# Patient Record
Sex: FEMALE | Race: ASIAN | NOT HISPANIC OR LATINO | Employment: UNEMPLOYED | URBAN - METROPOLITAN AREA
[De-identification: names, ages, dates, MRNs, and addresses within clinical notes are randomized per-mention and may not be internally consistent; named-entity substitution may affect disease eponyms.]

---

## 2017-02-03 ENCOUNTER — GENERIC CONVERSION - ENCOUNTER (OUTPATIENT)
Dept: OTHER | Facility: OTHER | Age: 57
End: 2017-02-03

## 2017-02-14 ENCOUNTER — ALLSCRIPTS OFFICE VISIT (OUTPATIENT)
Dept: OTHER | Facility: OTHER | Age: 57
End: 2017-02-14

## 2017-02-24 ENCOUNTER — GENERIC CONVERSION - ENCOUNTER (OUTPATIENT)
Dept: OTHER | Facility: OTHER | Age: 57
End: 2017-02-24

## 2017-04-12 ENCOUNTER — ALLSCRIPTS OFFICE VISIT (OUTPATIENT)
Dept: OTHER | Facility: OTHER | Age: 57
End: 2017-04-12

## 2017-04-27 ENCOUNTER — ALLSCRIPTS OFFICE VISIT (OUTPATIENT)
Dept: OTHER | Facility: OTHER | Age: 57
End: 2017-04-27

## 2017-06-16 ENCOUNTER — GENERIC CONVERSION - ENCOUNTER (OUTPATIENT)
Dept: OTHER | Facility: OTHER | Age: 57
End: 2017-06-16

## 2017-08-03 DIAGNOSIS — M17.0 PRIMARY OSTEOARTHRITIS OF BOTH KNEES: ICD-10-CM

## 2017-08-03 DIAGNOSIS — M16.12 PRIMARY OSTEOARTHRITIS OF LEFT HIP: ICD-10-CM

## 2017-08-05 ENCOUNTER — LAB CONVERSION - ENCOUNTER (OUTPATIENT)
Dept: OTHER | Facility: OTHER | Age: 57
End: 2017-08-05

## 2017-08-05 LAB
A/G RATIO (HISTORICAL): 1.8 (CALC) (ref 1–2.5)
ALBUMIN SERPL BCP-MCNC: 4.4 G/DL (ref 3.6–5.1)
ALP SERPL-CCNC: 99 U/L (ref 33–130)
ALT SERPL W P-5'-P-CCNC: 26 U/L (ref 6–29)
AST SERPL W P-5'-P-CCNC: 21 U/L (ref 10–35)
BILIRUB SERPL-MCNC: 0.5 MG/DL (ref 0.2–1.2)
BILIRUB UR QL STRIP: NEGATIVE
BUN SERPL-MCNC: 14 MG/DL (ref 7–25)
BUN/CREA RATIO (HISTORICAL): NORMAL (CALC) (ref 6–22)
CALCIUM SERPL-MCNC: 9.3 MG/DL (ref 8.6–10.4)
CHLORIDE SERPL-SCNC: 107 MMOL/L (ref 98–110)
CHOLEST SERPL-MCNC: 192 MG/DL (ref 125–200)
CHOLEST/HDLC SERPL: 2.9 (CALC)
CO2 SERPL-SCNC: 26 MMOL/L (ref 20–31)
COLOR UR: YELLOW
COMMENT (HISTORICAL): CLEAR
CREAT SERPL-MCNC: 0.72 MG/DL (ref 0.5–1.05)
DEPRECATED RDW RBC AUTO: 12.9 % (ref 11–15)
EGFR AFRICAN AMERICAN (HISTORICAL): 106 ML/MIN/1.73M2
EGFR-AMERICAN CALC (HISTORICAL): 92 ML/MIN/1.73M2
FECAL OCCULT BLOOD DIAGNOSTIC (HISTORICAL): NEGATIVE
GAMMA GLOBULIN (HISTORICAL): 2.4 G/DL (CALC) (ref 1.9–3.7)
GLUCOSE (HISTORICAL): 98 MG/DL (ref 65–99)
GLUCOSE (HISTORICAL): NEGATIVE
HCT VFR BLD AUTO: 40.5 % (ref 35–45)
HDLC SERPL-MCNC: 66 MG/DL
HGB BLD-MCNC: 13.3 G/DL (ref 11.7–15.5)
KETONES UR STRIP-MCNC: NEGATIVE MG/DL
LDL CHOLESTEROL (HISTORICAL): 102 MG/DL (CALC)
LEUKOCYTE ESTERASE UR QL STRIP: NEGATIVE
MCH RBC QN AUTO: 30 PG (ref 27–33)
MCHC RBC AUTO-ENTMCNC: 32.8 G/DL (ref 32–36)
MCV RBC AUTO: 91.4 FL (ref 80–100)
NITRITE UR QL STRIP: NEGATIVE
NON-HDL-CHOL (CHOL-HDL) (HISTORICAL): 126 MG/DL (CALC)
PH UR STRIP.AUTO: 6 [PH] (ref 5–8)
PLATELET # BLD AUTO: 234 THOUSAND/UL (ref 140–400)
PMV BLD AUTO: 9.8 FL (ref 7.5–12.5)
POTASSIUM SERPL-SCNC: 4.1 MMOL/L (ref 3.5–5.3)
PROT UR STRIP-MCNC: NEGATIVE MG/DL
RBC # BLD AUTO: 4.43 MILLION/UL (ref 3.8–5.1)
SODIUM SERPL-SCNC: 142 MMOL/L (ref 135–146)
SP GR UR STRIP.AUTO: 1.01 (ref 1–1.03)
T4 FREE SERPL-MCNC: 1.5 NG/DL (ref 0.8–1.8)
TOTAL PROTEIN (HISTORICAL): 6.8 G/DL (ref 6.1–8.1)
TRIGL SERPL-MCNC: 122 MG/DL
TSH SERPL DL<=0.05 MIU/L-ACNC: 2.2 MIU/L (ref 0.4–4.5)
WBC # BLD AUTO: 8.2 THOUSAND/UL (ref 3.8–10.8)

## 2017-08-07 ENCOUNTER — APPOINTMENT (OUTPATIENT)
Dept: PHYSICAL THERAPY | Facility: REHABILITATION | Age: 57
End: 2017-08-07
Payer: COMMERCIAL

## 2017-08-07 PROCEDURE — 97110 THERAPEUTIC EXERCISES: CPT

## 2017-08-07 PROCEDURE — 97162 PT EVAL MOD COMPLEX 30 MIN: CPT

## 2017-08-14 ENCOUNTER — APPOINTMENT (OUTPATIENT)
Dept: PHYSICAL THERAPY | Facility: REHABILITATION | Age: 57
End: 2017-08-14
Payer: COMMERCIAL

## 2017-08-14 PROCEDURE — 97110 THERAPEUTIC EXERCISES: CPT

## 2017-08-17 ENCOUNTER — APPOINTMENT (OUTPATIENT)
Dept: PHYSICAL THERAPY | Facility: REHABILITATION | Age: 57
End: 2017-08-17
Payer: COMMERCIAL

## 2017-08-17 ENCOUNTER — ALLSCRIPTS OFFICE VISIT (OUTPATIENT)
Dept: OTHER | Facility: OTHER | Age: 57
End: 2017-08-17

## 2017-08-21 ENCOUNTER — APPOINTMENT (OUTPATIENT)
Dept: PHYSICAL THERAPY | Facility: REHABILITATION | Age: 57
End: 2017-08-21
Payer: COMMERCIAL

## 2017-08-24 ENCOUNTER — APPOINTMENT (OUTPATIENT)
Dept: PHYSICAL THERAPY | Facility: REHABILITATION | Age: 57
End: 2017-08-24
Payer: COMMERCIAL

## 2017-08-28 ENCOUNTER — ALLSCRIPTS OFFICE VISIT (OUTPATIENT)
Dept: OTHER | Facility: OTHER | Age: 57
End: 2017-08-28

## 2017-10-20 ENCOUNTER — GENERIC CONVERSION - ENCOUNTER (OUTPATIENT)
Dept: OTHER | Facility: OTHER | Age: 57
End: 2017-10-20

## 2017-10-23 NOTE — PROGRESS NOTES
Assessment  1  Hypertension (401 9) (I10)  2  Hyperlipidemia (272 4) (E78 5)   · [facility], Eduardo Owens treats  3  Paroxysmal supraventricular tachycardia (427 0) (I47 1)   · Status post ablation, Dr Tony Alcantar, heart care group  4  Osteoarthritis of left hip, unspecified osteoarthritis type (715 95) (M16 12)  5  Osteoarthritis of both knees (715 96) (M17 0)   · OAA  6  Postsurgical hypothyroidism (244 0) (E89 0)   · Follows up here, no longer seeing endocrinology  7  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 (625 8) (N90 9)  8  Constipation (564 00) (K59 00)  9  Allergic rhinitis (477 9) (J30 9)  10  Headache (784 0) (R51)  11  Obesity (278 00) (E66 9)  12  Encounter for preventive health examination (V70 0) (Z00 00)  13  Change in bowel movement (787 99) (R19 4)    Plan  Allergic rhinitis, Constipation, Health Maintenance, Headache, Hyperlipidemia,  Hypertension, Obesity, Osteoarthritis of both knees, Osteoarthritis of  left hip, unspecified osteoarthritis type, Paroxysmal supraventricular tachycardia,  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, Postsurgical hypothyroidism    · Continue with our present treatment plan ; Status:Complete;   Done: 20TBN5940  05:18PM   · Drink plenty of fluids ; Status:Complete;   Done: 09GVP6338 05:18PM   · Eat a low fat and low cholesterol diet ; Status:Complete;   Done: 71DRU4560 05:18PM   · Start eating more fiber ; Status:Complete;   Done: 82NVE3465 05:18PM   · Follow-up visit in 6 months Evaluation and Treatment  Follow-up  Status: Hold For -  Scheduling  Requested for: 64RCT4606   · (1) CBC/ PLT (NO DIFF); Status:Active; Requested for:37Kpe8829;    · (1) COMPREHENSIVE METABOLIC PANEL; Status:Active; Requested for:46Hvr7327;    · (1) LIPID PANEL, FASTING; Status:Active; Requested for:82Mtr6480;    · (1) OCCULT BLOOD, FECAL IMMUNOCHEMICAL TEST; Status:Active; Requested  for:75Xgn1178;    · (1) T4, FREE; Status:Active; Requested for:76Oqj4115;    · (1) TSH; Status:Active;  Requested for:44Udb5918;    · (1) 8/4/70 was discussed with the patient  Patient does have some mild constipation she's told to increase her by mouth fluid intake and fiber if ineffective return to office patient gained 2 pounds since last office visit  Patient also has a perineal mass was told to see a surgeon by dermatology we'll refer her to Dr Jeannie Holland for surgical consultation      Review of Systems    Constitutional: as noted in HPI  Eyes: No complaints of eye pain, no red eyes, no eyesight problems, no discharge, no dry eyes, no itching of eyes  ENT: no complaints of earache, no loss of hearing, no nose bleeds, no nasal discharge, no sore throat, no hoarseness  Cardiovascular: No complaints of slow heart rate, no fast heart rate, no chest pain, no palpitations, no leg claudication, no lower extremity edema  Respiratory: No complaints of shortness of breath, no wheezing, no cough, no SOB on exertion, no orthopnea, no PND  Gastrointestinal: Patient usually has daily bowel movements over the past 3-4 months had been every 3-4 days no melena change in stool or hematochezia1 1 , but--cf2as noted in HPI1   Genitourinary: No complaints of dysuria, no incontinence, no pelvic pain, no dysmenorrhea, no vaginal discharge or bleeding  Musculoskeletal: as noted in HPI  Integumentary: as noted in HPI  Neurological: No complaints of headache, no confusion, no convulsions, no numbness, no dizziness or fainting, no tingling, no limb weakness, no difficulty walking  Psychiatric: Not suicidal, no sleep disturbance, no anxiety or depression, no change in personality, no emotional problems  Endocrine: No complaints of proptosis, no hot flashes, no muscle weakness, no deepening of the voice, no feelings of weakness  Hematologic/Lymphatic: No complaints of swollen glands, no swollen glands in the neck, does not bleed easily, does not bruise easily  1 Amended By: Dorothy Elder; Aug 17 2017 5:23 PM EST    Active Problems  1  Allergic rhinitis (477 9) (J30 9)  2  Benign hematuria (599 70) (N02 9)  3  Encounter for annual routine gynecological examination (V72 31) (Z01 419)  4  Encounter for routine gynecological examination with Papanicolaou smear of cervix   (V72 31,V76 2) (Z01 419)  5  GERD (gastroesophageal reflux disease) (530 81) (K21 9)  6  Headache (784 0) (R51)  7  Hyperlipidemia (272 4) (E78 5)  8  Hypertension (401 9) (I10)  9  Menopause (627 2) (Z78 0)  10  Obesity (278 00) (E66 9)  11  Osteoarthritis of both knees (715 96) (M17 0)  12  Osteoarthritis of left hip, unspecified osteoarthritis type (715 95) (M16 12)  13  Paroxysmal supraventricular tachycardia (427 0) (I47 1)  14  Postsurgical hypothyroidism (244 0) (E89 0)  15  History of Thyroid cancer (193) (C73)  16  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)  17  Visit for screening mammogram (V76 12) (Z12 31)    Past Medical History  1  History of pregnancy (V13 29)  2  History of Thyroid cancer (193) (C73)    The active problems and past medical history were reviewed and updated today  Surgical History  1  History of Anorectal Myomectomy  2  History of Catheter Ablation Atrial Tachycardia  3  History of Catheter Ablation Atrioventricular Node  4  History of  Section  5  History of Complete Colonoscopy  6  History of Foot Surgery  7  History of Gynecologic Services Thermal Endometrial Ablation  8  History of Heart Surgery  9  History of Knee Surgery  10  History of Myocardial Myectomy  11  History of Thyroid Surgery Total Thyroidectomy    The surgical history was reviewed and updated today  Family History  Mother   1  Family history of Alzheimer Disease  Family History   2  Family history of cerebrovascular accident (V17 1) (Z82 3)  3  Family history of diabetes mellitus (V18 0) (Z83 3)  4  Family history of hypertension (V17 49) (Z82 49)  5   Family history of myocardial infarction (V17 3) (Z82 49)    The family history was reviewed and updated today  Social History   · Being A Social Drinker   · Former smoker (J56 53) (I11 286)   ·   The social history was reviewed and updated today  Current Meds  1  Atorvastatin Calcium 40 MG Oral Tablet; Take 1 tablet daily; Therapy: 82Udv1881 to (Jennifer Hem)  Requested for: 17DTI1773; Last   Rx:85Awb5514 Ordered  2  Azelastine HCl - 0 1 % Nasal Solution; use 2 sprays each nostril twice a day; Therapy: 74Ozv4466 to (Last Rx:64Oqw4656)  Requested for: 02Kli2899 Ordered  3  Benzonatate 200 MG Oral Capsule; Take one capsule orally 3 times daily as needed for   cough; Therapy: 30Pst7203 to (Last Rx:23Lwy5099)  Requested for: 99Njp8610 Ordered  4  Doxycycline Hyclate 100 MG Oral Tablet; TAKE 1 TABLET Twice daily until gone; Therapy: 18RSE9580 to (Last Whitley Cutter)  Requested for: 27Apr2017 Ordered  5  Excedrin Migraine 250-250-65 MG Oral Tablet; as directed on package Recorded  6  Levothyroxine Sodium 112 MCG Oral Tablet; Take 1 tablet daily; Therapy: 73AZN8790 to (Evaluate:10Yqp8635)  Requested for: 80APG2257; Last   Rx:96Nrd9655 Ordered  7  Medrol 4 MG Oral Tablet Therapy Pack; USE AS DIRECTED ON PACKAGE; Therapy: 17XJT1763 to (Last Whitley Cutter)  Requested for: 27Apr2017 Ordered  8  Promethazine HCl - 6 25 MG/5ML Oral Syrup; 1 teaspoon every 4 hours and 2 teaspoons   before bed as needed for cough; Therapy: 11ZKE1572 to (Last Whitley Cutter)  Requested for: 27Apr2017 Ordered  9  Propranolol HCl ER 60 MG Oral Capsule Extended Release 24 Hour; take 1 capsule   daily; Therapy: 50LUT1518 to (Jennifer Hem)  Requested for: 69KSU0581; Last   Rx:66Gkt7399 Ordered    The medication list was reviewed and updated today  Allergies  1  Codeine Derivatives  2  Morphine Derivatives  3   Tramadol    Vitals  Vital Signs    Recorded: 17Aug2017 04:44PM   Heart Rate 60   Respiration 16   Systolic 782   Diastolic 70   Height 5 ft 3 2 in   Weight 206 lb 4 00 oz   BMI Calculated 36 3   BSA Calculated 1 96     Physical Exam    Constitutional   General appearance: No acute distress, well appearing and well nourished  Eyes   Conjunctiva and lids: No swelling, erythema or discharge  Ears, Nose, Mouth, and Throat Mucous membranes are moist    Pulmonary   Respiratory effort: No increased work of breathing or signs of respiratory distress  Auscultation of lungs: Clear to auscultation  Cardiovascular   Palpation of heart: Normal PMI, no thrills  Auscultation of heart: Normal rate and rhythm, normal S1 and S2, without murmurs  Examination of extremities for edema and/or varicosities: Normal     Carotid pulses: Normal     Abdomen Soft nontender positive bowel sounds  Lymphatic   Palpation of lymph nodes in neck: No lymphadenopathy  Musculoskeletal   Gait and station: Normal     Skin Warm and dry  Neurologic Negative Gross focal motor deficit     Psychiatric   Mood and affect: Normal          Signatures   Electronically signed by : Jj Condon DO; Aug 17 2017  5:20PM EST                       (Author)    Electronically signed by : Jj Condon DO; Aug 17 2017  5:23PM EST                       (Author)

## 2017-10-26 ENCOUNTER — GENERIC CONVERSION - ENCOUNTER (OUTPATIENT)
Dept: OTHER | Facility: OTHER | Age: 57
End: 2017-10-26

## 2017-11-20 ENCOUNTER — ALLSCRIPTS OFFICE VISIT (OUTPATIENT)
Dept: OTHER | Facility: OTHER | Age: 57
End: 2017-11-20

## 2017-11-21 NOTE — CONSULTS
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  Excision in the operating room  Counseling Documentation With Imm: The patient was counseled regarding The perioperative expectations  Goals and Barriers: The patient has the current Goals: Surgery as scheduled  The patent has the current Barriers: None identified  Patient Education: Educational resources provided: Soft tissue packet given  Medication SE Review and Pt Understands Tx: Possible side effects of new medications were reviewed with the patient/guardian today  The treatment plan was reviewed with the patient/guardian  The patient/guardian understands and agrees with the treatment plan   Self Referrals:   Self Referrals: No      Chief Complaint  Chief Complaint Free Text Note Form: H&P perineal mass      History of Present Illness  HPI: 59year old female who comes in for evaluation regarding external labial lesion  colonoscopy in 2010 with no polyps  Anesthesia history - no allergies- morphine makes her have a severe headache  Review of Systems  Complete-Female:  Constitutional: no fever-- and-- no chills  Eyes: no eye pain  ENT: no earache  Cardiovascular: no chest pain-- and-- no palpitations  Respiratory: no cough  Gastrointestinal: no nausea  Genitourinary: no pelvic pain  Musculoskeletal: no joint swelling  Integumentary: no itching  Neurological: no numbness  Psychiatric: no anxiety  Endocrine: no muscle weakness  Hematologic/Lymphatic: no tendency for easy bleeding  Active Problems    1  Allergic rhinitis (477 9) (J30 9)   2  Benign hematuria (599 70) (N02 9)   3  Change in bowel movement (787 99) (R19 4)   4  Constipation (564 00) (K59 00)   5  Encounter for routine gynecological examination with Papanicolaou smear of cervix (V72 31,V76 2) (Z01 419)   6   GERD (gastroesophageal reflux disease) (530 81) (K21 9)   7  Headache (784 0) (R51)   8  Hyperlipidemia (272 4) (E78 5)   9  Hypertension (401 9) (I10)   10  Menopause (627 2) (Z78 0)   11  Obesity (278 00) (E66 9)   12  Osteoarthritis of both knees (715 96) (M17 0)   13  Osteoarthritis of left hip, unspecified osteoarthritis type (715 95) (M16 12)   14  Paroxysmal supraventricular tachycardia (427 0) (I47 1)   15  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 (625 8) (N90 9)   16  Postsurgical hypothyroidism (244 0) (E89 0)   17  History of Thyroid cancer (193) (C73)    Past Medical History  1  History of pregnancy (V13 29)   2  History of Thyroid cancer (193) (C73)   3  History of Varicose veins of leg with edema (454 8) (Q09 033)  Active Problems And Past Medical History Reviewed: The active problems and past medical history were reviewed and updated today  Surgical History  1  History of Anorectal Myomectomy   2  History of Catheter Ablation Atrial Tachycardia   3  History of Catheter Ablation Atrioventricular Node   4  History of  Section   5  History of Complete Colonoscopy   6  History of Foot Surgery   7  History of Gynecologic Services Thermal Endometrial Ablation   8  History of Heart Surgery   9  History of Knee Surgery   10  History of Myocardial Myectomy   11  History of Thyroid Surgery Total Thyroidectomy  Surgical History Reviewed: The surgical history was reviewed and updated today  Family History  Mother    1  Family history of Alzheimer Disease  Family History    2  Family history of cerebrovascular accident (V17 1) (Z82 3)   3  Family history of diabetes mellitus (V18 0) (Z83 3)   4  Family history of hypertension (V17 49) (Z82 49)   5  Family history of myocardial infarction (V17 3) (Z82 49)  Family History Reviewed: The family history was reviewed and updated today  Social History     · Being A Social Drinker   · Former smoker (N60 53) (U22 256)   ·   Social History Reviewed:  The social history was reviewed and updated today  Current Meds   1  Atorvastatin Calcium 40 MG Oral Tablet; Take 1 tablet daily; Therapy: 70Ifg0632 to (22 992480)  Requested for: 91XIJ9041; Last Rx:99Tph3957 Ordered   2  Excedrin Migraine 250-250-65 MG Oral Tablet; as directed on package Recorded   3  Levothyroxine Sodium 112 MCG Oral Tablet; Take 1 tablet daily; Therapy: 05AYS9039 to (Evaluate:67Jkj9156)  Requested for: 75IYU5570; Last Rx:63Lgw6801 Ordered   4  Propranolol HCl ER 60 MG Oral Capsule Extended Release 24 Hour; take 1 capsule daily; Therapy: 52OFE1068 to 22 992480)  Requested for: 03GCM0732; Last Rx:52Lft2045 Ordered  Medication List Reviewed: The medication list was reviewed and updated today  Allergies  1  Codeine Derivatives   2  Morphine Derivatives   3  Tramadol    Vitals  Vital Signs    Recorded: 20Nov2017 03:05PM   Temperature 98 1 F, Tympanic   Heart Rate 68, L Radial   Respiration 16   Systolic 501, LUE, Sitting   Diastolic 70, LUE, Sitting   Height 5 ft 3 in   Weight 203 lb    BMI Calculated 35 96   BSA Calculated 1 95       Physical Exam   Constitutional  General appearance: No acute distress, well appearing and well nourished  -- 59year old female who appears her stated age  No acute distress  Pleasant, chatting, answered questions appropriately  Neck  Supple, symmetric, trachea midline, no masses  Pulmonary  Respiratory effort: No increased work of breathing or signs of respiratory distress  Auscultation of lungs: Clear to auscultation, equal breath sounds bilaterally, no wheezes, no rales, no rhonci  Cardiovascular  Auscultation of heart: Normal rate and rhythm, normal S1 and S2, without murmurs  Abdomen  Abdomen: Non-tender, no masses  Liver and spleen: No hepatomegaly or splenomegaly  Skin  Skin and subcutaneous tissue: Normal without rashes or lesions     Psychiatric  Orientation to person, place, and time: Normal    Mood and affect: Normal  Provider Comments  Provider Comments:   nature of the procedure was explained to the patient at great length, including the risks, benefits, alternatives, and complications  The patient is aware of Dr Aldridge Basket statistics and complication rates, they were discussed  preparation was explained to the patient at length, including a bowel preparation if necessary  Patient was instructed to take their hypertension medications prior to surgery, stop any blood thinners, and modulate their diabetes medications as per their primary care physician's instructions  understand the risks and benefits and agreed to the plan        Future Appointments    Date/Time Provider Specialty Site   12/12/2017 11:15 AM Farn Devine [facility]   02/22/2018 04:30 PM Adenike Ramirez DO [facility]       Signatures   Electronically signed by : Shayna Roberts [facility]; Nov 20 2017  3:22PM EST                       (Author)    Electronically signed by : Stacia Cuevas MD; Nov 20 2017  3:29PM EST                       (Author)

## 2017-11-29 ENCOUNTER — ANESTHESIA EVENT (OUTPATIENT)
Dept: PERIOP | Facility: HOSPITAL | Age: 57
End: 2017-11-29
Payer: COMMERCIAL

## 2017-11-29 RX ORDER — ATORVASTATIN CALCIUM 40 MG/1
40 TABLET, FILM COATED ORAL
COMMUNITY
End: 2018-02-22 | Stop reason: SDUPTHER

## 2017-11-29 RX ORDER — LEVOTHYROXINE SODIUM 112 UG/1
112 TABLET ORAL DAILY
COMMUNITY
End: 2018-02-22 | Stop reason: SDUPTHER

## 2017-11-29 RX ORDER — PROPRANOLOL HYDROCHLORIDE 60 MG/1
60 TABLET ORAL
COMMUNITY
End: 2018-02-19

## 2017-11-29 NOTE — PRE-PROCEDURE INSTRUCTIONS
Pre-Surgery Instructions:   Medication Instructions    atorvastatin (LIPITOR) 40 mg tablet Instructed patient per Anesthesia Guidelines   levothyroxine (SYNTHROID) 112 mcg tablet Instructed patient per Anesthesia Guidelines   propranolol (INDERAL) 60 mg tablet Instructed patient per Anesthesia Guidelines  Pt instructed via phone per Dr Santos Burgos to take* levothyroxine with a sip of water the morning of surgery   Pt via phone given/reviewed [facility] preop instructions and she has hibiclens

## 2017-11-30 ENCOUNTER — GENERIC CONVERSION - ENCOUNTER (OUTPATIENT)
Dept: OTHER | Facility: OTHER | Age: 57
End: 2017-11-30

## 2017-11-30 ENCOUNTER — ANESTHESIA (OUTPATIENT)
Dept: PERIOP | Facility: HOSPITAL | Age: 57
End: 2017-11-30
Payer: COMMERCIAL

## 2017-11-30 ENCOUNTER — HOSPITAL ENCOUNTER (OUTPATIENT)
Facility: HOSPITAL | Age: 57
Setting detail: OUTPATIENT SURGERY
Discharge: HOME | End: 2017-11-30
Attending: SURGERY | Admitting: SURGERY
Payer: COMMERCIAL

## 2017-11-30 VITALS
RESPIRATION RATE: 18 BRPM | TEMPERATURE: 97.3 F | BODY MASS INDEX: 34.83 KG/M2 | SYSTOLIC BLOOD PRESSURE: 118 MMHG | HEIGHT: 64 IN | HEART RATE: 74 BPM | WEIGHT: 204 LBS | OXYGEN SATURATION: 98 % | DIASTOLIC BLOOD PRESSURE: 76 MMHG

## 2017-11-30 DIAGNOSIS — N90.9 NONINFLAMMATORY DISORDER OF VULVA AND PERINEUM: ICD-10-CM

## 2017-11-30 PROCEDURE — 88304 TISSUE EXAM BY PATHOLOGIST: CPT | Performed by: SURGERY

## 2017-11-30 RX ORDER — SCOLOPAMINE TRANSDERMAL SYSTEM 1 MG/1
1 PATCH, EXTENDED RELEASE TRANSDERMAL ONCE AS NEEDED
Status: COMPLETED | OUTPATIENT
Start: 2017-11-30 | End: 2017-11-30

## 2017-11-30 RX ORDER — ONDANSETRON 4 MG/1
4 TABLET, ORALLY DISINTEGRATING ORAL EVERY 4 HOURS PRN
Status: DISCONTINUED | OUTPATIENT
Start: 2017-11-30 | End: 2017-11-30 | Stop reason: HOSPADM

## 2017-11-30 RX ORDER — ONDANSETRON 2 MG/ML
4 INJECTION INTRAMUSCULAR; INTRAVENOUS EVERY 4 HOURS PRN
Status: DISCONTINUED | OUTPATIENT
Start: 2017-11-30 | End: 2017-11-30 | Stop reason: HOSPADM

## 2017-11-30 RX ORDER — ACETAMINOPHEN 325 MG/1
650 TABLET ORAL EVERY 6 HOURS PRN
Status: DISCONTINUED | OUTPATIENT
Start: 2017-11-30 | End: 2017-11-30 | Stop reason: HOSPADM

## 2017-11-30 RX ORDER — FENTANYL CITRATE 50 UG/ML
50 INJECTION, SOLUTION INTRAMUSCULAR; INTRAVENOUS
Status: DISCONTINUED | OUTPATIENT
Start: 2017-11-30 | End: 2017-11-30 | Stop reason: HOSPADM

## 2017-11-30 RX ORDER — SODIUM CHLORIDE, SODIUM LACTATE, POTASSIUM CHLORIDE, CALCIUM CHLORIDE 600; 310; 30; 20 MG/100ML; MG/100ML; MG/100ML; MG/100ML
80 INJECTION, SOLUTION INTRAVENOUS CONTINUOUS
Status: DISCONTINUED | OUTPATIENT
Start: 2017-11-30 | End: 2017-11-30 | Stop reason: HOSPADM

## 2017-11-30 RX ORDER — ONDANSETRON 2 MG/ML
INJECTION INTRAMUSCULAR; INTRAVENOUS AS NEEDED
Status: DISCONTINUED | OUTPATIENT
Start: 2017-11-30 | End: 2017-11-30 | Stop reason: SURG

## 2017-11-30 RX ORDER — HYDROCODONE BITARTRATE AND ACETAMINOPHEN 5; 325 MG/1; MG/1
1-2 TABLET ORAL EVERY 6 HOURS PRN
Qty: 30 TABLET | Refills: 0 | Status: SHIPPED | OUTPATIENT
Start: 2017-11-30 | End: 2018-02-19

## 2017-11-30 RX ORDER — SODIUM CHLORIDE 9 MG/ML
125 INJECTION, SOLUTION INTRAVENOUS CONTINUOUS
Status: DISCONTINUED | OUTPATIENT
Start: 2017-11-30 | End: 2017-11-30 | Stop reason: HOSPADM

## 2017-11-30 RX ORDER — MAGNESIUM HYDROXIDE 1200 MG/15ML
LIQUID ORAL AS NEEDED
Status: DISCONTINUED | OUTPATIENT
Start: 2017-11-30 | End: 2017-11-30 | Stop reason: HOSPADM

## 2017-11-30 RX ORDER — MORPHINE SULFATE 4 MG/ML
2 INJECTION, SOLUTION INTRAMUSCULAR; INTRAVENOUS EVERY 2 HOUR PRN
Status: DISCONTINUED | OUTPATIENT
Start: 2017-11-30 | End: 2017-11-30 | Stop reason: HOSPADM

## 2017-11-30 RX ORDER — FENTANYL CITRATE 50 UG/ML
INJECTION, SOLUTION INTRAMUSCULAR; INTRAVENOUS AS NEEDED
Status: DISCONTINUED | OUTPATIENT
Start: 2017-11-30 | End: 2017-11-30 | Stop reason: SURG

## 2017-11-30 RX ORDER — MIDAZOLAM HYDROCHLORIDE 1 MG/ML
INJECTION INTRAMUSCULAR; INTRAVENOUS AS NEEDED
Status: DISCONTINUED | OUTPATIENT
Start: 2017-11-30 | End: 2017-11-30 | Stop reason: SURG

## 2017-11-30 RX ORDER — DEXTROSE AND SODIUM CHLORIDE 5; .45 G/100ML; G/100ML
80 INJECTION, SOLUTION INTRAVENOUS CONTINUOUS
Status: DISCONTINUED | OUTPATIENT
Start: 2017-11-30 | End: 2017-11-30 | Stop reason: HOSPADM

## 2017-11-30 RX ORDER — PROPOFOL 10 MG/ML
INJECTION, EMULSION INTRAVENOUS AS NEEDED
Status: DISCONTINUED | OUTPATIENT
Start: 2017-11-30 | End: 2017-11-30 | Stop reason: SURG

## 2017-11-30 RX ORDER — HYDROCODONE BITARTRATE AND ACETAMINOPHEN 5; 325 MG/1; MG/1
1 TABLET ORAL EVERY 4 HOURS PRN
Status: DISCONTINUED | OUTPATIENT
Start: 2017-11-30 | End: 2017-11-30 | Stop reason: HOSPADM

## 2017-11-30 RX ORDER — LIDOCAINE HYDROCHLORIDE AND EPINEPHRINE 10; 10 MG/ML; UG/ML
INJECTION, SOLUTION INFILTRATION; PERINEURAL AS NEEDED
Status: DISCONTINUED | OUTPATIENT
Start: 2017-11-30 | End: 2017-11-30 | Stop reason: HOSPADM

## 2017-11-30 RX ORDER — DEXAMETHASONE SODIUM PHOSPHATE 4 MG/ML
INJECTION, SOLUTION INTRA-ARTICULAR; INTRALESIONAL; INTRAMUSCULAR; INTRAVENOUS; SOFT TISSUE AS NEEDED
Status: DISCONTINUED | OUTPATIENT
Start: 2017-11-30 | End: 2017-11-30 | Stop reason: SURG

## 2017-11-30 RX ORDER — ONDANSETRON 2 MG/ML
4 INJECTION INTRAMUSCULAR; INTRAVENOUS EVERY 6 HOURS PRN
Status: DISCONTINUED | OUTPATIENT
Start: 2017-11-30 | End: 2017-11-30 | Stop reason: HOSPADM

## 2017-11-30 RX ADMIN — SODIUM CHLORIDE 125 ML/HR: 0.9 INJECTION, SOLUTION INTRAVENOUS at 06:06

## 2017-11-30 RX ADMIN — CEFAZOLIN SODIUM 2000 MG: 2 SOLUTION INTRAVENOUS at 07:41

## 2017-11-30 RX ADMIN — FENTANYL CITRATE 25 MCG: 50 INJECTION INTRAMUSCULAR; INTRAVENOUS at 07:40

## 2017-11-30 RX ADMIN — ONDANSETRON HYDROCHLORIDE 4 MG: 2 INJECTION, SOLUTION INTRAVENOUS at 07:49

## 2017-11-30 RX ADMIN — DEXAMETHASONE SODIUM PHOSPHATE 4 MG: 4 INJECTION, SOLUTION INTRAMUSCULAR; INTRAVENOUS at 07:49

## 2017-11-30 RX ADMIN — SODIUM CHLORIDE 125 ML/HR: 0.9 INJECTION, SOLUTION INTRAVENOUS at 08:40

## 2017-11-30 RX ADMIN — FENTANYL CITRATE 25 MCG: 50 INJECTION INTRAMUSCULAR; INTRAVENOUS at 07:47

## 2017-11-30 RX ADMIN — MIDAZOLAM HYDROCHLORIDE 1 MG: 1 INJECTION, SOLUTION INTRAMUSCULAR; INTRAVENOUS at 07:33

## 2017-11-30 RX ADMIN — LIDOCAINE HYDROCHLORIDE 50 MG: 20 INJECTION, SOLUTION INTRAVENOUS at 07:35

## 2017-11-30 RX ADMIN — ONDANSETRON 4 MG: 2 INJECTION INTRAMUSCULAR; INTRAVENOUS at 08:42

## 2017-11-30 RX ADMIN — FENTANYL CITRATE 50 MCG: 50 INJECTION, SOLUTION INTRAMUSCULAR; INTRAVENOUS at 08:44

## 2017-11-30 RX ADMIN — SCOPALAMINE 1 PATCH: 1 PATCH, EXTENDED RELEASE TRANSDERMAL at 07:29

## 2017-11-30 RX ADMIN — PROPOFOL 200 MG: 10 INJECTION, EMULSION INTRAVENOUS at 07:35

## 2017-11-30 RX ADMIN — MIDAZOLAM HYDROCHLORIDE 1 MG: 1 INJECTION, SOLUTION INTRAMUSCULAR; INTRAVENOUS at 07:28

## 2017-11-30 NOTE — ANESTHESIA PREPROCEDURE EVALUATION
Review of Systems/Medical History  Patient summary reviewed  Chart reviewed  History of anesthetic complications PONV    Cardiovascular  Hyperlipidemia, Hypertension controlled, Dysrhythmias, history of PSVT,   Comment: S/p cardiac ablation ,  Pulmonary       GI/Hepatic    GERD well controlled,             Endo/Other     GYN       Hematology   Musculoskeletal  Osteoarthritis,        Neurology    Headaches,    Psychology           Physical Exam    Airway    Mallampati score: I  TM Distance: >3 FB  Neck ROM: full     Dental   No notable dental hx     Cardiovascular  Rhythm: regular, Rate: normal, Cardiovascular exam normal    Pulmonary  Pulmonary exam normal Breath sounds clear to auscultation,     Other Findings        Anesthesia Plan  ASA Score- 2       Anesthesia Type- general with ASA Monitors  Additional Monitors:   Airway Plan: LMA  Induction- intravenous  Informed Consent- Anesthetic plan and risks discussed with patient

## 2017-11-30 NOTE — ANESTHESIA POSTPROCEDURE EVALUATION
Post-Op Assessment Note      CV Status:  Stable    Mental Status:  Alert and awake    Hydration Status:  Euvolemic    PONV Controlled:  Controlled    Airway Patency:  Patent    Post Op Vitals Reviewed: Yes          Staff: Anesthesiologist           /81 (11/30/17 0910)    Temp     Pulse 64 (11/30/17 0910)   Resp 18 (11/30/17 0910)    SpO2 97 % (11/30/17 0910)

## 2017-11-30 NOTE — DISCHARGE INSTRUCTIONS
[facility] Instructions  Dr Steve Veronica MD, FACS    1  General: You will feel pulling sensations around the wound or funny aches and pains around the incisions  This is normal  Even minor surgery is a change in your body and this is your bodys way of reaction to it  If you have had abdominal surgery, it may help to support the incision with a small pillow or blanket for comfort when moving or coughing  2  Wound care: Make sure to remove the bandage in about 24 hours, unless instructed otherwise  You usually don't have to redress the wound after 24-48 hours, unless for comfort  Keep the incision clean and dry  Let air get to it  If this Steri-Strips fall off, just keep the wound clean  3  Water: You may shower over the wound, unless there are drain tubes left in place  Do not bathe or use a pool or hot tub until cleared by the physician  You may shower right over the staples or Steri-Strips and packing dry when you are done  4  Activity: You may go up and down stairs, walk as much as you are comfortable, but walk at least 3 times each day  If you have had abdominal surgery, do not lift anything heavier than 15 pounds for at least 2-4 weeks, unless cleared by the doctor  5  Diet: You may resume a regular diet  If you had a same-day surgery or overnight stay surgery, you may wish to eat lightly for a few days: soups, crackers, and sandwiches  You may resume a regular diet when ready  6  Medications: Resume all of your previous medications, unless told otherwise by the doctor  Avoid aspirin or ibuprofen (Advil, Motrin, etc ) products for 2-3 days after the date of surgery  You may, at that time, began to take them again  Tylenol is always fine, unless you are taking any narcotic pain medication containing Tylenol (such as Percocet, Darvocet, Vicodin, or anything containing acetaminophen)  Do not take Tylenol if you're taking these medications   You do not need to take the narcotic pain medications unless you are having significant pain and discomfort  7  Driving: You will need someone to drive you home on the day of surgery  Do not drive or make any important decisions while on narcotic pain medication or 24 hours and after anesthesia or sedation for surgery  Generally, you may drive when your off all narcotic pain medications  8  Upset Stomach: You may take Maalox, Tums, or similar items for an upset stomach  If your narcotic pain medication causes an upset stomach, do not take it on an empty stomach  Try taking it with at least some crackers or toast      9  Constipation: Patients often experienced constipation after surgery  You may take over-the-counter medication for this, such as Metamucil, Senokot, Dulcolax, milk of magnesia, etc  You may take a suppository unless you have had anorectal surgery such as a procedure on your hemorrhoids  If you experience significant nausea or vomiting after abdominal surgery, call the office before trying any of these medications  10  Call the office: If you are experiencing any of the following, fevers above 101 5°, significant nausea or vomiting, if the wound develops drainage and/or is excessive redness around the wound, or if you have significant diarrhea or other worsening symptoms  11  Pain: You may be given a prescription for pain  This will be given to the hospital, the day of surgery  12  Sexual Activity: You may resume sexual activity when you feel ready and comfortable and your incision is sealed and healed without apparent infection risk  13  Urination: If you haven't urinated in 6 hours, go directly to the ER for evaluation for urinary retention  [address], [address]  [facility] [address]   Phone: [phone]      Scopolamine (Absorbed through the skin)   Scopolamine (sbln-AXH-k-isabell)  Treat nausea and vomiting     Brand Name(s): Transderm Scop   There may be other brand names for this medicine  When This Medicine Should Not Be Used: You should not use this medicine if you have had an allergic reaction to scopolamine, or if you have narrow angle glaucoma  How to Use This Medicine:   Patch  · Your doctor will tell you how many patches to use, where to apply them, and how often to apply them  Do not use more patches or apply them more often than your doctor tells you to  · Read and follow the patient instructions that come with this medicine  Talk to your doctor or pharmacist if you have any questions  · To prevent motion sickness, apply the patch at least 4 hours before you need it  · Wash and dry your hands thoroughly before applying the patch  · Leave the patch in its sealed wrapper until you are ready to put it on  Tear the wrapper open carefully  NEVER CUT the wrapper or the patch with scissors  Do not use any patch that has been cut by accident  · Take the liner off the sticky side before applying  · Apply the patch to dry, hairless skin behind the ear  · If the patch is loose or falls off, apply a new patch at a different place behind the ear  · After you take off the patch, wash the place where the patch was and your hands thoroughly  · Only one patch should be used at any time  If a dose is missed:   · If you forget to wear or change a patch, put one on as soon as you can  If it is almost time to put on your next patch, wait until then to apply a new patch and skip the one you missed  Do not apply extra patches to make up for a missed dose  How to Store and Dispose of This Medicine:   · Store the patches at room temperature in a closed container, away from heat, moisture, and direct light  · Fold the used patch in half with the sticky sides together  Throw any used patch away so that children or pets cannot get to it  You will also need to throw away old patches after the expiration date has passed    · Keep all medicine out of the reach of children  Never share your medicine with anyone  Drugs and Foods to Avoid:   Ask your doctor or pharmacist before using any other medicine, including over-the-counter medicines, vitamins, and herbal products  · Tell your doctor if you use anything else that makes you sleepy  Some examples are allergy medicine, narcotic pain medicine, and alcohol  · Do not drink alcohol while you are using this medicine  Warnings While Using This Medicine:   · Make sure your doctor knows if you are pregnant or breastfeeding, or if you have glaucoma, prostate problems, trouble urinating, blocked bowels, liver disease, kidney disease, or a history of seizures or mental illness  · This medicine can cause blurring of vision and other vision problems if it comes in contact with the eyes  This medicine may also cause problems with urination  If any of these reactions occur, remove the patch and call your doctor right away  · This medicine may make you dizzy or drowsy  Avoid driving, using machines, or doing anything else that could be dangerous if you are not alert  If you plan to participate in underwater sports, this medicine may cause disorienting effects  If this is a concern for you, talk with your doctor  · This medicine may make you sweat less and cause your body to get too hot  Be careful in hot weather, when you are exercising, or if using a sauna or whirlpool  · Tell any doctor or dentist who treats you that you are using this medicine  This medicine may affect certain medical test results  · Skin burns have been reported at the patch site in several patients wearing an aluminized transdermal system during a magnetic resonance imaging scan (MRI)  Because Transderm Sc?p® contains aluminum, it is recommended to remove the system before undergoing an MRI    Possible Side Effects While Using This Medicine:   Call your doctor right away if you notice any of these side effects:  · Allergic reaction: Itching or hives, swelling in your face or hands, swelling or tingling in your mouth or throat, chest tightness, trouble breathing  · Blurred vision  · Confusion or memory loss  · Fast, slow, or uneven heartbeat  · Lightheadedness, dizziness, drowsiness, or fainting  · Seeing, hearing, or feeling things that are not there  · Severe eye pain  · Trouble urinating  If you notice these less serious side effects, talk with your doctor:   · Dry mouth  · Dry, itchy, or red eyes  · Restlessness  · Skin rash or redness  If you notice other side effects that you think are caused by this medicine, tell your doctor  Call your doctor for medical advice about side effects  You may report side effects to FDA at 2-534-FDA-5846  © 2017 [address] Information is for End User's use only and may not be sold, redistributed or otherwise used for commercial purposes  The above information is an  only  It is not intended as medical advice for individual conditions or treatments  Talk to your doctor, nurse or pharmacist before following any medical regimen to see if it is safe and effective for you

## 2017-11-30 NOTE — OP NOTE
EXCISION RIGHT PERINEAL LIPOMA/ MASS, REMOVAL OF SKIN TAG  Postoperative Note  PATIENT NAME: Dyan Yeh  : 1960  MRN: [id number]  AL OR ROOM 05    Surgery Date: 2017    Pre operative diagnosis:   Noninflammatory disorder of vulva and perineum [N90 9]    Operative Indications:  Soft tissue mass    Operative Findings:  4 cm angio lipoma  A 2 mm skin tag    Consent:  The risks, benefits, and alternatives to the surgery were discussed with the patient and with the family prior to surgery, personally by Dr Sukumar Ang  If the consent was obtained by the physician assistant or other representative, the consent was reviewed once again personally by the operating physician  Common complications particular for this procedure as well as unusual complications were discussed, including but not limited to:  bleeding, wound infection, prolonged wound healing, open wounds, reoperation and/or recurrence of the lesion  Additional consent was obtained for the small skin tag just lateral to the larger lesion  A  was used if necessary  The patient expressed understanding of the issues discussed and wished and consented to the procedure to proceed  All questions were answered  Dr Sukumar Ang personally discussed the informed consent with this patient  Post operative diagnosis :and findings  Post-Op Diagnosis Codes: Mass of the right perineum, consistent with lipoma  Skin tag      Procedure:   Procedure(s):  EXCISION RIGHT PERINEAL LIPOMA/ MASS, REMOVAL OF SKIN TAG    Surgeon(s) and Role:     * Adam Dolan MD - Primary     * Gabi Lowery DO - Assisting    The Physician Assistant was medically necessary for surgical safety the case including suturing, retraction, and hemostasis  No qualified resident was available  I was present for the entire procedure       Drains:       Specimens:  ID Type Source Tests Collected by Time Destination   1 : Angiolipoma Right Perinium Tissue Soft Tissue, Lipoma TISSUE EXAM Moni Mccarthy MD 2017 [id number]    2 : Right Perinium Tissue Skin, Cyst/Tag/Debridement TISSUE EXAM Moni Mccarthy MD 2017 2708        Estimated Blood Loss:   Minimal    Anesthesia Type:   Choice     Procedure: The patient was seen in the Holding Room  The risks, benefits, complications, treatment options, and expected outcomes were discussed with the patient  The possibilities of reaction to medication, bleeding, infection, the need for additional procedures, failure to diagnose a condition, and creating a complication operation were discussed with the patient  The patient concurred with the proposed plan, giving informed consent  The site of surgery properly noted/marked  The patient was taken to Operating Room, identified as Ashanti Real and staff verified the patient name, , site, and laterality, if applicable  A Time Out was held and the above information confirmed  The patient was placed supine  The perineum was prepped and draped in standard fashion  Local anesthesia was used to anesthetize the skin surrounding a 4 cm lesion  A oblique elliptical incision was made over the lesion  Sharp and blunt dissection were used to mobilize the mass which was in a subcutaneous location  Hemostasis was achieved with cautery  Scissors, knife, and cautery were used in the excision so that 2mm  margins were taken around the lesion  Closure was achieved a with layered closure utilizing a 3-0 Vicryl subcutaneous layer and a  4-0 Monocryl subcuticular stitch  This was then covered with a layer of Histoacryl  A small additional skin tag just lateral to this was removed measuring approximately 2 mm, and this was closed using Histoacryl  At the end of the operation, all sponge, instrument, and needle counts were correct  Skin and soft tissue and fat were removed along with the mass         Some portions of this records may have been generated with voice recognition software  There may be translation, syntax,  or grammatical errors  Occasional wrong word or "sound-a-like" substitutions may have occurred due to the inherent limitations of the voice recognition software  Read the chart carefully and recognize, using context, where substations may have occurred  If you have any questions, please contact the dictating provider for clarification or correction, as needed         Complications: None    Condition: Stable to PACU    SIGNATURE: Yonny Schumacher MD   DATE: November 30, 2017   TIME: 8:35 AM

## 2017-12-01 NOTE — PROGRESS NOTES
Pt had min bleeding noted on marilynn pad that was "oozying" incision site closed w/hystocyl that was intact  Dr Eliezer Madden made aware and came to bedside to assess pt  Pt was concerned  Dr Eliezer Madden spoke to pt and advised skin tags bleed advised to place band aid to site  Also advised pressure to site  Ice/peripad applied over top of marilynn pad  Periwash bottle also given to keep site clean

## 2017-12-05 ENCOUNTER — GENERIC CONVERSION - ENCOUNTER (OUTPATIENT)
Dept: OTHER | Facility: OTHER | Age: 57
End: 2017-12-05

## 2017-12-07 ENCOUNTER — GENERIC CONVERSION - ENCOUNTER (OUTPATIENT)
Dept: OTHER | Facility: OTHER | Age: 57
End: 2017-12-07

## 2017-12-12 ENCOUNTER — ALLSCRIPTS OFFICE VISIT (OUTPATIENT)
Dept: OTHER | Facility: OTHER | Age: 57
End: 2017-12-12

## 2017-12-14 NOTE — PROGRESS NOTES
Assessment    1  History of 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 (625 8) (N90 9)    Discussion/Summary    52-year-old female status post excision of lipoma from light right perineal/labial area  Reviewed with the patient  Benign, as expected  She is given a copy of the pathology report  All questions answered  Postoperative restrictions reviewed  See back on an as-needed basis  Chief Complaint  Post op lipoma excision marilynn area 11/30      Post-Op  HPI: 52-year-old female status post excision of right perineal lipoma and skin tag on November 30, 2017   had some minor hip discomfort, likely secondary to positioning intraoperatively for the 1st day postoperative but this has resolved completely  Review of Systems   Constitutional: no fever-- and-- no chills  Active Problems    1  Allergic rhinitis (477 9) (J30 9)   2  Benign hematuria (599 70) (N02 9)   3  Change in bowel movement (787 99) (R19 4)   4  Constipation (564 00) (K59 00)   5  Encounter for routine gynecological examination with Papanicolaou smear of cervix (V72 31,V76 2) (Z01 419)   6  GERD (gastroesophageal reflux disease) (530 81) (K21 9)   7  Headache (784 0) (R51)   8  Hyperlipidemia (272 4) (E78 5)   9  Hypertension (401 9) (I10)   10  Menopause (627 2) (Z78 0)   11  Obesity (278 00) (E66 9)   12  Osteoarthritis of both knees (715 96) (M17 0)   13  Osteoarthritis of left hip, unspecified osteoarthritis type (715 95) (M16 12)   14  Paroxysmal supraventricular tachycardia (427 0) (I47 1)   15  Postsurgical hypothyroidism (244 0) (E89 0)   16  History of Thyroid cancer (193) (C73)    Social History     · Being A Social Drinker   · Former smoker (O24 91) (I82 067)   · Social smoker never a consistent smoker   ·     Current Meds   1  Atorvastatin Calcium 40 MG Oral Tablet; Take 1 tablet daily; Therapy: 03Vuj7818 to (Simeon Moran)  Requested for: 70ULM1877; Last Rx:[id number] Ordered   2   Excedrin Migraine 250-250-65 MG Oral Tablet; as directed on package Recorded   3  Hydrocodone-Acetaminophen 5-325 MG Oral Tablet; TAKE 1 TABLET EVERY 4 TO 6 HOURS AS NEEDED; Therapy: 61UKO6445 to (Evaluate:02Jsp1471); Last Rx:08Fpn4924 Ordered   4  Levothyroxine Sodium 112 MCG Oral Tablet; Take 1 tablet daily; Therapy: 00CSP9643 to (Evaluate:36Lfn4236)  Requested for: 45XBE4616; Last Rx:27Tke1274 Ordered   5  Propranolol HCl ER 60 MG Oral Capsule Extended Release 24 Hour; take 1 capsule daily; Therapy: 80LTZ3120 to (Ana Rosa Eubanks)  Requested for: 08BWG9701; Last Rx:96Gbv5240 Ordered    Allergies  1  Codeine Derivatives   2  Morphine Derivatives   3  Tramadol    Vitals   Recorded: 12Dec2017 11:22AM   Temperature 98 2 F, Tympanic   Heart Rate 72   Respiration 18   Systolic 691, LUE, Sitting   Diastolic 90, LUE, Sitting   Height 5 ft 3 in   Weight 200 lb    BMI Calculated 35 43   BSA Calculated 1 93       Physical Exam   Constitutional Pleasant well-appearing  No acute distress  Additional Exam:  Right marilynn labial area status post excision  Expected postoperative swelling  Minimal tenderness  Monocryl suture removed from the inferior most aspect of the incision  No redness or drainage  No evidence of infection        Future Appointments    Date/Time Provider Specialty Site   02/22/2018 04:30 PM Eldon Ramirez DO [facility]       Signatures   Electronically signed by : Yasmine Christopher [facility]; Dec 12 2017 12:23PM EST                       (Author)    Electronically signed by : Zena Mattson MD; Dec 13 2017  9:56AM EST

## 2018-01-11 NOTE — RESULT NOTES
Verified Results  * DXA BONE DENSITY SPINE HIP AND PELVIS [id number] 09:34AM Marlen Calender Order Number: [id number]    - Patient Instructions: To schedule this appointment, please contact Central Scheduling at [phone]  Test Name Result Flag Reference   DXA BONE DENSITY SPINE HIP AND PELVIS (Report)     CENTRAL DXA SCAN     CLINICAL HISTORY:  60year old post-menopausal  female risk factors include estrogen deficiency  Personal history thyroid cancer  TECHNIQUE: Bone densitometry was performed using a [employer] W bone densitometer  Regions of interest appear properly placed  There are no obvious fractures or other confounding variables which could limit the study  Her elevated BMI will also    influence the T score result  COMPARISON: Baseline     RESULTS:    LUMBAR SPINE: L1-L4:   BMD 1 218 gm/cm2   T-score above normal, +1 6   Z-score +2 9     LEFT TOTAL HIP:   BMD 1 046 gm/cm2   T-score normal, 0 9   Z-score +1 7     LEFT FEMORAL NECK:   BMD 0 982 gm/cm2   T-score above normal, +1 2   Z-score +2 4             IMPRESSION:   1  Based on the [facility] classification, this study identifies above normal bone density and the patient is considered at low risk for fracture  2  A daily intake of calcium of at least 1200 mg and vitamin D, 800-1000 IU, as well as weight bearing and muscle strengthening exercise, fall prevention and avoidance of tobacco and excessive alcohol intake as basic preventive measures are recommended  3  Repeat DXA scan on the same equipment in 18-24 months as clinically indicated  The 10 year risk of hip fracture is <0 1%, with the 10 year risk of major osteoporotic fracture being 4 8%, as calculated by the [facility] fracture risk assessment tool (FRAX)  The current NOF guidelines recommend treating patients with FRAX 10 year risk    score of >3% for hip fracture and >20% for major osteoporotic fracture        WHO CLASSIFICATION:   Normal (a T-score of -1 0 or higher)   Low bone mineral density (a T-score of less than -1 0 but higher than -2 5)   Osteoporosis (a T-score of -2 5 or less)   Severe osteoporosis (a T-score of -2 5 or less with a fragility fracture)         Thank you for allowing us the opportunity to participate in your patient care  The expanded DEXA report will no longer be arriving in your mail  If you desire to view the full report please contact [address] [place] [address] or access the PACS system         Workstation performed: [id number]     Signed by:   Venus Wade MD   12/22/16

## 2018-01-12 VITALS
DIASTOLIC BLOOD PRESSURE: 68 MMHG | BODY MASS INDEX: 36.21 KG/M2 | SYSTOLIC BLOOD PRESSURE: 110 MMHG | HEIGHT: 63 IN | HEART RATE: 64 BPM | WEIGHT: 204.4 LBS

## 2018-01-12 NOTE — RESULT NOTES
Verified Results  (1) COMPREHENSIVE METABOLIC PANEL [id number] 43:20ZN Livia Ramirez     Test Name Result Flag Reference   GLUCOSE 78 mg/dL  65-99   Fasting reference interval   UREA NITROGEN (BUN) 13 mg/dL  7-25   CREATININE 0 67 mg/dL  0 50-1 05   For patients >52years of age, the reference limit  for Creatinine is approximately 13% higher for people  identified as -American  eGFR NON-AFR  AMERICAN 97 mL/min/1 73m2  > OR = 60   eGFR AFRICAN AMERICAN 112 mL/min/1 73m2  > OR = 60   BUN/CREATININE RATIO   0-40   NOT APPLICABLE (calc)   SODIUM 141 mmol/L  135-146   POTASSIUM 4 0 mmol/L  3 5-5 3   CHLORIDE 105 mmol/L     CARBON DIOXIDE 25 mmol/L  20-31   CALCIUM 8 8 mg/dL  8 6-10 4   PROTEIN, TOTAL 6 4 g/dL  6 1-8 1   ALBUMIN 4 2 g/dL  3 6-5 1   GLOBULIN 2 2 g/dL (calc)  1 9-3 7   ALBUMIN/GLOBULIN RATIO 1 9 (calc)  1 0-2 5   BILIRUBIN, TOTAL 0 6 mg/dL  0 2-1 2   ALKALINE PHOSPHATASE 81 U/L     AST 19 U/L  10-35   ALT 21 U/L  6-29     (1) LIPID PANEL, FASTING [id number] 02:45PM Noel Ramirez     Test Name Result Flag Reference   CHOLESTEROL, TOTAL 195 mg/dL  125-200   HDL CHOLESTEROL 62 mg/dL  > OR = 46   TRIGLICERIDES 702 mg/dL H <150   LDL-CHOLESTEROL 101 mg/dL (calc)  <130   Desirable range <100 mg/dL for patients with CHD or  diabetes and <70 mg/dL for diabetic patients with  known heart disease  CHOL/HDLC RATIO 3 1 (calc)  < OR = 5 0   NON HDL CHOLESTEROL 133 mg/dL (calc)     Target for non-HDL cholesterol is 30 mg/dL higher than   LDL cholesterol target       (1) T4, FREE [id number] 02:45PM Noel Ramirez     Test Name Result Flag Reference   T4, FREE 1 5 ng/dL  0 8-1 8     (Q) CBC (H/H, RBC, INDICES, WBC, PLT) [id number] 02:45PM Noel Ramirez     Test Name Result Flag Reference   WHITE BLOOD CELL COUNT 7 6 Thousand/uL  3 8-10 8   RED BLOOD CELL COUNT 4 40 Million/uL  3 80-5 10   HEMOGLOBIN 13 5 g/dL  11 7-15 5   HEMATOCRIT 40 2 %  35 0-45 0   MCV 91 3 fL  80 0-100 0 MCH 30 7 pg  27 0-33 0   MCHC 33 6 g/dL  32 0-36 0   RDW 13 8 %  11 0-15 0   PLATELET COUNT 373 Thousand/uL  140-400   MPV 8 0 fL  7 5-11 5     (Q) TSH, 3RD GENERATION [id number] 02:45PM Noel Ramirez     Test Name Result Flag Reference   TSH 3 58 mIU/L  0 40-4 50     (Q) URINALYSIS REFLEX [id number] 02:45PM Noel Ramirez   REPORT COMMENT:  FASTING:YES     Test Name Result Flag Reference   COLOR YELLOW  YELLOW   APPEARANCE CLEAR  CLEAR   SPECIFIC GRAVITY 1 025  1 001-1 035   PH 5 5  5 0-8 0   GLUCOSE NEGATIVE  NEGATIVE   BILIRUBIN NEGATIVE  NEGATIVE   KETONES NEGATIVE  NEGATIVE   OCCULT BLOOD 1+ A NEGATIVE   PROTEIN NEGATIVE  NEGATIVE   NITRITE NEGATIVE  NEGATIVE   LEUKOCYTE ESTERASE NEGATIVE  NEGATIVE   WBC 0-5 /HPF  < OR = 5   RBC 0-2 /HPF  < OR = 2   SQUAMOUS EPITHELIAL CELLS NONE SEEN /HPF  < OR = 5   BACTERIA NONE SEEN /HPF  NONE SEEN   HYALINE CAST NONE SEEN /LPF  NONE SEEN

## 2018-01-13 VITALS
BODY MASS INDEX: 36.54 KG/M2 | DIASTOLIC BLOOD PRESSURE: 70 MMHG | SYSTOLIC BLOOD PRESSURE: 118 MMHG | HEIGHT: 63 IN | HEART RATE: 60 BPM | WEIGHT: 206.25 LBS | RESPIRATION RATE: 16 BRPM

## 2018-01-13 VITALS
HEIGHT: 63 IN | BODY MASS INDEX: 36.39 KG/M2 | TEMPERATURE: 98.9 F | DIASTOLIC BLOOD PRESSURE: 80 MMHG | SYSTOLIC BLOOD PRESSURE: 114 MMHG | HEART RATE: 68 BPM | WEIGHT: 205.4 LBS

## 2018-01-14 VITALS
DIASTOLIC BLOOD PRESSURE: 70 MMHG | HEART RATE: 68 BPM | TEMPERATURE: 98.7 F | WEIGHT: 204 LBS | BODY MASS INDEX: 35.91 KG/M2 | SYSTOLIC BLOOD PRESSURE: 110 MMHG

## 2018-01-14 VITALS
RESPIRATION RATE: 16 BRPM | TEMPERATURE: 98 F | HEART RATE: 80 BPM | DIASTOLIC BLOOD PRESSURE: 77 MMHG | WEIGHT: 205 LBS | BODY MASS INDEX: 36.32 KG/M2 | SYSTOLIC BLOOD PRESSURE: 121 MMHG | HEIGHT: 63 IN

## 2018-01-14 VITALS
HEIGHT: 63 IN | TEMPERATURE: 98.1 F | WEIGHT: 203 LBS | DIASTOLIC BLOOD PRESSURE: 70 MMHG | RESPIRATION RATE: 16 BRPM | HEART RATE: 68 BPM | SYSTOLIC BLOOD PRESSURE: 140 MMHG | BODY MASS INDEX: 35.97 KG/M2

## 2018-01-15 NOTE — RESULT NOTES
Verified Results  (1) COMPREHENSIVE METABOLIC PANEL [id number] 02:14TT Devyivernonanai Noel     Test Name Result Flag Reference   GLUCOSE 80 mg/dL  65-99   Fasting reference interval   UREA NITROGEN (BUN) 17 mg/dL  7-25   CREATININE 0 70 mg/dL  0 50-1 05   For patients >52years of age, the reference limit  for Creatinine is approximately 13% higher for people  identified as -American  eGFR NON-AFR  AMERICAN 96 mL/min/1 73m2  > OR = 60   eGFR AFRICAN AMERICAN 111 mL/min/1 73m2  > OR = 60   BUN/CREATININE RATIO   8-73   NOT APPLICABLE (calc)   SODIUM 139 mmol/L  135-146   POTASSIUM 4 1 mmol/L  3 5-5 3   CHLORIDE 103 mmol/L     CARBON DIOXIDE 24 mmol/L  19-30   CALCIUM 9 2 mg/dL  8 6-10 4   PROTEIN, TOTAL 7 2 g/dL  6 1-8 1   ALBUMIN 4 7 g/dL  3 6-5 1   GLOBULIN 2 5 g/dL (calc)  1 9-3 7   ALBUMIN/GLOBULIN RATIO 1 9 (calc)  1 0-2 5   BILIRUBIN, TOTAL 0 6 mg/dL  0 2-1 2   ALKALINE PHOSPHATASE 83 U/L     AST 24 U/L  10-35   ALT 29 U/L  6-29     (1) LIPID PANEL, FASTING [id number] 02:50PM Deyvilise Noel     Test Name Result Flag Reference   CHOLESTEROL, TOTAL 206 mg/dL H 125-200   HDL CHOLESTEROL 63 mg/dL  > OR = 46   TRIGLICERIDES 104 mg/dL  <150   LDL-CHOLESTEROL 113 mg/dL (calc)  <130   Desirable range <100 mg/dL for patients with CHD or  diabetes and <70 mg/dL for diabetic patients with  known heart disease  CHOL/HDLC RATIO 3 3 (calc)  < OR = 5 0   NON HDL CHOLESTEROL 143 mg/dL (calc)     Target for non-HDL cholesterol is 30 mg/dL higher than   LDL cholesterol target       (1) T4, FREE [id number] 02:50PM EliasNoel hernandez     Test Name Result Flag Reference   T4, FREE 1 4 ng/dL  0 8-1 8     (1) URINALYSIS (will reflex a microscopy if leukocytes, occult blood, protein or nitrites are not within normal limits) [id number] 02:50PM Protectus TechnologiesNoel hernandez     Test Name Result Flag Reference   COLOR YELLOW  YELLOW   APPEARANCE CLEAR  CLEAR   SPECIFIC GRAVITY 1 018  1 001-1 035   PH 5 5  5 0-8 0 GLUCOSE NEGATIVE  NEGATIVE   BILIRUBIN NEGATIVE  NEGATIVE   KETONES NEGATIVE  NEGATIVE   OCCULT BLOOD TRACE A NEGATIVE   PROTEIN NEGATIVE  NEGATIVE   NITRITE NEGATIVE  NEGATIVE   LEUKOCYTE ESTERASE NEGATIVE  NEGATIVE   WBC NONE SEEN /HPF  < OR = 5   RBC NONE SEEN /HPF  < OR = 2   SQUAMOUS EPITHELIAL CELLS NONE SEEN /HPF  < OR = 5   BACTERIA NONE SEEN /HPF  NONE SEEN   HYALINE CAST NONE SEEN /LPF  NONE SEEN     (Q) CBC (H/H, RBC, INDICES, WBC, PLT) [id number] 02:50PM Noel Ramirez     Test Name Result Flag Reference   WHITE BLOOD CELL COUNT 8 1 Thousand/uL  3 8-10 8   RED BLOOD CELL COUNT 4 49 Million/uL  3 80-5 10   HEMOGLOBIN 13 5 g/dL  11 7-15 5   HEMATOCRIT 42 1 %  35 0-45 0   MCV 93 8 fL  80 0-100 0   MCH 30 1 pg  27 0-33 0   MCHC 32 1 g/dL  32 0-36 0   RDW 13 4 %  11 0-15 0   PLATELET COUNT 705 Thousand/uL  140-400     (Q) TSH, 3RD GENERATION [id number] 02:50PM Noel Ramirez   REPORT COMMENT:  FASTING:YES     Test Name Result Flag Reference   TSH 4 14 mIU/L  0 40-4 50

## 2018-01-18 NOTE — RESULT NOTES
Verified Results  MAMMO SCREENING BILATERAL W 3D & CAD [id number] 09:10AM Demetrius Fortune Order Number: [id number]    - Patient Instructions: To schedule this appointment, please contact Central Scheduling at [phone]  Do not wear any perfume, powder, lotion or deodorant on breast or underarm area  Please bring your doctors order, referral (if needed) and insurance information with you on the day of the test  Failure to bring this information may result in this test being rescheduled  Arrive 15 minutes prior to your appointment time to register  On the day of your test, please bring any prior mammogram or breast studies with you that were not performed at a [facility]  Failure to bring prior exams may result in your test needing to be rescheduled   Order Number: [id number]    - Patient Instructions: To schedule this appointment, please contact Central Scheduling at [phone]  Do not wear any perfume, powder, lotion or deodorant on breast or underarm area  Please bring your doctors order, referral (if needed) and insurance information with you on the day of the test  Failure to bring this information may result in this test being rescheduled  Arrive 15 minutes prior to your appointment time to register  On the day of your test, please bring any prior mammogram or breast studies with you that were not performed at a [facility]  Failure to bring prior exams may result in your test needing to be rescheduled  Test Name Result Flag Reference   MAMMO SCREENING BILATERAL W 3D & CAD (Report)     Patient History:   Patient is postmenopausal, has history of other cancer at age 48,   and had first child at age 27  No known family history of cancer  Patient is a former smoker  Patient's BMI is 35 1  Reason for exam: screening (asymptomatic)       Mammo Screening Bilateral W DBT and CAD: December 21, 2016 -    Check In #: [[id number]]   2D/3D Procedure   3D views: Bilateral MLO view(s) were taken  2D views: Bilateral CC view(s) were taken  Technologist: ELISE Whitehead (R)(M)   Prior study comparison: October 17, 2014, bilateral digital    screening mammogram performed at [place] [address]  April 10, 2012, left breast mammogram, performed   at [place] [address] [facility]  April 9, 2012, bilateral   mammogram, performed at [facility]  April 4, 2011, bilateral mammogram, performed at [facility]  July 17, 2009, bilateral mammogram, performed at    [facility]  July 16, 2008, bilateral    mammogram, performed at [facility]  There are scattered fibroglandular densities  No dominant soft tissue mass, architectural distortion or    suspicious calcifications are noted in either breast  The skin    and nipple contours are within normal limits  No significant changes when compared with prior studies  ASSESSMENT: BiRad:1 - Negative     Recommendation:   Routine screening mammogram of both breasts in 1 year  A    reminder letter will be scheduled  8-10% of cancers will be missed on mammography  Management of a    palpable abnormality must be based on clinical grounds  Patients    will be notified of their results via letter from our facility  Accredited by [employer] of Radiology and FDA       Transcription Location: ELISE Obrien 98: [id number]     Risk Value(s):   Tyrer-Cuzick 10 Year: 4 909%, Tyrer-Cuzick Lifetime: 13 203%,    Myriad Table: 1 5%, WARREN 5 Year: 1 8%, NCI Lifetime: 10 5%

## 2018-01-18 NOTE — MISCELLANEOUS
Message   Recorded as Task   Date: 11/30/2017 10:41 AM, Created By: Alice Segura   Task Name: Hospital Call   Assigned To: [facility],Team   Regarding Patient: Gavin Lorenzo, Status: Active   CommentAaron Elizondo - 30 Nov 2017 10:41 AM     TASK CREATED  [place] from same day surgery at [facility] calling, pt has lipoma removed from marilynn area this am and is having some bright red blood ozzing from incision site, pad was changed and ice applied, was about 50 cent size drainage noted  Area continues to ooze but incision site looks good, unable to tell where drainage is coming from  Called place to Dr Luis Romo and await return call  Alice Segura - 30 Nov 2017 10:44 AM     TASK EDITED  About 15 mins later [place] calling again from same day sugery pt would like to go home and continues with oozing, moved her bowels and remains concerned  About half dollar size drainage noted this time  IV has been removed and pt would like some reassurance this is ok  Spoke w/ Dr Luis Romo and she is heading down to same day surgery and will evaluate this before pt is d'cd  Active Problems    1  Allergic rhinitis (477 9) (J30 9)   2  Benign hematuria (599 70) (N02 9)   3  Change in bowel movement (787 99) (R19 4)   4  Constipation (564 00) (K59 00)   5  Encounter for routine gynecological examination with Papanicolaou smear of cervix   (V72 31,V76 2) (Z01 419)   6  GERD (gastroesophageal reflux disease) (530 81) (K21 9)   7  Headache (784 0) (R51)   8  Hyperlipidemia (272 4) (E78 5)   9  Hypertension (401 9) (I10)   10  Menopause (627 2) (Z78 0)   11  Obesity (278 00) (E66 9)   12  Osteoarthritis of both knees (715 96) (M17 0)   13  Osteoarthritis of left hip, unspecified osteoarthritis type (715 95) (M16 12)   14  Paroxysmal supraventricular tachycardia (427 0) (I47 1)   15  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 (625 8) (N90 9)   16  Postsurgical hypothyroidism (244 0) (E89 0)   17   History of Thyroid cancer (193) (C73)    Current Meds   1  Atorvastatin Calcium 40 MG Oral Tablet; Take 1 tablet daily; Therapy: 96Fwr7778 to (Shelvia Kitchen)  Requested for: 91OMM9239; Last   Rx:25Sqd0860 Ordered   2  Excedrin Migraine 250-250-65 MG Oral Tablet; as directed on package Recorded   3  Levothyroxine Sodium 112 MCG Oral Tablet; Take 1 tablet daily; Therapy: 20YLH6870 to (Evaluate:48Mvn3887)  Requested for: 36BNC0506; Last   Rx:35Jdw7517 Ordered   4  Propranolol HCl ER 60 MG Oral Capsule Extended Release 24 Hour; take 1 capsule   daily; Therapy: 08JVP2075 to (Shelvia Kitchen)  Requested for: 93UXP8917; Last   Rx:73Quz4790 Ordered    Allergies    1  Codeine Derivatives   2  Morphine Derivatives   3   Tramadol    Signatures   Electronically signed by : Miguelina Escoto, ; Nov 30 2017 10:44AM EST                       (Author)

## 2018-01-19 ENCOUNTER — GENERIC CONVERSION - ENCOUNTER (OUTPATIENT)
Dept: OTHER | Facility: OTHER | Age: 58
End: 2018-01-19

## 2018-01-23 VITALS
HEIGHT: 63 IN | SYSTOLIC BLOOD PRESSURE: 160 MMHG | TEMPERATURE: 98.2 F | DIASTOLIC BLOOD PRESSURE: 90 MMHG | BODY MASS INDEX: 35.44 KG/M2 | WEIGHT: 200 LBS | HEART RATE: 72 BPM | RESPIRATION RATE: 18 BRPM

## 2018-01-23 NOTE — MISCELLANEOUS
Message   Recorded as Task   Date: 12/07/2017 09:52 AM, Created By: Susie Rubalcava   Task Name: Call Back   Assigned To: [facility],Team   Regarding Patient: Diego Niño, Status: Active   CommentLawerance Keara - 07 Dec 2017 9:52 AM     TASK CREATED  Call patient with benign results   [facility] - 07 Dec 2017 2:21 PM     TASK EDITED  Spoke w/ pt made aware  Active Problems    1  Allergic rhinitis (477 9) (J30 9)   2  Benign hematuria (599 70) (N02 9)   3  Change in bowel movement (787 99) (R19 4)   4  Constipation (564 00) (K59 00)   5  Encounter for routine gynecological examination with Papanicolaou smear of cervix   (V72 31,V76 2) (Z01 419)   6  GERD (gastroesophageal reflux disease) (530 81) (K21 9)   7  Headache (784 0) (R51)   8  Hyperlipidemia (272 4) (E78 5)   9  Hypertension (401 9) (I10)   10  Menopause (627 2) (Z78 0)   11  Obesity (278 00) (E66 9)   12  Osteoarthritis of both knees (715 96) (M17 0)   13  Osteoarthritis of left hip, unspecified osteoarthritis type (715 95) (M16 12)   14  Paroxysmal supraventricular tachycardia (427 0) (I47 1)   15  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 (625 8) (N90 9)   16  Postsurgical hypothyroidism (244 0) (E89 0)   17  History of Thyroid cancer (193) (C73)    Current Meds   1  Atorvastatin Calcium 40 MG Oral Tablet; Take 1 tablet daily; Therapy: 43Zec5164 to (Mychal Sit)  Requested for: 11LVJ9425; Last   Rx:16Xxy0314 Ordered   2  Excedrin Migraine 250-250-65 MG Oral Tablet; as directed on package Recorded   3  Hydrocodone-Acetaminophen 5-325 MG Oral Tablet; TAKE 1 TABLET EVERY 4 TO 6   HOURS AS NEEDED; Therapy: 62VKY5326 to (Evaluate:24Upu0817); Last Rx:44Gwm4699 Ordered   4  Levothyroxine Sodium 112 MCG Oral Tablet; Take 1 tablet daily; Therapy: 41AOQ9978 to (Evaluate:07Cwh3864)  Requested for: 82BEK8423; Last   Rx:81Dht7651 Ordered   5  Propranolol HCl ER 60 MG Oral Capsule Extended Release 24 Hour; take 1 capsule   daily;    Therapy: 38XAP7887 to (Yobani Forrester)  Requested for: 12ALU6121; Last   Rx:78Ute0426 Ordered    Allergies    1  Codeine Derivatives   2  Morphine Derivatives   3   Tramadol    Signatures   Electronically signed by : Queen Braulio, ; Dec  7 2017  2:21PM EST                       (Author)

## 2018-01-23 NOTE — MISCELLANEOUS
Message   Recorded as Task   Date: 12/01/2017 03:47 PM, Created By: Etta Oglesby   Task Name: Call Back   [facility]   Regarding Patient: Jackie Low, Status: Active   CommentSilvestre Vela - 01 Dec 2017 3:47 PM     TASK CREATED  Spoke w/ pt post excision lesion around marilynn area  Pt states she is doing well, ambulating ad nicolasa, soreness at site but not to painful  Taking in regular diet and fluids well  States she continues oozing bloody drainage around incision site and is changing pads  Had about a dollar coin size noted earlier today  Advised to continue to monitor and if any concerns, questions call office  Aware of pending pathology  Etta Oglesby - 04 Dec 2017 10:46 AM     TASK EDITED  Path remains pending   Jayshree Calvo - 07 Dec 2017 10:49 AM     TASK EDITED  Patient called and would like to speak to Yessenia Dailey  Told patient that she is in training this morning and will be able to call her back this afternoon  Please call [phone]   Etta Oglesby - 07 Dec 2017 2:20 PM     TASK EDITED  Spoke w/ pt and she is asking for a new script for pain meds  Spoke w/ TB and and will get script martha  Made aware of path results as well  Active Problems    1  Allergic rhinitis (477 9) (J30 9)   2  Benign hematuria (599 70) (N02 9)   3  Change in bowel movement (787 99) (R19 4)   4  Constipation (564 00) (K59 00)   5  Encounter for routine gynecological examination with Papanicolaou smear of cervix   (V72 31,V76 2) (Z01 419)   6  GERD (gastroesophageal reflux disease) (530 81) (K21 9)   7  Headache (784 0) (R51)   8  Hyperlipidemia (272 4) (E78 5)   9  Hypertension (401 9) (I10)   10  Menopause (627 2) (Z78 0)   11  Obesity (278 00) (E66 9)   12  Osteoarthritis of both knees (715 96) (M17 0)   13  Osteoarthritis of left hip, unspecified osteoarthritis type (715 95) (M16 12)   14  Paroxysmal supraventricular tachycardia (427 0) (I47 1)   15   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 (625 8) (N90 9)   16  Postsurgical hypothyroidism (244 0) (E89 0)   17  History of Thyroid cancer (193) (C73)    Current Meds   1  Atorvastatin Calcium 40 MG Oral Tablet; Take 1 tablet daily; Therapy: 07Ydy9805 to ([phone])  Requested for: 97BII2138; Last   Rx:53Ukm1163 Ordered   2  Excedrin Migraine 250-250-65 MG Oral Tablet; as directed on package Recorded   3  Levothyroxine Sodium 112 MCG Oral Tablet; Take 1 tablet daily; Therapy: 42FRF7474 to (Evaluate:28Grm2309)  Requested for: 64PIO8358; Last   Rx:10Bdz6537 Ordered   4  Propranolol HCl ER 60 MG Oral Capsule Extended Release 24 Hour; take 1 capsule   daily; Therapy: 86HIX7518 to ([phone])  Requested for: 28AUI8841; Last   Rx:94Mkt1123 Ordered    Allergies    1  Codeine Derivatives   2  Morphine Derivatives   3   Tramadol    Plan  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    · Hydrocodone-Acetaminophen 5-325 MG Oral Tablet; TAKE 1 TABLET EVERY 4 TO  6 HOURS AS NEEDED    Signatures   Electronically signed by : Ildefonso Nieto, ; Dec  7 2017  2:20PM EST                       (Author)

## 2018-01-23 NOTE — MISCELLANEOUS
Message   Recorded as Task   Date: 12/06/2017 09:42 AM, Created By: Jono Ovalles   Task Name: Follow Up   Assigned To: [facility],Team   Regarding Patient: Maryam Walden, Status: Active   Saroj Echavarria - 06 Dec 2017 9:42 AM     TASK CREATED  Message left to check in with post op excision, pt was anxious at time of d'c from hospital  Left message to contact office w/ any concerns or questions and will see her at post op 12/12  Active Problems    1  Allergic rhinitis (477 9) (J30 9)   2  Benign hematuria (599 70) (N02 9)   3  Change in bowel movement (787 99) (R19 4)   4  Constipation (564 00) (K59 00)   5  Encounter for routine gynecological examination with Papanicolaou smear of cervix   (V72 31,V76 2) (Z01 419)   6  GERD (gastroesophageal reflux disease) (530 81) (K21 9)   7  Headache (784 0) (R51)   8  Hyperlipidemia (272 4) (E78 5)   9  Hypertension (401 9) (I10)   10  Menopause (627 2) (Z78 0)   11  Obesity (278 00) (E66 9)   12  Osteoarthritis of both knees (715 96) (M17 0)   13  Osteoarthritis of left hip, unspecified osteoarthritis type (715 95) (M16 12)   14  Paroxysmal supraventricular tachycardia (427 0) (I47 1)   15  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 (625 8) (N90 9)   16  Postsurgical hypothyroidism (244 0) (E89 0)   17  History of Thyroid cancer (193) (C73)    Current Meds   1  Atorvastatin Calcium 40 MG Oral Tablet; Take 1 tablet daily; Therapy: 59Xqe3714 to (22 992480)  Requested for: 09BWU3748; Last   Rx:11Ebv1855 Ordered   2  Excedrin Migraine 250-250-65 MG Oral Tablet; as directed on package Recorded   3  Levothyroxine Sodium 112 MCG Oral Tablet; Take 1 tablet daily; Therapy: 96YWM9763 to (Evaluate:06Tth4928)  Requested for: 41PSD3392; Last   Rx:30Hyf0448 Ordered   4  Propranolol HCl ER 60 MG Oral Capsule Extended Release 24 Hour; take 1 capsule   daily;    Therapy: 03XSW7671 to (22 992480)  Requested for: 64ZIT2290; Last   Rx:90Pfo0498 Ordered    Allergies    1  Codeine Derivatives   2  Morphine Derivatives   3   Tramadol    Signatures   Electronically signed by : August Gee, ; Dec  6 2017  9:42AM EST                       (Author)

## 2018-02-17 DIAGNOSIS — J30.9 ALLERGIC RHINITIS: ICD-10-CM

## 2018-02-17 DIAGNOSIS — M17.0 PRIMARY OSTEOARTHRITIS OF BOTH KNEES: ICD-10-CM

## 2018-02-17 DIAGNOSIS — K59.00 CONSTIPATION: ICD-10-CM

## 2018-02-17 DIAGNOSIS — E89.0 POSTPROCEDURAL HYPOTHYROIDISM: ICD-10-CM

## 2018-02-17 DIAGNOSIS — E78.5 HYPERLIPIDEMIA: ICD-10-CM

## 2018-02-17 DIAGNOSIS — M16.12 PRIMARY OSTEOARTHRITIS OF LEFT HIP: ICD-10-CM

## 2018-02-17 DIAGNOSIS — N90.9 NONINFLAMMATORY DISORDER OF VULVA AND PERINEUM: ICD-10-CM

## 2018-02-17 DIAGNOSIS — Z00.00 ENCOUNTER FOR GENERAL ADULT MEDICAL EXAMINATION WITHOUT ABNORMAL FINDINGS: ICD-10-CM

## 2018-02-17 DIAGNOSIS — I47.1 SUPRAVENTRICULAR TACHYCARDIA (HCC): ICD-10-CM

## 2018-02-17 DIAGNOSIS — E66.9 OBESITY: ICD-10-CM

## 2018-02-17 DIAGNOSIS — R51 HEADACHE(784.0): ICD-10-CM

## 2018-02-17 DIAGNOSIS — I10 ESSENTIAL (PRIMARY) HYPERTENSION: ICD-10-CM

## 2018-02-17 LAB
ALBUMIN SERPL-MCNC: 4.2 G/DL (ref 3.6–5.1)
ALBUMIN/GLOB SERPL: 1.8 (CALC) (ref 1–2.5)
ALP SERPL-CCNC: 95 U/L (ref 33–130)
ALT SERPL-CCNC: 20 U/L (ref 6–29)
APPEARANCE UR: CLEAR
AST SERPL-CCNC: 19 U/L (ref 10–35)
BACTERIA UR QL AUTO: ABNORMAL /HPF
BILIRUB SERPL-MCNC: 0.6 MG/DL (ref 0.2–1.2)
BILIRUB UR QL STRIP: NEGATIVE
BUN SERPL-MCNC: 13 MG/DL (ref 7–25)
BUN/CREAT SERPL: NORMAL (CALC) (ref 6–22)
CALCIUM SERPL-MCNC: 8.8 MG/DL (ref 8.6–10.4)
CHLORIDE SERPL-SCNC: 105 MMOL/L (ref 98–110)
CHOLEST SERPL-MCNC: 198 MG/DL
CHOLEST/HDLC SERPL: 3.1 (CALC)
CO2 SERPL-SCNC: 28 MMOL/L (ref 20–31)
COLOR UR: YELLOW
CREAT SERPL-MCNC: 0.77 MG/DL (ref 0.5–1.05)
ERYTHROCYTE [DISTWIDTH] IN BLOOD BY AUTOMATED COUNT: 12.9 % (ref 11–15)
GLOBULIN SER CALC-MCNC: 2.4 G/DL (CALC) (ref 1.9–3.7)
GLUCOSE SERPL-MCNC: 85 MG/DL (ref 65–99)
GLUCOSE UR QL STRIP: NEGATIVE
HCT VFR BLD AUTO: 38.5 % (ref 35–45)
HDLC SERPL-MCNC: 63 MG/DL
HGB BLD-MCNC: 13.1 G/DL (ref 11.7–15.5)
HGB UR QL STRIP: ABNORMAL
HYALINE CASTS #/AREA URNS LPF: ABNORMAL /LPF
KETONES UR QL STRIP: NEGATIVE
LDLC SERPL CALC-MCNC: 107 MG/DL (CALC)
LEUKOCYTE ESTERASE UR QL STRIP: NEGATIVE
MCH RBC QN AUTO: 31.5 PG (ref 27–33)
MCHC RBC AUTO-ENTMCNC: 34 G/DL (ref 32–36)
MCV RBC AUTO: 92.5 FL (ref 80–100)
NITRITE UR QL STRIP: NEGATIVE
NONHDLC SERPL-MCNC: 135 MG/DL (CALC)
PH UR STRIP: 5.5 [PH] (ref 5–8)
PLATELET # BLD AUTO: 238 THOUSAND/UL (ref 140–400)
PMV BLD REES-ECKER: 9.9 FL (ref 7.5–12.5)
POTASSIUM SERPL-SCNC: 3.9 MMOL/L (ref 3.5–5.3)
PROT SERPL-MCNC: 6.6 G/DL (ref 6.1–8.1)
PROT UR QL STRIP: NEGATIVE
RBC # BLD AUTO: 4.16 MILLION/UL (ref 3.8–5.1)
RBC #/AREA URNS HPF: ABNORMAL /HPF
SL AMB EGFR AFRICAN AMERICAN: 98 ML/MIN/1.73M2
SL AMB EGFR NON AFRICAN AMERICAN: 85 ML/MIN/1.73M2
SODIUM SERPL-SCNC: 140 MMOL/L (ref 135–146)
SP GR UR STRIP: 1.02 (ref 1–1.03)
SQUAMOUS #/AREA URNS HPF: ABNORMAL /HPF
T4 FREE SERPL-MCNC: 1.6 NG/DL (ref 0.8–1.8)
TRIGL SERPL-MCNC: 165 MG/DL
TSH SERPL-ACNC: 1.55 MIU/L (ref 0.4–4.5)
WBC # BLD AUTO: 8.3 THOUSAND/UL (ref 3.8–10.8)
WBC #/AREA URNS HPF: ABNORMAL /HPF

## 2018-02-19 PROBLEM — N90.89 PERINEAL MASS IN FEMALE: Status: ACTIVE | Noted: 2017-08-17

## 2018-02-19 PROBLEM — M16.12 OSTEOARTHRITIS OF LEFT HIP: Status: ACTIVE | Noted: 2017-02-01

## 2018-02-19 PROBLEM — R19.8 CHANGE IN BOWEL MOVEMENT: Status: ACTIVE | Noted: 2017-08-17

## 2018-02-19 PROBLEM — K59.00 CONSTIPATION: Status: ACTIVE | Noted: 2017-08-17

## 2018-02-19 RX ORDER — ACETAMINOPHEN, ASPIRIN AND CAFFEINE 250; 250; 65 MG/1; MG/1; MG/1
TABLET, FILM COATED ORAL EVERY 6 HOURS PRN
COMMUNITY
End: 2021-04-23 | Stop reason: HOSPADM

## 2018-02-19 RX ORDER — PROPRANOLOL HCL 60 MG
1 CAPSULE, EXTENDED RELEASE 24HR ORAL DAILY
COMMUNITY
Start: 2011-09-22 | End: 2018-02-22 | Stop reason: SDUPTHER

## 2018-02-22 ENCOUNTER — OFFICE VISIT (OUTPATIENT)
Dept: FAMILY MEDICINE CLINIC | Facility: CLINIC | Age: 58
End: 2018-02-22
Payer: COMMERCIAL

## 2018-02-22 VITALS
RESPIRATION RATE: 16 BRPM | WEIGHT: 209 LBS | HEIGHT: 64 IN | SYSTOLIC BLOOD PRESSURE: 110 MMHG | HEART RATE: 68 BPM | DIASTOLIC BLOOD PRESSURE: 72 MMHG | BODY MASS INDEX: 35.68 KG/M2

## 2018-02-22 DIAGNOSIS — R51.9 NONINTRACTABLE EPISODIC HEADACHE, UNSPECIFIED HEADACHE TYPE: ICD-10-CM

## 2018-02-22 DIAGNOSIS — I10 ESSENTIAL HYPERTENSION: Primary | ICD-10-CM

## 2018-02-22 DIAGNOSIS — J30.9 ALLERGIC RHINITIS, UNSPECIFIED CHRONICITY, UNSPECIFIED SEASONALITY, UNSPECIFIED TRIGGER: ICD-10-CM

## 2018-02-22 DIAGNOSIS — Z12.11 SCREENING FOR COLON CANCER: ICD-10-CM

## 2018-02-22 DIAGNOSIS — N02.9 BENIGN HEMATURIA: ICD-10-CM

## 2018-02-22 DIAGNOSIS — I47.1 PAROXYSMAL SUPRAVENTRICULAR TACHYCARDIA (HCC): ICD-10-CM

## 2018-02-22 DIAGNOSIS — E89.0 POSTOPERATIVE HYPOTHYROIDISM: ICD-10-CM

## 2018-02-22 DIAGNOSIS — E66.9 CLASS 2 OBESITY WITHOUT SERIOUS COMORBIDITY WITH BODY MASS INDEX (BMI) OF 35.0 TO 35.9 IN ADULT, UNSPECIFIED OBESITY TYPE: ICD-10-CM

## 2018-02-22 DIAGNOSIS — E78.5 HYPERLIPIDEMIA, UNSPECIFIED HYPERLIPIDEMIA TYPE: ICD-10-CM

## 2018-02-22 DIAGNOSIS — Z12.39 SCREENING FOR BREAST CANCER: ICD-10-CM

## 2018-02-22 PROBLEM — N90.89 PERINEAL MASS IN FEMALE: Status: RESOLVED | Noted: 2017-08-17 | Resolved: 2018-02-22

## 2018-02-22 PROCEDURE — 99214 OFFICE O/P EST MOD 30 MIN: CPT | Performed by: FAMILY MEDICINE

## 2018-02-22 RX ORDER — LEVOTHYROXINE SODIUM 112 UG/1
112 TABLET ORAL DAILY
Qty: 90 TABLET | Refills: 3 | Status: SHIPPED | OUTPATIENT
Start: 2018-02-22 | End: 2019-01-11 | Stop reason: SDUPTHER

## 2018-02-22 RX ORDER — PROPRANOLOL HCL 60 MG
60 CAPSULE, EXTENDED RELEASE 24HR ORAL DAILY
Qty: 90 CAPSULE | Refills: 3 | Status: SHIPPED | OUTPATIENT
Start: 2018-02-22 | End: 2019-01-11 | Stop reason: SDUPTHER

## 2018-02-22 RX ORDER — ATORVASTATIN CALCIUM 40 MG/1
40 TABLET, FILM COATED ORAL
Qty: 90 TABLET | Refills: 3 | Status: SHIPPED | OUTPATIENT
Start: 2018-02-22 | End: 2019-01-11 | Stop reason: SDUPTHER

## 2018-02-22 NOTE — PROGRESS NOTES
Assessment/Plan:    Postoperative hypothyroidism  Stable continue present therapy refill of Synthroid 112 mcg was given    Allergic rhinitis  Asymptomatic at the present time    Hypertension  Stable propranolol was reordered    Paroxysmal supraventricular tachycardia (HCC)  Stable, propranolol was reordered status post ablation    Benign hematuria  Workup in past, continue to monitor    Headache  Asymptomatic at the present time patient does use Excedrin migraine if needed    Hyperlipidemia  Stable atorvastatin was reordered    Obesity  Weight loss recommended    Screening for colon cancer  Hemoccult was ordered with next blood work    Screening for breast cancer  Mammography is ordered       Diagnoses and all orders for this visit:    Essential hypertension  -     propranolol (INDERAL LA) 60 mg 24 hr capsule; Take 1 capsule (60 mg total) by mouth daily  -     CBC; Future  -     Comprehensive metabolic panel; Future  -     Lipid Panel with Direct LDL reflex; Future  -     TSH, 3rd generation with T4 reflex; Future  -     Urinalysis with reflex to microscopic; Future  -     T4; Future  -     Occult Bloood,Fecal Immunochemical; Future    Paroxysmal supraventricular tachycardia (HCC)  -     propranolol (INDERAL LA) 60 mg 24 hr capsule; Take 1 capsule (60 mg total) by mouth daily  -     CBC; Future  -     Comprehensive metabolic panel; Future  -     Lipid Panel with Direct LDL reflex; Future  -     TSH, 3rd generation with T4 reflex; Future  -     Urinalysis with reflex to microscopic; Future  -     T4; Future  -     Occult Bloood,Fecal Immunochemical; Future    Benign hematuria  -     CBC; Future  -     Comprehensive metabolic panel; Future  -     Lipid Panel with Direct LDL reflex; Future  -     TSH, 3rd generation with T4 reflex; Future  -     Urinalysis with reflex to microscopic;  Future  -     T4; Future  -     Occult Bloood,Fecal Immunochemical; Future    Hyperlipidemia, unspecified hyperlipidemia type  -     CBC; Future  -     Comprehensive metabolic panel; Future  -     Lipid Panel with Direct LDL reflex; Future  -     TSH, 3rd generation with T4 reflex; Future  -     Urinalysis with reflex to microscopic; Future  -     T4; Future  -     Occult Bloood,Fecal Immunochemical; Future    Class 2 obesity without serious comorbidity with body mass index (BMI) of 35 0 to 35 9 in adult, unspecified obesity type  -     atorvastatin (LIPITOR) 40 mg tablet; Take 1 tablet (40 mg total) by mouth daily at bedtime  -     CBC; Future  -     Comprehensive metabolic panel; Future  -     Lipid Panel with Direct LDL reflex; Future  -     TSH, 3rd generation with T4 reflex; Future  -     Urinalysis with reflex to microscopic; Future  -     T4; Future  -     Occult Bloood,Fecal Immunochemical; Future    Nonintractable episodic headache, unspecified headache type  -     CBC; Future  -     Comprehensive metabolic panel; Future  -     Lipid Panel with Direct LDL reflex; Future  -     TSH, 3rd generation with T4 reflex; Future  -     Urinalysis with reflex to microscopic; Future  -     T4; Future  -     Occult Bloood,Fecal Immunochemical; Future    Allergic rhinitis, unspecified chronicity, unspecified seasonality, unspecified trigger  -     CBC; Future  -     Comprehensive metabolic panel; Future  -     Lipid Panel with Direct LDL reflex; Future  -     TSH, 3rd generation with T4 reflex; Future  -     Urinalysis with reflex to microscopic; Future  -     T4; Future  -     Occult Bloood,Fecal Immunochemical; Future    Postoperative hypothyroidism  -     levothyroxine (SYNTHROID) 112 mcg tablet; Take 1 tablet (112 mcg total) by mouth daily  -     CBC; Future  -     Comprehensive metabolic panel; Future  -     Lipid Panel with Direct LDL reflex; Future  -     TSH, 3rd generation with T4 reflex; Future  -     Urinalysis with reflex to microscopic;  Future  -     T4; Future  -     Occult Bloood,Fecal Immunochemical; Future    Screening for colon cancer  - CBC; Future  -     Comprehensive metabolic panel; Future  -     Lipid Panel with Direct LDL reflex; Future  -     TSH, 3rd generation with T4 reflex; Future  -     Urinalysis with reflex to microscopic; Future  -     T4; Future  -     Occult Bloood,Fecal Immunochemical; Future    Screening for breast cancer  -     Mammo screening bilateral w cad; Future          Subjective:      Patient ID: Sima Hunter is a 59 y o  female  Cc: follow up and to review blood work results  R Feliciano    Patient doing well without any new medical complaints or concerns at the present time  Blood work was reviewed with the patient        The following portions of the patient's history were reviewed and updated as appropriate: allergies, current medications, past family history, past medical history, past social history, past surgical history and problem list     Review of Systems   Constitutional: Negative  HENT: Negative  Eyes: Negative  Respiratory: Negative  Gastrointestinal: Negative  Endocrine: Negative  Genitourinary: Negative  Musculoskeletal: Negative  Skin: Negative  Allergic/Immunologic: Negative  Neurological: Negative  Hematological: Negative  Psychiatric/Behavioral: Negative  Objective:    Vitals:    02/22/18 1646   BP: 110/72   BP Location: Left arm   Patient Position: Sitting   Cuff Size: Standard   Pulse: 68   Resp: 16   Weight: 94 8 kg (209 lb)   Height: 5' 4" (1 626 m)        Physical Exam   Constitutional: She is oriented to person, place, and time  She appears well-developed and well-nourished  HENT:   Head: Normocephalic and atraumatic  Mouth/Throat: Oropharynx is clear and moist    Eyes: Conjunctivae are normal    Neck: Normal range of motion  Neck supple  No thyromegaly present  Cardiovascular: Normal rate, regular rhythm, normal heart sounds and intact distal pulses  Pulmonary/Chest: Effort normal and breath sounds normal    Abdominal: Soft  Musculoskeletal: She exhibits no edema  Lymphadenopathy:     She has no cervical adenopathy  Neurological: She is alert and oriented to person, place, and time  Skin: Skin is warm and dry  Psychiatric: She has a normal mood and affect

## 2018-07-17 ENCOUNTER — TELEPHONE (OUTPATIENT)
Dept: FAMILY MEDICINE CLINIC | Facility: CLINIC | Age: 58
End: 2018-07-17

## 2018-07-17 NOTE — TELEPHONE ENCOUNTER
Pt tried to schedule mammo screening, but was unable to  Scheduling informed her since she has a 3D mammo last time,she will continue to need 3D mammos   Please write new script for Pt    Mammo screening bilateral w 3d & cad    Thank you

## 2018-09-20 ENCOUNTER — TRANSCRIBE ORDERS (OUTPATIENT)
Dept: ADMINISTRATIVE | Facility: HOSPITAL | Age: 58
End: 2018-09-20

## 2018-09-22 LAB
ALBUMIN SERPL-MCNC: 4.3 G/DL (ref 3.6–5.1)
ALBUMIN/GLOB SERPL: 1.9 (CALC) (ref 1–2.5)
ALP SERPL-CCNC: 86 U/L (ref 33–130)
ALT SERPL-CCNC: 23 U/L (ref 6–29)
AST SERPL-CCNC: 20 U/L (ref 10–35)
BASOPHILS # BLD AUTO: 57 CELLS/UL (ref 0–200)
BASOPHILS NFR BLD AUTO: 0.7 %
BILIRUB SERPL-MCNC: 0.4 MG/DL (ref 0.2–1.2)
BUN SERPL-MCNC: 12 MG/DL (ref 7–25)
BUN/CREAT SERPL: NORMAL (CALC) (ref 6–22)
CALCIUM SERPL-MCNC: 8.8 MG/DL (ref 8.6–10.4)
CHLORIDE SERPL-SCNC: 104 MMOL/L (ref 98–110)
CHOLEST SERPL-MCNC: 185 MG/DL
CHOLEST/HDLC SERPL: 3.2 (CALC)
CO2 SERPL-SCNC: 26 MMOL/L (ref 20–32)
CREAT SERPL-MCNC: 0.61 MG/DL (ref 0.5–0.99)
EOSINOPHIL # BLD AUTO: 213 CELLS/UL (ref 15–500)
EOSINOPHIL NFR BLD AUTO: 2.6 %
ERYTHROCYTE [DISTWIDTH] IN BLOOD BY AUTOMATED COUNT: 12.8 % (ref 11–15)
GLOBULIN SER CALC-MCNC: 2.3 G/DL (CALC) (ref 1.9–3.7)
GLUCOSE SERPL-MCNC: 86 MG/DL (ref 65–99)
HCT VFR BLD AUTO: 39.2 % (ref 35–45)
HDLC SERPL-MCNC: 57 MG/DL
HGB BLD-MCNC: 13.2 G/DL (ref 11.7–15.5)
LDLC SERPL CALC-MCNC: 103 MG/DL (CALC)
LYMPHOCYTES # BLD AUTO: 2304 CELLS/UL (ref 850–3900)
LYMPHOCYTES NFR BLD AUTO: 28.1 %
MCH RBC QN AUTO: 31.5 PG (ref 27–33)
MCHC RBC AUTO-ENTMCNC: 33.7 G/DL (ref 32–36)
MCV RBC AUTO: 93.6 FL (ref 80–100)
MONOCYTES # BLD AUTO: 484 CELLS/UL (ref 200–950)
MONOCYTES NFR BLD AUTO: 5.9 %
NEUTROPHILS # BLD AUTO: 5141 CELLS/UL (ref 1500–7800)
NEUTROPHILS NFR BLD AUTO: 62.7 %
NONHDLC SERPL-MCNC: 128 MG/DL (CALC)
PLATELET # BLD AUTO: 247 THOUSAND/UL (ref 140–400)
PMV BLD REES-ECKER: 9.8 FL (ref 7.5–12.5)
POTASSIUM SERPL-SCNC: 3.8 MMOL/L (ref 3.5–5.3)
PROT SERPL-MCNC: 6.6 G/DL (ref 6.1–8.1)
RBC # BLD AUTO: 4.19 MILLION/UL (ref 3.8–5.1)
SL AMB EGFR AFRICAN AMERICAN: 114 ML/MIN/1.73M2
SL AMB EGFR NON AFRICAN AMERICAN: 99 ML/MIN/1.73M2
SODIUM SERPL-SCNC: 140 MMOL/L (ref 135–146)
T4 SERPL-MCNC: 12.2 MCG/DL (ref 5.1–11.9)
TRIGL SERPL-MCNC: 149 MG/DL
TSH SERPL-ACNC: 3.39 MIU/L (ref 0.4–4.5)
WBC # BLD AUTO: 8.2 THOUSAND/UL (ref 3.8–10.8)

## 2018-09-27 ENCOUNTER — OFFICE VISIT (OUTPATIENT)
Dept: FAMILY MEDICINE CLINIC | Facility: CLINIC | Age: 58
End: 2018-09-27
Payer: COMMERCIAL

## 2018-09-27 VITALS
DIASTOLIC BLOOD PRESSURE: 72 MMHG | HEIGHT: 64 IN | HEART RATE: 64 BPM | SYSTOLIC BLOOD PRESSURE: 120 MMHG | BODY MASS INDEX: 33.97 KG/M2 | RESPIRATION RATE: 20 BRPM | WEIGHT: 199 LBS

## 2018-09-27 DIAGNOSIS — E66.9 CLASS 2 OBESITY WITHOUT SERIOUS COMORBIDITY WITH BODY MASS INDEX (BMI) OF 35.0 TO 35.9 IN ADULT, UNSPECIFIED OBESITY TYPE: ICD-10-CM

## 2018-09-27 DIAGNOSIS — J30.9 ALLERGIC RHINITIS, UNSPECIFIED SEASONALITY, UNSPECIFIED TRIGGER: ICD-10-CM

## 2018-09-27 DIAGNOSIS — E78.5 HYPERLIPIDEMIA, UNSPECIFIED HYPERLIPIDEMIA TYPE: ICD-10-CM

## 2018-09-27 DIAGNOSIS — Z00.00 HEALTH CARE MAINTENANCE: ICD-10-CM

## 2018-09-27 DIAGNOSIS — E89.0 POSTOPERATIVE HYPOTHYROIDISM: Primary | ICD-10-CM

## 2018-09-27 DIAGNOSIS — R51.9 NONINTRACTABLE EPISODIC HEADACHE, UNSPECIFIED HEADACHE TYPE: ICD-10-CM

## 2018-09-27 DIAGNOSIS — I10 ESSENTIAL HYPERTENSION: ICD-10-CM

## 2018-09-27 DIAGNOSIS — I47.1 PAROXYSMAL SUPRAVENTRICULAR TACHYCARDIA (HCC): ICD-10-CM

## 2018-09-27 PROBLEM — R19.8 CHANGE IN BOWEL MOVEMENT: Status: RESOLVED | Noted: 2017-08-17 | Resolved: 2018-09-27

## 2018-09-27 PROBLEM — K59.00 CONSTIPATION: Status: RESOLVED | Noted: 2017-08-17 | Resolved: 2018-09-27

## 2018-09-27 PROCEDURE — 99214 OFFICE O/P EST MOD 30 MIN: CPT | Performed by: FAMILY MEDICINE

## 2018-09-27 PROCEDURE — 3008F BODY MASS INDEX DOCD: CPT | Performed by: FAMILY MEDICINE

## 2018-09-27 PROCEDURE — 1036F TOBACCO NON-USER: CPT | Performed by: FAMILY MEDICINE

## 2018-09-27 PROCEDURE — 3078F DIAST BP <80 MM HG: CPT | Performed by: FAMILY MEDICINE

## 2018-09-27 PROCEDURE — 3074F SYST BP LT 130 MM HG: CPT | Performed by: FAMILY MEDICINE

## 2018-09-27 NOTE — PROGRESS NOTES
Assessment/Plan:  1  Hypertension, stable continue present therapy  2  Hyperlipidemia, stable continue present therapy  3  Hypothyroidism, stable continue present therapy  TSH is under excellent control T4 is top normal   Will continue Synthroid 112 mcg  Will check T3, T4, and TSH in 6 months  4  Paroxysmal SVT status post ablation, stable patient follow up with Cardiology per their instructions stable on beta-blocker  5  Obesity, patient did lose 10 lb since last office visit  6  Health care maintenance, patient refused flu vaccine today  7  Patient return in 6 months for opposite blood work, sooner if needed          Postoperative hypothyroidism  TSH is normal T4 is top normal, continue levothyroxine 112 mcg daily    Allergic rhinitis  Asymptomatic    Paroxysmal supraventricular tachycardia (HCC)  Stable patient follow up with Cardiology per their instructions continue beta-blocker    Hypertension  Stable continue Inderal LA 60 daily    Obesity  Patient lost 10 lb since last office visit    Hyperlipidemia  Stable continue atorvastatin 40 mg daily    Headache  Asymptomatic at present patient use Excedrin migraine with relief    Health care maintenance  Flu vaccine was refused today in office       Diagnoses and all orders for this visit:    Postoperative hypothyroidism  -     CBC; Future  -     Comprehensive metabolic panel; Future  -     Lipid Panel with Direct LDL reflex; Future  -     T4; Future  -     TSH, 3rd generation with Free T4 reflex; Future  -     Urinalysis with microscopic; Future  -     T3; Future    Paroxysmal supraventricular tachycardia (HCC)  -     CBC; Future  -     Comprehensive metabolic panel; Future  -     Lipid Panel with Direct LDL reflex; Future  -     T4; Future  -     TSH, 3rd generation with Free T4 reflex; Future  -     Urinalysis with microscopic; Future  -     T3; Future    Essential hypertension  -     CBC; Future  -     Comprehensive metabolic panel;  Future  -     Lipid Panel with Direct LDL reflex; Future  -     T4; Future  -     TSH, 3rd generation with Free T4 reflex; Future  -     Urinalysis with microscopic; Future  -     T3; Future    Hyperlipidemia, unspecified hyperlipidemia type  -     CBC; Future  -     Comprehensive metabolic panel; Future  -     Lipid Panel with Direct LDL reflex; Future  -     T4; Future  -     TSH, 3rd generation with Free T4 reflex; Future  -     Urinalysis with microscopic; Future  -     T3; Future    Class 2 obesity without serious comorbidity with body mass index (BMI) of 35 0 to 35 9 in adult, unspecified obesity type  -     CBC; Future  -     Comprehensive metabolic panel; Future  -     Lipid Panel with Direct LDL reflex; Future  -     T4; Future  -     TSH, 3rd generation with Free T4 reflex; Future  -     Urinalysis with microscopic; Future  -     T3; Future    Allergic rhinitis, unspecified seasonality, unspecified trigger  -     CBC; Future  -     Comprehensive metabolic panel; Future  -     Lipid Panel with Direct LDL reflex; Future  -     T4; Future  -     TSH, 3rd generation with Free T4 reflex; Future  -     Urinalysis with microscopic; Future  -     T3; Future    Nonintractable episodic headache, unspecified headache type  -     CBC; Future  -     Comprehensive metabolic panel; Future  -     Lipid Panel with Direct LDL reflex; Future  -     T4; Future  -     TSH, 3rd generation with Free T4 reflex; Future  -     Urinalysis with microscopic; Future  -     T3; Future    Health care maintenance  -     CBC; Future  -     Comprehensive metabolic panel; Future  -     Lipid Panel with Direct LDL reflex; Future  -     T4; Future  -     TSH, 3rd generation with Free T4 reflex; Future  -     Urinalysis with microscopic; Future  -     T3; Future          Subjective: pt here for a follow up and to review blood work results  ELISE Wiggins     Patient ID: Sunitha Fortune is a 59 y o  female      Patient doing well without any medical complaints concerns at the present time  Patient did lose 10 lb since last office visit  Blood work was discussed with the patient        The following portions of the patient's history were reviewed and updated as appropriate: allergies, current medications, past family history, past medical history, past social history, past surgical history and problem list     Review of Systems   Constitutional:        HPI   HENT: Negative  Eyes: Negative  Respiratory: Negative  Cardiovascular: Negative  Gastrointestinal: Negative  Endocrine: Negative  Genitourinary: Negative  Musculoskeletal: Negative  Skin: Negative  Allergic/Immunologic: Positive for environmental allergies  Neurological: Negative  Hematological: Negative  Psychiatric/Behavioral: Negative  Objective:        Vitals:    09/27/18 1637   BP: 120/72   BP Location: Left arm   Patient Position: Sitting   Cuff Size: Large   Pulse: 64   Resp: 20   Weight: 90 3 kg (199 lb)   Height: 5' 4" (1 626 m)        Physical Exam   Constitutional: She is oriented to person, place, and time  She appears well-developed and well-nourished  HENT:   Head: Normocephalic and atraumatic  Mouth/Throat: Oropharynx is clear and moist    Eyes: Pupils are equal, round, and reactive to light  Conjunctivae and EOM are normal    Neck: Neck supple  No thyromegaly present  Cardiovascular: Normal rate, regular rhythm and normal heart sounds  Pulmonary/Chest: Effort normal and breath sounds normal    Abdominal: Soft  Bowel sounds are normal  There is no tenderness  Musculoskeletal: She exhibits no edema  Lymphadenopathy:     She has no cervical adenopathy  Neurological: She is alert and oriented to person, place, and time  No cranial nerve deficit  Skin: Skin is warm and dry  Psychiatric: She has a normal mood and affect

## 2019-01-11 DIAGNOSIS — I10 ESSENTIAL HYPERTENSION: ICD-10-CM

## 2019-01-11 DIAGNOSIS — E66.9 CLASS 2 OBESITY WITHOUT SERIOUS COMORBIDITY WITH BODY MASS INDEX (BMI) OF 35.0 TO 35.9 IN ADULT, UNSPECIFIED OBESITY TYPE: ICD-10-CM

## 2019-01-11 DIAGNOSIS — I47.1 PAROXYSMAL SUPRAVENTRICULAR TACHYCARDIA (HCC): ICD-10-CM

## 2019-01-11 DIAGNOSIS — E89.0 POSTOPERATIVE HYPOTHYROIDISM: ICD-10-CM

## 2019-01-11 RX ORDER — ATORVASTATIN CALCIUM 40 MG/1
TABLET, FILM COATED ORAL
Qty: 90 TABLET | Refills: 3 | Status: SHIPPED | OUTPATIENT
Start: 2019-01-11 | End: 2019-12-27 | Stop reason: SDUPTHER

## 2019-01-11 RX ORDER — PROPRANOLOL HCL 60 MG
CAPSULE, EXTENDED RELEASE 24HR ORAL
Qty: 90 CAPSULE | Refills: 3 | Status: SHIPPED | OUTPATIENT
Start: 2019-01-11 | End: 2019-12-27 | Stop reason: SDUPTHER

## 2019-01-11 RX ORDER — LEVOTHYROXINE SODIUM 112 MCG
TABLET ORAL
Qty: 90 TABLET | Refills: 3 | Status: SHIPPED | OUTPATIENT
Start: 2019-01-11 | End: 2019-12-27 | Stop reason: SDUPTHER

## 2019-04-13 LAB
ALBUMIN SERPL-MCNC: 4.2 G/DL (ref 3.6–5.1)
ALBUMIN/GLOB SERPL: 1.9 (CALC) (ref 1–2.5)
ALP SERPL-CCNC: 83 U/L (ref 33–130)
ALT SERPL-CCNC: 24 U/L (ref 6–29)
AST SERPL-CCNC: 19 U/L (ref 10–35)
BACTERIA UR QL AUTO: NORMAL /HPF
BASOPHILS # BLD AUTO: 43 CELLS/UL (ref 0–200)
BASOPHILS NFR BLD AUTO: 0.6 %
BILIRUB SERPL-MCNC: 0.7 MG/DL (ref 0.2–1.2)
BUN SERPL-MCNC: 12 MG/DL (ref 7–25)
BUN/CREAT SERPL: NORMAL (CALC) (ref 6–22)
CALCIUM SERPL-MCNC: 8.8 MG/DL (ref 8.6–10.4)
CHLORIDE SERPL-SCNC: 104 MMOL/L (ref 98–110)
CHOLEST SERPL-MCNC: 192 MG/DL
CHOLEST/HDLC SERPL: 3.7 (CALC)
CO2 SERPL-SCNC: 25 MMOL/L (ref 20–32)
CREAT SERPL-MCNC: 0.7 MG/DL (ref 0.5–0.99)
EOSINOPHIL # BLD AUTO: 170 CELLS/UL (ref 15–500)
EOSINOPHIL NFR BLD AUTO: 2.4 %
ERYTHROCYTE [DISTWIDTH] IN BLOOD BY AUTOMATED COUNT: 13 % (ref 11–15)
GLOBULIN SER CALC-MCNC: 2.2 G/DL (CALC) (ref 1.9–3.7)
GLUCOSE SERPL-MCNC: 79 MG/DL (ref 65–99)
HCT VFR BLD AUTO: 39.8 % (ref 35–45)
HDLC SERPL-MCNC: 52 MG/DL
HGB BLD-MCNC: 13.5 G/DL (ref 11.7–15.5)
HYALINE CASTS #/AREA URNS LPF: NORMAL /LPF
LDLC SERPL CALC-MCNC: 110 MG/DL (CALC)
LYMPHOCYTES # BLD AUTO: 2215 CELLS/UL (ref 850–3900)
LYMPHOCYTES NFR BLD AUTO: 31.2 %
MCH RBC QN AUTO: 31.2 PG (ref 27–33)
MCHC RBC AUTO-ENTMCNC: 33.9 G/DL (ref 32–36)
MCV RBC AUTO: 91.9 FL (ref 80–100)
MONOCYTES # BLD AUTO: 540 CELLS/UL (ref 200–950)
MONOCYTES NFR BLD AUTO: 7.6 %
NEUTROPHILS # BLD AUTO: 4132 CELLS/UL (ref 1500–7800)
NEUTROPHILS NFR BLD AUTO: 58.2 %
NONHDLC SERPL-MCNC: 140 MG/DL (CALC)
PLATELET # BLD AUTO: 227 THOUSAND/UL (ref 140–400)
PMV BLD REES-ECKER: 9.6 FL (ref 7.5–12.5)
POTASSIUM SERPL-SCNC: 3.9 MMOL/L (ref 3.5–5.3)
PROT SERPL-MCNC: 6.4 G/DL (ref 6.1–8.1)
RBC # BLD AUTO: 4.33 MILLION/UL (ref 3.8–5.1)
RBC #/AREA URNS HPF: NORMAL /HPF
SL AMB EGFR AFRICAN AMERICAN: 109 ML/MIN/1.73M2
SL AMB EGFR NON AFRICAN AMERICAN: 94 ML/MIN/1.73M2
SODIUM SERPL-SCNC: 138 MMOL/L (ref 135–146)
SQUAMOUS #/AREA URNS HPF: NORMAL /HPF
T3 SERPL-MCNC: 92 NG/DL (ref 76–181)
T4 SERPL-MCNC: 10.5 MCG/DL (ref 5.1–11.9)
TRIGL SERPL-MCNC: 187 MG/DL
TSH SERPL-ACNC: 3.04 MIU/L (ref 0.4–4.5)
WBC # BLD AUTO: 7.1 THOUSAND/UL (ref 3.8–10.8)
WBC #/AREA URNS HPF: NORMAL /HPF

## 2019-04-18 ENCOUNTER — OFFICE VISIT (OUTPATIENT)
Dept: FAMILY MEDICINE CLINIC | Facility: CLINIC | Age: 59
End: 2019-04-18
Payer: COMMERCIAL

## 2019-04-18 VITALS
BODY MASS INDEX: 34.31 KG/M2 | SYSTOLIC BLOOD PRESSURE: 122 MMHG | RESPIRATION RATE: 16 BRPM | HEIGHT: 64 IN | HEART RATE: 72 BPM | WEIGHT: 201 LBS | DIASTOLIC BLOOD PRESSURE: 70 MMHG

## 2019-04-18 DIAGNOSIS — I47.1 PAROXYSMAL SUPRAVENTRICULAR TACHYCARDIA (HCC): ICD-10-CM

## 2019-04-18 DIAGNOSIS — M16.12 OSTEOARTHRITIS OF LEFT HIP, UNSPECIFIED OSTEOARTHRITIS TYPE: ICD-10-CM

## 2019-04-18 DIAGNOSIS — E78.5 HYPERLIPIDEMIA, UNSPECIFIED HYPERLIPIDEMIA TYPE: ICD-10-CM

## 2019-04-18 DIAGNOSIS — E66.9 OBESITY (BMI 30-39.9): ICD-10-CM

## 2019-04-18 DIAGNOSIS — R51.9 NONINTRACTABLE EPISODIC HEADACHE, UNSPECIFIED HEADACHE TYPE: ICD-10-CM

## 2019-04-18 DIAGNOSIS — I10 ESSENTIAL HYPERTENSION: ICD-10-CM

## 2019-04-18 DIAGNOSIS — E89.0 POSTOPERATIVE HYPOTHYROIDISM: Primary | ICD-10-CM

## 2019-04-18 DIAGNOSIS — M17.0 OSTEOARTHRITIS OF BOTH KNEES, UNSPECIFIED OSTEOARTHRITIS TYPE: ICD-10-CM

## 2019-04-18 PROCEDURE — 3074F SYST BP LT 130 MM HG: CPT | Performed by: FAMILY MEDICINE

## 2019-04-18 PROCEDURE — 3008F BODY MASS INDEX DOCD: CPT | Performed by: FAMILY MEDICINE

## 2019-04-18 PROCEDURE — 1036F TOBACCO NON-USER: CPT | Performed by: FAMILY MEDICINE

## 2019-04-18 PROCEDURE — 3078F DIAST BP <80 MM HG: CPT | Performed by: FAMILY MEDICINE

## 2019-04-18 PROCEDURE — 99214 OFFICE O/P EST MOD 30 MIN: CPT | Performed by: FAMILY MEDICINE

## 2019-10-26 LAB
ALBUMIN SERPL-MCNC: 4.1 G/DL (ref 3.6–5.1)
ALBUMIN/GLOB SERPL: 2 (CALC) (ref 1–2.5)
ALP SERPL-CCNC: 77 U/L (ref 33–130)
ALT SERPL-CCNC: 18 U/L (ref 6–29)
APPEARANCE UR: CLEAR
AST SERPL-CCNC: 17 U/L (ref 10–35)
BACTERIA UR QL AUTO: ABNORMAL /HPF
BASOPHILS # BLD AUTO: 34 CELLS/UL (ref 0–200)
BASOPHILS NFR BLD AUTO: 0.2 %
BILIRUB SERPL-MCNC: 0.5 MG/DL (ref 0.2–1.2)
BILIRUB UR QL STRIP: NEGATIVE
BUN SERPL-MCNC: 14 MG/DL (ref 7–25)
BUN/CREAT SERPL: ABNORMAL (CALC) (ref 6–22)
CALCIUM SERPL-MCNC: 8.7 MG/DL (ref 8.6–10.4)
CHLORIDE SERPL-SCNC: 106 MMOL/L (ref 98–110)
CHOLEST SERPL-MCNC: 204 MG/DL
CHOLEST/HDLC SERPL: 3.1 (CALC)
CO2 SERPL-SCNC: 24 MMOL/L (ref 20–32)
COLOR UR: YELLOW
CREAT SERPL-MCNC: 0.69 MG/DL (ref 0.5–0.99)
EOSINOPHIL # BLD AUTO: 0 CELLS/UL (ref 15–500)
EOSINOPHIL NFR BLD AUTO: 0 %
ERYTHROCYTE [DISTWIDTH] IN BLOOD BY AUTOMATED COUNT: 12.8 % (ref 11–15)
GLOBULIN SER CALC-MCNC: 2.1 G/DL (CALC) (ref 1.9–3.7)
GLUCOSE SERPL-MCNC: 104 MG/DL (ref 65–99)
GLUCOSE UR QL STRIP: NEGATIVE
HCT VFR BLD AUTO: 38 % (ref 35–45)
HDLC SERPL-MCNC: 65 MG/DL
HGB BLD-MCNC: 13 G/DL (ref 11.7–15.5)
HGB UR QL STRIP: ABNORMAL
HYALINE CASTS #/AREA URNS LPF: ABNORMAL /LPF
KETONES UR QL STRIP: NEGATIVE
LDLC SERPL CALC-MCNC: 114 MG/DL (CALC)
LEUKOCYTE ESTERASE UR QL STRIP: ABNORMAL
LYMPHOCYTES # BLD AUTO: 1966 CELLS/UL (ref 850–3900)
LYMPHOCYTES NFR BLD AUTO: 11.7 %
MCH RBC QN AUTO: 31.8 PG (ref 27–33)
MCHC RBC AUTO-ENTMCNC: 34.2 G/DL (ref 32–36)
MCV RBC AUTO: 92.9 FL (ref 80–100)
MONOCYTES # BLD AUTO: 1109 CELLS/UL (ref 200–950)
MONOCYTES NFR BLD AUTO: 6.6 %
NEUTROPHILS # BLD AUTO: ABNORMAL CELLS/UL (ref 1500–7800)
NEUTROPHILS NFR BLD AUTO: 81.5 %
NITRITE UR QL STRIP: NEGATIVE
NONHDLC SERPL-MCNC: 139 MG/DL (CALC)
PH UR STRIP: 6 [PH] (ref 5–8)
PLATELET # BLD AUTO: 246 THOUSAND/UL (ref 140–400)
PMV BLD REES-ECKER: 9.6 FL (ref 7.5–12.5)
POTASSIUM SERPL-SCNC: 3.7 MMOL/L (ref 3.5–5.3)
PROT SERPL-MCNC: 6.2 G/DL (ref 6.1–8.1)
PROT UR QL STRIP: NEGATIVE
RBC # BLD AUTO: 4.09 MILLION/UL (ref 3.8–5.1)
RBC #/AREA URNS HPF: ABNORMAL /HPF
SL AMB EGFR AFRICAN AMERICAN: 109 ML/MIN/1.73M2
SL AMB EGFR NON AFRICAN AMERICAN: 94 ML/MIN/1.73M2
SODIUM SERPL-SCNC: 140 MMOL/L (ref 135–146)
SP GR UR STRIP: 1 (ref 1–1.03)
SQUAMOUS #/AREA URNS HPF: ABNORMAL /HPF
T4 SERPL-MCNC: 11.3 MCG/DL (ref 5.1–11.9)
TRIGL SERPL-MCNC: 135 MG/DL
TSH SERPL-ACNC: 0.81 MIU/L (ref 0.4–4.5)
WBC # BLD AUTO: 16.8 THOUSAND/UL (ref 3.8–10.8)
WBC #/AREA URNS HPF: ABNORMAL /HPF

## 2019-11-01 ENCOUNTER — OFFICE VISIT (OUTPATIENT)
Dept: FAMILY MEDICINE CLINIC | Facility: CLINIC | Age: 59
End: 2019-11-01
Payer: COMMERCIAL

## 2019-11-01 VITALS
BODY MASS INDEX: 34.35 KG/M2 | HEART RATE: 70 BPM | OXYGEN SATURATION: 94 % | TEMPERATURE: 98 F | SYSTOLIC BLOOD PRESSURE: 110 MMHG | DIASTOLIC BLOOD PRESSURE: 78 MMHG | HEIGHT: 64 IN | WEIGHT: 201.2 LBS | RESPIRATION RATE: 16 BRPM

## 2019-11-01 DIAGNOSIS — E89.0 POSTOPERATIVE HYPOTHYROIDISM: Primary | ICD-10-CM

## 2019-11-01 DIAGNOSIS — E78.5 HYPERLIPIDEMIA, UNSPECIFIED HYPERLIPIDEMIA TYPE: ICD-10-CM

## 2019-11-01 DIAGNOSIS — I10 ESSENTIAL HYPERTENSION: ICD-10-CM

## 2019-11-01 DIAGNOSIS — Z12.39 SCREENING FOR BREAST CANCER: ICD-10-CM

## 2019-11-01 DIAGNOSIS — E66.9 OBESITY (BMI 30-39.9): ICD-10-CM

## 2019-11-01 DIAGNOSIS — R73.9 HYPERGLYCEMIA: ICD-10-CM

## 2019-11-01 DIAGNOSIS — D72.829 LEUKOCYTOSIS, UNSPECIFIED TYPE: ICD-10-CM

## 2019-11-01 DIAGNOSIS — I47.1 PAROXYSMAL SUPRAVENTRICULAR TACHYCARDIA (HCC): ICD-10-CM

## 2019-11-01 PROCEDURE — 3008F BODY MASS INDEX DOCD: CPT | Performed by: FAMILY MEDICINE

## 2019-11-01 PROCEDURE — 3078F DIAST BP <80 MM HG: CPT | Performed by: FAMILY MEDICINE

## 2019-11-01 PROCEDURE — 3074F SYST BP LT 130 MM HG: CPT | Performed by: FAMILY MEDICINE

## 2019-11-01 PROCEDURE — 99214 OFFICE O/P EST MOD 30 MIN: CPT | Performed by: FAMILY MEDICINE

## 2019-11-01 NOTE — PATIENT INSTRUCTIONS
Repeat CBC in 2 weeks  Return in 6 months for office visit blood work  Follow-up with all specialist per their instructions    Diet exercise and weight loss recommended

## 2019-11-01 NOTE — PROGRESS NOTES
Assessment and Plan:  1  Hypothyroidism, stable continue present therapy  2  Hypertension, stable continue present therapy  3  PSVT, stable status post ablation on beta-blocker follows with Cardiology  4  Hyperglycemia, mild low sugar low carb right diet recommended  5  Hyperlipidemia, continue atorvastatin  6  Leukocytosis, repeat CBC with diff in 2 weeks  7  Obesity BMI of 34 54 discussed diet exercise and weight loss recommended  8  Screening breast cancer, mammography was ordered  9  Patient to return in 6 months for office visit blood work sooner if needed        Problem List Items Addressed This Visit        Endocrine    Postoperative hypothyroidism - Primary      Stable continue present therapy         Relevant Orders    CBC    Comprehensive metabolic panel    Lipid Panel with Direct LDL reflex    Hemoglobin A1C    T4    TSH, 3rd generation with Free T4 reflex    Urinalysis with reflex to microscopic       Cardiovascular and Mediastinum    Hypertension      Stable  On propranolol         Relevant Orders    CBC    Comprehensive metabolic panel    Lipid Panel with Direct LDL reflex    Hemoglobin A1C    T4    TSH, 3rd generation with Free T4 reflex    Urinalysis with reflex to microscopic    Paroxysmal supraventricular tachycardia (HCC)      Stable patient follows with Cardiology         Relevant Orders    CBC    Comprehensive metabolic panel    Lipid Panel with Direct LDL reflex    Hemoglobin A1C    T4    TSH, 3rd generation with Free T4 reflex    Urinalysis with reflex to microscopic       Other    Hyperlipidemia      Stable continue atorvastatin 40 mg daily         Relevant Orders    CBC    Comprehensive metabolic panel    Lipid Panel with Direct LDL reflex    Hemoglobin A1C    T4    TSH, 3rd generation with Free T4 reflex    Urinalysis with reflex to microscopic    Screening for breast cancer      Mammogram ordered         Relevant Orders    Mammo screening bilateral w 3d & cad    Obesity (BMI 30-39  9) BMI 34 54 discussed         Relevant Orders    CBC    Comprehensive metabolic panel    Lipid Panel with Direct LDL reflex    Hemoglobin A1C    T4    TSH, 3rd generation with Free T4 reflex    Urinalysis with reflex to microscopic    Hyperglycemia      Low sugar low carb right diet recommended         Relevant Orders    CBC    Comprehensive metabolic panel    Lipid Panel with Direct LDL reflex    Hemoglobin A1C    T4    TSH, 3rd generation with Free T4 reflex    Urinalysis with reflex to microscopic      Other Visit Diagnoses     Leukocytosis, unspecified type        Relevant Orders    CBC and differential    CBC    Comprehensive metabolic panel    Lipid Panel with Direct LDL reflex    Hemoglobin A1C    T4    TSH, 3rd generation with Free T4 reflex    Urinalysis with reflex to microscopic                 Diagnoses and all orders for this visit:    Postoperative hypothyroidism  -     CBC; Future  -     Comprehensive metabolic panel; Future  -     Lipid Panel with Direct LDL reflex; Future  -     Hemoglobin A1C; Future  -     T4; Future  -     TSH, 3rd generation with Free T4 reflex; Future  -     Urinalysis with reflex to microscopic; Future    Screening for breast cancer  -     Mammo screening bilateral w 3d & cad; Future    Essential hypertension  -     CBC; Future  -     Comprehensive metabolic panel; Future  -     Lipid Panel with Direct LDL reflex; Future  -     Hemoglobin A1C; Future  -     T4; Future  -     TSH, 3rd generation with Free T4 reflex; Future  -     Urinalysis with reflex to microscopic; Future    Paroxysmal supraventricular tachycardia (HCC)  -     CBC; Future  -     Comprehensive metabolic panel; Future  -     Lipid Panel with Direct LDL reflex; Future  -     Hemoglobin A1C; Future  -     T4; Future  -     TSH, 3rd generation with Free T4 reflex; Future  -     Urinalysis with reflex to microscopic; Future    Hyperglycemia  -     CBC; Future  -     Comprehensive metabolic panel;  Future  -     Lipid Panel with Direct LDL reflex; Future  -     Hemoglobin A1C; Future  -     T4; Future  -     TSH, 3rd generation with Free T4 reflex; Future  -     Urinalysis with reflex to microscopic; Future    Hyperlipidemia, unspecified hyperlipidemia type  -     CBC; Future  -     Comprehensive metabolic panel; Future  -     Lipid Panel with Direct LDL reflex; Future  -     Hemoglobin A1C; Future  -     T4; Future  -     TSH, 3rd generation with Free T4 reflex; Future  -     Urinalysis with reflex to microscopic; Future    Obesity (BMI 30-39 9)  -     CBC; Future  -     Comprehensive metabolic panel; Future  -     Lipid Panel with Direct LDL reflex; Future  -     Hemoglobin A1C; Future  -     T4; Future  -     TSH, 3rd generation with Free T4 reflex; Future  -     Urinalysis with reflex to microscopic; Future    Leukocytosis, unspecified type  -     CBC and differential; Future  -     CBC; Future  -     Comprehensive metabolic panel; Future  -     Lipid Panel with Direct LDL reflex; Future  -     Hemoglobin A1C; Future  -     T4; Future  -     TSH, 3rd generation with Free T4 reflex; Future  -     Urinalysis with reflex to microscopic; Future              Subjective:      Patient ID: Yesi Roberts is a 62 y o  female  CC:    Chief Complaint   Patient presents with    Follow-up     pt is here for her 6 month follow up       HPI:     Patient doing well without any medical complaints concerns at the present time  Blood work was discussed with the patient      The following portions of the patient's history were reviewed and updated as appropriate: allergies, current medications, past family history, past medical history, past social history, past surgical history and problem list       Review of Systems   Constitutional: Negative  HENT: Negative  Eyes: Negative  Respiratory: Negative  Cardiovascular: Negative  Gastrointestinal: Negative  Endocrine: Negative  Genitourinary: Negative      Musculoskeletal: Negative  Skin: Negative  Allergic/Immunologic: Negative  Neurological: Negative  Hematological: Negative  Psychiatric/Behavioral: Negative  Data to review:       Objective:    Vitals:    11/01/19 1211   BP: 110/78   BP Location: Left arm   Patient Position: Sitting   Cuff Size: Adult   Pulse: 70   Resp: 16   Temp: 98 °F (36 7 °C)   TempSrc: Temporal   SpO2: 94%   Weight: 91 3 kg (201 lb 3 2 oz)   Height: 5' 4" (1 626 m)        Physical Exam   Constitutional: She is oriented to person, place, and time  She appears well-developed and well-nourished  HENT:   Head: Normocephalic and atraumatic  Right Ear: External ear normal    Left Ear: External ear normal    Nose: Nose normal    Mouth/Throat: Oropharynx is clear and moist  No oropharyngeal exudate  Eyes: Pupils are equal, round, and reactive to light  Conjunctivae and EOM are normal  No scleral icterus  Neck: Neck supple  No JVD present  Cardiovascular: Normal rate, regular rhythm and normal heart sounds  Pulmonary/Chest: Effort normal and breath sounds normal    Abdominal: Soft  Bowel sounds are normal  There is no tenderness  Musculoskeletal: She exhibits no edema  Lymphadenopathy:     She has no cervical adenopathy  Neurological: She is alert and oriented to person, place, and time  No cranial nerve deficit  Skin: Skin is warm and dry  Psychiatric: She has a normal mood and affect  BMI Counseling: Body mass index is 34 54 kg/m²  The BMI is above normal  Nutrition recommendations include moderation in carbohydrate intake and reducing intake of cholesterol  Exercise recommendations include exercising 3-5 times per week

## 2019-11-15 ENCOUNTER — TELEPHONE (OUTPATIENT)
Dept: FAMILY MEDICINE CLINIC | Facility: CLINIC | Age: 59
End: 2019-11-15

## 2019-11-15 NOTE — TELEPHONE ENCOUNTER
FYI    PATIENT CALLED IN STATED SHE WAS IN November 1ST WITH DR FLORES STATED SHE WAS GIVEN BLOOD WORK TO BE DONE ON 11/11 OR AFTER HAS NOT HAD A CHANCE TO COMPLETE BECAUSE LAST WEEK SHE HAD A VERY BAD COLD, WANTED DR FLORES TO KNOW NOT TO EXPECT BLOOD WORK,  PATIENT STATED SHE WILL LET THE COLD RUN OUT AND THEN WILL COMPLETE ADVISED IF NOT FEELING BETTER MIGHT HAVE TO BE SEEN AND WE DO OFFER SAME DAY SICK VISITS

## 2019-12-27 DIAGNOSIS — I47.1 PAROXYSMAL SUPRAVENTRICULAR TACHYCARDIA (HCC): ICD-10-CM

## 2019-12-27 DIAGNOSIS — I10 ESSENTIAL HYPERTENSION: ICD-10-CM

## 2019-12-27 DIAGNOSIS — E66.9 CLASS 2 OBESITY WITHOUT SERIOUS COMORBIDITY WITH BODY MASS INDEX (BMI) OF 35.0 TO 35.9 IN ADULT, UNSPECIFIED OBESITY TYPE: ICD-10-CM

## 2019-12-27 DIAGNOSIS — E89.0 POSTOPERATIVE HYPOTHYROIDISM: ICD-10-CM

## 2019-12-27 RX ORDER — LEVOTHYROXINE SODIUM 112 MCG
TABLET ORAL
Qty: 90 TABLET | Refills: 3 | Status: SHIPPED | OUTPATIENT
Start: 2019-12-27 | End: 2020-02-20 | Stop reason: SDUPTHER

## 2019-12-27 RX ORDER — PROPRANOLOL HCL 60 MG
CAPSULE, EXTENDED RELEASE 24HR ORAL
Qty: 90 CAPSULE | Refills: 3 | Status: SHIPPED | OUTPATIENT
Start: 2019-12-27 | End: 2020-10-27 | Stop reason: SDUPTHER

## 2019-12-27 RX ORDER — ATORVASTATIN CALCIUM 40 MG/1
TABLET, FILM COATED ORAL
Qty: 90 TABLET | Refills: 3 | Status: SHIPPED | OUTPATIENT
Start: 2019-12-27 | End: 2020-10-27 | Stop reason: SDUPTHER

## 2020-01-01 LAB
BASOPHILS # BLD AUTO: 32 CELLS/UL (ref 0–200)
BASOPHILS NFR BLD AUTO: 0.5 %
EOSINOPHIL # BLD AUTO: 183 CELLS/UL (ref 15–500)
EOSINOPHIL NFR BLD AUTO: 2.9 %
ERYTHROCYTE [DISTWIDTH] IN BLOOD BY AUTOMATED COUNT: 12.4 % (ref 11–15)
HCT VFR BLD AUTO: 39.4 % (ref 35–45)
HGB BLD-MCNC: 13.3 G/DL (ref 11.7–15.5)
LYMPHOCYTES # BLD AUTO: 2463 CELLS/UL (ref 850–3900)
LYMPHOCYTES NFR BLD AUTO: 39.1 %
MCH RBC QN AUTO: 31.1 PG (ref 27–33)
MCHC RBC AUTO-ENTMCNC: 33.8 G/DL (ref 32–36)
MCV RBC AUTO: 92.1 FL (ref 80–100)
MONOCYTES # BLD AUTO: 359 CELLS/UL (ref 200–950)
MONOCYTES NFR BLD AUTO: 5.7 %
NEUTROPHILS # BLD AUTO: 3263 CELLS/UL (ref 1500–7800)
NEUTROPHILS NFR BLD AUTO: 51.8 %
PLATELET # BLD AUTO: 228 THOUSAND/UL (ref 140–400)
PMV BLD REES-ECKER: 9.7 FL (ref 7.5–12.5)
RBC # BLD AUTO: 4.28 MILLION/UL (ref 3.8–5.1)
WBC # BLD AUTO: 6.3 THOUSAND/UL (ref 3.8–10.8)

## 2020-02-20 DIAGNOSIS — E89.0 POSTOPERATIVE HYPOTHYROIDISM: ICD-10-CM

## 2020-02-20 RX ORDER — LEVOTHYROXINE SODIUM 112 UG/1
112 TABLET ORAL DAILY
Qty: 90 TABLET | Refills: 3 | Status: SHIPPED | OUTPATIENT
Start: 2020-02-20 | End: 2021-01-19

## 2020-06-01 RX ORDER — ATOVAQUONE AND PROGUANIL HYDROCHLORIDE 250; 100 MG/1; MG/1
TABLET, FILM COATED ORAL
COMMUNITY
End: 2020-07-30

## 2020-07-18 LAB
ALBUMIN SERPL-MCNC: 4.3 G/DL (ref 3.6–5.1)
ALBUMIN/GLOB SERPL: 2 (CALC) (ref 1–2.5)
ALP SERPL-CCNC: 78 U/L (ref 37–153)
ALT SERPL-CCNC: 23 U/L (ref 6–29)
APPEARANCE UR: CLEAR
AST SERPL-CCNC: 20 U/L (ref 10–35)
BACTERIA UR QL AUTO: ABNORMAL /HPF
BASOPHILS # BLD AUTO: 40 CELLS/UL (ref 0–200)
BASOPHILS NFR BLD AUTO: 0.6 %
BILIRUB SERPL-MCNC: 0.6 MG/DL (ref 0.2–1.2)
BILIRUB UR QL STRIP: NEGATIVE
BUN SERPL-MCNC: 11 MG/DL (ref 7–25)
BUN/CREAT SERPL: NORMAL (CALC) (ref 6–22)
CALCIUM SERPL-MCNC: 8.8 MG/DL (ref 8.6–10.4)
CHLORIDE SERPL-SCNC: 106 MMOL/L (ref 98–110)
CHOLEST SERPL-MCNC: 153 MG/DL
CHOLEST/HDLC SERPL: 3.1 (CALC)
CO2 SERPL-SCNC: 27 MMOL/L (ref 20–32)
COLOR UR: YELLOW
CREAT SERPL-MCNC: 0.67 MG/DL (ref 0.5–0.99)
EOSINOPHIL # BLD AUTO: 158 CELLS/UL (ref 15–500)
EOSINOPHIL NFR BLD AUTO: 2.4 %
ERYTHROCYTE [DISTWIDTH] IN BLOOD BY AUTOMATED COUNT: 12.9 % (ref 11–15)
GLOBULIN SER CALC-MCNC: 2.1 G/DL (CALC) (ref 1.9–3.7)
GLUCOSE SERPL-MCNC: 91 MG/DL (ref 65–99)
GLUCOSE UR QL STRIP: NEGATIVE
HBA1C MFR BLD: 5.7 % OF TOTAL HGB
HCT VFR BLD AUTO: 40 % (ref 35–45)
HDLC SERPL-MCNC: 49 MG/DL
HGB BLD-MCNC: 13.7 G/DL (ref 11.7–15.5)
HGB UR QL STRIP: ABNORMAL
HYALINE CASTS #/AREA URNS LPF: ABNORMAL /LPF
KETONES UR QL STRIP: NEGATIVE
LDLC SERPL CALC-MCNC: 78 MG/DL (CALC)
LEUKOCYTE ESTERASE UR QL STRIP: ABNORMAL
LYMPHOCYTES # BLD AUTO: 2310 CELLS/UL (ref 850–3900)
LYMPHOCYTES NFR BLD AUTO: 35 %
MCH RBC QN AUTO: 31.7 PG (ref 27–33)
MCHC RBC AUTO-ENTMCNC: 34.3 G/DL (ref 32–36)
MCV RBC AUTO: 92.6 FL (ref 80–100)
MONOCYTES # BLD AUTO: 475 CELLS/UL (ref 200–950)
MONOCYTES NFR BLD AUTO: 7.2 %
NEUTROPHILS # BLD AUTO: 3617 CELLS/UL (ref 1500–7800)
NEUTROPHILS NFR BLD AUTO: 54.8 %
NITRITE UR QL STRIP: NEGATIVE
NONHDLC SERPL-MCNC: 104 MG/DL (CALC)
PH UR STRIP: 7 [PH] (ref 5–8)
PLATELET # BLD AUTO: 245 THOUSAND/UL (ref 140–400)
PMV BLD REES-ECKER: 9.9 FL (ref 7.5–12.5)
POTASSIUM SERPL-SCNC: 4.3 MMOL/L (ref 3.5–5.3)
PROT SERPL-MCNC: 6.4 G/DL (ref 6.1–8.1)
PROT UR QL STRIP: NEGATIVE
RBC # BLD AUTO: 4.32 MILLION/UL (ref 3.8–5.1)
RBC #/AREA URNS HPF: ABNORMAL /HPF
SL AMB EGFR AFRICAN AMERICAN: 109 ML/MIN/1.73M2
SL AMB EGFR NON AFRICAN AMERICAN: 94 ML/MIN/1.73M2
SODIUM SERPL-SCNC: 140 MMOL/L (ref 135–146)
SP GR UR STRIP: 1.01 (ref 1–1.03)
SQUAMOUS #/AREA URNS HPF: ABNORMAL /HPF
T4 SERPL-MCNC: 12 MCG/DL (ref 5.1–11.9)
TRIGL SERPL-MCNC: 159 MG/DL
TSH SERPL-ACNC: 2.89 MIU/L (ref 0.4–4.5)
WBC # BLD AUTO: 6.6 THOUSAND/UL (ref 3.8–10.8)
WBC #/AREA URNS HPF: ABNORMAL /HPF

## 2020-07-30 ENCOUNTER — OFFICE VISIT (OUTPATIENT)
Dept: FAMILY MEDICINE CLINIC | Facility: CLINIC | Age: 60
End: 2020-07-30
Payer: COMMERCIAL

## 2020-07-30 VITALS
RESPIRATION RATE: 18 BRPM | DIASTOLIC BLOOD PRESSURE: 72 MMHG | WEIGHT: 204.8 LBS | SYSTOLIC BLOOD PRESSURE: 108 MMHG | HEART RATE: 78 BPM | HEIGHT: 64 IN | TEMPERATURE: 98.4 F | BODY MASS INDEX: 34.97 KG/M2

## 2020-07-30 DIAGNOSIS — Z12.11 SCREENING FOR COLON CANCER: ICD-10-CM

## 2020-07-30 DIAGNOSIS — N30.01 ACUTE CYSTITIS WITH HEMATURIA: ICD-10-CM

## 2020-07-30 DIAGNOSIS — M15.9 OSTEOARTHRITIS OF MULTIPLE JOINTS, UNSPECIFIED OSTEOARTHRITIS TYPE: ICD-10-CM

## 2020-07-30 DIAGNOSIS — R73.9 HYPERGLYCEMIA: ICD-10-CM

## 2020-07-30 DIAGNOSIS — E66.01 SEVERE OBESITY (BMI 35.0-39.9) WITH COMORBIDITY (HCC): ICD-10-CM

## 2020-07-30 DIAGNOSIS — M25.50 ARTHRALGIA, UNSPECIFIED JOINT: ICD-10-CM

## 2020-07-30 DIAGNOSIS — E66.9 OBESITY (BMI 30-39.9): ICD-10-CM

## 2020-07-30 DIAGNOSIS — I10 ESSENTIAL HYPERTENSION: ICD-10-CM

## 2020-07-30 DIAGNOSIS — E89.0 POSTOPERATIVE HYPOTHYROIDISM: Primary | ICD-10-CM

## 2020-07-30 DIAGNOSIS — I47.1 PAROXYSMAL SUPRAVENTRICULAR TACHYCARDIA (HCC): ICD-10-CM

## 2020-07-30 DIAGNOSIS — M79.10 MYALGIA: ICD-10-CM

## 2020-07-30 DIAGNOSIS — E78.5 HYPERLIPIDEMIA, UNSPECIFIED HYPERLIPIDEMIA TYPE: ICD-10-CM

## 2020-07-30 DIAGNOSIS — M60.9 MYOSITIS, UNSPECIFIED MYOSITIS TYPE, UNSPECIFIED SITE: ICD-10-CM

## 2020-07-30 PROBLEM — M19.90 OSTEOARTHRITIS: Status: ACTIVE | Noted: 2020-07-30

## 2020-07-30 PROCEDURE — 3008F BODY MASS INDEX DOCD: CPT | Performed by: FAMILY MEDICINE

## 2020-07-30 PROCEDURE — 3078F DIAST BP <80 MM HG: CPT | Performed by: FAMILY MEDICINE

## 2020-07-30 PROCEDURE — 99214 OFFICE O/P EST MOD 30 MIN: CPT | Performed by: FAMILY MEDICINE

## 2020-07-30 PROCEDURE — 3074F SYST BP LT 130 MM HG: CPT | Performed by: FAMILY MEDICINE

## 2020-07-30 PROCEDURE — 1036F TOBACCO NON-USER: CPT | Performed by: FAMILY MEDICINE

## 2020-07-30 RX ORDER — NITROFURANTOIN 25; 75 MG/1; MG/1
CAPSULE ORAL
Qty: 14 CAPSULE | Refills: 0 | Status: SHIPPED | OUTPATIENT
Start: 2020-07-30 | End: 2021-01-14 | Stop reason: ALTCHOICE

## 2020-07-30 RX ORDER — MELOXICAM 15 MG/1
TABLET ORAL
Qty: 30 TABLET | Refills: 5 | Status: SHIPPED | OUTPATIENT
Start: 2020-07-30 | End: 2021-04-23 | Stop reason: HOSPADM

## 2020-07-30 NOTE — PATIENT INSTRUCTIONS
Start meloxicam 15 mg daily with food for arthritis/muscle pain  Finish Macrobid, antibiotics for bladder  Repeat urinalysis and blood work in 2 weeks  If still symptoms in 3-4 weeks return to office  Follow-up with Dr Lew Aschoff for follow-up colonoscopy for colon cancer  Diet exercise weight loss recommended  Four-month office scheduled for 6 months for office visit blood work sooner if needed

## 2020-07-30 NOTE — PROGRESS NOTES
Assessment and Plan:  1  Hypothyroidism, stable continue present therapy  2  Hypertension, stable continue present therapy  3  PSVT status post ablation stable follow-up with Cardiology per their instructions  4  DJD, sees [facility] for injections of knees and hips  Increasing symptomatology  Will start meloxicam GI side effects discussed  5  Hyperglycemia diet controlled  6  Hyperlipidemia, stable continue present therapy  7  BMI 35 15 diet exercise weight loss recommended patient gained 3 lb since last office visit  8  Myalgias/myositis/arthralgia, check blood work for connective tissue diseases, check Lyme titer check parvovirus titer  Meloxicam was ordered as above  If not resolved in 3-4 weeks return to office  9  Acute cystitis, will treat with Macrobid repeat urinalysis with culture in 2 weeks  10  Screening colon cancer last colonoscopy is 3/10  Refer to Dr Radha Whitehead patient prefers her to do follow-up colonoscopy  11   Patient return in 6 months for office visit blood work sooner if needed          Problem List Items Addressed This Visit        Endocrine    Postoperative hypothyroidism - Primary     Stable continue levothyroxine 112 mcg daily            Cardiovascular and Mediastinum    Essential hypertension     Stable on propranolol LA 60 mg daily         Paroxysmal supraventricular tachycardia (HCC)     Status post ablation, asymptomatic is on beta-blocker            Musculoskeletal and Integument    Osteoarthritis     Start meloxicam 15 mg daily, GI side effects discussed         Relevant Medications    nitrofurantoin (MACROBID) 100 mg capsule    meloxicam (MOBIC) 15 mg tablet    Other Relevant Orders    UA (URINE) with reflex to Scope    Urine culture    RF Screen w/ Reflex to Titer    Parvovirus B19 antibody, IgG and IgM    Sedimentation rate, automated    RAFY Screen w/ Reflex to Titer/Pattern    CK    C-reactive protein    Lyme Antibody Profile with reflex to WB    Vitamin D 25 hydroxy       Other Hyperlipidemia     Stable continue atorvastatin 40 mg daily         Severe obesity (BMI 35 0-39  9) with comorbidity (HCC)     BMI 35 15 diet exercise weight loss recommended         Screening for colon cancer     Refer to Dr Mason Umanzor referral to General Surgery    Hyperglycemia     Diet controlled         RESOLVED: Obesity (BMI 30-39  9)     Patient gained 3 lb diet exercise weight loss recommended           Other Visit Diagnoses     Myalgia        Relevant Orders    UA (URINE) with reflex to Scope    Urine culture    RF Screen w/ Reflex to Titer    Parvovirus B19 antibody, IgG and IgM    Sedimentation rate, automated    RAFY Screen w/ Reflex to Titer/Pattern    CK    C-reactive protein    Lyme Antibody Profile with reflex to WB    Vitamin D 25 hydroxy    Arthralgia, unspecified joint        Relevant Orders    UA (URINE) with reflex to Scope    Urine culture    RF Screen w/ Reflex to Titer    Parvovirus B19 antibody, IgG and IgM    Sedimentation rate, automated    RAFY Screen w/ Reflex to Titer/Pattern    CK    C-reactive protein    Lyme Antibody Profile with reflex to WB    Vitamin D 25 hydroxy    Myositis, unspecified myositis type, unspecified site        Relevant Orders    UA (URINE) with reflex to Scope    Urine culture    RF Screen w/ Reflex to Titer    Parvovirus B19 antibody, IgG and IgM    Sedimentation rate, automated    RAFY Screen w/ Reflex to Titer/Pattern    CK    C-reactive protein    Lyme Antibody Profile with reflex to WB    Vitamin D 25 hydroxy    Acute cystitis with hematuria        Relevant Medications    nitrofurantoin (MACROBID) 100 mg capsule    Other Relevant Orders    UA (URINE) with reflex to Scope    Urine culture    RF Screen w/ Reflex to Titer    Parvovirus B19 antibody, IgG and IgM    Sedimentation rate, automated    RAFY Screen w/ Reflex to Titer/Pattern    CK    C-reactive protein    Lyme Antibody Profile with reflex to WB    Vitamin D 25 hydroxy Diagnoses and all orders for this visit:    Postoperative hypothyroidism    Essential hypertension    Paroxysmal supraventricular tachycardia (HCC)    Osteoarthritis of multiple joints, unspecified osteoarthritis type  -     nitrofurantoin (MACROBID) 100 mg capsule; Take 1 tablet twice daily with food  -     UA (URINE) with reflex to Scope; Future  -     Urine culture; Future  -     RF Screen w/ Reflex to Titer; Future  -     Parvovirus B19 antibody, IgG and IgM; Future  -     Sedimentation rate, automated; Future  -     RAFY Screen w/ Reflex to Titer/Pattern; Future  -     CK; Future  -     C-reactive protein; Future  -     Lyme Antibody Profile with reflex to WB; Future  -     Vitamin D 25 hydroxy; Future  -     meloxicam (MOBIC) 15 mg tablet; Take 1 daily with food    Hyperglycemia    Hyperlipidemia, unspecified hyperlipidemia type    Obesity (BMI 30-39  9)    Screening for colon cancer  -     Ambulatory referral to General Surgery; Future    Myalgia  -     UA (URINE) with reflex to Scope; Future  -     Urine culture; Future  -     RF Screen w/ Reflex to Titer; Future  -     Parvovirus B19 antibody, IgG and IgM; Future  -     Sedimentation rate, automated; Future  -     RAFY Screen w/ Reflex to Titer/Pattern; Future  -     CK; Future  -     C-reactive protein; Future  -     Lyme Antibody Profile with reflex to WB; Future  -     Vitamin D 25 hydroxy; Future    Arthralgia, unspecified joint  -     UA (URINE) with reflex to Scope; Future  -     Urine culture; Future  -     RF Screen w/ Reflex to Titer; Future  -     Parvovirus B19 antibody, IgG and IgM; Future  -     Sedimentation rate, automated; Future  -     RAFY Screen w/ Reflex to Titer/Pattern; Future  -     CK; Future  -     C-reactive protein; Future  -     Lyme Antibody Profile with reflex to WB; Future  -     Vitamin D 25 hydroxy; Future    Myositis, unspecified myositis type, unspecified site  -     UA (URINE) with reflex to Scope;  Future  - Urine culture; Future  -     RF Screen w/ Reflex to Titer; Future  -     Parvovirus B19 antibody, IgG and IgM; Future  -     Sedimentation rate, automated; Future  -     RAFY Screen w/ Reflex to Titer/Pattern; Future  -     CK; Future  -     C-reactive protein; Future  -     Lyme Antibody Profile with reflex to WB; Future  -     Vitamin D 25 hydroxy; Future    Acute cystitis with hematuria  -     nitrofurantoin (MACROBID) 100 mg capsule; Take 1 tablet twice daily with food  -     UA (URINE) with reflex to Scope; Future  -     Urine culture; Future  -     RF Screen w/ Reflex to Titer; Future  -     Parvovirus B19 antibody, IgG and IgM; Future  -     Sedimentation rate, automated; Future  -     RAFY Screen w/ Reflex to Titer/Pattern; Future  -     CK; Future  -     C-reactive protein; Future  -     Lyme Antibody Profile with reflex to WB; Future  -     Vitamin D 25 hydroxy; Future    Severe obesity (BMI 35 0-39  9) with comorbidity ([facility] [address] )              Subjective:      Patient ID: Judy Samuel is a 56 y o  female  CC:    Chief Complaint   Patient presents with    Follow-up     6 month       HPI:    Patient's only complaint is she is getting increasing myalgias and arthralgias  Patient does see [facility] and has injections in knees and hips and they only last 2-3 months and  No family history of connective tissue disease no history of rash or tick bite  We will screen her for connective tissue disease  Patient's UA does show possible UTI patient does admit to increased urination  Will treated for this and repeat urinalysis in 2 weeks  Patient's last colonoscopy was 3/10  Patient wishes see Dr Karma Goodman for repeat colonoscopy      The following portions of the patient's history were reviewed and updated as appropriate: allergies, current medications, past family history, past medical history, past social history, past surgical history and problem list       Review of Systems   Constitutional: Negative      HENT: Negative  Eyes: Negative  Respiratory: Negative  Cardiovascular: Negative  Gastrointestinal:        HPI   Endocrine: Negative  Genitourinary:        HPI   Musculoskeletal:        HPI   Skin: Negative  Allergic/Immunologic: Negative  Neurological: Negative  Hematological: Negative  Psychiatric/Behavioral: Negative  Data to review:       Objective:    Vitals:    07/30/20 1331   BP: 108/72   Pulse: 78   Resp: 18   Temp: 98 4 °F (36 9 °C)   TempSrc: Temporal   Weight: 92 9 kg (204 lb 12 8 oz)   Height: 5' 4" (1 626 m)        Physical Exam   Constitutional: She is oriented to person, place, and time  She appears well-developed and well-nourished  HENT:   Head: Normocephalic and atraumatic  Eyes: No scleral icterus  Neck: Neck supple  No JVD present  Cardiovascular: Normal rate, regular rhythm and normal heart sounds  Pulmonary/Chest: Effort normal and breath sounds normal    Abdominal: Soft  Bowel sounds are normal  There is no tenderness  Musculoskeletal: She exhibits no edema, tenderness or deformity  Negative hot joints   Neurological: She is alert and oriented to person, place, and time  No cranial nerve deficit  Skin: Skin is warm and dry  No rash noted  Psychiatric: She has a normal mood and affect  BMI Counseling: Body mass index is 35 15 kg/m²  The BMI is above normal  Nutrition recommendations include moderation in carbohydrate intake and reducing intake of cholesterol  Exercise recommendations include exercising 3-5 times per week

## 2020-08-11 DIAGNOSIS — E55.9 VITAMIN D DEFICIENCY: Primary | ICD-10-CM

## 2020-08-11 RX ORDER — ERGOCALCIFEROL 1.25 MG/1
50000 CAPSULE ORAL WEEKLY
Qty: 4 CAPSULE | Refills: 11 | Status: SHIPPED | OUTPATIENT
Start: 2020-08-11 | End: 2021-05-28

## 2020-08-14 LAB
25(OH)D3 SERPL-MCNC: 15 NG/ML (ref 30–100)
ANA SER QL IF: NEGATIVE
APPEARANCE UR: CLEAR
B BURGDOR AB SER IA-ACNC: <0.9 INDEX
B19V IGG SER IA-ACNC: 0.1
B19V IGM SER IA-ACNC: 0.1
BILIRUB UR QL STRIP: NEGATIVE
CK SERPL-CCNC: 73 U/L (ref 29–143)
COLOR UR: YELLOW
CRP SERPL-MCNC: 9.2 MG/L
ERYTHROCYTE [SEDIMENTATION RATE] IN BLOOD BY WESTERGREN METHOD: 22 MM/H
GLUCOSE UR QL STRIP: NEGATIVE
HGB UR QL STRIP: NEGATIVE
KETONES UR QL STRIP: NEGATIVE
LEUKOCYTE ESTERASE UR QL STRIP: NEGATIVE
NITRITE UR QL STRIP: NEGATIVE
PH UR STRIP: 5.5 [PH] (ref 5–8)
PROT UR QL STRIP: NEGATIVE
RHEUMATOID FACT SERPL-ACNC: <14 IU/ML
SP GR UR STRIP: 1.01 (ref 1–1.03)
SPECIMEN SOURCE CVX/VAG CYTO: NORMAL
TRANSFUSION STATUS PATIENT QL: NORMAL
VZV AG SPEC QL IF: NO GROWTH

## 2020-08-25 ENCOUNTER — TELEPHONE (OUTPATIENT)
Dept: SURGERY | Facility: CLINIC | Age: 60
End: 2020-08-25

## 2020-08-25 NOTE — TELEPHONE ENCOUNTER
Received referral in [address] [place] [address]  Printed script  Mailed to patients home address to contact us for scheduling

## 2020-10-27 DIAGNOSIS — I47.1 PAROXYSMAL SUPRAVENTRICULAR TACHYCARDIA (HCC): ICD-10-CM

## 2020-10-27 DIAGNOSIS — I10 ESSENTIAL HYPERTENSION: ICD-10-CM

## 2020-10-27 DIAGNOSIS — E66.9 CLASS 2 OBESITY WITHOUT SERIOUS COMORBIDITY WITH BODY MASS INDEX (BMI) OF 35.0 TO 35.9 IN ADULT, UNSPECIFIED OBESITY TYPE: ICD-10-CM

## 2020-10-27 RX ORDER — ATORVASTATIN CALCIUM 40 MG/1
40 TABLET, FILM COATED ORAL
Qty: 90 TABLET | Refills: 3 | Status: SHIPPED | OUTPATIENT
Start: 2020-10-27 | End: 2021-07-29 | Stop reason: SDUPTHER

## 2020-10-27 RX ORDER — PROPRANOLOL HCL 60 MG
60 CAPSULE, EXTENDED RELEASE 24HR ORAL DAILY
Qty: 90 CAPSULE | Refills: 3 | Status: SHIPPED | OUTPATIENT
Start: 2020-10-27 | End: 2021-07-29 | Stop reason: SDUPTHER

## 2020-11-06 ENCOUNTER — TELEPHONE (OUTPATIENT)
Dept: FAMILY MEDICINE CLINIC | Facility: CLINIC | Age: 60
End: 2020-11-06

## 2020-11-18 ENCOUNTER — TELEPHONE (OUTPATIENT)
Dept: CARDIOLOGY CLINIC | Facility: CLINIC | Age: 60
End: 2020-11-18

## 2020-11-18 DIAGNOSIS — R00.2 PALPITATIONS: ICD-10-CM

## 2020-11-18 DIAGNOSIS — I47.1 SVT (SUPRAVENTRICULAR TACHYCARDIA) (HCC): Primary | ICD-10-CM

## 2020-11-18 RX ORDER — THIAMINE HCL 100 MG
500 TABLET ORAL DAILY
Qty: 30 TABLET | Refills: 11
Start: 2020-11-18

## 2020-11-18 RX ORDER — ASPIRIN 81 MG/1
81 TABLET ORAL DAILY
Qty: 30 TABLET | Refills: 11
Start: 2020-11-18 | End: 2021-04-13

## 2020-11-20 ENCOUNTER — HOSPITAL ENCOUNTER (OUTPATIENT)
Dept: NON INVASIVE DIAGNOSTICS | Facility: HOSPITAL | Age: 60
Discharge: HOME | End: 2020-11-20
Attending: INTERNAL MEDICINE
Payer: COMMERCIAL

## 2020-11-20 DIAGNOSIS — R00.2 PALPITATIONS: ICD-10-CM

## 2020-11-20 DIAGNOSIS — I47.1 SVT (SUPRAVENTRICULAR TACHYCARDIA) (HCC): ICD-10-CM

## 2020-11-20 PROCEDURE — 93225 XTRNL ECG REC<48 HRS REC: CPT

## 2020-11-20 PROCEDURE — 93226 XTRNL ECG REC<48 HR SCAN A/R: CPT

## 2020-11-30 PROCEDURE — 93227 XTRNL ECG REC<48 HR R&I: CPT | Performed by: INTERNAL MEDICINE

## 2020-12-01 ENCOUNTER — TELEPHONE (OUTPATIENT)
Dept: CARDIOLOGY CLINIC | Facility: CLINIC | Age: 60
End: 2020-12-01

## 2021-01-07 ENCOUNTER — VBI (OUTPATIENT)
Dept: ADMINISTRATIVE | Facility: OTHER | Age: 61
End: 2021-01-07

## 2021-01-13 ENCOUNTER — TELEPHONE (OUTPATIENT)
Dept: FAMILY MEDICINE CLINIC | Facility: CLINIC | Age: 61
End: 2021-01-13

## 2021-01-13 NOTE — TELEPHONE ENCOUNTER
PT CALLED IN TO REQUEST LAB ORDERS, PT HAS AN OV SCHEDULE WITH DR WELSH 1/28 PT WOULD LIKE TO KNOW IF SHE IS DUE FOE LAB WORK? SO SHE CAN HAVE THIS DONE BEFORE HER NEXT APPOINTMENT, IF SO PLEASE PLACE ORDERS AND CALL PT SO SHE CAN  THE LAB SCRIPTS  THANK YOU!

## 2021-01-14 ENCOUNTER — CONSULT (OUTPATIENT)
Dept: CARDIOLOGY CLINIC | Facility: CLINIC | Age: 61
End: 2021-01-14
Payer: COMMERCIAL

## 2021-01-14 DIAGNOSIS — I47.1 PAROXYSMAL SUPRAVENTRICULAR TACHYCARDIA (HCC): ICD-10-CM

## 2021-01-14 DIAGNOSIS — I47.1 SVT (SUPRAVENTRICULAR TACHYCARDIA) (HCC): ICD-10-CM

## 2021-01-14 DIAGNOSIS — R73.9 HYPERGLYCEMIA: ICD-10-CM

## 2021-01-14 DIAGNOSIS — E55.9 VITAMIN D DEFICIENCY: ICD-10-CM

## 2021-01-14 DIAGNOSIS — E89.0 POSTOPERATIVE HYPOTHYROIDISM: ICD-10-CM

## 2021-01-14 DIAGNOSIS — R00.2 PALPITATIONS: Primary | ICD-10-CM

## 2021-01-14 DIAGNOSIS — E78.2 MIXED HYPERLIPIDEMIA: ICD-10-CM

## 2021-01-14 DIAGNOSIS — E78.2 MIXED HYPERLIPIDEMIA: Primary | ICD-10-CM

## 2021-01-14 DIAGNOSIS — I10 ESSENTIAL HYPERTENSION: ICD-10-CM

## 2021-01-14 PROCEDURE — 99204 OFFICE O/P NEW MOD 45 MIN: CPT | Performed by: INTERNAL MEDICINE

## 2021-01-14 RX ORDER — ZINC GLUCONATE 50 MG
50 TABLET ORAL DAILY
COMMUNITY

## 2021-01-14 RX ORDER — DIPHENHYDRAMINE HCL 25 MG
25 TABLET ORAL
COMMUNITY

## 2021-01-14 RX ORDER — DIPHENOXYLATE HYDROCHLORIDE AND ATROPINE SULFATE 2.5; .025 MG/1; MG/1
1 TABLET ORAL DAILY
COMMUNITY

## 2021-01-14 RX ORDER — CHROMIUM 200 MCG
500 TABLET ORAL DAILY
COMMUNITY

## 2021-01-15 VITALS
SYSTOLIC BLOOD PRESSURE: 104 MMHG | HEART RATE: 59 BPM | HEIGHT: 64 IN | WEIGHT: 182 LBS | DIASTOLIC BLOOD PRESSURE: 66 MMHG | OXYGEN SATURATION: 98 % | BODY MASS INDEX: 31.07 KG/M2

## 2021-01-16 LAB
25(OH)D3 SERPL-MCNC: 56 NG/ML (ref 30–100)
ALBUMIN SERPL-MCNC: 4.2 G/DL (ref 3.6–5.1)
ALBUMIN/GLOB SERPL: 1.8 (CALC) (ref 1–2.5)
ALP SERPL-CCNC: 89 U/L (ref 37–153)
ALT SERPL-CCNC: 25 U/L (ref 6–29)
AST SERPL-CCNC: 17 U/L (ref 10–35)
BILIRUB SERPL-MCNC: 0.4 MG/DL (ref 0.2–1.2)
BUN SERPL-MCNC: 16 MG/DL (ref 7–25)
BUN/CREAT SERPL: ABNORMAL (CALC) (ref 6–22)
CALCIUM SERPL-MCNC: 8.9 MG/DL (ref 8.6–10.4)
CHLORIDE SERPL-SCNC: 108 MMOL/L (ref 98–110)
CHOLEST SERPL-MCNC: 177 MG/DL
CHOLEST/HDLC SERPL: 3.3 (CALC)
CO2 SERPL-SCNC: 25 MMOL/L (ref 20–32)
CREAT SERPL-MCNC: 0.62 MG/DL (ref 0.5–0.99)
GLOBULIN SER CALC-MCNC: 2.3 G/DL (CALC) (ref 1.9–3.7)
GLUCOSE SERPL-MCNC: 134 MG/DL (ref 65–99)
HBA1C MFR BLD: 5.2 % OF TOTAL HGB
HDLC SERPL-MCNC: 54 MG/DL
LDLC SERPL CALC-MCNC: 103 MG/DL (CALC)
NONHDLC SERPL-MCNC: 123 MG/DL (CALC)
POTASSIUM SERPL-SCNC: 4.1 MMOL/L (ref 3.5–5.3)
PROT SERPL-MCNC: 6.5 G/DL (ref 6.1–8.1)
SL AMB EGFR AFRICAN AMERICAN: 112 ML/MIN/1.73M2
SL AMB EGFR NON AFRICAN AMERICAN: 97 ML/MIN/1.73M2
SODIUM SERPL-SCNC: 140 MMOL/L (ref 135–146)
TRIGL SERPL-MCNC: 108 MG/DL
TSH SERPL-ACNC: 1.04 MIU/L (ref 0.4–4.5)

## 2021-01-19 DIAGNOSIS — E89.0 POSTOPERATIVE HYPOTHYROIDISM: ICD-10-CM

## 2021-01-19 RX ORDER — LEVOTHYROXINE SODIUM 112 UG/1
TABLET ORAL
Qty: 90 TABLET | Refills: 3 | Status: SHIPPED | OUTPATIENT
Start: 2021-01-19 | End: 2022-01-31

## 2021-01-28 ENCOUNTER — OFFICE VISIT (OUTPATIENT)
Dept: FAMILY MEDICINE CLINIC | Facility: CLINIC | Age: 61
End: 2021-01-28
Payer: COMMERCIAL

## 2021-01-28 VITALS
HEART RATE: 56 BPM | TEMPERATURE: 97.1 F | HEIGHT: 64 IN | WEIGHT: 183.2 LBS | SYSTOLIC BLOOD PRESSURE: 110 MMHG | DIASTOLIC BLOOD PRESSURE: 72 MMHG | BODY MASS INDEX: 31.28 KG/M2

## 2021-01-28 DIAGNOSIS — E55.9 VITAMIN D DEFICIENCY: ICD-10-CM

## 2021-01-28 DIAGNOSIS — I47.1 PAROXYSMAL SUPRAVENTRICULAR TACHYCARDIA (HCC): ICD-10-CM

## 2021-01-28 DIAGNOSIS — E66.9 OBESITY (BMI 30-39.9): ICD-10-CM

## 2021-01-28 DIAGNOSIS — R73.9 HYPERGLYCEMIA: ICD-10-CM

## 2021-01-28 DIAGNOSIS — E66.01 SEVERE OBESITY (BMI 35.0-39.9) WITH COMORBIDITY (HCC): ICD-10-CM

## 2021-01-28 DIAGNOSIS — Z12.11 SCREENING FOR COLON CANCER: ICD-10-CM

## 2021-01-28 DIAGNOSIS — R42 VERTIGO: ICD-10-CM

## 2021-01-28 DIAGNOSIS — E89.0 POSTOPERATIVE HYPOTHYROIDISM: Primary | ICD-10-CM

## 2021-01-28 DIAGNOSIS — E78.2 MIXED HYPERLIPIDEMIA: ICD-10-CM

## 2021-01-28 DIAGNOSIS — Z12.31 ENCOUNTER FOR SCREENING MAMMOGRAM FOR BREAST CANCER: ICD-10-CM

## 2021-01-28 DIAGNOSIS — I10 ESSENTIAL HYPERTENSION: ICD-10-CM

## 2021-01-28 PROCEDURE — 3008F BODY MASS INDEX DOCD: CPT | Performed by: FAMILY MEDICINE

## 2021-01-28 PROCEDURE — 1036F TOBACCO NON-USER: CPT | Performed by: FAMILY MEDICINE

## 2021-01-28 PROCEDURE — 99214 OFFICE O/P EST MOD 30 MIN: CPT | Performed by: FAMILY MEDICINE

## 2021-01-28 RX ORDER — MECLIZINE HYDROCHLORIDE 25 MG/1
25 TABLET ORAL 3 TIMES DAILY PRN
Qty: 30 TABLET | Refills: 0 | Status: SHIPPED | OUTPATIENT
Start: 2021-01-28 | End: 2021-04-13

## 2021-01-28 NOTE — ASSESSMENT & PLAN NOTE
I favor BPPV    meclizine ordered drowsiness discussed   Check CT brain  Check carotid Doppler   Refer for vestibular therapy

## 2021-01-28 NOTE — PATIENT INSTRUCTIONS
Complete CT scan of brain and carotid artery ultrasound  Follow-up physical therapy for vestibular therapy   Use meclizine as needed for symptomatic relief    May cause drowsiness do not drive or operate machinery until side effects are known   Continue diet exercise weight loss  Follow all specialist per their instructions   Recheck 6 weeks

## 2021-01-28 NOTE — PROGRESS NOTES
Assessment and Plan:   1  Hypothyroidism, stable continue present therapy  2  Hypertension, stable continue present therapy  3  PSVT, patient is seeing [facility]  4  Hyperglycemia diet controlled  5  Hyperlipidemia stable continue present therapy  6  BMI 31 45  Patient has lost 11 lb continue diet exercise weight loss   7  Vertigo, favor BP VB  Will rule out other etiologies  Patient has just recently seen Cardiology so I doubt this is cardiogenic  Check CT brain check carotid Doppler  Meclizine ordered drowsiness discussed refer to physical therapy for vestibular therapy  8  Patient to be recheck in 6 weeks sooner if need   9  Patient was to see Dr Desmond Ariza  For colonoscopy      Problem List Items Addressed This Visit        Endocrine    Postoperative hypothyroidism - Primary       Stable continue present therapy            Cardiovascular and Mediastinum    Essential hypertension      Stable continue present therapy         Paroxysmal supraventricular tachycardia [facility])      Patient now follows with [facility]            Other    Mixed hyperlipidemia      Stable continue atorvastatin 40 mg daily         Hyperglycemia      Diet controlled         Vitamin D deficiency      Stable continue present therapy         Obesity (BMI 30-39  9)    Vertigo      I favor BPPV    meclizine ordered drowsiness discussed   Check CT brain  Check carotid Doppler   Refer for vestibular therapy         Relevant Medications    meclizine (ANTIVERT) 25 mg tablet    Other Relevant Orders    CT head wo contrast    VAS carotid complete study    Ambulatory referral to Physical Therapy    RESOLVED: Severe obesity (BMI 35 0-39  9) with comorbidity ([address] )      Other Visit Diagnoses     Encounter for screening mammogram for breast cancer        Relevant Orders    Mammo screening bilateral w 3d & cad                 Diagnoses and all orders for this visit:    Postoperative hypothyroidism    Encounter for screening mammogram for breast cancer  -     Mammo screening bilateral w 3d & cad; Future    Essential hypertension    Paroxysmal supraventricular tachycardia (HCC)    Hyperglycemia    Mixed hyperlipidemia    Vitamin D deficiency    Severe obesity (BMI 35 0-39  9) with comorbidity ([address] )    Obesity (BMI 30-39  9)    Vertigo  -     CT head wo contrast; Future  -     Cancel: Ambulatory referral to Physical Therapy; Future  -     meclizine (ANTIVERT) 25 mg tablet; Take 1 tablet (25 mg total) by mouth 3 (three) times a day as needed for dizziness  -     VAS carotid complete study; Future  -     Ambulatory referral to Physical Therapy; Future              Subjective:      Patient ID: Primitivo Thomas is a 56 y o  female  CC:    Chief Complaint   Patient presents with    Follow-up    Results    Hypertension    Dizziness     Pt states she has had episodes of Vertigo that come on very quickly and don't last very long  She states no known triggers  kw       HPI:     For the past week  Patient has had few episodes of some vertigo  The room spins around mainly associated with head movement  Negative syncope or near syncope  Episode daily or every-other-day lasting "just a few seconds"  The following portions of the patient's history were reviewed and updated as appropriate: allergies, current medications, past family history, past medical history, past social history, past surgical history and problem list       Review of Systems   Constitutional: Negative  HENT: Negative  Eyes: Negative  Respiratory: Negative  Cardiovascular:        Patient is seeing new cardiologist at [address] [place] [address], seen earlier this month   Gastrointestinal: Negative  Endocrine: Negative  Genitourinary: Negative  Musculoskeletal: Negative  Skin: Negative  Allergic/Immunologic: Negative  Neurological:        HPI   Hematological: Negative  Psychiatric/Behavioral: Negative            Data to review:       Objective:    Vitals: 01/28/21 1142   BP: 110/72   BP Location: Left arm   Patient Position: Sitting   Pulse: 56   Temp: (!) 97 1 °F (36 2 °C)   TempSrc: Temporal   Weight: 83 1 kg (183 lb 3 2 oz)   Height: 5' 4" (1 626 m)        Physical Exam  Vitals signs and nursing note reviewed  Constitutional:       Appearance: Normal appearance  HENT:      Head: Normocephalic and atraumatic  Right Ear: Tympanic membrane, ear canal and external ear normal  There is no impacted cerumen  Left Ear: Tympanic membrane, ear canal and external ear normal  There is no impacted cerumen  Eyes:      General: No scleral icterus  Right eye: No discharge  Left eye: No discharge  Extraocular Movements: Extraocular movements intact  Conjunctiva/sclera: Conjunctivae normal       Pupils: Pupils are equal, round, and reactive to light  Neck:      Musculoskeletal: Neck supple  Vascular: No carotid bruit  Cardiovascular:      Rate and Rhythm: Normal rate and regular rhythm  Heart sounds: Normal heart sounds  Pulmonary:      Effort: Pulmonary effort is normal       Breath sounds: Normal breath sounds  Abdominal:      General: Bowel sounds are normal       Palpations: Abdomen is soft  Tenderness: There is no abdominal tenderness  Musculoskeletal:      Right lower leg: No edema  Left lower leg: No edema  Skin:     General: Skin is warm and dry  Neurological:      General: No focal deficit present  Mental Status: She is alert and oriented to person, place, and time  Cranial Nerves: No cranial nerve deficit  Motor: No weakness  Coordination: Coordination normal       Gait: Gait normal       Deep Tendon Reflexes: Reflexes normal       Comments: Romberg negative   Psychiatric:         Mood and Affect: Mood normal          Behavior: Behavior normal          Thought Content: Thought content normal          Judgment: Judgment normal            BMI Counseling:  Body mass index is 31 45 kg/m²  The BMI is above normal  Nutrition recommendations include moderation in carbohydrate intake and reducing intake of cholesterol  Exercise recommendations include exercising 3-5 times per week

## 2021-02-26 ENCOUNTER — PREP FOR PROCEDURE (OUTPATIENT)
Dept: OBGYN CLINIC | Facility: OTHER | Age: 61
End: 2021-02-26

## 2021-02-26 DIAGNOSIS — Z01.818 PREOPERATIVE EXAMINATION, UNSPECIFIED: ICD-10-CM

## 2021-02-26 DIAGNOSIS — M16.11 PRIMARY OSTEOARTHRITIS OF RIGHT HIP: Primary | ICD-10-CM

## 2021-03-01 ENCOUNTER — TELEPHONE (OUTPATIENT)
Dept: CARDIOLOGY CLINIC | Facility: CLINIC | Age: 61
End: 2021-03-01

## 2021-03-01 NOTE — TELEPHONE ENCOUNTER
[facility] called stating that pt needs a cardiac clearance for an upcoming surgery  Did you need pt to come back in, or can you do this based on last visit? Form on your desk

## 2021-03-02 PROBLEM — R00.2 PALPITATIONS: Status: ACTIVE | Noted: 2021-03-02

## 2021-03-02 PROCEDURE — 93000 ELECTROCARDIOGRAM COMPLETE: CPT | Performed by: INTERNAL MEDICINE

## 2021-03-02 NOTE — ASSESSMENT & PLAN NOTE
Patient is currently on atorvastatin 40 mg daily  Lipid panel 1/17/2020: C 153  T 159  H 49  L 78  Recommend dietary modification for triglyceride elevation

## 2021-03-02 NOTE — ASSESSMENT & PLAN NOTE
Patient reports intermittent episodes of palpitations which started around 10/26/2020-11/18/2020  I had recommended the addition of magnesium 500 mg daily as well as aspirin 81 mg daily prior to this office visit  Holter monitor showed predominantly normal sinus rhythm with 7 runs of atrial tachycardia with the longest lasting 10 beats and a low PVC burden of 1 6%  Palpitations did correlate with PVCs and atrial tachycardia  Symptoms have improved significantly with the use of magnesium  Patient will continue to monitor for recurrent symptoms  Longer event monitoring would be considered if palpitations reoccur

## 2021-03-02 NOTE — ASSESSMENT & PLAN NOTE
Patient has a history of SVT with prior SVT ablation 11/2004 (details unknown) and records not able to be found  She had done well up until around October of 2020 when she started to have increased palpitations  Please see discussion under palpitations

## 2021-03-04 ENCOUNTER — TELEPHONE (OUTPATIENT)
Dept: CARDIOLOGY CLINIC | Facility: CLINIC | Age: 61
End: 2021-03-04

## 2021-03-04 NOTE — TELEPHONE ENCOUNTER
Elian Wade from [place] [id number] called and stated that the sx clearance she received for the pt has two responses circled  She will be faxing over a new one to be completed

## 2021-03-08 ENCOUNTER — TELEPHONE (OUTPATIENT)
Dept: CARDIOLOGY CLINIC | Facility: CLINIC | Age: 61
End: 2021-03-08

## 2021-03-08 NOTE — TELEPHONE ENCOUNTER
Pt states that she will not do this test  States that she cant walk well  States that she is more concerned about her hip  Advised her that she should have this done prior to having her hip surgery  States that she is not having any symptoms that are listed below  States that her magnesium helped her

## 2021-03-08 NOTE — TELEPHONE ENCOUNTER
Request for cardiac clearance for hip surgery 04/21/2021  I had last seen the patient 01/14/2021 at which time palpitations were controlled however she did note other symptoms including fatigue, shortness of breath, intermittent lower extremity edema and short-lived sharp chest pain  Ideally would recommend that the patient undergo a Lexiscan nuclear stress test prior to surgery if she is agreeable

## 2021-03-09 ENCOUNTER — EVALUATION (OUTPATIENT)
Dept: PHYSICAL THERAPY | Facility: REHABILITATION | Age: 61
End: 2021-03-09
Payer: COMMERCIAL

## 2021-03-09 DIAGNOSIS — M25.551 RIGHT HIP PAIN: Primary | ICD-10-CM

## 2021-03-09 DIAGNOSIS — Z01.818 PREOPERATIVE EXAMINATION, UNSPECIFIED: ICD-10-CM

## 2021-03-09 PROCEDURE — 97116 GAIT TRAINING THERAPY: CPT | Performed by: PHYSICAL THERAPIST

## 2021-03-09 PROCEDURE — 97110 THERAPEUTIC EXERCISES: CPT | Performed by: PHYSICAL THERAPIST

## 2021-03-09 PROCEDURE — 97112 NEUROMUSCULAR REEDUCATION: CPT | Performed by: PHYSICAL THERAPIST

## 2021-03-09 PROCEDURE — 97162 PT EVAL MOD COMPLEX 30 MIN: CPT | Performed by: PHYSICAL THERAPIST

## 2021-03-09 NOTE — PROGRESS NOTES
PT Evaluation     Today's date: 3/9/2021  Patient name: Irene Glover  : 1960  MRN: [id number]  Referring provider: Denny Mojica MD  Dx:   Encounter Diagnosis     ICD-10-CM    1  Right hip pain  M25 [id number]    2  Preoperative examination, unspecified  [id number] Ambulatory referral to Physical Therapy       Start Time: 90  Stop Time: 1250  Total time in clinic (min): 85 minutes    Assessment  Assessment details: Patient is a 56 y o  female that presents pre-op R JEREMY on 2021  Patient presents with decreased strength, decreased ROM, and tenderness to palpation  Patient has difficulty with ambulation, transfers, negotiation of stairs, recreational activities, and house/yard work secondary to impairments  Patient would benefit from skilled physical therapy services to address impairments to maximize function  Impairments: abnormal gait, abnormal muscle firing, abnormal or restricted ROM, activity intolerance, impaired balance, impaired physical strength, lacks appropriate home exercise program, pain with function and weight-bearing intolerance  Understanding of Dx/Px/POC: good   Prognosis: good    Goals  Impairment:  1  Patient will reports 50% reduction in pain to maximize function  2   Patient will improve strength to 4/5 in all planes to maximize function  3   Patient will improve ROM to [facility] to maximize function  Functional:  1  Patient will improve FOTO by goal points to goal/100 at discharge to maximize function  2  Patient will be independent with HEP in 4 weeks to maximize function  3  Patient will report no difficulty with ambulation at discharge to maximize function  4  Patient will report no difficulty with negotiation of stairs at discharge to maximize function  5  Patient will report no difficulty with transfers to maximize function  Plan  Plan details: Patient has surgery on 2021  Will see for first visit post op/RE on 2021    Patient would benefit from: skilled physical therapy  Planned modality interventions: cryotherapy  Planned therapy interventions: abdominal trunk stabilization, activity modification, balance, patient education, neuromuscular re-education, manual therapy, therapeutic activities, stretching, strengthening, therapeutic exercise, gait training, functional ROM exercises and home exercise program  Duration in visits: 2  Duration in weeks: 7  Treatment plan discussed with: patient        Subjective Evaluation    History of Present Illness  Mechanism of injury: Patient presents pre-op R JEREMY with anterior approach on 2021  Patient has had right hip pain for about 5 years  She also has a history of left hip pain (3-4 years) and bilateral knee pain (14 years)  She plans to have both knees and her left hip replaced within the next year  Patient reports a recent onset of right buttock and leg pain within the last week with insidious onset  Patient reports difficulty with ambulation, transfers, negotiation of stairs, ADLS, and housework  Pain  At worst pain ratin  Location: R hip  Aggravating factors: walking, standing and stair climbing  Progression: worsening    Social Support  Steps to enter house: yes  Stairs in house: no     Treatments  Current treatment: physical therapy  Patient Goals  Patient goals for therapy: decreased pain, increased motion, increased strength and independence with ADLs/IADLs          Objective     Tenderness     Right Hip   Tenderness in the greater trochanter  Neurological Testing     Sensation     Lumbar   Left   Intact: light touch    Right   Intact: light touch    Passive Range of Motion     Right Hip   Flexion: [facility]  External rotation (90/90): [facility]  Internal rotation (90/90):  [facility]    Strength/Myotome Testing     Left Hip   Planes of Motion   Flexion: 4+    Right Hip   Planes of Motion   Flexion: 2+ (sharp pain)  External rotation: 4- (pain)  Internal rotation: 4- (pain)    Left Knee   Flexion: 4+  Extension: 4+    Right Knee   Flexion: 4  Extension: 4    Tests     Lumbar     Left   Negative crossed SLR, passive SLR and slump test      Right   Negative crossed SLR, passive SLR and slump test      Ambulation   Weight-Bearing Status   Weight-Bearing Status (Right): full weight-bearing    Assistive device used: single point cane    Ambulation: Stairs   Ascend stairs: independent  Pattern: non-reciprocal  Railings: one rail  Descend stairs: independent  Pattern: non-reciprocal  Railings: one rail    Additional Stairs Ambulation Details  Difficulty with hip flexion to move right LE to the next step, step to stp gait pattern leading with left ascending and right descending     Observational Gait   Gait: antalgic   Decreased walking speed and stride length                Precautions: hx cancer, L foot surgery, zhane knee and hip pain      Manuals 3/9            R hip PROM                                                    Neuro Re-Ed             Quad set issued            Glut set issued            Standing march issued                                                                Ther Ex             Recumbent bike             SLR issued            SAQ issued            Heel slide flexion issued            Bent knee fall out issued                                                   Ther Activity                                       Gait Training             Stair training 10 min            SPC gait training 10 min            Modalities

## 2021-03-09 NOTE — TELEPHONE ENCOUNTER
I cannot make her do the stress test  It would be a medicine stress test and she would not be walking on the treadmill  If she does not want to proceed then I will fill out her preop clearance to the best of my ability  I am glad the magnesium helped her  Thank you

## 2021-03-11 NOTE — TELEPHONE ENCOUNTER
The pt called and stated that she does not want to do the test  She looked into the clearance and she believes that she only needs clearance from her PCP according to the form she has  She is going to call them to see if they can do the EKG and sign the clearance  She states that since beginning the magnesium she has zero symptoms and doesn't understand why the stress test would be required

## 2021-03-22 NOTE — TELEPHONE ENCOUNTER
I have her clearance on my desk  Can you confirm that she does not need it from me  I can "clear " her without stress test but she has to understand that she is at an unclear risk for her procedure

## 2021-03-23 DIAGNOSIS — Z01.818 PREOP EXAMINATION: Primary | ICD-10-CM

## 2021-03-26 LAB — HBA1C MFR BLD HPLC: 5.5 %

## 2021-04-06 ENCOUNTER — OFFICE VISIT (OUTPATIENT)
Dept: FAMILY MEDICINE CLINIC | Facility: CLINIC | Age: 61
End: 2021-04-06
Payer: COMMERCIAL

## 2021-04-06 ENCOUNTER — TELEPHONE (OUTPATIENT)
Dept: FAMILY MEDICINE CLINIC | Facility: CLINIC | Age: 61
End: 2021-04-06

## 2021-04-06 ENCOUNTER — TELEPHONE (OUTPATIENT)
Dept: PULMONOLOGY | Facility: CLINIC | Age: 61
End: 2021-04-06

## 2021-04-06 VITALS
TEMPERATURE: 97.6 F | DIASTOLIC BLOOD PRESSURE: 78 MMHG | HEIGHT: 64 IN | WEIGHT: 178.2 LBS | HEART RATE: 64 BPM | BODY MASS INDEX: 30.42 KG/M2 | SYSTOLIC BLOOD PRESSURE: 126 MMHG

## 2021-04-06 DIAGNOSIS — Z01.818 PREOPERATIVE CLEARANCE: Primary | ICD-10-CM

## 2021-04-06 DIAGNOSIS — E78.2 MIXED HYPERLIPIDEMIA: ICD-10-CM

## 2021-04-06 DIAGNOSIS — M15.9 OSTEOARTHRITIS OF MULTIPLE JOINTS, UNSPECIFIED OSTEOARTHRITIS TYPE: ICD-10-CM

## 2021-04-06 DIAGNOSIS — E89.0 POSTOPERATIVE HYPOTHYROIDISM: ICD-10-CM

## 2021-04-06 DIAGNOSIS — R00.2 PALPITATIONS: ICD-10-CM

## 2021-04-06 DIAGNOSIS — I47.1 PAROXYSMAL SUPRAVENTRICULAR TACHYCARDIA (HCC): ICD-10-CM

## 2021-04-06 DIAGNOSIS — I10 ESSENTIAL HYPERTENSION: ICD-10-CM

## 2021-04-06 DIAGNOSIS — E55.9 VITAMIN D DEFICIENCY: Primary | ICD-10-CM

## 2021-04-06 DIAGNOSIS — R73.9 HYPERGLYCEMIA: ICD-10-CM

## 2021-04-06 DIAGNOSIS — J34.89 INTERNAL NASAL LESION: ICD-10-CM

## 2021-04-06 DIAGNOSIS — M16.11 ARTHRITIS OF RIGHT HIP: ICD-10-CM

## 2021-04-06 DIAGNOSIS — Z01.818 PRE-OP EXAM: ICD-10-CM

## 2021-04-06 PROBLEM — M16.12 OSTEOARTHRITIS OF LEFT HIP: Status: RESOLVED | Noted: 2017-02-01 | Resolved: 2021-04-06

## 2021-04-06 PROCEDURE — 93000 ELECTROCARDIOGRAM COMPLETE: CPT | Performed by: FAMILY MEDICINE

## 2021-04-06 PROCEDURE — 1036F TOBACCO NON-USER: CPT | Performed by: FAMILY MEDICINE

## 2021-04-06 PROCEDURE — 3008F BODY MASS INDEX DOCD: CPT | Performed by: INTERNAL MEDICINE

## 2021-04-06 PROCEDURE — 3008F BODY MASS INDEX DOCD: CPT | Performed by: FAMILY MEDICINE

## 2021-04-06 PROCEDURE — 99214 OFFICE O/P EST MOD 30 MIN: CPT | Performed by: FAMILY MEDICINE

## 2021-04-06 RX ORDER — CHOLECALCIFEROL (VITAMIN D3) 25 MCG
1 TABLET ORAL DAILY
COMMUNITY

## 2021-04-06 NOTE — TELEPHONE ENCOUNTER
Radha calling from  Dr Raquel Tiwari office patient needs cardio pre surg clearance appt for upcoming hip replacement   Dr Coughlin Angry  On 04/21/2021   Please advise an then call radha back at [phone]  Thanks

## 2021-04-06 NOTE — PATIENT INSTRUCTIONS
Patient needs cardiac clearance from her cardiologist, Dr Maricarmen Quintanilla for surgery  Use Bactroban ointment twice daily to left nares for 5 days   Return in 1 week if still symptoms  Return 1 week after surgery for follow-up

## 2021-04-06 NOTE — Clinical Note
Previous note said patient was not cleared    This is incorrect patient is fully cleared for upcoming surgery

## 2021-04-06 NOTE — PROGRESS NOTES
Assessment and Plan:  1  Preoperative clearance , preoperative blood work was reviewed to include UA, hemoglobin A1c, C-reactive protein, PT, CBC, PTT, CMP, and iron studies  All were acceptable for surgery  EKG was completed in office  EKG shows NSR without change and normal Patient will need cardiac clearance secondary to PSVT  Addendum 04/12/2021  Patient did I obtained cardiac clearance on 04/09/2021  Patient is not fully cleared for upcoming surgery  Addendum  04/15/2021  Above statement was incorrect  Patient is fully cleared for upcoming surgery  2  Right hip DJD  Patient is scheduled for right total hip replacement with Dr Alex Ford at [facility] 04/21/2021  3  Hypertension, stable continue present therapy  4  PSVT status post ablation/ palpitations  EKG completed in office today  EKG in office is normal and an as are  Patient will need cardiology clearance by her cardiologist, Dr Bettie Post   5  Nasal lesion, xerotic lesion I will prescribe Bactroban nasal ointment twice a day for 5 days  If not resolving next week patient return to office   6  Patient to return after surgery for evaluation      Problem List Items Addressed This Visit        Cardiovascular and Mediastinum    Essential hypertension    Paroxysmal supraventricular tachycardia ([address] )    Relevant Orders    Ambulatory referral to Cardiology       Other    Palpitations    Relevant Orders    Ambulatory referral to Cardiology      Other Visit Diagnoses     Preoperative clearance    -  Primary    Relevant Orders    Ambulatory referral to Cardiology    Arthritis of right hip        Relevant Orders    Ambulatory referral to Cardiology    Pre-op exam        Relevant Orders    POCT ECG (Completed)    Internal nasal lesion        Relevant Medications    mupirocin (BACTROBAN) 2 % nasal ointment                 Diagnoses and all orders for this visit:    Preoperative clearance  -     Ambulatory referral to Cardiology;  Future    Arthritis of right hip  -     Ambulatory referral to Cardiology; Future    Paroxysmal supraventricular tachycardia ([address] )  -     Ambulatory referral to Cardiology; Future    Essential hypertension    Palpitations  -     Ambulatory referral to Cardiology; Future    Pre-op exam  -     POCT ECG    Internal nasal lesion  -     mupirocin (BACTROBAN) 2 % nasal ointment; into each nostril 2 (two) times a day    Other orders  -     Cholecalciferol (Vitamin D3) 25 MCG TABS; Take by mouth  -     COLLAGEN PO; Take by mouth  -     TURMERIC PO; Take 500 mg by mouth  -     cyanocobalamin (VITAMIN B-12) 2000 MCG tablet; Take 2,500 mcg by mouth daily              Subjective:      Patient ID: Perico Keenan is a 56 y o  female  CC:    Chief Complaint   Patient presents with    Pre-op Exam     RTHR with Dr [facility]re [facility] on 4/21/2021  kw    Sore     Pt states she gets sores in her nose and wonders if this is allergy related or could this be from a sinus infection b/c she does have occasional PND  HPI:     Patient is here for preoperative clearance for a right total hip replacement [facility] 04/21/2021 with Dr Yaa Toure  Patient's preoperative blood work was reviewed  Patient likes some nasal lesions evaluated patient has some dry cracked areas left anterior septal area  For the past couple days  The following portions of the patient's history were reviewed and updated as appropriate: allergies, current medications, past family history, past medical history, past social history, past surgical history and problem list       Review of Systems   Constitutional: Negative  HENT:        HPI   Eyes: Negative  Respiratory: Negative  Cardiovascular: In light of PSVT patient will need cardiac clearance to proceed with surgery   Gastrointestinal: Negative  Endocrine: Negative  Genitourinary: Negative  Musculoskeletal:        HPI   Skin: Negative  Allergic/Immunologic: Negative      Neurological: Negative  Hematological: Negative  Psychiatric/Behavioral: Negative  Data to review:       Objective:    Vitals:    04/06/21 1433   BP: 126/78   BP Location: Left arm   Patient Position: Sitting   Pulse: 64   Temp: 97 6 °F (36 4 °C)   TempSrc: Temporal   Weight: 80 8 kg (178 lb 3 2 oz)   Height: 5' 4" (1 626 m)        Physical Exam  Vitals signs and nursing note reviewed  Constitutional:       Appearance: Normal appearance  HENT:      Head: Normocephalic and atraumatic  Right Ear: External ear normal       Left Ear: External ear normal       Nose:      Comments: Left anterior septal area with mildly xerotic area negative acute bleed negative open area     Mouth/Throat:      Pharynx: Oropharynx is clear  No oropharyngeal exudate or posterior oropharyngeal erythema  Eyes:      General: No scleral icterus  Right eye: No discharge  Left eye: No discharge  Extraocular Movements: Extraocular movements intact  Conjunctiva/sclera: Conjunctivae normal       Pupils: Pupils are equal, round, and reactive to light  Neck:      Musculoskeletal: Neck supple  No neck rigidity or muscular tenderness  Vascular: No carotid bruit  Cardiovascular:      Rate and Rhythm: Normal rate and regular rhythm  Heart sounds: Normal heart sounds  Pulmonary:      Effort: Pulmonary effort is normal       Breath sounds: Normal breath sounds  Abdominal:      General: Bowel sounds are normal       Palpations: Abdomen is soft  Tenderness: There is no abdominal tenderness  Musculoskeletal:      Right lower leg: No edema  Left lower leg: No edema  Lymphadenopathy:      Cervical: No cervical adenopathy  Skin:     General: Skin is warm and dry  Neurological:      General: No focal deficit present  Mental Status: She is alert     Psychiatric:         Mood and Affect: Mood normal

## 2021-04-07 NOTE — TELEPHONE ENCOUNTER
Patient did not want to proceed with any recommended testing  You can bring her in for preoperative evaluation if appointment opens up  Will need to find somewhere to put her

## 2021-04-09 ENCOUNTER — OFFICE VISIT (OUTPATIENT)
Dept: CARDIOLOGY CLINIC | Facility: CLINIC | Age: 61
End: 2021-04-09
Payer: COMMERCIAL

## 2021-04-09 VITALS — DIASTOLIC BLOOD PRESSURE: 82 MMHG | SYSTOLIC BLOOD PRESSURE: 120 MMHG | WEIGHT: 179 LBS | BODY MASS INDEX: 30.73 KG/M2

## 2021-04-09 DIAGNOSIS — I47.1 PAROXYSMAL SUPRAVENTRICULAR TACHYCARDIA (HCC): ICD-10-CM

## 2021-04-09 DIAGNOSIS — R00.2 PALPITATIONS: ICD-10-CM

## 2021-04-09 DIAGNOSIS — E78.2 MIXED HYPERLIPIDEMIA: ICD-10-CM

## 2021-04-09 DIAGNOSIS — I10 ESSENTIAL HYPERTENSION: ICD-10-CM

## 2021-04-09 DIAGNOSIS — E66.9 OBESITY (BMI 30-39.9): ICD-10-CM

## 2021-04-09 DIAGNOSIS — Z01.810 PREOPERATIVE CARDIOVASCULAR EXAMINATION: Primary | ICD-10-CM

## 2021-04-09 PROCEDURE — 3074F SYST BP LT 130 MM HG: CPT | Performed by: INTERNAL MEDICINE

## 2021-04-09 PROCEDURE — 99214 OFFICE O/P EST MOD 30 MIN: CPT | Performed by: INTERNAL MEDICINE

## 2021-04-09 PROCEDURE — 3079F DIAST BP 80-89 MM HG: CPT | Performed by: INTERNAL MEDICINE

## 2021-04-09 PROCEDURE — 1036F TOBACCO NON-USER: CPT | Performed by: INTERNAL MEDICINE

## 2021-04-09 NOTE — PROGRESS NOTES
Cardiology Office Visit    Matthew Thomson  [id number]  1960    [facility]  [address] [facility] [address] [facility] [facility] [phone]      Dear Fredy Barron DO,    I had the pleasure of seeing your patient at our [facility] [place] office today 4/9/2021  As you know she is a pleasant 56y o  year old female with a medical history as described below  Patient previously followed at The [address] [place] [address] Drive (Dr Carin Colon)  Reason for office visit: Follow up palpitations with history of SVT  1  Preoperative cardiovascular examination  Assessment & Plan:  Patient needs right hip replacement 4/21/2021  Patient currently denies any chest pain or shortness of breath  ECG is non ischemic  Patient able to do 4 METs without limitation prior to increased hip pain  No known sustained arrhythmias  Patient is at acceptable risk to  proceed with surgery as planned  Post procedure ECG  2  Palpitations  Assessment & Plan:  Patient reports intermittent episodes of palpitations which started around 10/26/2020-11/18/2020  I had recommended the addition of magnesium 500 mg daily as well as aspirin 81 mg daily  Holter monitor showed predominantly normal sinus rhythm with 7 runs of atrial tachycardia with the longest lasting 10 beats and a low PVC burden of 1 6%  Palpitations did correlate with PVCs and atrial tachycardia  Symptoms have improved significantly with the use of magnesium  Palpitations are currently quiescent  Patient will continue to monitor for recurrent symptoms  Longer event monitoring can be considered if palpitations reoccur  Continue magnesium 250 mg daily  Would hold aspirin 81 mg daily for now as she is taking Excedrin 2 tablets every 6 hours and is far exceeding 81 mg daily  I discussed the negative GI side effects of continued high frequency Excedrin use        3  Paroxysmal supraventricular tachycardia [facility])  Assessment & Plan:  Patient has a history of SVT with prior SVT ablation 11/2004 (details unknown) and records not able to be found  She had done well up until around October of 2020 when she started to have increased palpitations  Please see discussion under palpitations  4  Essential hypertension  Assessment & Plan:  Blood pressure is well controlled on exam        5  Mixed hyperlipidemia  Assessment & Plan:  Patient is currently on atorvastatin 40 mg daily  Lipid panel 1/15/21: C 177  T 108    H 54  L 103  Recommend continued dietary modification  6  Obesity (BMI 30-39  9)  Assessment & Plan:  BMI 30 73 kg/m2  Discussed the importance of diet and weight loss  Unfortunately the patient's mobility is limited due to bilateral hip and knee pain which limits her ability to exercise  HPI     1/14/2021: Patient presents to the office today regarding palpitations in the setting of prior history of SVT  Patient underwent previous SVT ablation in 2004 by Dr Hadley Soler and was followed by Dr Silver Villagran  She tells me that from October 26 through November 18th she has noted increased palpitations  She describes increased palpitations as feeling as if her heart rate is fast and then does a flip/flop  She does describe episodes of chest tightness that occur with her palpitations  She also describes feeling "faint" as well as feeling pressure in her head  And a feeling of "heaviness when walking requiring her to sit down  I had recommended that she add magnesium 500 mg daily as well as 81 mg of aspirin when she had call the office prior to visit  She tells me that this seemed to help significantly with palpitations  She also describes intermittent sharp right-sided chest discomfort which is short-lived in nature  She notes that her palpitations seem very infrequent  She denies any exertional chest pain  4/9/2021: Patient presents to the office today for follow up   She is feeling well with the exception of hip pain  She is scheduled for total right hip replacement 4/21/2021 and eventually will need her left hip done as well as both knees  She is feeling well  She denies any chest heaviness since her palpitations have been controlled  She denies any recurrent palpitations  Her functional capacity is limited due to her hip/knee pain  She is taking 2 excedrin every 6 hours for pain  Patient Active Problem List   Diagnosis    Allergic rhinitis    Benign hematuria    Headache    Mixed hyperlipidemia    Essential hypertension    Menopause    Osteoarthritis of both knees    Paroxysmal supraventricular tachycardia (HCC)    Postoperative hypothyroidism    Screening for colon cancer    Screening for breast cancer    Health care maintenance    Hyperglycemia    Osteoarthritis    Vitamin D deficiency    Obesity (BMI 30-39  9)    Vertigo    Palpitations    Preoperative cardiovascular examination     Past Medical History:   Diagnosis Date    Bruises easily     Cancer ([facility] )     thyroid    Dental crowns present     Headache     Hyperlipidemia     Hypertension     Joint pain     Knee pain, bilateral     Low back pain     Migraines     Mild acid reflux     Osteoarthritis     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     last assessed - 12Dec2017    PONV (postoperative nausea and vomiting)     Risk for falls     SVT (supraventricular tachycardia) ([facility] )     history/none recent/ has had heart ablation in 2004    Thyroid cancer ([facility] ) 2007    Status post thyroidectomy, no longer sees endocrinology; last assessed - 17Oct2014    Use of cane as ambulatory aid     occas    Varicose veins of leg with edema     last assessed - 22Oct2014    Walking pneumonia     Wears glasses      Social History     Socioeconomic History    Marital status: /Civil Union     Spouse name: Not on file    Number of children: Not on file    Years of education: Not on file    Highest education level: Not on file Occupational History    Not on file   Social Needs    Financial resource strain: Not on file    Food insecurity     Worry: Not on file     Inability: Not on file    Transportation needs     Medical: Not on file     Non-medical: Not on file   Tobacco Use    Smoking status: Former Smoker     Types: Cigarettes    Smokeless tobacco: Never Used    Tobacco comment: quit 10 years ago; social smoker never a consistent smoker   Substance and Sexual Activity    Alcohol use: Yes     Comment: socially    Drug use: No    Sexual activity: Never   Lifestyle    Physical activity     Days per week: Not on file     Minutes per session: Not on file    Stress: Not on file   Relationships    Social connections     Talks on phone: Not on file     Gets together: Not on file     Attends Shinto service: Not on file     Active member of club or organization: Not on file     Attends meetings of clubs or organizations: Not on file     Relationship status: Not on file    Intimate partner violence     Fear of current or ex partner: Not on file     Emotionally abused: Not on file     Physically abused: Not on file     Forced sexual activity: Not on file   Other Topics Concern    Not on file   Social History Narrative    Not on file      Family History   Problem Relation Age of Onset    Alzheimer's disease Mother     Stroke Family     Diabetes Family     Hypertension Family     Heart attack Family      Past Surgical History:   Procedure Laterality Date    AV NODE ABLATION       SECTION  10/27/1992    COLONOSCOPY      Complete colonoscopy    ENDOMETRIAL ABLATION      Thermal; Resolved - Oct 2005    FOOT SURGERY Left     x 2, 1970 &  removed ganglion cyst    INFERIOR OBLIQUE MYECTOMY      removed 8 fibroid tumors     KNEE ARTHROSCOPY Left     KNEE CARTILAGE SURGERY Left     Left knee torn meniscus;  Resolved - Sep 2008    LAPAROSCOPIC ENDOMETRIOSIS FULGURATION      LIPOMA RESECTION      MYOMECTOMY Anorectal    OTHER SURGICAL HISTORY      heart ablation - atrial tachycardia; ablation for SVT    OVARIAN CYST REMOVAL      THYROIDECTOMY      THYROIDECTOMY      VAGINAL MASS EXCISION Right 11/30/2017    Procedure: EXCISION RIGHT PERINEAL LIPOMA/ MASS, REMOVAL OF SKIN TAG;  Surgeon: Hallie Bautista MD;  Location: [facility];  Service: General       Current Outpatient Medications:     aspirin (ECOTRIN LOW STRENGTH) 81 mg EC tablet, Take 1 tablet (81 mg total) by mouth daily, Disp: 30 tablet, Rfl: 11    aspirin-acetaminophen-caffeine (EXCEDRIN MIGRAINE) 250-250-65 MG per tablet, Take by mouth, Disp: , Rfl:     atorvastatin (LIPITOR) 40 mg tablet, Take 1 tablet (40 mg total) by mouth daily at bedtime, Disp: 90 tablet, Rfl: 3    Cholecalciferol (Vitamin D3) 25 MCG TABS, Take by mouth, Disp: , Rfl:     COLLAGEN PO, Take by mouth, Disp: , Rfl:     Cyanocobalamin (VITAMIN B 12 PO), Take by mouth, Disp: , Rfl:     cyanocobalamin (VITAMIN B-12) 2000 MCG tablet, Take 2,500 mcg by mouth daily, Disp: , Rfl:     diphenhydrAMINE (BENADRYL) 25 mg tablet, Take 25 mg by mouth daily at bedtime as needed for itching , Disp: , Rfl:     ergocalciferol (VITAMIN D2) 50,000 units, Take 1 capsule (50,000 Units total) by mouth once a week, Disp: 4 capsule, Rfl: 11    L-Lysine 500 MG CAPS, Take 500 mg by mouth once a week, Disp: , Rfl:     levothyroxine 112 mcg tablet, TAKE 1 TABLET BY MOUTH DAILY, Disp: 90 tablet, Rfl: 3    Magnesium 500 MG TABS, Take 1 tablet (500 mg total) by mouth daily, Disp: 30 tablet, Rfl: 11    multivitamin (THERAGRAN) TABS, Take 1 tablet by mouth once a week, Disp: , Rfl:     mupirocin (BACTROBAN) 2 % nasal ointment, into each nostril 2 (two) times a day, Disp: 10 g, Rfl: 0    propranolol (INDERAL LA) 60 mg 24 hr capsule, Take 1 capsule (60 mg total) by mouth daily, Disp: 90 capsule, Rfl: 3    TURMERIC PO, Take 500 mg by mouth, Disp: , Rfl:     Vitamin Mixture (JAY-C PO), Take 1,000 mg by mouth once a week, Disp: , Rfl:     zinc gluconate 50 mg tablet, Take 50 mg by mouth once a week, Disp: , Rfl:     meclizine (ANTIVERT) 25 mg tablet, Take 1 tablet (25 mg total) by mouth 3 (three) times a day as needed for dizziness (Patient not taking: Reported on 4/6/2021), Disp: 30 tablet, Rfl: 0    meloxicam (MOBIC) 15 mg tablet, Take 1 daily with food (Patient not taking: Reported on 4/6/2021), Disp: 30 tablet, Rfl: 5    mupirocin (BACTROBAN) 2 % ointment, , Disp: , Rfl:      Allergies   Allergen Reactions    Morphine Other (See Comments) and Headache     Other reaction(s): Other (See Comments)  severe headache  "severe headache"    Other Rash     Adhesive tape    Pain Meds,,,, caused N/V    Codeine Headache     Category: Adverse Reaction;     Tramadol Headache     Category: Adverse Reaction;     Medical Tape Rash         Cardiac Test Results:     ECG 4/6/2021: Normal sinus rhythm  First degree AV block  ECG 01/14/2021:  Sinus bradycardia with first-degree AV block at 59 beats per minute  Nonspecific ST T wave abnormality  Holter monitor 11/20/2020:   Predominantly normal sinus rhythm the average heart rate of 71 beats per minute  Seven runs of atrial tachycardia with the longest lasting 10 beats  1 6% PVC burden  Rare PACs  Symptoms correlated with PVCs and atrial tachycardia  There is no evidence of any pauses or advanced heart block  Lipid panel 1/15/21: C 177  T 108    H 54  L 103  Review of Systems:    Review of Systems   Constitutional: Positive for unexpected weight change  Negative for activity change, appetite change and fatigue  HENT: Negative for congestion, hearing loss, tinnitus and trouble swallowing  Eyes: Negative for visual disturbance  Respiratory: Negative for cough, chest tightness, shortness of breath and wheezing  Snoring   Cardiovascular: Positive for leg swelling ( intermittent)  Negative for chest pain and palpitations     Gastrointestinal: Negative for abdominal distention, abdominal pain, blood in stool, nausea and vomiting  Genitourinary: Negative for difficulty urinating  Nocturia   Musculoskeletal: Positive for arthralgias, back pain, joint swelling and myalgias  Skin: Positive for rash  Neurological: Positive for weakness, numbness and headaches  Negative for dizziness, syncope and light-headedness  Hematological: Bruises/bleeds easily  Psychiatric/Behavioral: Positive for sleep disturbance  Negative for confusion  The patient is not nervous/anxious  All other systems reviewed and are negative  Vitals:    04/09/21 0950   BP: 120/82   Weight: 81 2 kg (179 lb)     Vitals:    04/09/21 0950   Weight: 81 2 kg (179 lb)           Physical Exam   Constitutional: She is oriented to person, place, and time  She appears well-developed and well-nourished  HENT:   Head: Normocephalic and atraumatic  Eyes: Pupils are equal, round, and reactive to light  Conjunctivae are normal    Neck: Normal range of motion  No JVD present  Cardiovascular: Normal rate, regular rhythm and normal heart sounds  Exam reveals no gallop and no friction rub  No murmur heard  Pulmonary/Chest: Effort normal and breath sounds normal    Abdominal: Soft  Bowel sounds are normal    Musculoskeletal:         General: No edema  Neurological: She is alert and oriented to person, place, and time  Skin: Skin is warm and dry  Psychiatric: She has a normal mood and affect  Her behavior is normal    Vitals reviewed

## 2021-04-11 NOTE — ASSESSMENT & PLAN NOTE
Patient is currently on atorvastatin 40 mg daily  Lipid panel 1/15/21: C 177  T 108    H 54  L 103  Recommend continued dietary modification

## 2021-04-11 NOTE — ASSESSMENT & PLAN NOTE
BMI 30 73 kg/m2  Discussed the importance of diet and weight loss  Unfortunately the patient's mobility is limited due to bilateral hip and knee pain which limits her ability to exercise

## 2021-04-11 NOTE — ASSESSMENT & PLAN NOTE
Patient reports intermittent episodes of palpitations which started around 10/26/2020-11/18/2020  I had recommended the addition of magnesium 500 mg daily as well as aspirin 81 mg daily  Holter monitor showed predominantly normal sinus rhythm with 7 runs of atrial tachycardia with the longest lasting 10 beats and a low PVC burden of 1 6%  Palpitations did correlate with PVCs and atrial tachycardia  Symptoms have improved significantly with the use of magnesium  Palpitations are currently quiescent  Patient will continue to monitor for recurrent symptoms  Longer event monitoring can be considered if palpitations reoccur  Continue magnesium 250 mg daily  Would hold aspirin 81 mg daily for now as she is taking Excedrin 2 tablets every 6 hours and is far exceeding 81 mg daily  I discussed the negative GI side effects of continued high frequency Excedrin use

## 2021-04-11 NOTE — ASSESSMENT & PLAN NOTE
Patient needs right hip replacement 4/21/2021  Patient currently denies any chest pain or shortness of breath  ECG is non ischemic  Patient able to do 4 METs without limitation prior to increased hip pain  No known sustained arrhythmias  Patient is at acceptable risk to  proceed with surgery as planned  Post procedure ECG

## 2021-04-13 RX ORDER — ACETAMINOPHEN 325 MG/1
650 TABLET ORAL EVERY 6 HOURS PRN
COMMUNITY
End: 2021-10-19

## 2021-04-13 NOTE — PRE-PROCEDURE INSTRUCTIONS
Pre-Surgery Instructions:   Medication Instructions    acetaminophen (TYLENOL) 325 mg tablet Instructed patient per Anesthesia Guidelines   aspirin-acetaminophen-caffeine (EXCEDRIN MIGRAINE) 543-087-40 MG per tablet Instructed patient per Anesthesia Guidelines   atorvastatin (LIPITOR) 40 mg tablet Instructed patient per Anesthesia Guidelines   Cholecalciferol (Vitamin D3) 25 MCG TABS Instructed patient per Anesthesia Guidelines   COLLAGEN PO Instructed patient per Anesthesia Guidelines   cyanocobalamin (VITAMIN B-12) 2000 MCG tablet Instructed patient per Anesthesia Guidelines   diphenhydrAMINE (BENADRYL) 25 mg tablet Instructed patient per Anesthesia Guidelines   ergocalciferol (VITAMIN D2) 50,000 units Instructed patient per Anesthesia Guidelines   L-Lysine 500 MG CAPS Instructed patient per Anesthesia Guidelines   levothyroxine 112 mcg tablet Instructed patient per Anesthesia Guidelines   Magnesium 500 MG TABS Instructed patient per Anesthesia Guidelines   multivitamin (THERAGRAN) TABS Instructed patient per Anesthesia Guidelines   mupirocin (BACTROBAN) 2 % nasal ointment Instructed patient per Anesthesia Guidelines   propranolol (INDERAL LA) 60 mg 24 hr capsule Instructed patient per Anesthesia Guidelines   TURMERIC PO Instructed patient per Anesthesia Guidelines   Vitamin Mixture (JAY-C PO) Instructed patient per Anesthesia Guidelines   zinc gluconate 50 mg tablet Instructed patient per Anesthesia Guidelines  Pt instructed to take levothyroxine the morning of surgery with a small sip of water  [facility] preop instructions reviewed with pt  Pt has surgical soap  Joint information reviewed  Incentive spirometer reviewed

## 2021-04-14 ENCOUNTER — TELEPHONE (OUTPATIENT)
Dept: FAMILY MEDICINE CLINIC | Facility: CLINIC | Age: 61
End: 2021-04-14

## 2021-04-14 DIAGNOSIS — Z01.818 PREOP EXAMINATION: ICD-10-CM

## 2021-04-14 PROCEDURE — U0005 INFEC AGEN DETEC AMPLI PROBE: HCPCS | Performed by: PHYSICIAN ASSISTANT

## 2021-04-14 PROCEDURE — U0003 INFECTIOUS AGENT DETECTION BY NUCLEIC ACID (DNA OR RNA); SEVERE ACUTE RESPIRATORY SYNDROME CORONAVIRUS 2 (SARS-COV-2) (CORONAVIRUS DISEASE [COVID-19]), AMPLIFIED PROBE TECHNIQUE, MAKING USE OF HIGH THROUGHPUT TECHNOLOGIES AS DESCRIBED BY CMS-2020-01-R: HCPCS | Performed by: PHYSICIAN ASSISTANT

## 2021-04-14 NOTE — TELEPHONE ENCOUNTER
DR Gaviota Polanco, YOU SAW PT ON 4/6/2021 FOR PRE OP CLEARANCE AND SHE WAS NOT CLEARED DUE TO NEEDING CARDIAC CLEARANCE  IN THE MEANTIME CARDIOLOGY DID CLEAR HER  CAN YOU PLEASE NOW CLEAR HER FOR SURGERY AND LET [facility] KNOW THAT SHE IS CLEARED? [id number] [facility] [phone]  THANK YOU

## 2021-04-15 LAB — SARS-COV-2 RNA RESP QL NAA+PROBE: NEGATIVE

## 2021-04-20 ENCOUNTER — ANESTHESIA EVENT (OUTPATIENT)
Dept: PERIOP | Facility: HOSPITAL | Age: 61
End: 2021-04-20
Payer: COMMERCIAL

## 2021-04-21 ENCOUNTER — HOSPITAL ENCOUNTER (OUTPATIENT)
Facility: HOSPITAL | Age: 61
Setting detail: OUTPATIENT SURGERY
Discharge: HOME HEALTH | End: 2021-04-23
Attending: ORTHOPAEDIC SURGERY | Admitting: ORTHOPAEDIC SURGERY
Payer: COMMERCIAL

## 2021-04-21 ENCOUNTER — APPOINTMENT (OUTPATIENT)
Dept: RADIOLOGY | Facility: HOSPITAL | Age: 61
End: 2021-04-21
Payer: COMMERCIAL

## 2021-04-21 ENCOUNTER — HOSPITAL ENCOUNTER (OUTPATIENT)
Dept: RADIOLOGY | Facility: HOSPITAL | Age: 61
Setting detail: OUTPATIENT SURGERY
Discharge: HOME | End: 2021-04-21
Payer: COMMERCIAL

## 2021-04-21 ENCOUNTER — ANESTHESIA (OUTPATIENT)
Dept: PERIOP | Facility: HOSPITAL | Age: 61
End: 2021-04-21
Payer: COMMERCIAL

## 2021-04-21 DIAGNOSIS — M16.11 PRIMARY OSTEOARTHRITIS OF RIGHT HIP: Primary | ICD-10-CM

## 2021-04-21 DIAGNOSIS — M16.11 OSTEOARTHRITIS OF RIGHT HIP, UNSPECIFIED OSTEOARTHRITIS TYPE: ICD-10-CM

## 2021-04-21 DIAGNOSIS — M16.11 PRIMARY OSTEOARTHRITIS OF RIGHT HIP: ICD-10-CM

## 2021-04-21 LAB
ABO GROUP BLD: NORMAL
ABO GROUP BLD: NORMAL
BLD GP AB SCN SERPL QL: NEGATIVE
RH BLD: POSITIVE
RH BLD: POSITIVE
SPECIMEN EXPIRATION DATE: NORMAL

## 2021-04-21 PROCEDURE — 73501 X-RAY EXAM HIP UNI 1 VIEW: CPT

## 2021-04-21 PROCEDURE — C1776 JOINT DEVICE (IMPLANTABLE): HCPCS | Performed by: ORTHOPAEDIC SURGERY

## 2021-04-21 PROCEDURE — 86901 BLOOD TYPING SEROLOGIC RH(D): CPT | Performed by: ORTHOPAEDIC SURGERY

## 2021-04-21 PROCEDURE — 86850 RBC ANTIBODY SCREEN: CPT | Performed by: ORTHOPAEDIC SURGERY

## 2021-04-21 PROCEDURE — 86900 BLOOD TYPING SEROLOGIC ABO: CPT | Performed by: ORTHOPAEDIC SURGERY

## 2021-04-21 PROCEDURE — 73502 X-RAY EXAM HIP UNI 2-3 VIEWS: CPT

## 2021-04-21 DEVICE — PINNACLE POROCOAT ACETABULAR SHELL SECTOR II 48MM OD
Type: IMPLANTABLE DEVICE | Site: TROCHANTER | Status: FUNCTIONAL
Brand: PINNACLE POROCOAT

## 2021-04-21 DEVICE — BIOLOX DELTA CERAMIC FEMORAL HEAD 32MM DIA +1 12/14 TAPER
Type: IMPLANTABLE DEVICE | Site: TROCHANTER | Status: FUNCTIONAL
Brand: BIOLOX DELTA

## 2021-04-21 DEVICE — CORAIL HIP SYSTEM CEMENTLESS FEMORAL STEM 12/14 AMT 135 DEGREES KA SIZE 9 HA COATED STANDARD COLLAR
Type: IMPLANTABLE DEVICE | Site: TROCHANTER | Status: FUNCTIONAL
Brand: CORAIL

## 2021-04-21 DEVICE — PINNACLE HIP SOLUTIONS ALTRX POLYETHYLENE ACETABULAR LINER NEUTRAL 32MM ID 48MM OD
Type: IMPLANTABLE DEVICE | Site: TROCHANTER | Status: FUNCTIONAL
Brand: PINNACLE ALTRX

## 2021-04-21 RX ORDER — BUPIVACAINE HYDROCHLORIDE 7.5 MG/ML
INJECTION, SOLUTION INTRASPINAL AS NEEDED
Status: DISCONTINUED | OUTPATIENT
Start: 2021-04-21 | End: 2021-04-21

## 2021-04-21 RX ORDER — HYDROMORPHONE HCL/PF 1 MG/ML
0.5 SYRINGE (ML) INJECTION EVERY 2 HOUR PRN
Status: DISCONTINUED | OUTPATIENT
Start: 2021-04-21 | End: 2021-04-23 | Stop reason: HOSPADM

## 2021-04-21 RX ORDER — EPHEDRINE SULFATE 50 MG/ML
INJECTION INTRAVENOUS AS NEEDED
Status: DISCONTINUED | OUTPATIENT
Start: 2021-04-21 | End: 2021-04-21

## 2021-04-21 RX ORDER — ONDANSETRON 2 MG/ML
4 INJECTION INTRAMUSCULAR; INTRAVENOUS EVERY 6 HOURS PRN
Status: DISCONTINUED | OUTPATIENT
Start: 2021-04-21 | End: 2021-04-23 | Stop reason: HOSPADM

## 2021-04-21 RX ORDER — PROPRANOLOL HCL 60 MG
60 CAPSULE, EXTENDED RELEASE 24HR ORAL EVERY EVENING
Status: DISCONTINUED | OUTPATIENT
Start: 2021-04-21 | End: 2021-04-23 | Stop reason: HOSPADM

## 2021-04-21 RX ORDER — ATORVASTATIN CALCIUM 40 MG/1
40 TABLET, FILM COATED ORAL
Status: DISCONTINUED | OUTPATIENT
Start: 2021-04-21 | End: 2021-04-23 | Stop reason: HOSPADM

## 2021-04-21 RX ORDER — SODIUM CHLORIDE 9 MG/ML
INJECTION, SOLUTION INTRAVENOUS AS NEEDED
Status: DISCONTINUED | OUTPATIENT
Start: 2021-04-21 | End: 2021-04-21 | Stop reason: HOSPADM

## 2021-04-21 RX ORDER — FERROUS SULFATE 325(65) MG
325 TABLET ORAL
Status: DISCONTINUED | OUTPATIENT
Start: 2021-04-22 | End: 2021-04-23 | Stop reason: HOSPADM

## 2021-04-21 RX ORDER — CEFAZOLIN SODIUM 2 G/50ML
2000 SOLUTION INTRAVENOUS ONCE
Status: COMPLETED | OUTPATIENT
Start: 2021-04-21 | End: 2021-04-21

## 2021-04-21 RX ORDER — MIDAZOLAM HYDROCHLORIDE 2 MG/2ML
INJECTION, SOLUTION INTRAMUSCULAR; INTRAVENOUS AS NEEDED
Status: DISCONTINUED | OUTPATIENT
Start: 2021-04-21 | End: 2021-04-21

## 2021-04-21 RX ORDER — FENTANYL CITRATE 50 UG/ML
INJECTION, SOLUTION INTRAMUSCULAR; INTRAVENOUS AS NEEDED
Status: DISCONTINUED | OUTPATIENT
Start: 2021-04-21 | End: 2021-04-21

## 2021-04-21 RX ORDER — ONDANSETRON 2 MG/ML
INJECTION INTRAMUSCULAR; INTRAVENOUS AS NEEDED
Status: DISCONTINUED | OUTPATIENT
Start: 2021-04-21 | End: 2021-04-21

## 2021-04-21 RX ORDER — FERROUS SULFATE 325(65) MG
325 TABLET ORAL
Status: DISCONTINUED | OUTPATIENT
Start: 2021-04-22 | End: 2021-04-21

## 2021-04-21 RX ORDER — CEFAZOLIN SODIUM 1 G/50ML
1000 SOLUTION INTRAVENOUS EVERY 8 HOURS
Status: COMPLETED | OUTPATIENT
Start: 2021-04-21 | End: 2021-04-22

## 2021-04-21 RX ORDER — LEVOTHYROXINE SODIUM 112 UG/1
112 TABLET ORAL
Status: DISCONTINUED | OUTPATIENT
Start: 2021-04-22 | End: 2021-04-23 | Stop reason: HOSPADM

## 2021-04-21 RX ORDER — OXYCODONE HYDROCHLORIDE 5 MG/1
5 TABLET ORAL EVERY 4 HOURS PRN
Status: DISCONTINUED | OUTPATIENT
Start: 2021-04-21 | End: 2021-04-23 | Stop reason: HOSPADM

## 2021-04-21 RX ORDER — SENNOSIDES 8.6 MG
1 TABLET ORAL DAILY
Status: DISCONTINUED | OUTPATIENT
Start: 2021-04-22 | End: 2021-04-23 | Stop reason: HOSPADM

## 2021-04-21 RX ORDER — OXYCODONE HYDROCHLORIDE 10 MG/1
10 TABLET ORAL EVERY 4 HOURS PRN
Status: DISCONTINUED | OUTPATIENT
Start: 2021-04-21 | End: 2021-04-23 | Stop reason: HOSPADM

## 2021-04-21 RX ORDER — SODIUM CHLORIDE 9 MG/ML
125 INJECTION, SOLUTION INTRAVENOUS CONTINUOUS
Status: DISCONTINUED | OUTPATIENT
Start: 2021-04-21 | End: 2021-04-23 | Stop reason: HOSPADM

## 2021-04-21 RX ORDER — ACETAMINOPHEN 325 MG/1
650 TABLET ORAL EVERY 6 HOURS PRN
Status: DISCONTINUED | OUTPATIENT
Start: 2021-04-21 | End: 2021-04-23 | Stop reason: HOSPADM

## 2021-04-21 RX ORDER — MELATONIN
1000 DAILY
Status: DISCONTINUED | OUTPATIENT
Start: 2021-04-22 | End: 2021-04-23 | Stop reason: HOSPADM

## 2021-04-21 RX ORDER — KETOROLAC TROMETHAMINE 30 MG/ML
15 INJECTION, SOLUTION INTRAMUSCULAR; INTRAVENOUS EVERY 6 HOURS PRN
Status: DISCONTINUED | OUTPATIENT
Start: 2021-04-21 | End: 2021-04-23 | Stop reason: HOSPADM

## 2021-04-21 RX ORDER — ONDANSETRON 2 MG/ML
4 INJECTION INTRAMUSCULAR; INTRAVENOUS ONCE AS NEEDED
Status: DISCONTINUED | OUTPATIENT
Start: 2021-04-21 | End: 2021-04-21 | Stop reason: HOSPADM

## 2021-04-21 RX ORDER — ASPIRIN 325 MG
325 TABLET ORAL 2 TIMES DAILY
Status: DISCONTINUED | OUTPATIENT
Start: 2021-04-21 | End: 2021-04-23 | Stop reason: HOSPADM

## 2021-04-21 RX ORDER — MAGNESIUM HYDROXIDE 1200 MG/15ML
LIQUID ORAL AS NEEDED
Status: DISCONTINUED | OUTPATIENT
Start: 2021-04-21 | End: 2021-04-21 | Stop reason: HOSPADM

## 2021-04-21 RX ORDER — PROPOFOL 10 MG/ML
INJECTION, EMULSION INTRAVENOUS CONTINUOUS PRN
Status: DISCONTINUED | OUTPATIENT
Start: 2021-04-21 | End: 2021-04-21

## 2021-04-21 RX ORDER — DOCUSATE SODIUM 100 MG/1
100 CAPSULE, LIQUID FILLED ORAL 2 TIMES DAILY
Status: DISCONTINUED | OUTPATIENT
Start: 2021-04-21 | End: 2021-04-23 | Stop reason: HOSPADM

## 2021-04-21 RX ORDER — UREA 10 %
500 LOTION (ML) TOPICAL DAILY
Status: DISCONTINUED | OUTPATIENT
Start: 2021-04-22 | End: 2021-04-23 | Stop reason: HOSPADM

## 2021-04-21 RX ORDER — SODIUM CHLORIDE, SODIUM LACTATE, POTASSIUM CHLORIDE, CALCIUM CHLORIDE 600; 310; 30; 20 MG/100ML; MG/100ML; MG/100ML; MG/100ML
100 INJECTION, SOLUTION INTRAVENOUS CONTINUOUS
Status: DISCONTINUED | OUTPATIENT
Start: 2021-04-21 | End: 2021-04-23 | Stop reason: HOSPADM

## 2021-04-21 RX ORDER — FENTANYL CITRATE/PF 50 MCG/ML
25 SYRINGE (ML) INJECTION
Status: DISCONTINUED | OUTPATIENT
Start: 2021-04-21 | End: 2021-04-21 | Stop reason: HOSPADM

## 2021-04-21 RX ORDER — TRANEXAMIC ACID 100 MG/ML
INJECTION, SOLUTION INTRAVENOUS AS NEEDED
Status: DISCONTINUED | OUTPATIENT
Start: 2021-04-21 | End: 2021-04-21

## 2021-04-21 RX ADMIN — FENTANYL CITRATE 25 MCG: 50 INJECTION, SOLUTION INTRAMUSCULAR; INTRAVENOUS at 17:00

## 2021-04-21 RX ADMIN — PHENYLEPHRINE HYDROCHLORIDE 200 MCG: 10 INJECTION INTRAVENOUS at 14:24

## 2021-04-21 RX ADMIN — ATORVASTATIN CALCIUM 40 MG: 40 TABLET, FILM COATED ORAL at 21:57

## 2021-04-21 RX ADMIN — TRANEXAMIC ACID 1 G: 1 INJECTION, SOLUTION INTRAVENOUS at 14:17

## 2021-04-21 RX ADMIN — OXYCODONE HYDROCHLORIDE 10 MG: 10 TABLET ORAL at 18:28

## 2021-04-21 RX ADMIN — HYDROMORPHONE HYDROCHLORIDE 0.5 MG: 1 INJECTION, SOLUTION INTRAMUSCULAR; INTRAVENOUS; SUBCUTANEOUS at 21:57

## 2021-04-21 RX ADMIN — SODIUM CHLORIDE, SODIUM LACTATE, POTASSIUM CHLORIDE, AND CALCIUM CHLORIDE 100 ML/HR: .6; .31; .03; .02 INJECTION, SOLUTION INTRAVENOUS at 17:04

## 2021-04-21 RX ADMIN — FENTANYL CITRATE 50 MCG: 50 INJECTION INTRAMUSCULAR; INTRAVENOUS at 14:07

## 2021-04-21 RX ADMIN — PHENYLEPHRINE HYDROCHLORIDE 200 MCG: 10 INJECTION INTRAVENOUS at 14:42

## 2021-04-21 RX ADMIN — ASPIRIN 325 MG ORAL TABLET 325 MG: 325 PILL ORAL at 18:34

## 2021-04-21 RX ADMIN — PHENYLEPHRINE HYDROCHLORIDE 100 MCG: 10 INJECTION INTRAVENOUS at 15:25

## 2021-04-21 RX ADMIN — ONDANSETRON 4 MG: 2 INJECTION INTRAMUSCULAR; INTRAVENOUS at 23:32

## 2021-04-21 RX ADMIN — SODIUM CHLORIDE 125 ML/HR: 0.9 INJECTION, SOLUTION INTRAVENOUS at 12:00

## 2021-04-21 RX ADMIN — FENTANYL CITRATE 50 MCG: 50 INJECTION INTRAMUSCULAR; INTRAVENOUS at 13:58

## 2021-04-21 RX ADMIN — BUPIVACAINE HYDROCHLORIDE IN DEXTROSE 1.6 ML: 7.5 INJECTION, SOLUTION SUBARACHNOID at 14:12

## 2021-04-21 RX ADMIN — SODIUM CHLORIDE: 0.9 INJECTION, SOLUTION INTRAVENOUS at 14:18

## 2021-04-21 RX ADMIN — CEFAZOLIN SODIUM 1000 MG: 1 SOLUTION INTRAVENOUS at 21:58

## 2021-04-21 RX ADMIN — MIDAZOLAM 2 MG: 1 INJECTION INTRAMUSCULAR; INTRAVENOUS at 13:51

## 2021-04-21 RX ADMIN — DOCUSATE SODIUM 100 MG: 100 CAPSULE, LIQUID FILLED ORAL at 18:34

## 2021-04-21 RX ADMIN — CEFAZOLIN SODIUM 2000 MG: 2 SOLUTION INTRAVENOUS at 13:48

## 2021-04-21 RX ADMIN — EPHEDRINE SULFATE 10 MG: 50 INJECTION, SOLUTION INTRAVENOUS at 14:55

## 2021-04-21 RX ADMIN — FENTANYL CITRATE 25 MCG: 50 INJECTION, SOLUTION INTRAMUSCULAR; INTRAVENOUS at 16:55

## 2021-04-21 RX ADMIN — PHENYLEPHRINE HYDROCHLORIDE 100 MCG: 10 INJECTION INTRAVENOUS at 14:55

## 2021-04-21 RX ADMIN — MIDAZOLAM 2 MG: 1 INJECTION INTRAMUSCULAR; INTRAVENOUS at 13:58

## 2021-04-21 RX ADMIN — PROPOFOL 90 MCG/KG/MIN: 10 INJECTION, EMULSION INTRAVENOUS at 14:15

## 2021-04-21 RX ADMIN — SODIUM CHLORIDE 125 ML/HR: 0.9 INJECTION, SOLUTION INTRAVENOUS at 16:25

## 2021-04-21 RX ADMIN — EPHEDRINE SULFATE 10 MG: 50 INJECTION, SOLUTION INTRAVENOUS at 15:25

## 2021-04-21 RX ADMIN — ONDANSETRON 4 MG: 2 INJECTION INTRAMUSCULAR; INTRAVENOUS at 14:15

## 2021-04-21 RX ADMIN — PHENYLEPHRINE HYDROCHLORIDE 100 MCG: 10 INJECTION INTRAVENOUS at 15:54

## 2021-04-21 RX ADMIN — KETOROLAC TROMETHAMINE 15 MG: 30 INJECTION, SOLUTION INTRAMUSCULAR; INTRAVENOUS at 18:09

## 2021-04-21 NOTE — ANESTHESIA PROCEDURE NOTES
Spinal Block    Patient location during procedure: OR  Start time: 4/21/2021 2:12 PM  Reason for block: procedure for pain, at surgeon's request and primary anesthetic  Staffing  Anesthesiologist: Elisha Samson MD  Resident/CRNA: Britton Arnold CRNA  Performed: anesthesiologist and resident/CRNA   Preanesthetic Checklist  Completed: patient identified, site marked, surgical consent, pre-op evaluation, timeout performed, IV checked, risks and benefits discussed and monitors and equipment checked  Spinal Block  Patient position: sitting  Prep: Betadine  Patient monitoring: cardiac monitor, frequent blood pressure checks and continuous pulse ox  Approach: midline  Location: L3-4  Injection technique: single-shot  Needle  Needle type: pencil-tip   Needle gauge: 25 G  Needle length: 10 cm  Assessment  Sensory level: T4  Injection Assessment:  negative aspiration for heme, no paresthesia on injection and positive aspiration for clear CSF    Post-procedure:  adhesive bandage applied, pressure dressing applied, secured with tape, site cleaned and sterile dressing applied

## 2021-04-21 NOTE — INTERVAL H&P NOTE
H&P reviewed  After examining the patient I find no changes in the patients condition since the H&P had been written      Vitals:    04/21/21 1130   BP: 120/69   Pulse: 72   Resp: 16   Temp: 97 7 °F (36 5 °C)   SpO2: 99%

## 2021-04-21 NOTE — ANESTHESIA PREPROCEDURE EVALUATION
Review of Systems/Medical History  Patient summary reviewed  Chart reviewed  History of anesthetic complications PONV    Cardiovascular  EKG reviewed , Hyperlipidemia, Hypertension controlled, Dysrhythmias , history of PSVT,   Comment: S/p cardiac ablation ,  Pulmonary       GI/Hepatic    GERD well controlled,             Endo/Other     GYN       Hematology   Musculoskeletal  Osteoarthritis,        Neurology    Headaches,    Psychology           Physical Exam    Airway    Mallampati score: I  TM Distance: >3 FB  Neck ROM: full     Dental   No notable dental hx     Cardiovascular  Rhythm: regular, Rate: normal, Cardiovascular exam normal    Pulmonary  Pulmonary exam normal Breath sounds clear to auscultation,     Other Findings        Anesthesia Plan  ASA Score- 2     Anesthesia Type- spinal with ASA Monitors  Additional Monitors:   Airway Plan:     Comment: EKG NSR  Cardiology clearance on chart          Plan Factors-    Chart reviewed  EKG reviewed  Existing labs reviewed  Patient summary reviewed  Patient is not a current smoker  Patient instructed to abstain from smoking on day of procedure  Patient did not smoke on day of surgery  Induction- intravenous  Postoperative Plan-     Informed Consent- Anesthetic plan and risks discussed with patient

## 2021-04-21 NOTE — OP NOTE
Right Anterior Total Hip Procedure Note    Patient Name: Kaela Garcia  MRN: [id number]  Date of Surgery 4/21/2021        Indications: Degenerative joint disease right hip with failed conservative care  Pre-operative Diagnosis: Right hip degenerative joint disease  Post-operative Diagnosis: Right hip degenerative joint disease  Surgeon: Emily Stein MD [facility]    Assistant: Rafat Weinstein [facility]    Anesthesia:  Procedure(s) and Anesthesia Type:     * ARTHROPLASTY HIP TOTAL ANTERIOR - Choice  Operative procedure: Right total hip arthroplasty, anterior approach    Implants:   Implant Name Type Inv  Item Serial No   Lot No  LRB No  Used Action   SHELL ACETABULAR 48MM POROUS SOLID LCK RING PINNACLE - [id number]  SHELL ACETABULAR 48MM POROUS SOLID LCK RING PINNACLE  DEPUY [id number] Right 1 Implanted   LINER ACTB ULT LNK PE 32 X 48MM NEUTRAL ALTRX - [id number]  LINER ACTB ULT LNK PE 32 X 48MM NEUTRAL ALTRX  DEPUY [id number] Right 1 Implanted   STEM FEM PRESS FIT 12/14 TAPER SZ 9 COLR STD CORAIL - [id number]  STEM FEM PRESS FIT 12/14 TAPER SZ 9 COLR STD CORAIL  DEPUY [id number] Right 1 Implanted   HEAD FEMORAL 12/14 TPR 32MM +1MM BIOLOX ARTICULEZE - [id number]  HEAD FEMORAL 12/14 TPR 32MM +1MM BIOLOX ARTICULEZE  DEPUY [id number] Right 1 Implanted     Metal head on Polyethelene, cementless    Drains: None  Estimated blood loss: 150cc    Antibiotics: Given preoperatively    Clinical note: Kaela Garcia is a 56 y o  female who presents with severe right hip pain and disability  Physical exam investigations was consistent with degenerative joint disease  The patient been treated nonoperatively and failed to improve  Nonoperative versus operative options reviewed  The patient did understand the situation and did wish to proceed with operative management    Description of procedure: The patient was identified as Kaela Garcia and the right hip as the surgical site  Anesthesia was administered    The patient was appropriately padded and placed on the [place] table for hip surgery  Utilizing a anterior approach, sharp dissection was carried down through skin  Hemostasis was obtained using electrocautery followed by division of the fascia overlying tensor fascia jack  The tensor fascia jack and abductors were retracted laterally  The lateral femoral circumflex vessels were identified and electrocoagulated  The hip capsule was identified and appropriate retractors were placed  A capsulotomy was performed and end-stage degenerative changes within the hip joint were confirmed  Under fluoroscopic control, a femoral neck cut was made and the femoral head and neck was removed  Loss of articular cartilage with osteophyte formation was again confirmed  Under direct vision as well as with the aid of fluoroscopic control, the acetabulum was serially reamed to a bleeding bony bed  Prominent osteophytes were removed  Under fluoroscopic control a [place] sector acetabulum was then impacted in place with approximately 40° of abduction and 20° of anteversion  A neutral polyethylene liner was then impacted into the acetabular shell with good fixation  Appropriate retractors were placed along the proximal femur and appropriate soft tissue releases were performed  The femur was elevated out of the wound to gain access to the femoral canal   The femoral canal was opened up with a box osteotome and rongeur followed by appropriate rasp and broaches  A trial reduction was carried out and the appropriate head and neck size was identified both under direct vision as well as with fluoroscopic imaging  The hip was noted to be stable with extreme positioning  The trial components were removed at the appropriate time after copious irrigation of the wound, the final femoral stem was impacted in place with excellent fixation    Trial reduction with various head sizes was carried out and the appropriate head size selected and impacted on the [facility]  The hip was reduced for the last time again demonstrating excellent range of motion stability with leg lengths being estimated to be near equal   Final imaging was obtained with the fluoroscopy and kept his hard copy  Hemostasis was inspected and found to be satisfactory followed by irrigation with pulse lavage and closure utilizing 2-0 Vicryl and stratafix for deep layers, 2-0 Vicryl  For subcutaneous closure and a 3 Monocryl subcutaneous stitch for skin  Steri-Strips were applied  A soft absorbent bandage was added and the patient was then transferred to the stretcher in the supine position  There were no complications  Throughout the procedure, assistance by Eleanor Horowitz PA-C was required  She was required to aid in positioning the patient preoperatively and intraoperatively she was required to manipulate the patient's right lower extremity as well as various retractors and other equipment under my guidance  On completion of procedure, she was required to aid in closure of the wound and application of bandage  She was also required to aid in transfer the patient to recovery  AP hip, AP pelvis and and lateral views of the right hip were obtained intraoperatively  This demonstrated appropriate positioning a right total hip arthroplasty components  There was no evidence loosening or failure  There was air in the soft tissues consistent with intraoperative status the radiographs        Date: 4/21/2021  Time: 4:12 PM    Corine Sandifer MD [facility]

## 2021-04-22 LAB
ANION GAP SERPL CALCULATED.3IONS-SCNC: 8 MMOL/L (ref 4–13)
BUN SERPL-MCNC: 13 MG/DL (ref 5–25)
CALCIUM SERPL-MCNC: 7.8 MG/DL (ref 8.3–10.1)
CHLORIDE SERPL-SCNC: 106 MMOL/L (ref 100–108)
CO2 SERPL-SCNC: 26 MMOL/L (ref 21–32)
CREAT SERPL-MCNC: 0.63 MG/DL (ref 0.6–1.3)
ERYTHROCYTE [DISTWIDTH] IN BLOOD BY AUTOMATED COUNT: 13 % (ref 11.6–15.1)
GFR SERPL CREATININE-BSD FRML MDRD: 96 ML/MIN/1.73SQ M
GLUCOSE SERPL-MCNC: 104 MG/DL (ref 65–140)
HCT VFR BLD AUTO: 31.9 % (ref 34.8–46.1)
HGB BLD-MCNC: 10.2 G/DL (ref 11.5–15.4)
MCH RBC QN AUTO: 30.7 PG (ref 26.8–34.3)
MCHC RBC AUTO-ENTMCNC: 32 G/DL (ref 31.4–37.4)
MCV RBC AUTO: 96 FL (ref 82–98)
PLATELET # BLD AUTO: 157 THOUSANDS/UL (ref 149–390)
PMV BLD AUTO: 9 FL (ref 8.9–12.7)
POTASSIUM SERPL-SCNC: 3.7 MMOL/L (ref 3.5–5.3)
RBC # BLD AUTO: 3.32 MILLION/UL (ref 3.81–5.12)
SODIUM SERPL-SCNC: 140 MMOL/L (ref 136–145)
WBC # BLD AUTO: 8.7 THOUSAND/UL (ref 4.31–10.16)

## 2021-04-22 PROCEDURE — 97110 THERAPEUTIC EXERCISES: CPT

## 2021-04-22 PROCEDURE — 97116 GAIT TRAINING THERAPY: CPT

## 2021-04-22 PROCEDURE — 97530 THERAPEUTIC ACTIVITIES: CPT

## 2021-04-22 PROCEDURE — 85027 COMPLETE CBC AUTOMATED: CPT | Performed by: PHYSICIAN ASSISTANT

## 2021-04-22 PROCEDURE — 97167 OT EVAL HIGH COMPLEX 60 MIN: CPT

## 2021-04-22 PROCEDURE — 80048 BASIC METABOLIC PNL TOTAL CA: CPT | Performed by: PHYSICIAN ASSISTANT

## 2021-04-22 PROCEDURE — 97163 PT EVAL HIGH COMPLEX 45 MIN: CPT

## 2021-04-22 RX ORDER — DOCUSATE SODIUM 100 MG/1
100 CAPSULE, LIQUID FILLED ORAL 2 TIMES DAILY
Qty: 10 CAPSULE | Refills: 0
Start: 2021-04-22 | End: 2021-10-26

## 2021-04-22 RX ORDER — ASPIRIN 325 MG
325 TABLET ORAL 2 TIMES DAILY
Qty: 41 TABLET | Refills: 0 | Status: SHIPPED | OUTPATIENT
Start: 2021-04-22 | End: 2021-09-08

## 2021-04-22 RX ORDER — FERROUS SULFATE 325(65) MG
325 TABLET ORAL
Refills: 0
Start: 2021-04-22 | End: 2021-10-26

## 2021-04-22 RX ORDER — OXYCODONE HYDROCHLORIDE 5 MG/1
5 TABLET ORAL EVERY 4 HOURS PRN
Qty: 30 TABLET | Refills: 0
Start: 2021-04-22 | End: 2021-05-02

## 2021-04-22 RX ADMIN — CEFAZOLIN SODIUM 1000 MG: 1 SOLUTION INTRAVENOUS at 06:12

## 2021-04-22 RX ADMIN — OXYCODONE HYDROCHLORIDE 10 MG: 10 TABLET ORAL at 17:04

## 2021-04-22 RX ADMIN — Medication 1000 UNITS: at 08:11

## 2021-04-22 RX ADMIN — OXYCODONE HYDROCHLORIDE 10 MG: 10 TABLET ORAL at 01:21

## 2021-04-22 RX ADMIN — KETOROLAC TROMETHAMINE 15 MG: 30 INJECTION, SOLUTION INTRAMUSCULAR; INTRAVENOUS at 07:19

## 2021-04-22 RX ADMIN — ASPIRIN 325 MG ORAL TABLET 325 MG: 325 PILL ORAL at 17:04

## 2021-04-22 RX ADMIN — DOCUSATE SODIUM 100 MG: 100 CAPSULE, LIQUID FILLED ORAL at 08:11

## 2021-04-22 RX ADMIN — LEVOTHYROXINE SODIUM 112 MCG: 112 TABLET ORAL at 06:12

## 2021-04-22 RX ADMIN — SENNOSIDES 8.6 MG: 8.6 TABLET ORAL at 08:11

## 2021-04-22 RX ADMIN — ATORVASTATIN CALCIUM 40 MG: 40 TABLET, FILM COATED ORAL at 22:05

## 2021-04-22 RX ADMIN — ACETAMINOPHEN 650 MG: 325 TABLET ORAL at 15:43

## 2021-04-22 RX ADMIN — OXYCODONE HYDROCHLORIDE 10 MG: 10 TABLET ORAL at 12:00

## 2021-04-22 RX ADMIN — DOCUSATE SODIUM 100 MG: 100 CAPSULE, LIQUID FILLED ORAL at 17:04

## 2021-04-22 RX ADMIN — ACETAMINOPHEN 650 MG: 325 TABLET ORAL at 22:05

## 2021-04-22 RX ADMIN — FERROUS SULFATE TAB 325 MG (65 MG ELEMENTAL FE) 325 MG: 325 (65 FE) TAB at 12:01

## 2021-04-22 RX ADMIN — Medication 500 MG: at 12:00

## 2021-04-22 RX ADMIN — OXYCODONE HYDROCHLORIDE 10 MG: 10 TABLET ORAL at 07:20

## 2021-04-22 RX ADMIN — ASPIRIN 325 MG ORAL TABLET 325 MG: 325 PILL ORAL at 08:11

## 2021-04-22 NOTE — PLAN OF CARE
Problem: PHYSICAL THERAPY ADULT  Goal: Performs mobility at highest level of function for planned discharge setting  See evaluation for individualized goals  Description: Treatment/Interventions: Functional transfer training, LE strengthening/ROM, Elevations, Therapeutic exercise, Endurance training, Patient/family training, Bed mobility, Gait training, Spoke to nursing, OT  Equipment Recommended: Walker(Pt owns walker)       See flowsheet documentation for full assessment, interventions and recommendations  Note: Prognosis: Good  Problem List: Decreased strength, Decreased range of motion, Decreased endurance, Impaired balance, Decreased mobility, Orthopedic restrictions, Pain(WBAT RLE)  Assessment: Pt  56 y  o female presents for elective R JEREMY surgery  Past medical hx includes HTN, hyperlipidemia  Pt admitted for Primary osteoarthritis of right hip  S/p R anterior JEREMY on 4/21  Pt referred to PT for functional mobility evaluation & D/C planning w/ orders of activity as tolerated and WBAT B/L LE  PTA, pt reports being I w/ SPC  During evaluation, deficits included dec mobility, balance, ambulation  Pt demonstrated dec endurance and tolerance to activity  Evaluation of functional mobility required minAx1 for sit to supine, toilet transfers and sit<>stand transfers; minAx2 for amb  BP seated /68  Pt was able to ambulate 10' w/ RW from bed to bathroom  During amb, pt complained of inc sweating  Once seated on toilet, pt cued on ankle pumps and BP was 93/54  Gait deviations as above, antalgic and slow but no gross LOB noted  Denies reports of dizziness or SOB t/o session  Nsg staff most recent vital signs as follows: /62 (BP Location: Right arm)   Pulse 84   Temp 98 3 °F (36 8 °C) (Temporal)   Resp 18   Ht 5' 4" (1 626 m)   Wt 78 kg (172 lb)   SpO2 98%   BMI 29 52 kg/m²   At end of session, pt back in bed in stable condition, call bell & phone in reach, bed alarm activated, all lines intact   Pt was educated on fall precautions and reinforced w/ good understanding  The patient's AM-PAC Basic Mobility Inpatient Short Form Raw Score is 17, Standardized Score is 39 67  A standardized score less than 42 9 suggests the patient may benefit from discharge to post-acute rehabilitation services  Please also refer to the recommendation of the Physical Therapist for safe discharge planning  Pt would benefit from continued PT to address deficits as defined above and maximize level of independence with functional mobility and safety  Despite AM-PAC score, from PT/mobility standpoint recommendation for D/C to home w/ OPPT, when medically cleared based on objective measures above, current function, dec family/caregiver support and dec cognition  CM to follow  Nsg staff to continue to mobilized pt (OOB in chair for all meals & ambulate in room/unit) as tolerated to prevent further decline in function  Nsg notified  After IE, pt performed further functional mobility  Please see PT treatment note below for details  Barriers to Discharge: Inaccessible home environment  Barriers to Discharge Comments: stairs     PT Discharge Recommendation: Home with outpatient rehabilitation(OPPT)     PT - OK to Discharge: No(Pt to achieve PT goals prior to D/C)    See flowsheet documentation for full assessment

## 2021-04-22 NOTE — OCCUPATIONAL THERAPY NOTE
Occupational Therapy Evaluation (Time=)     Patient Name: Favian Ray  OIWKX'N Date: 2021  Problem List  Principal Problem:    Primary osteoarthritis of right hip    Past Medical History  Past Medical History:   Diagnosis Date    Arthritis     Bruises easily     Cancer ([facility] [address] )     thyroid    Chronic pain disorder     Dental crowns present     Headache     History of vertigo     Hyperlipidemia     Hypertension     Joint pain     Knee pain, bilateral     Low back pain     Migraines     Mild acid reflux     Motion sickness     Osteoarthritis     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     last assessed - [id number]    PONV (postoperative nausea and vomiting)     Risk for falls     SVT (supraventricular tachycardia) (HCC)     history/none recent/ has had heart ablation in     Thyroid cancer ([facility] [address] )     Status post thyroidectomy, no longer sees endocrinology; last assessed - [id number]    Use of cane as ambulatory aid     occas    Varicose veins of leg with edema     last assessed - [id number]    Walking pneumonia     Wears glasses      Past Surgical History  Past Surgical History:   Procedure Laterality Date    AV NODE ABLATION       SECTION  10/27/1992    COLONOSCOPY      Complete colonoscopy    ENDOMETRIAL ABLATION      Thermal; Resolved - Oct 2005    FOOT SURGERY Left     x 2, [address] removed ganglion cyst    INFERIOR OBLIQUE MYECTOMY      removed 8 fibroid tumors     KNEE ARTHROSCOPY Left     KNEE CARTILAGE SURGERY Left     Left knee torn meniscus;  Resolved - Sep 2008    LAPAROSCOPIC ENDOMETRIOSIS FULGURATION      LIMBAL STEM CELL TRANSPLANT      LIPOMA RESECTION      MYOMECTOMY      Anorectal    OTHER SURGICAL HISTORY      heart ablation - atrial tachycardia; ablation for SVT    OVARIAN CYST REMOVAL      UT TOTAL HIP ARTHROPLASTY Right 2021    Procedure: ARTHROPLASTY HIP TOTAL ANTERIOR;  Surgeon: Jocelyn Edward MD;  Location: [facility];  Service: Orthopedics    THYROIDECTOMY      VAGINAL MASS EXCISION Right 11/30/2017    Procedure: EXCISION RIGHT PERINEAL LIPOMA/ MASS, REMOVAL OF SKIN TAG;  Surgeon: Enedina Mills MD;  Location: [facility];  Service: General        04/22/21 0900   OT Last Visit   OT Visit Date 04/22/21   Note Type   Note type Evaluation   Restrictions/Precautions   Weight Bearing Precautions Per Order Yes   RLE Weight Bearing Per Order WBAT   Other Precautions WBS; Multiple lines; Fall Risk;Pain  (WBAT RLE; no restrictions)   Pain Assessment   Pain Assessment Tool 0-10   Pain Score 4  (Pain stated as 0 at rest, 8 when active)   Pain Location/Orientation Orientation: Right;Location: Hip   Patient's Stated Pain Goal No pain   Hospital Pain Intervention(s) Medication (See MAR); Repositioned; Ambulation/increased activity; Emotional support   Home Living   Type of [address] One level; Laundry in basement;Performs ADLs on one level; Able to live on main level with bedroom/bathroom;Stairs to enter without rails  (4+5+5 charly c 2 steps from door/landing to main floor )   Bathroom Shower/Tub Tub/shower unit   Bathroom Toilet Raised   Bathroom Equipment Tub transfer bench   [address] Walker;Cane   Prior Function   Level of [place] Independent with ADLs and functional mobility  (with use of SPC)   Lives With Spouse;Daughter  (dtr is 34 y o )   Receives Help From [facility] Doctor [place], [address] in the last 6 months 0   Vocational Retired  (helped c 's businesses/landlord)   Lifestyle   Autonomy PTA, pt states independence with all aspects of her ADLs, transfers, ambulation with use of SPC; neg falls, +, neg home alone   Reciprocal Relationships , daughter   Service to Others Homemaker   Intrinsic [place]   Psychosocial   Psychosocial (WDL) WDL   Subjective   Subjective "I feel much better the more I walk " ADL   Where Assessed Edge of bed   Eating Assistance 6  Modified independent   Grooming Assistance 6  Modified Independent   UB Bathing Assistance 5  Supervision/Setup   LB Bathing Assistance 2  Maximal Assistance   UB Dressing Assistance 5  Supervision/Setup   LB Dressing Assistance 2  Maximal [address]  4  Minimal Assistance   Bed Mobility   Supine to Sit Unable to assess  (Pt was at EOB upon entering the room)   Sit to Supine 4  Minimal assistance   Additional items Assist x 1; Increased time required;Verbal cues;LE management   Transfers   Sit to Stand 4  Minimal assistance   Additional items Assist x 1; Increased time required;Verbal cues  (RW)   Stand to Sit 4  Minimal assistance   Additional items Assist x 1; Increased time required;Verbal cues  (RW)   Toilet transfer 4  Minimal assistance   Additional items Assist x 1; Increased time required;Standard toilet;Verbal cues  (Hand rail used; RW)   Additional Comments PN=862/68 (EOB); 93/54 (on toilet); 98/57 (supine in bed post ambulation); HR= bpm; SpO2=95% on RA; nsg aware   Functional Mobility   Functional Mobility 4  Minimal assistance   Additional Comments x2 for safety and balance   Additional items Rolling walker   Balance   Static Sitting Good   Dynamic Sitting Fair +   Static Standing Fair -   Dynamic Standing Poor +   Ambulatory Poor +   Activity Tolerance   Activity Tolerance Patient limited by fatigue;Patient limited by pain;Treatment limited secondary to medical complications (Comment)  (episodes of hypotension)   Medical Staff Made Aware Willy Frausto; Nsg Emanuel Peeling   Nurse Made Aware Yes   RUE Assessment   RUE Assessment WFL   RUE Strength   RUE Overall Strength Within Functional Limits - strength 5/5   LUE Assessment   LUE Assessment WFL   LUE Strength   LUE Overall Strength Within Functional Limits - strength 5/5   Hand Function   Gross Motor Coordination Functional   Fine Motor Coordination Functional   Sensation   Light Touch No apparent deficits   Proprioception   Proprioception No apparent deficits   Vision-Basic Assessment   Current Vision Wears glasses all the time   Vision - Complex Assessment   Acuity Able to read employee name badge without difficulty; Able to read clock/calendar on wall without difficulty   Perception   Inattention/Neglect Appears intact   Cognition   Overall Cognitive Status [facility]   Arousal/Participation Alert; Responsive; Cooperative   Attention Within functional limits   Orientation Level Oriented X4   Memory Within functional limits   Following Commands Follows all commands and directions without difficulty   Assessment   Limitation Decreased ADL status; Decreased Safe judgement during ADL;Decreased endurance;Decreased self-care trans;Decreased high-level ADLs   Prognosis Good   Assessment Pt is a 56 y o  female admitted to [place] [address] on 4/21/21 for an elective s/p R THR (4/21) 2* osteoarthritis  Pt currently is WBAT RLE c orders for activity as tolerated  Pt PMH includes OA, DJD, thyroid Ca, Vertigo/motion sickness, HTN, and L knee arthroscope  At baseline PTA, pt states independence with all aspects of her ADLs, transfers, ambulation with use of SPC; neg falls, +, neg home alone  During evaluation of functional mobility, pt demonstrated min A c x1 sit<>stand transfers and min A x2 c ambulation using RW with cueing throughout for safety and positioning  For the initial eval, pt demonstrated deficits c activity tolerance (currently fair, 10-15 min), functional mobility, functional balance, ADL status, sit-stand transfers, and transfer safety  Pt would benefit from continued OT tx to improve her level of independence and address the above deficits, 3-5xwk/1-2 wks  Currently recommend D/C home c outpatient rehab when medically appropriate  OT will continue to follow pt on OT caseload with the following goals     Goals   Patient Goals To return home   STG Time Frame   (1-7)   Short Term Goal #1 Pt will complete all UE and LE bathing/dressing ADL activities with Mod I   Short Term Goal #2 Pt will improve functional mobility to mod I c safe transfer technique for ADL/IADL/leisure tasks with AD as needed  Short Term Goal  Pt will complete all toileting tasks including hygiene and clothing management c mod I   Long Term Goal #1 Pt will demonstrate improved activity tolerance to G (20-30 minutes) with 1-2 rest breaks throughout and standing tolerance for 5-7 minutes for improved engagement with functional tasks  Long Term Goal #2 Pt will demonstrate improved functional balance by 1 grade to assist with ADLs   Long Term Goal Pt will tolerate education for use of AD to assist c ADLs and demonstrate G carryover   Plan   Treatment Interventions ADL retraining;Functional transfer training;UE strengthening/ROM; Endurance training;Patient/family training;Equipment evaluation/education; Compensatory technique education; Energy conservation; Activityengagement   Goal Expiration Date 05/06/21   OT Treatment Day 0   OT Frequency 3-5x/wk   Recommendation   OT Discharge Recommendation Home with outpatient rehabilitation   OT - OK to Discharge Yes  (when medically appropriate)   AM-PAC Daily Activity Inpatient   Lower Body Dressing 2   Bathing 2   Toileting 3   Upper Body Dressing 3   Grooming 4   Eating 4   Daily Activity Raw Score 18   Daily Activity Standardized Score (Calc for Raw Score >=11) 38 66   AM-PAC Applied Cognition Inpatient   Following a Speech/Presentation 4   Understanding Ordinary Conversation 4   Taking Medications 4   Remembering Where Things Are Placed or Put Away 4   Remembering List of 4-5 Errands 4   Taking Care of Complicated Tasks 4   Applied Cognition Raw Score 24   Applied Cognition Standardized Score 62 21   Man Farrell

## 2021-04-22 NOTE — PHYSICAL THERAPY NOTE
PT EVALUATION (8:22-8:38=16 min)    Pt  Name: nAgi Becerra  Pt  Age: 56 y o  MRN: [id number]  LENGTH OF STAY: 0    Patient Active Problem List   Diagnosis    Allergic rhinitis    Benign hematuria    Headache    Mixed hyperlipidemia    Essential hypertension    Menopause    Osteoarthritis of both knees    Paroxysmal supraventricular tachycardia (HCC)    Postoperative hypothyroidism    Screening for colon cancer    Screening for breast cancer    Health care maintenance    Hyperglycemia    Osteoarthritis    Vitamin D deficiency    Obesity (BMI 30-39  9)    Vertigo    Palpitations    Preoperative cardiovascular examination    Primary osteoarthritis of right hip       Admitting Diagnoses:   Primary osteoarthritis of right hip [M16 11]    Past Medical History:   Diagnosis Date    Arthritis     Bruises easily     Cancer ([place] [address] )     thyroid    Chronic pain disorder     Dental crowns present     Headache     History of vertigo     Hyperlipidemia     Hypertension     Joint pain     Knee pain, bilateral     Low back pain     Migraines     Mild acid reflux     Motion sickness     Osteoarthritis     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     last assessed - [id number]    PONV (postoperative nausea and vomiting)     Risk for falls     SVT (supraventricular tachycardia) ([place] [address] )     history/none recent/ has had heart ablation in     Thyroid cancer ([place] [address] )     Status post thyroidectomy, no longer sees endocrinology; last assessed - [id number]    Use of cane as ambulatory aid     occas    Varicose veins of leg with edema     last assessed - [id number]    Walking pneumonia     Wears glasses        Past Surgical History:   Procedure Laterality Date    AV NODE ABLATION       SECTION  10/27/1992    COLONOSCOPY      Complete colonoscopy    ENDOMETRIAL ABLATION      Thermal; Resolved - Oct 2005    FOOT SURGERY Left     x 2, 1970 &  removed ganglion cyst    INFERIOR OBLIQUE MYECTOMY   removed 8 fibroid tumors     KNEE ARTHROSCOPY Left     KNEE CARTILAGE SURGERY Left     Left knee torn meniscus; Resolved - Sep 2008    LAPAROSCOPIC ENDOMETRIOSIS FULGURATION      LIMBAL STEM CELL TRANSPLANT      LIPOMA RESECTION      MYOMECTOMY      Anorectal    OTHER SURGICAL HISTORY      heart ablation - atrial tachycardia; ablation for SVT    OVARIAN CYST REMOVAL      DE TOTAL HIP ARTHROPLASTY Right 4/21/2021    Procedure: ARTHROPLASTY HIP TOTAL ANTERIOR;  Surgeon: Brett Church MD;  Location: [facility];  Service: Orthopedics    THYROIDECTOMY      VAGINAL MASS EXCISION Right 11/30/2017    Procedure: EXCISION RIGHT PERINEAL LIPOMA/ MASS, REMOVAL OF SKIN TAG;  Surgeon: Hallie Bautista MD;  Location[facility];  Service: General       Imaging Studies:  XR hip/pelv 1 vw left if performed   Final Result by Mily Maher DO (04/21 1754)      Unremarkable appearance of right total hip arthroplasty as visualized  Workstation performed: [id number]         XR hip/pelv 1 vw right if performed   Final Result by Mily Maher DO (04/21 1744)      Unremarkable appearance of right total hip arthroplasty  Workstation performed: [id number]              04/22/21 0901   PT Last Visit   PT Visit Date 04/22/21   Note Type   Note type Evaluation   Pain Assessment   Pain Assessment Tool 0-10   Pain Score No Pain  (01/0 at rest; 8/10 w/ amb)   Pain Location/Orientation Orientation: Right;Location: Hip   Hospital Pain Intervention(s) Repositioned;Cold applied; Ambulation/increased activity; Rest;Emotional support   Home Living   Type of [address] One level;Performs ADLs on one level; Able to live on main level with bedroom/bathroom;Stairs to enter without rails; Laundry in basement  (4+5+5 FRANSISCO w/ another 2STE to get to main floor)   Bathroom Shower/Tub Tub/shower unit   [employer] Raised   Bathroom Equipment Tub transfer bench   [address] Walker;Cane   Prior Function   Level of [place] Independent with ADLs and functional mobility  (w/ SPC)   Lives With Spouse;Daughter   Receives Help From Family   ADL Assistance Independent   Falls in the last 6 months 0   Vocational Other (Comment)  (Not currently working )   Comments (+)    Restrictions/Precautions   [place] Bearing Precautions Per Order Yes   RLE [place] Bearing Per Order WBAT   Other Precautions WBS; Multiple lines; Fall Risk;Pain  (WBAT RLE)   General   Family/Caregiver Present No   Cognition   Overall Cognitive Status WFL   Arousal/Participation Alert   Orientation Level Oriented X4   Following Commands Follows all commands and directions without difficulty   Comments Cooperative and pleasant   RUE Assessment   RUE Assessment   (refer to OT)   LUE Assessment   LUE Assessment   (refer to OT)   RLE Assessment   RLE Assessment X   Strength RLE   R Hip Flexion 1/5   R Knee Flexion 3+/5   R Knee Extension 3+/5   R Ankle Dorsiflexion 4/5   R Ankle Plantar Flexion 4/5   LLE Assessment   LLE Assessment WFL  (4/5 grossly )   Coordination   Sensation WFL   Bed Mobility   Supine to Sit Unable to assess   Sit to Supine 4  Minimal assistance   Additional items Assist x 1; Increased time required;Verbal cues;LE management   Additional Comments Cues for technique and safety; Pt seated EOB upon arrival    Transfers   Sit to Stand 4  Minimal assistance   Additional items Assist x 1; Increased time required;Verbal cues   Stand to Sit 4  Minimal assistance   Additional items Assist x 1; Increased time required;Verbal cues   Toilet transfer 4  Minimal assistance   Additional items Assist x 1; Armrests; Increased time required;Verbal cues;Standard toilet  (grab bar use)   Additional Comments Cues for technique and safety   Ambulation/Elevation   Gait pattern Antalgic;Decreased foot clearance;Decreased R stance; Short stride; Step to;Excessively slow   Gait Assistance 4  Minimal assist   Additional items Assist x 2;Verbal cues; Tactile cues   Assistive Device Rolling walker   Distance 10'x1  (bed to bathroom)   Balance   Static Sitting Good   Dynamic Sitting Fair +   Static Standing Fair -  (w/ RW)   Dynamic Standing Poor +  (w/ RW)   Ambulatory Poor +  (w/ RW)   Endurance Deficit   Endurance Deficit Yes   Endurance Deficit Description pain; fatigue; weakness   Activity Tolerance   Activity Tolerance Patient limited by pain; Patient limited by fatigue   Medical Staff Made Aware OT Nik    Nurse Made Aware SHANE Connelly   Assessment   Prognosis Good   Problem List Decreased strength;Decreased range of motion;Decreased endurance; Impaired balance;Decreased mobility;Orthopedic restrictions;Pain  (WBAT RLE)   Assessment Pt  56 y  o female presents for elective R JEREMY surgery  Past medical hx includes HTN, hyperlipidemia  Pt admitted for Primary osteoarthritis of right hip  S/p R anterior JEREMY on 4/21  Pt referred to PT for functional mobility evaluation & D/C planning w/ orders of activity as tolerated and WBAT B/L LE  PTA, pt reports being I w/ SPC  During evaluation, deficits included dec mobility, balance, ambulation  Pt demonstrated dec endurance and tolerance to activity  Evaluation of functional mobility required minAx1 for sit to supine, toilet transfers and sit<>stand transfers; minAx2 for amb  BP seated /68  Pt was able to ambulate 10' w/ RW from bed to bathroom  During amb, pt complained of inc sweating  Once seated on toilet, pt cued on ankle pumps and BP was 93/54  Gait deviations as above, antalgic and slow but no gross LOB noted  Denies reports of dizziness or SOB t/o session  Nsg staff most recent vital signs as follows: /62 (BP Location: Right arm)   Pulse 84   Temp 98 3 °F (36 8 °C) (Temporal)   Resp 18   Ht 5' 4" (1 626 m)   Wt 78 kg (172 lb)   SpO2 98%   BMI 29 52 kg/m²   At end of session, pt back in bed in stable condition, call bell & phone in reach, bed alarm activated, all lines intact   Pt was educated on fall precautions and reinforced w/ good understanding  The patient's AM-PAC Basic Mobility Inpatient Short Form Raw Score is 17, Standardized Score is 39 67  A standardized score less than 42 9 suggests the patient may benefit from discharge to post-acute rehabilitation services  Please also refer to the recommendation of the Physical Therapist for safe discharge planning  Pt would benefit from continued PT to address deficits as defined above and maximize level of independence with functional mobility and safety  Despite AM-PAC score, from PT/mobility standpoint recommendation for D/C to home w/ OPPT, when medically cleared based on objective measures above, current function, dec family/caregiver support and dec cognition  CM to follow  Nsg staff to continue to mobilized pt (OOB in chair for all meals & ambulate in room/unit) as tolerated to prevent further decline in function  Nsg notified  After IE, pt performed further functional mobility  Please see PT treatment note below for details  Barriers to Discharge Inaccessible home environment   Barriers to Discharge Comments stairs   Goals   Patient Goals to go home   STG Expiration Date 05/02/21   Short Term Goal #1 1) Inc overall LE strength by 1/2 MMT grade to improve functional mobility; 2) Pt will demonstrate improved bed mobility with S to dec caregiver burden; 3) Pt will demonstrate improved transfers w/ S for inc safety; 4) Pt will be able to amb w/ S >150' w/ RW for household distances to inc safety and dec caregiver burden; 5) Pt will be able to navigate stairs with S to dec caregiver burden and inc safety with functional mobility; 6) Improve general balance by 1 grade to inc safety; 7) PT for ongoing patient and caregiver education    PT Treatment Day 0   Plan   Treatment/Interventions Functional transfer training;LE strengthening/ROM; Elevations; Therapeutic exercise; Endurance training;Patient/family training;Bed mobility;Gait training;Spoke to nursing;OT   PT Frequency 7x/wk; Twice a day;Weekend   Recommendation   PT Discharge Recommendation Home with outpatient rehabilitation  (OPPT)   Equipment Recommended Walker  (Pt owns walker)   PT - OK to Discharge No  (Pt to achieve PT goals prior to D/C)   AM-PAC Basic Mobility Inpatient   Turning in Bed Without Bedrails 4   Lying on Back to Sitting on Edge of Flat Bed 3   Moving Bed to Chair 3   Standing Up From Chair 3   Walk in Room 2   Climb 3-5 Stairs 2   Basic Mobility Inpatient Raw Score 17   Basic Mobility Standardized Score 39 67   Hx/personal factors: co-morbidities, inaccessible home, mutliple lines, use of AD, WB restrictions and fall risk  Examination: dec mobility, dec balance, dec endurance, dec amb, risk for falls, pain, WB restrictions  Clinical: unpredictable (ongoing medical status, abnormal lab values, risk for falls and pain mgt)  Complexity: high     PT TREATMENT NOTE:    TIME IN: 8:38  TIME OUT: 9:01  TOTAL TIME: 23 min    After IE, pt tolerated further functional mobility  Required minAx1 for toilet tranfers, sit<>stand transfers; minAx2 for amb  Pt was able to amb 5' from bathroom to bed  Bed was moved closer to bathroom 2* inc pt complaints of sweating and low BP  BP seated at EOB following amb 92/55  Pt was then able to perform sit to stand transfer w/ 4 side steps towards bottom of bed w/ minAx2  BP at end of session 98/57  Pt required inc time to complete tasks w/ moderate verbal cues for RW management and safety  Applied ice packs to R hip following treatment  At end of session, pt back in bed in stable condition w/ call bell and phone in reach and bed alarm activated  Nsg notified  Will continue PT per POC       Mandie Chain

## 2021-04-22 NOTE — PLAN OF CARE
Problem: Potential for Falls  Goal: Patient will remain free of falls  Description: INTERVENTIONS:  - Assess patient frequently for physical needs  -  Identify cognitive and physical deficits and behaviors that affect risk of falls    -  [place] fall precautions as indicated by assessment   - Educate patient/family on patient safety including physical limitations  - Instruct patient to call for assistance with activity based on assessment  - Modify environment to reduce risk of injury  - Consider OT/PT consult to assist with strengthening/mobility  Outcome: Progressing     Problem: PAIN - ADULT  Goal: Verbalizes/displays adequate comfort level or baseline comfort level  Description: Interventions:  - Encourage patient to monitor pain and request assistance  - Assess pain using appropriate pain scale  - Administer analgesics based on type and severity of pain and evaluate response  - Implement non-pharmacological measures as appropriate and evaluate response  - Consider cultural and social influences on pain and pain management  - Notify physician/advanced practitioner if interventions unsuccessful or patient reports new pain  Outcome: Progressing     Problem: INFECTION - ADULT  Goal: Absence or prevention of progression during hospitalization  Description: INTERVENTIONS:  - Assess and monitor for signs and symptoms of infection  - Monitor lab/diagnostic results  - Monitor all insertion sites, i e  indwelling lines, tubes, and drains  - Monitor endotracheal if appropriate and nasal secretions for changes in amount and color  - [place] appropriate cooling/warming therapies per order  - Administer medications as ordered  - Instruct and encourage patient and family to use good hand hygiene technique  - Identify and instruct in appropriate isolation precautions for identified infection/condition  Outcome: Progressing  Goal: Absence of fever/infection during neutropenic period  Description: INTERVENTIONS:  - Monitor WBC    Outcome: Progressing     Problem: SAFETY ADULT  Goal: Patient will remain free of falls  Description: INTERVENTIONS:  - Assess patient frequently for physical needs  -  Identify cognitive and physical deficits and behaviors that affect risk of falls    -  [place] fall precautions as indicated by assessment   - Educate patient/family on patient safety including physical limitations  - Instruct patient to call for assistance with activity based on assessment  - Modify environment to reduce risk of injury  - Consider OT/PT consult to assist with strengthening/mobility  Outcome: Progressing  Goal: Maintain or return to baseline ADL function  Description: INTERVENTIONS:  -  Assess patient's ability to carry out ADLs; assess patient's baseline for ADL function and identify physical deficits which impact ability to perform ADLs (bathing, care of mouth/teeth, toileting, grooming, dressing, etc )  - Assess/evaluate cause of self-care deficits   - Assess range of motion  - Assess patient's mobility; develop plan if impaired  - Assess patient's need for assistive devices and provide as appropriate  - Encourage maximum independence but intervene and supervise when necessary  - Involve family in performance of ADLs  - Assess for home care needs following discharge   - Consider OT consult to assist with ADL evaluation and planning for discharge  - Provide patient education as appropriate  Outcome: Progressing  Goal: Maintain or return mobility status to optimal level  Description: INTERVENTIONS:  - Assess patient's baseline mobility status (ambulation, transfers, stairs, etc )    - Identify cognitive and physical deficits and behaviors that affect mobility  - Identify mobility aids required to assist with transfers and/or ambulation (gait belt, sit-to-stand, lift, walker, cane, etc )  - [place] fall precautions as indicated by assessment  - Record patient progress and toleration of activity level on Mobility SBAR; progress patient to next Phase/Stage  - Instruct patient to call for assistance with activity based on assessment  - Consider rehabilitation consult to assist with strengthening/weightbearing, etc   Outcome: Progressing     Problem: DISCHARGE PLANNING  Goal: Discharge to home or other facility with appropriate resources  Description: INTERVENTIONS:  - Identify barriers to discharge w/patient and caregiver  - Arrange for needed discharge resources and transportation as appropriate  - Identify discharge learning needs (meds, wound care, etc )  - Arrange for interpretive services to assist at discharge as needed  - Refer to Case Management Department for coordinating discharge planning if the patient needs post-hospital services based on physician/advanced practitioner order or complex needs related to functional status, cognitive ability, or social support system  Outcome: Progressing     Problem: Knowledge Deficit  Goal: Patient/family/caregiver demonstrates understanding of disease process, treatment plan, medications, and discharge instructions  Description: Complete learning assessment and assess knowledge base    Interventions:  - Provide teaching at level of understanding  - Provide teaching via preferred learning methods  Outcome: Progressing     Problem: METABOLIC, FLUID AND ELECTROLYTES - ADULT  Goal: Electrolytes maintained within normal limits  Description: INTERVENTIONS:  - Monitor labs and assess patient for signs and symptoms of electrolyte imbalances  - Administer electrolyte replacement as ordered  - Monitor response to electrolyte replacements, including repeat lab results as appropriate  - Instruct patient on fluid and nutrition as appropriate  Outcome: Progressing  Goal: Fluid balance maintained  Description: INTERVENTIONS:  - Monitor labs   - Monitor I/O and WT  - Instruct patient on fluid and nutrition as appropriate  - Assess for signs & symptoms of volume excess or deficit  Outcome: Progressing Problem: SKIN/TISSUE INTEGRITY - ADULT  Goal: Skin integrity remains intact  Description: INTERVENTIONS  - Identify patients at risk for skin breakdown  - Assess and monitor skin integrity  - Assess and monitor nutrition and hydration status  - Monitor labs (i e  albumin)  - Assess for incontinence   - Turn and reposition patient  - Assist with mobility/ambulation  - Relieve pressure over bony prominences  - Avoid friction and shearing  - Provide appropriate hygiene as needed including keeping skin clean and dry  - Evaluate need for skin moisturizer/barrier cream  - Collaborate with interdisciplinary team (i e  Nutrition, Rehabilitation, etc )   - Patient/family teaching  Outcome: Progressing  Goal: Incision(s), wounds(s) or drain site(s) healing without S/S of infection  Description: INTERVENTIONS  - Assess and document risk factors for skin impairment   - Assess and document dressing, incision, wound bed, drain sites and surrounding tissue  - Consider nutrition services referral as needed  - Oral mucous membranes remain intact  - Provide patient/ family education  Outcome: Progressing  Goal: Oral mucous membranes remain intact  Description: INTERVENTIONS  - Assess oral mucosa and hygiene practices  - Implement preventative oral hygiene regimen  - Implement oral medicated treatments as ordered  - Initiate Nutrition services referral as needed  Outcome: Progressing     Problem: MUSCULOSKELETAL - ADULT  Goal: Maintain or return mobility to safest level of function  Description: INTERVENTIONS:  - Assess patient's ability to carry out ADLs; assess patient's baseline for ADL function and identify physical deficits which impact ability to perform ADLs (bathing, care of mouth/teeth, toileting, grooming, dressing, etc )  - Assess/evaluate cause of self-care deficits   - Assess range of motion  - Assess patient's mobility  - Assess patient's need for assistive devices and provide as appropriate  - Encourage maximum independence but intervene and supervise when necessary  - Involve family in performance of ADLs  - Assess for home care needs following discharge   - Consider OT consult to assist with ADL evaluation and planning for discharge  - Provide patient education as appropriate  Outcome: Progressing  Goal: Maintain proper alignment of affected body part  Description: INTERVENTIONS:  - Support, maintain and protect limb and body alignment  - Provide patient/ family with appropriate education  Outcome: Progressing

## 2021-04-22 NOTE — CASE MANAGEMENT
CM met with pt at bedside to discuss discharge needs  Pt lives in a house with her  and daughter  ADL's are completed independently with no DME use  Pt has a RW; declined need for [facility]  Pt will be doing OPPT starting Monday at [facility]  Pt does drive and will have a ride home  No other needs expressed or identified  CM will continue to follow as needed  CM reviewed d/c planning process including the following: identifying help at home, patient preference for d/c planning needs, Discharge Lounge, [facility] Meds to Bed program, availability of treatment team to discuss questions or concerns patient and/or family may have regarding understanding medications and recognizing signs and symptoms once discharged  CM also encouraged patient to follow up with all recommended appointments after discharge  Patient advised of importance for patient and family to participate in managing patients medical well being

## 2021-04-22 NOTE — PLAN OF CARE
Problem: OCCUPATIONAL THERAPY ADULT  Goal: Performs self-care activities at highest level of function for planned discharge setting  See evaluation for individualized goals  Description: Treatment Interventions: ADL retraining, Functional transfer training, UE strengthening/ROM, Endurance training, Patient/family training, Equipment evaluation/education, Compensatory technique education, Energy conservation, Activityengagement          See flowsheet documentation for full assessment, interventions and recommendations  Note: Limitation: Decreased ADL status, Decreased Safe judgement during ADL, Decreased endurance, Decreased self-care trans, Decreased high-level ADLs  Prognosis: Good  Assessment: Pt is a 56 y o  female admitted to [place] [address] on 4/21/21 for an elective s/p R THR (4/21) 2* osteoarthritis  Pt currently is WBAT RLE c orders for activity as tolerated  Pt PMH includes OA, DJD, thyroid Ca, Vertigo/motion sickness, HTN, and L knee arthroscope  At baseline PTA, pt states independence with all aspects of her ADLs, transfers, ambulation with use of SPC; neg falls, +, neg home alone  During evaluation of functional mobility, pt demonstrated min A c x1 sit<>stand transfers and min A x2 c ambulation using RW with cueing throughout for safety and positioning  For the initial eval, pt demonstrated deficits c activity tolerance (currently fair, 10-15 min), functional mobility, functional balance, ADL status, sit-stand transfers, and transfer safety  Pt would benefit from continued OT tx to improve her level of independence and address the above deficits, 3-5xwk/1-2 wks  Currently recommend D/C home c outpatient rehab when medically appropriate  OT will continue to follow pt on OT caseload with the following goals       OT Discharge Recommendation: Home with outpatient rehabilitation  OT - OK to Discharge: Yes(when medically appropriate)

## 2021-04-22 NOTE — PROGRESS NOTES
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  Degenerative joint disease bilateral knees  Ambulatory dysfunction  Hypothyroidism/surgical thyroidectomy 2006 due to thyroid cancer  Hyperlipidemia  Hypertension- slight hypotension, asymptomatic  History of PSVT status post cardiac ablation 2004, controlled with beta-blockers  Clinical murmur, possibly aortic stenosis needs further evaluation  History of vitamin-D deficiency  Mild acute blood loss anemia related to surgery    Recommendation:    Continue postoperative care as suggested by primary orthopedic service  Medically at baseline can be discharged once cleared by PT OT and primary service  Hemodynamically appears to be stable  She will need quick outpatient follow-up appointment with her cardiologist particularly with concerns of murmur  Patient is on Inderal LA 24 hour release 60 mg capsule with regards to her previous history of tachycardia  Her blood pressure is stable but slightly low, I suggest that she has mild depletion of her volume and increase intake of salt and fluid would help it  Unless her blood pressure drops less than 100 then only I will consider holding her beta blockers considering she will have high risk of atrial tachycardias  Suggest that she complete the echocardiogram and she understands  Pain medications minimize narcotics, continue incentive spirometry Luke stockings and DVT prophylaxis per orthopedic team   Met with patient's family members over in room answered all her questions  I am anticipating patient can go home tomorrow after being cleared by other team members  Subjective:     Patient seen and examined today  EMR and overnight events reviewed  Patient is sitting on the site chair, eating her supper  Family members in room  Denies any nausea vomiting  Pain mild-to-moderate rated 3/10 on numeric pain scale on operated right hip area  Has been using ice packs  Denies any leg cramps or swelling no bleeding  Review of Systems     Constitutional: Denies appetite change  No chills, diaphoresis, fatigue or fever  HEENT: No congestion, sore throat  No visual complaints  Respiratory: No cough, chest tightness, shortness of breath and wheezing  Cardiovascular: No chest pain and palpitations  No Syncope or grey out  GI: Negative for abdominal distention, pain, constipation, diarrhea or nausea  Endocrine: Negative for cold or heat intolerance, polydipsia and polyuria  Genitourinary: Negative for decreased urine volume, dysuria, flank pain and urgency  Skin: Negative for rash  Neurological: Negative for dizziness, weakness, numbness and headaches  Hematological: Negative for spontaneous bruising or bleeding  Psychiatric: Negative for confusion, dysphoric mood, hallucinations  All other systems reviewed and are negative  OBJECTIVE:     Vitals:     Temp:  [96 8 °F (36 °C)-101 1 °F (38 4 °C)] 100 3 °F (37 9 °C)  HR:  [68-99] 99  Resp:  [16-18] 16  BP: ()/(54-75) 114/65  SpO2:  [97 %-100 %] 97 %  Temp (24hrs), Av 4 °F (36 9 °C), Min:96 8 °F (36 °C), Max:101 1 °F (38 4 °C)  Current: Temperature: 100 3 °F (37 9 °C)    Intake/Output Summary (Last 24 hours) at 2021 1733  Last data filed at 2021 1701  Gross per 24 hour   Intake --   Output 1550 ml   Net -1550 ml     Body mass index is 29 52 kg/m²  Physical Exam    Pleasant awake and alert  Eating her supper on the site chair  HEENT normocephalic atraumatic  Neck is supple  No ear or eye discharge  Mood is fair and neutral   Clear lungs no rales or crackles pain  S1-S2 regular /9/6 ejection systolic murmur midsternal area  Abdomen is soft nontender    Right hip clean dressing well-approximated surgical site no drainage or discharge from incision area  Bilateral Homans sign is negative, bilateral Luke stockings are noted  Bilateral distal pulses are intact in lower extremities    Mood is fair and neutral   Skin is dry and warm      Labs, Imaging, & Other studies:    All pertinent labs and imaging studies were personally reviewed  Results from last 7 days   Lab Units 04/22/21  0518   WBC Thousand/uL 8 70   HEMOGLOBIN g/dL 10 2*   PLATELETS Thousands/uL 157     Results from last 7 days   Lab Units 04/22/21  0510   POTASSIUM mmol/L 3 7   CHLORIDE mmol/L 106   CO2 mmol/L 26   BUN mg/dL 13   CREATININE mg/dL 0 63   EGFR ml/min/1 73sq m 96   CALCIUM mg/dL 7 8*           Current Meds:  Current Facility-Administered Medications   Medication Dose Route Frequency Provider Last Rate Last Admin    acetaminophen (TYLENOL) tablet 650 mg  650 mg Oral Q6H PRN Eleanor Begun, PA-C   650 mg at 04/22/21 1543    aspirin tablet 325 mg  325 mg Oral BID Eleanor Begun, PA-C   325 mg at 04/22/21 1704    atorvastatin (LIPITOR) tablet 40 mg  40 mg Oral HS Quincy Braxton MD   40 mg at 04/21/21 2157    cholecalciferol (VITAMIN D3) tablet 1,000 Units  1,000 Units Oral Daily Quincy Braxton MD   1,000 Units at 04/22/21 3006    docusate sodium (COLACE) capsule 100 mg  100 mg Oral BID Eleanor Begun, PA-C   100 mg at 04/22/21 1704    ferrous sulfate tablet 325 mg  325 mg Oral Daily [employer], PA-C   325 mg at 04/22/21 1201    HYDROmorphone (DILAUDID) injection 0 5 mg  0 5 mg Intravenous Q2H PRN Eleanor Begun, PA-C   0 5 mg at 04/21/21 2157    ketorolac (TORADOL) injection 15 mg  15 mg Intravenous Q6H PRN Eleanor Begun, PA-C   15 mg at 04/22/21 0719    lactated ringers infusion  100 mL/hr Intravenous Continuous Eleanor Begun, PA-C 100 mL/hr at 04/21/21 1704 100 mL/hr at 04/21/21 1704    levothyroxine tablet 112 mcg  112 mcg Oral Early Morning Quincy Braxton MD   112 mcg at 04/22/21 0612    magnesium gluconate (MAGONATE) tablet 500 mg  500 mg Oral Daily Josee Abdullahi MD   500 mg at 04/22/21 1200    ondansetron (ZOFRAN) injection 4 mg  4 mg Intravenous Q6H PRN Maurine Bence, PA-C   4 mg at 04/21/21 2332    oxyCODONE (ROXICODONE) immediate release tablet 10 mg  10 mg Oral Q4H PRN Maurine Bence, PA-C   10 mg at 04/22/21 1704    oxyCODONE (ROXICODONE) IR tablet 5 mg  5 mg Oral Q4H PRN Maurine Bence, PA-C        propranolol (INDERAL LA) 24 hr capsule 60 mg  60 mg Oral QPM Josee Abdullahi MD        [facility]) tablet 8 6 mg  1 tablet Oral Daily Maurine Bence, PA-C   8 6 mg at 04/22/21 5400    sodium chloride 0 9 % infusion  125 mL/hr Intravenous Continuous Rozanne Duty, DO   Stopped at 04/21/21 1701     Home Meds:  Medications Prior to Admission   Medication    acetaminophen (TYLENOL) 325 mg tablet    aspirin-acetaminophen-caffeine (EXCEDRIN MIGRAINE) 250-250-65 MG per tablet    atorvastatin (LIPITOR) 40 mg tablet    Cholecalciferol (Vitamin D3) 25 MCG TABS    COLLAGEN PO    cyanocobalamin (VITAMIN B-12) 2000 MCG tablet    diphenhydrAMINE (BENADRYL) 25 mg tablet    ergocalciferol (VITAMIN D2) 50,000 units    L-Lysine 500 MG CAPS    levothyroxine 112 mcg tablet    Magnesium 500 MG TABS    multivitamin (THERAGRAN) TABS    mupirocin (BACTROBAN) 2 % nasal ointment    mupirocin (BACTROBAN) 2 % ointment    propranolol (INDERAL LA) 60 mg 24 hr capsule    TURMERIC PO    Vitamin Mixture (JAY-C PO)    zinc gluconate 50 mg tablet    meloxicam (MOBIC) 15 mg tablet       Continuous Infusions:  lactated ringers, 100 mL/hr, Last Rate: 100 mL/hr (04/21/21 1704)  sodium chloride, 125 mL/hr, Last Rate: Stopped (04/21/21 1701)        Invasive Devices     Peripheral Intravenous Line            Peripheral IV 04/21/21 Left Wrist 1 day                VTE Pharmacologic Prophylaxis: ASA as per Primary Ortho Team   VTE Mechanical Prophylaxis: sequential compression device    Portions of the record may have been created with voice recognition software  Occasional wrong word or "sound a like" substitutions may have occurred due to the inherent limitations of voice recognition software  Read the chart carefully and recognize, using context, where substitutions have occurred

## 2021-04-22 NOTE — CONSULTS
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  Degenerative joint disease bilateral knees  Ambulatory dysfunction  Hypothyroidism/surgical thyroidectomy 2006 due to thyroid cancer  Hyperlipidemia  Hypertension  History of PSVT status post cardiac ablation 2004, controlled with beta-blockers  Clinical murmur aortic stenosis needs further evaluation  History of vitamin-D deficiency     Recommendation: -    Patient is currently recuperating well following her surgery  Her pain is well controlled with current medicines  Upon review of her outpatient records and examination she appears to be fairly stable  Discussed with her in detail about various options regarding pain control  Also clinically I found her to have a murmur with seems to be more related to aortic stenosis but she is not aware about that  She did mention that her family physician had a echocardiogram done in the office and that report I would try to look in the epic was not available  She did have a Holter monitoring and has been followed by a cardiologist with regards to her episodes of palpitations  Patient is currently taking beta-blockers which can be resumed, she takes them every day before she goes to bed at 11:00 p m  [facility] She appears to be clinically well optimized and a food have any clinical symptomatology of palpitations dizziness lightheadedness then I would suggest to place her on telemetric monitoring and consulting Cardiology colleagues  She needs to avoid dehydration and avoid caffeinated stimulants   I have reviewed patients medications as initiated on post operative orders by the primary team  Recommend  monitoring closely for any hypoxemia / respiratory insufficieny related to narcotics and to reduce doses and frequency of narcotics if necessary  Resume patients home medications and I have reviewed them  Maintain Intravenous Fluids for next 24 -48 hours, till patient able to reliably keep meals and meds in  Suggest  Zofran ODT 4 mg sublingually PRN for control of post operative nausea  Patient encouraged to  use Incentive spirometry q 15 minutes while awake to minimize risk of postoperative atelectasis and pneumonia  Patient verbalized to understand and fully comprehend  Recommend DVT prophylaxis with use of Venodyne compression boots  If any factor X A inhibitor,  low molecule weight heparin or Coumadin is planned by the primary team, we will be happy to dose that  drug based on patient's hemostasis  Maintain ASA as antiplatelet drug per Ortho  Team  Use Proton Pump inhibitor to minimize risk of gastritis  Monitor for any tinnitus as an early sign of salicylism and check salicylate levels in that situation  Discontinue patient's Norman catheter within next 24-48 hours in order  to minimize risk of catheter associated UTI  Please check patient's hemoglobin and hematocrit within next 24 hours to ensure that there is no acute blood loss anemia which would need any blood transfusion  We will follow this pleasant patient with your service closely and recommend necessary changes based on  further hospital course and diagnostics  Thank you, Dr Camila Lauren, for your kind consultation  HISTORY OF PRESENT ILLNESS:    Reason for Consult:  Post operative management following elective replacement of right hip    HPI: Vishal Guardado is a 56 y o  female, who has underlying advanced degenerative joint disease related to her right hip osteoarthritis which had been bothering her for past many months    She reportedly had tried various treatments including nonsteroidal anti-inflammatory medications, lifestyle modification attempts to lose weight use of assist device physical therapy corticosteroid injections without any significant changes  She was diagnosed of vitamin-D deficiency and had been on supplements to see if that would help her  But as the time progressed she continued to have worsening  She had various imaging studies done and was confirmed to have severe osteoarthritic pattern within the joint space as evident on her report which showed narrowing subchondral sclerosis and subchondral cyst formation with osteophyte formation  Based on her clinical symptomatology and severity as evident on limitation of her activities of daily living and worsening quality of life and this is she was given option either to continue conservative treatments or to proceed with more definitive joint replacement  She underwent preoperative evaluation by her primary care physician and underwent the surgery earlier today without any complications  Patient's preoperative evaluation done by her cardiologist Dr Laine Oppenheim on 04/09/2021 was reviewed  That indicated that patient had [facility]Dr Jacqueline Landry  She had history of SVT and that was ablated  Review of Systems   Constitutional:  No appetite change, denies chills, diaphoresis, fatigue or fever  HEENT:  Negative for congestion, sore throat and tinnitus  No visual complaints  Respiratory:  Negative cough, chest tightness, shortness of breath and wheezing  Cardiovascular:  Negative for chest pain and palpitations  previously she used to have palpitations and had ablation done and now follows with her cardiologist   Apparently had an episode in October when she was at her [address] [place] [address] Drive,  [address] in [facility]  Gastrointestinal: Negative for abdominal distention or pain, constipation, diarrhea and nausea  Endocrine: Negative for cold intolerance, heat intolerance, polydipsia and polyuria  Patient has history of thyroidectomy in the past and currently takes thyroid replacement  Genitourinary:                Negative for  dysuria, flank pain  Tolerating Norman without difficulty  Skin:   Negative for color change, pallor and rash  Neurological:   Negative for dizziness, tremors, seizures, syncope, facial asymmetry, speech difficulty, weakness, light-headedness, numbness and headaches  Hematological:  Musculoskeletal:  Psychiatric:  No h/o spontaneous bruising/bleeding  Joint pains as above  Negative for agitation, behavioral problems, confusion, decreased concentration, dysphoric mood and sleep disturbance  A complete system-based review of systems as discussed with patient is otherwise negative      PAST MEDICAL HISTORY:  Past Medical History:   Diagnosis Date    Arthritis     Bruises easily     Cancer ([facility] )     thyroid    Chronic pain disorder     Dental crowns present     Headache     History of vertigo     Hyperlipidemia     Hypertension     Joint pain     Knee pain, bilateral     Low back pain     Migraines     Mild acid reflux     Motion sickness     Osteoarthritis     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     last assessed - [id number]    PONV (postoperative nausea and vomiting)     Risk for falls     SVT (supraventricular tachycardia) ([facility] )     history/none recent/ has had heart ablation in     Thyroid cancer ([place] [address] )     Status post thyroidectomy, no longer sees endocrinology; last assessed - [id number]    Use of cane as ambulatory aid     occas    Varicose veins of leg with edema     last assessed - [id number]    Walking pneumonia     Wears glasses      Past Surgical History:   Procedure Laterality Date    AV NODE ABLATION       SECTION  10/27/1992    COLONOSCOPY      Complete colonoscopy    ENDOMETRIAL ABLATION      Thermal; Resolved - Oct 2005    FOOT SURGERY Left     x 2,  &  removed ganglion cyst    INFERIOR OBLIQUE MYECTOMY      removed 8 fibroid tumors     KNEE ARTHROSCOPY Left     KNEE CARTILAGE SURGERY Left Left knee torn meniscus; Resolved - Sep 2008    LAPAROSCOPIC ENDOMETRIOSIS FULGURATION      LIMBAL STEM CELL TRANSPLANT      LIPOMA RESECTION      MYOMECTOMY      Anorectal    OTHER SURGICAL HISTORY      heart ablation - atrial tachycardia; ablation for SVT    OVARIAN CYST REMOVAL      THYROIDECTOMY      VAGINAL MASS EXCISION Right 2017    Procedure: EXCISION RIGHT PERINEAL LIPOMA/ MASS, REMOVAL OF SKIN TAG;  Surgeon: Erika Pimentel MD;  Location: [facility];  Service: General       FAMILY HISTORY:  Non-contributory    SOCIAL HISTORY:  Social History     Substance and Sexual Activity   Alcohol Use Yes    Frequency: Monthly or less    Comment: socially     Social History     Substance and Sexual Activity   Drug Use No     Social History     Tobacco Use   Smoking Status Former Smoker    Types: Cigarettes    Quit date: 3/6/2021    Years since quittin 1   Smokeless Tobacco Never Used   Tobacco Comment    pt quit 10 years ago but has an occ cigarette       ALLERGIES:  Allergies   Allergen Reactions    Morphine Other (See Comments) and Headache     Other reaction(s): Other (See Comments)  severe headache  "severe headache"    Other Rash     Adhesive tape    Pain Meds,,,, caused N/V    Codeine Headache     Category: Adverse Reaction;     Tramadol Headache     Category: Adverse Reaction;     Medical Tape Rash       MEDICATIONS:  All current active medications have been reviewed    Current Facility-Administered Medications   Medication Dose Route Frequency Provider Last Rate Last Admin    acetaminophen (TYLENOL) tablet 650 mg  650 mg Oral Q6H PRN Marylee Squires, PA-C        aspirin tablet 325 mg  325 mg Oral BID Marylee Squires, PA-C   325 mg at 21 1834    atorvastatin (LIPITOR) tablet 40 mg  40 mg Oral HS Otis Aguilar MD        ceFAZolin (ANCEF) IVPB (premix in dextrose) 1,000 mg 50 mL  1,000 mg Intravenous Amor Beauchamp PA-C        [START ON 2021] cholecalciferol (VITAMIN D3) tablet 1,000 Units  1,000 Units Oral Daily Garima Mchugh MD        docusate sodium (COLACE) capsule 100 mg  100 mg Oral BID Bam Hanley PA-C   100 mg at 04/21/21 1834    [START ON 4/22/2021] ferrous sulfate tablet 325 mg  325 mg Oral Daily With [employer]        HYDROmorphone (DILAUDID) injection 0 5 mg  0 5 mg Intravenous Q2H PRN Bam Hanley PA-C        ketorolac (TORADOL) injection 15 mg  15 mg Intravenous Q6H PRN Bam Hanley PA-C   15 mg at 04/21/21 1809    lactated ringers bolus 1,000 mL  1,000 mL Intravenous Once PRN Dayna Horowitz MD        And    lactated ringers bolus 1,000 mL  1,000 mL Intravenous Once PRN Dayna Horowitz MD        lactated ringers infusion  100 mL/hr Intravenous Continuous Bam Hanley PA-C 100 mL/hr at 04/21/21 1704 100 mL/hr at 04/21/21 1704    [START ON 4/22/2021] levothyroxine tablet 112 mcg  112 mcg Oral Early Morning MD Yonny Mancia ON 4/22/2021] magnesium gluconate (MAGONATE) tablet 500 mg  500 mg Oral Daily Garima Mchugh MD        ondansetron [facility]) injection 4 mg  4 mg Intravenous Q6H PRN Bam Hanley PA-C        oxyCODONE (ROXICODONE) immediate release tablet 10 mg  10 mg Oral Q4H PRN Bam Hanley PA-C   10 mg at 04/21/21 1828    oxyCODONE (ROXICODONE) IR tablet 5 mg  5 mg Oral Q4H PRN aBm Hanley PA-C        propranolol (INDERAL LA) 24 hr capsule 60 mg  60 mg Oral QPM Garima Mchugh MD        [START ON 4/22/2021] senna (SENOKOT) tablet 8 6 mg  1 tablet Oral Daily Bam Hanley PA-C        sodium chloride 0 9 % bolus 1,000 mL  1,000 mL Intravenous Once PRN Dayna Horowitz MD        And    sodium chloride 0 9 % bolus 1,000 mL  1,000 mL Intravenous Once PRN Dayna Horowitz MD        sodium chloride 0 9 % infusion  125 mL/hr Intravenous Continuous Meli Head, DO   Stopped at 04/21/21 1701       Medications Prior to Admission   Medication    acetaminophen (TYLENOL) 325 mg tablet    aspirin-acetaminophen-caffeine (EXCEDRIN MIGRAINE) 250-250-65 MG per tablet    atorvastatin (LIPITOR) 40 mg tablet    Cholecalciferol (Vitamin D3) 25 MCG TABS    COLLAGEN PO    cyanocobalamin (VITAMIN B-12) 2000 MCG tablet    diphenhydrAMINE (BENADRYL) 25 mg tablet    ergocalciferol (VITAMIN D2) 50,000 units    L-Lysine 500 MG CAPS    levothyroxine 112 mcg tablet    Magnesium 500 MG TABS    multivitamin (THERAGRAN) TABS    mupirocin (BACTROBAN) 2 % nasal ointment    mupirocin (BACTROBAN) 2 % ointment    propranolol (INDERAL LA) 60 mg 24 hr capsule    TURMERIC PO    Vitamin Mixture (JAY-C PO)    zinc gluconate 50 mg tablet    meloxicam (MOBIC) 15 mg tablet           PHYSICAL EXAM:    Vitals:  Temp:  [96 8 °F (36 °C)-97 7 °F (36 5 °C)] 97 1 °F (36 2 °C)  HR:  [64-78] 78  Resp:  [12-20] 16  BP: ()/(56-77) 142/62  SpO2:  [96 %-100 %] 99 %  Temp (24hrs), Av 3 °F (36 3 °C), Min:96 8 °F (36 °C), Max:97 7 °F (36 5 °C)  Current: Temperature: (!) 97 1 °F (36 2 °C)  Body mass index is 29 52 kg/m²  General Appearance:    Awake, alert, cooperative, no distress, appears of stated age  Head:    Normocephalic, without obvious abnormality, atraumatic   Eyes:    Pupils equal in size,conjunctiva/corneas clear, EOM's intact  Ears:    No clinical hearing diminution   Nose:   No evidence of epistaxis/ discharge from nares  Throat:   Lips, mucosa, and tongue normal    Neck:   Supple, symmetrical, trachea midline, no JVP  Back:     Symmetric, no curvature  Lungs:     Bilateral air entry is broncho-alveolar and equal        No crepitation or rales  No pleural rub  Heart:    Regular rate and rhythm, S1S2 normal, II/VI ES murmur  Abdomen:     Soft, non-tender, no masses, no organomegaly   Genitalia:    Indwelling Norman catheter  Clear urine +, No hematuria  Musculoskeletal:   Extremities appear normal, atraumatic, no cyanosis or edema   Surgical site on the operated right hip has clear dressing / no bleeding or hemorrhage apparent  Vascular:   2+ in Posterior tibialis / dorsalis pedis  Skin:   Skin color, texture, turgor normal, no rashes or lesions   Neurologic:   Oriented/ Awake/ facial symmetry maintained  Speech is intact  Muscle bulk and strength is equivocal in B/L Upper and lower extremities except on operated right lower limb  Light sensation is intact B/l LE  B/L [place] flexion is WNL  LABS, IMAGING, & OTHER STUDIES:  Lab Results:  I have personally reviewed pertinent labs  Lab Results   Component Value Date     08/04/2017     01/15/2021    CO2 25 01/15/2021    BUN 16 01/15/2021    CREATININE 0 62 01/15/2021    CALCIUM 8 9 01/15/2021    AST 17 01/15/2021    ALT 25 01/15/2021    ALKPHOS 89 01/15/2021    PROT 6 8 08/04/2017    BILITOT 0 5 08/04/2017     Lab Results   Component Value Date    WBC 6 6 07/17/2020    WBC 6 3 12/31/2019    WBC 16 8 (H) 10/25/2019    HGB 13 7 07/17/2020    HGB 13 3 12/31/2019    HGB 13 0 10/25/2019     07/17/2020     12/31/2019     10/25/2019       Lab Results   Component Value Date    HGBA1C 5 5 03/26/2021    HGBA1C 5 2 01/15/2021    HGBA1C 5 7 (H) 07/17/2020         Imaging Studies:   I have personally reviewed pertinent imaging study reports  Imaging:     Xr Hip/pelv 1 Vw Left If Performed    Result Date: 4/21/2021  Narrative: LEFT HIP INDICATION:  Post op positioning s/p total hip replacement  COMPARISON:  None VIEWS:  XR HIP/PELV 1 VW LEFT W PELVIS IF PERFORMED FINDINGS: Right total hip arthroplasty is identified in satisfactory position without evidence of hardware complication  Please note the inferior tip of the femoral stem component is not fully visualized  There is no acute fracture or dislocation as visualized  Severe left hip osteoarthritis  Soft tissues are unremarkable  Degenerative changes visualized lower lumbar spine  Impression: Unremarkable appearance of right total hip arthroplasty as visualized  Workstation performed: [id number]     Xr Hip/pelv 1 Vw Right If Performed    Result Date: 4/21/2021  Narrative: RIGHT HIP INDICATION:  Post op positioning  COMPARISON:  Earlier today VIEWS:  XR HIP/PELV 1 VW RIGHT  W PELVIS IF PERFORMED Images: 2 FINDINGS: There is no acute fracture or dislocation  Right total hip arthroplasty is identified in satisfactory position without evidence of hardware complication  Severe left hip osteoarthritis incidentally noted  Postoperative changes are seen in the adjacent soft tissues  Opacity seen along the medial right thigh is presumably external      Impression: Unremarkable appearance of right total hip arthroplasty  Workstation performed: [id number]       EKG, Pathology, and Other Studies:   I have personally reviewed pertinent reports  Patient had electrocardiogram reported on 04/06/2021 as normal sinus rhythm with first-degree AV block  Holter monitoring done in November 2020 had shown normal sinus rhythm with average heart rate of 71 beats per minute with 7 runs of atrial tachycardia with longest lasting 10 beats  Portions of the record may have been created with voice recognition software  Occasional wrong word or "sound a like" substitutions may have occurred due to the inherent limitations of voice recognition software  Read the chart carefully and recognize, using context, where substitutions have occurred

## 2021-04-22 NOTE — PROGRESS NOTES
Orthopedic Total Hip Progress Note    ASSESSMENT & PLAN:  Status post- RIGHT total hip arthroplasty: Doing well postoperatively  Pain Relief: Patient not feeling medications is controlling  her pain  Has been out of bed and to bathroom on own  Activity: Patient may be WBAT  Weight Bearing: Weight bearing as tollerated     LOS: 0 days     SUBJECTIVE:      Systemic or Specific Complaints: No Complaints    OBJECTIVE:  Vital signs in last 24 hours:  Temp:  [96 8 °F (36 °C)-98 3 °F (36 8 °C)] 98 3 °F (36 8 °C)  HR:  [64-84] 84  Resp:  [12-20] 18  BP: ()/(56-77) 104/62  General: alert, appears stated age and cooperative   Neurovascular: Tibial nerve: Intact, Superficial peroneal nerve: Intact and Deep peroneal nerve: Intact  Dorsalis pedis pulse: 2+, Posterior tibial pulse: 2+ and Capillary refill: Normal   Wound: No Erythema   Range of Motion: Normal and As expected post THR   DVT Exam: Negative Octavio's sign  No cords or calf tenderness  No significant calf/ankle edema  Data Review  CBC:   Lab Results   Component Value Date    WBC 8 70 04/22/2021    WBC 8 2 08/04/2017    RBC 3 32 (L) 04/22/2021    RBC 4 43 08/04/2017    HGB 10 2 (L) 04/22/2021    HGB 13 3 08/04/2017    HCT 31 9 (L) 04/22/2021    HCT 40 5 08/04/2017     04/22/2021     08/04/2017       Plan: Mobilize with PT / OT  DVT prophylaxis - ASA  D/C Planning for Disposition - Plan to d/c home today if doing well  Plan to start PT at home  WBAT    ?     Kel Mckeon PA-C  Date: 4/22/2021  Time: 7:33 AM

## 2021-04-22 NOTE — PHYSICAL THERAPY NOTE
PT PROGRESS NOTE    Name: Aly Esparza  AGE: 56 y o  MRN: [id number]   LENGTH OF STAY: 0        04/22/21 1501   PT Last Visit   PT Visit Date 04/22/21   Note Type   Note Type Treatment   Pain Assessment   Pain Assessment Tool 0-10   Pain Score 7  (7/10 w/ standing; progressed to 5/10 mid amb)   Pain Location/Orientation Orientation: Right;Location: Hip   Hospital Pain Intervention(s) Cold applied;Repositioned; Ambulation/increased activity; Emotional support; Rest   Restrictions/Precautions   Weight Bearing Precautions Per Order Yes   RLE Weight Bearing Per Order WBAT   Other Precautions WBS; Fall Risk;Pain  (WBAT RLE)   General   Chart Reviewed Yes   Response to Previous Treatment Patient with no complaints from previous session  Family/Caregiver Present No   Cognition   Overall Cognitive Status WFL   Arousal/Participation Alert; Responsive   Attention Within functional limits   Orientation Level Oriented X4   Following Commands Follows all commands and directions without difficulty   Comments Cooperative and pleasant   Subjective   Subjective Pt agreeable to PT   Bed Mobility   Supine to Sit 4  Minimal assistance   Additional items Assist x 1;HOB elevated; Bedrails; Increased time required;Verbal cues;LE management  (attempted to use LLE to help RLE out of bed)   Sit to Supine 4  Minimal assistance   Additional items Assist x 1;Bedrails; Increased time required;Verbal cues;LE management  (attempted to use LLE to help RLE into bed )   Additional Comments Cues for use of LLE to help lift RLE in/out of bed however pt unable to fully lift RLE  Transfers   Sit to Stand 5  Supervision   Additional items Armrests; Increased time required;Verbal cues   Stand to Sit 5  Supervision   Additional items Armrests; Increased time required;Verbal cues   Toilet transfer 5  Supervision   Additional items Armrests; Increased time required;Verbal cues;Standard toilet  (grab bar use)   Additional Comments Cues for technique and safety Ambulation/Elevation   Gait pattern Antalgic;Decreased foot clearance;Decreased R stance; Short stride; Step to;Excessively slow  (step to pattern that progressed to reciprocal pattern)   Gait Assistance 4  Minimal assist  (minAx1 that progressed to S mid amb)   Additional items Assist x 1;Verbal cues   Assistive Device Rolling walker   Distance 10'x1; 10'x1; 280'x1; 4'x1  (seated rest breaks between trials)   Stair Management Assistance 5  Supervision   Additional items Verbal cues   Stair Management Technique Two rails; Step to pattern;Nonreciprocal   Number of Stairs 5  (2x stair )   Balance   Static Sitting Good   Dynamic Sitting Fair +   Static Standing Fair  (w/ RW)   Dynamic Standing Fair -  (w/ RW)   Ambulatory Fair -  (w/ RW)   Endurance Deficit   Endurance Deficit Yes   Endurance Deficit Description pain; fatigue; weakness   Activity Tolerance   Activity Tolerance Patient limited by pain; Patient limited by fatigue   Nurse Made Aware RN [facility]   Exercises   Quad Sets Supine;10 reps;AROM; Right   Glute Sets Supine;10 reps;AROM; Right   Hip Flexion Supine;10 reps;AAROM; Right  (SLR)   Hip Abduction Sitting;10 reps;AAROM; Right   Hip Adduction Sitting;10 reps;AAROM; Right   Knee AROM Long Arc Quad Sitting;10 reps;AROM; Right   Ankle Pumps Sitting;10 reps;AROM; Bilateral   Balance training  Pt was able to tolerate ~3 min static standing at sink to wash her hands and brush her teeth w/ minimal sway and no inc in pain    Assessment   Prognosis Good   Problem List Decreased strength;Decreased range of motion;Decreased endurance; Impaired balance;Decreased mobility;Orthopedic restrictions;Pain  (WBAT RLE)   Assessment Patient was seen today per POC  Overall, pt demonstrated improved mobility and inc tolerance to activity  BP at beginning of session in supine 95/58  Pain at rest 0/10, pain w/ standing 7/10, and pain w/ amb 5/10   Required minAx1 for bed mobility; S for transfers and stair navigation; minAx1 for amb however progressed to S mid amb  BP seated EOB 99/60  Patient was able to perform multiple trials of amb  Pt amb 10' w/ RW from bed to bathroom  Pt was able to amb 10' w/ RW from bathroom to chair  BP seated in chair 108/67  Pt was able to tolerate ~3 min of static standing balance at sink to wash hands and brush teeth w/ minimal sway and no inc in pain  Pt was able to amb 280' w/ RW w/ step to pattern however able to progressed to reciprocal gait mid amb  VC provided for use of forward eye gaze and proper RW management  Gait deviations as mentioned above, antalgic w/ dec R foot clearance but no gross LOB noted  Pt was able to tolerate stair navigation w/ B/L hand rails and step to gait pattern  Good tolerance to AROM/AAROM therapeutic exercises in seated and supine with moderate fatigue and pain  No reports of dizziness t/o session  Nsg staff most recent vital signs as follows: BP 93/54 (BP Location: Right arm)   Pulse 80   Temp 97 6 °F (36 4 °C) (Temporal)   Resp 16   Ht 5' 4" (1 626 m)   Wt 78 kg (172 lb)   SpO2 100%   BMI 29 52 kg/m²   At the end of session, pt back in bed in stable condition w/ call bell and phone in reach  Will continue to see pt per POC as tolerated  The patient's AM-PAC Basic Mobility Inpatient Short Form Raw Score is 19, Standardized Score is 42 48  A standardized score less than 42 9 suggests the patient may benefit from discharge to post-acute rehabilitation services  Please also refer to the recommendation of the Physical Therapist for safe discharge planning  From PT standpoint continued recommendation for home w/ OPPT at D/C when medically cleared based objective findings, current function and risk for falls  Nsg staff to continue to mobilized pt (OOB in chair for all meals & ambulate in room/unit) as tolerated to prevent further decline in function  Nsg staff notified      Barriers to Discharge Inaccessible home environment   Barriers to Discharge Comments stairs   Goals Patient Goals to go home   STG Expiration Date 05/02/21   PT Treatment Day 1   Plan   Treatment/Interventions Functional transfer training;LE strengthening/ROM; Elevations; Therapeutic exercise; Endurance training;Patient/family training;Bed mobility;Gait training;Spoke to nursing;OT   Progress Progressing toward goals   PT Frequency 7x/wk; Twice a day;Weekend   Recommendation   PT Discharge Recommendation Home with outpatient rehabilitation  (OPPT)   Equipment Recommended Walker  (Pt owns walker )   PT - OK to Discharge Yes  (when medically cleared )   AM-PAC Basic Mobility Inpatient   Turning in Bed Without Bedrails 4   Lying on Back to Sitting on Edge of Flat Bed 3   Moving Bed to Chair 3   Standing Up From Chair 3   Walk in Room 3   Climb 3-5 Stairs 3   Basic Mobility Inpatient Raw Score 19   Basic Mobility Standardized Score 42 48       Caroline Hassan

## 2021-04-22 NOTE — PLAN OF CARE
Problem: PHYSICAL THERAPY ADULT  Goal: Performs mobility at highest level of function for planned discharge setting  See evaluation for individualized goals  Description: Treatment/Interventions: Functional transfer training, LE strengthening/ROM, Elevations, Therapeutic exercise, Endurance training, Patient/family training, Bed mobility, Gait training, Spoke to nursing, OT  Equipment Recommended: Walker(Pt owns walker)       See flowsheet documentation for full assessment, interventions and recommendations  4/22/2021 1538 by Marcia Keenan  Outcome: Progressing  Note: Prognosis: Good  Problem List: Decreased strength, Decreased range of motion, Decreased endurance, Impaired balance, Decreased mobility, Orthopedic restrictions, Pain(WBAT RLE)  Assessment: Patient was seen today per POC  Overall, pt demonstrated improved mobility and inc tolerance to activity  BP at beginning of session in supine 95/58  Pain at rest 0/10, pain w/ standing 7/10, and pain w/ amb 5/10  Required minAx1 for bed mobility; S for transfers and stair navigation; minAx1 for amb however progressed to S mid amb  BP seated EOB 99/60  Patient was able to perform multiple trials of amb  Pt amb 10' w/ RW from bed to bathroom  Pt was able to amb 10' w/ RW from bathroom to chair  BP seated in chair 108/67  Pt was able to tolerate ~3 min of static standing balance at sink to wash hands and brush teeth w/ minimal sway and no inc in pain  Pt was able to amb 280' w/ RW w/ step to pattern however able to progressed to reciprocal gait mid amb  VC provided for use of forward eye gaze and proper RW management  Gait deviations as mentioned above, antalgic w/ dec R foot clearance but no gross LOB noted  Pt was able to tolerate stair navigation w/ B/L hand rails and step to gait pattern  Good tolerance to AROM/AAROM therapeutic exercises in seated and supine with moderate fatigue and pain  No reports of dizziness t/o session   Nsg staff most recent vital signs as follows: BP 93/54 (BP Location: Right arm)   Pulse 80   Temp 97 6 °F (36 4 °C) (Temporal)   Resp 16   Ht 5' 4" (1 626 m)   Wt 78 kg (172 lb)   SpO2 100%   BMI 29 52 kg/m²   At the end of session, pt back in bed in stable condition w/ call bell and phone in reach  Will continue to see pt per POC as tolerated  The patient's AM-PAC Basic Mobility Inpatient Short Form Raw Score is 19, Standardized Score is 42 48  A standardized score less than 42 9 suggests the patient may benefit from discharge to post-acute rehabilitation services  Please also refer to the recommendation of the Physical Therapist for safe discharge planning  From PT standpoint continued recommendation for home w/ OPPT at D/C when medically cleared based objective findings, current function and risk for falls  Nsg staff to continue to mobilized pt (OOB in chair for all meals & ambulate in room/unit) as tolerated to prevent further decline in function  Nsg staff notified  Barriers to Discharge: Inaccessible home environment  Barriers to Discharge Comments: stairs     PT Discharge Recommendation: Home with outpatient rehabilitation(OPPT)     PT - OK to Discharge: Yes(when medically cleared )    See flowsheet documentation for full assessment

## 2021-04-22 NOTE — PLAN OF CARE
Problem: Potential for Falls  Goal: Patient will remain free of falls  Description: INTERVENTIONS:  - Assess patient frequently for physical needs  -  Identify cognitive and physical deficits and behaviors that affect risk of falls    -  [place] fall precautions as indicated by assessment   - Educate patient/family on patient safety including physical limitations  - Instruct patient to call for assistance with activity based on assessment  - Modify environment to reduce risk of injury  - Consider OT/PT consult to assist with strengthening/mobility  Outcome: Progressing

## 2021-04-23 VITALS
RESPIRATION RATE: 16 BRPM | HEIGHT: 64 IN | SYSTOLIC BLOOD PRESSURE: 100 MMHG | BODY MASS INDEX: 29.24 KG/M2 | DIASTOLIC BLOOD PRESSURE: 50 MMHG | HEART RATE: 92 BPM | OXYGEN SATURATION: 98 % | TEMPERATURE: 97.2 F | WEIGHT: 171.3 LBS

## 2021-04-23 LAB
ANION GAP SERPL CALCULATED.3IONS-SCNC: 10 MMOL/L (ref 4–13)
BUN SERPL-MCNC: 9 MG/DL (ref 5–25)
CALCIUM SERPL-MCNC: 8.2 MG/DL (ref 8.3–10.1)
CHLORIDE SERPL-SCNC: 106 MMOL/L (ref 100–108)
CO2 SERPL-SCNC: 26 MMOL/L (ref 21–32)
CREAT SERPL-MCNC: 0.63 MG/DL (ref 0.6–1.3)
GFR SERPL CREATININE-BSD FRML MDRD: 96 ML/MIN/1.73SQ M
GLUCOSE SERPL-MCNC: 106 MG/DL (ref 65–140)
POTASSIUM SERPL-SCNC: 3.2 MMOL/L (ref 3.5–5.3)
SODIUM SERPL-SCNC: 142 MMOL/L (ref 136–145)

## 2021-04-23 PROCEDURE — 80048 BASIC METABOLIC PNL TOTAL CA: CPT | Performed by: PHYSICIAN ASSISTANT

## 2021-04-23 PROCEDURE — 97110 THERAPEUTIC EXERCISES: CPT

## 2021-04-23 PROCEDURE — 97116 GAIT TRAINING THERAPY: CPT

## 2021-04-23 PROCEDURE — 97530 THERAPEUTIC ACTIVITIES: CPT

## 2021-04-23 RX ORDER — ACETAMINOPHEN 325 MG/1
650 TABLET ORAL EVERY 6 HOURS PRN
Refills: 0 | Status: ON HOLD
Start: 2021-04-23 | End: 2021-09-28

## 2021-04-23 RX ORDER — POTASSIUM CHLORIDE 20 MEQ/1
20 TABLET, EXTENDED RELEASE ORAL ONCE
Status: COMPLETED | OUTPATIENT
Start: 2021-04-23 | End: 2021-04-23

## 2021-04-23 RX ADMIN — OXYCODONE HYDROCHLORIDE 10 MG: 10 TABLET ORAL at 06:30

## 2021-04-23 RX ADMIN — DOCUSATE SODIUM 100 MG: 100 CAPSULE, LIQUID FILLED ORAL at 09:11

## 2021-04-23 RX ADMIN — Medication 1000 UNITS: at 09:11

## 2021-04-23 RX ADMIN — FERROUS SULFATE TAB 325 MG (65 MG ELEMENTAL FE) 325 MG: 325 (65 FE) TAB at 12:02

## 2021-04-23 RX ADMIN — SENNOSIDES 8.6 MG: 8.6 TABLET ORAL at 09:11

## 2021-04-23 RX ADMIN — Medication 500 MG: at 12:02

## 2021-04-23 RX ADMIN — POTASSIUM CHLORIDE 20 MEQ: 1500 TABLET, EXTENDED RELEASE ORAL at 15:12

## 2021-04-23 RX ADMIN — LEVOTHYROXINE SODIUM 112 MCG: 112 TABLET ORAL at 06:30

## 2021-04-23 RX ADMIN — ASPIRIN 325 MG ORAL TABLET 325 MG: 325 PILL ORAL at 09:11

## 2021-04-23 NOTE — PLAN OF CARE
Problem: Potential for Falls  Goal: Patient will remain free of falls  Description: INTERVENTIONS:  - Assess patient frequently for physical needs  -  Identify cognitive and physical deficits and behaviors that affect risk of falls    -  [place] fall precautions as indicated by assessment   - Educate patient/family on patient safety including physical limitations  - Instruct patient to call for assistance with activity based on assessment  - Modify environment to reduce risk of injury  - Consider OT/PT consult to assist with strengthening/mobility  Outcome: Progressing     Problem: PAIN - ADULT  Goal: Verbalizes/displays adequate comfort level or baseline comfort level  Description: Interventions:  - Encourage patient to monitor pain and request assistance  - Assess pain using appropriate pain scale  - Administer analgesics based on type and severity of pain and evaluate response  - Implement non-pharmacological measures as appropriate and evaluate response  - Consider cultural and social influences on pain and pain management  - Notify physician/advanced practitioner if interventions unsuccessful or patient reports new pain  Outcome: Progressing     Problem: INFECTION - ADULT  Goal: Absence or prevention of progression during hospitalization  Description: INTERVENTIONS:  - Assess and monitor for signs and symptoms of infection  - Monitor lab/diagnostic results  - Monitor all insertion sites, i e  indwelling lines, tubes, and drains  - Monitor endotracheal if appropriate and nasal secretions for changes in amount and color  - [place] appropriate cooling/warming therapies per order  - Administer medications as ordered  - Instruct and encourage patient and family to use good hand hygiene technique  - Identify and instruct in appropriate isolation precautions for identified infection/condition  Outcome: Progressing  Goal: Absence of fever/infection during neutropenic period  Description: INTERVENTIONS:  - Monitor WBC    Outcome: Progressing     Problem: SAFETY ADULT  Goal: Patient will remain free of falls  Description: INTERVENTIONS:  - Assess patient frequently for physical needs  -  Identify cognitive and physical deficits and behaviors that affect risk of falls    -  [place] fall precautions as indicated by assessment   - Educate patient/family on patient safety including physical limitations  - Instruct patient to call for assistance with activity based on assessment  - Modify environment to reduce risk of injury  - Consider OT/PT consult to assist with strengthening/mobility  Outcome: Progressing  Goal: Maintain or return to baseline ADL function  Description: INTERVENTIONS:  -  Assess patient's ability to carry out ADLs; assess patient's baseline for ADL function and identify physical deficits which impact ability to perform ADLs (bathing, care of mouth/teeth, toileting, grooming, dressing, etc )  - Assess/evaluate cause of self-care deficits   - Assess range of motion  - Assess patient's mobility; develop plan if impaired  - Assess patient's need for assistive devices and provide as appropriate  - Encourage maximum independence but intervene and supervise when necessary  - Involve family in performance of ADLs  - Assess for home care needs following discharge   - Consider OT consult to assist with ADL evaluation and planning for discharge  - Provide patient education as appropriate  Outcome: Progressing  Goal: Maintain or return mobility status to optimal level  Description: INTERVENTIONS:  - Assess patient's baseline mobility status (ambulation, transfers, stairs, etc )    - Identify cognitive and physical deficits and behaviors that affect mobility  - Identify mobility aids required to assist with transfers and/or ambulation (gait belt, sit-to-stand, lift, walker, cane, etc )  - [place] fall precautions as indicated by assessment  - Record patient progress and toleration of activity level on Mobility SBAR; progress patient to next Phase/Stage  - Instruct patient to call for assistance with activity based on assessment  - Consider rehabilitation consult to assist with strengthening/weightbearing, etc   Outcome: Progressing     Problem: DISCHARGE PLANNING  Goal: Discharge to home or other facility with appropriate resources  Description: INTERVENTIONS:  - Identify barriers to discharge w/patient and caregiver  - Arrange for needed discharge resources and transportation as appropriate  - Identify discharge learning needs (meds, wound care, etc )  - Arrange for interpretive services to assist at discharge as needed  - Refer to Case Management Department for coordinating discharge planning if the patient needs post-hospital services based on physician/advanced practitioner order or complex needs related to functional status, cognitive ability, or social support system  Outcome: Progressing     Problem: Knowledge Deficit  Goal: Patient/family/caregiver demonstrates understanding of disease process, treatment plan, medications, and discharge instructions  Description: Complete learning assessment and assess knowledge base    Interventions:  - Provide teaching at level of understanding  - Provide teaching via preferred learning methods  Outcome: Progressing     Problem: METABOLIC, FLUID AND ELECTROLYTES - ADULT  Goal: Electrolytes maintained within normal limits  Description: INTERVENTIONS:  - Monitor labs and assess patient for signs and symptoms of electrolyte imbalances  - Administer electrolyte replacement as ordered  - Monitor response to electrolyte replacements, including repeat lab results as appropriate  - Instruct patient on fluid and nutrition as appropriate  Outcome: Progressing  Goal: Fluid balance maintained  Description: INTERVENTIONS:  - Monitor labs   - Monitor I/O and WT  - Instruct patient on fluid and nutrition as appropriate  - Assess for signs & symptoms of volume excess or deficit  Outcome: Progressing Problem: SKIN/TISSUE INTEGRITY - ADULT  Goal: Skin integrity remains intact  Description: INTERVENTIONS  - Identify patients at risk for skin breakdown  - Assess and monitor skin integrity  - Assess and monitor nutrition and hydration status  - Monitor labs (i e  albumin)  - Assess for incontinence   - Turn and reposition patient  - Assist with mobility/ambulation  - Relieve pressure over bony prominences  - Avoid friction and shearing  - Provide appropriate hygiene as needed including keeping skin clean and dry  - Evaluate need for skin moisturizer/barrier cream  - Collaborate with interdisciplinary team (i e  Nutrition, Rehabilitation, etc )   - Patient/family teaching  Outcome: Progressing  Goal: Incision(s), wounds(s) or drain site(s) healing without S/S of infection  Description: INTERVENTIONS  - Assess and document risk factors for skin impairment   - Assess and document dressing, incision, wound bed, drain sites and surrounding tissue  - Consider nutrition services referral as needed  - Oral mucous membranes remain intact  - Provide patient/ family education  Outcome: Progressing  Goal: Oral mucous membranes remain intact  Description: INTERVENTIONS  - Assess oral mucosa and hygiene practices  - Implement preventative oral hygiene regimen  - Implement oral medicated treatments as ordered  - Initiate Nutrition services referral as needed  Outcome: Progressing     Problem: MUSCULOSKELETAL - ADULT  Goal: Maintain or return mobility to safest level of function  Description: INTERVENTIONS:  - Assess patient's ability to carry out ADLs; assess patient's baseline for ADL function and identify physical deficits which impact ability to perform ADLs (bathing, care of mouth/teeth, toileting, grooming, dressing, etc )  - Assess/evaluate cause of self-care deficits   - Assess range of motion  - Assess patient's mobility  - Assess patient's need for assistive devices and provide as appropriate  - Encourage maximum independence but intervene and supervise when necessary  - Involve family in performance of ADLs  - Assess for home care needs following discharge   - Consider OT consult to assist with ADL evaluation and planning for discharge  - Provide patient education as appropriate  Outcome: Progressing  Goal: Maintain proper alignment of affected body part  Description: INTERVENTIONS:  - Support, maintain and protect limb and body alignment  - Provide patient/ family with appropriate education  Outcome: Progressing

## 2021-04-23 NOTE — DISCHARGE SUMMARY
DISCHARGE SUMMARY      Patient Name: Rox Pool  Patient MRN: [id number]  Admitting Provider: Michela Lindsay MD  Discharging Provider: No att  providers found  Primary Care Physician at Discharge: Rohan KikiJian Ford  Admission Date: 4/21/2021       Discharge Date: 4/23/2021    Admission Diagnosis  Primary osteoarthritis of right hip [M16 11]    Discharge Diagnoses  Principal Problem:    Primary osteoarthritis of right hip  Resolved Problems:    * No resolved hospital problems  [facility] AND [facility] Course  Rox Pool was [facility] on [facility], with diagnosis of osteoarthritis in her Right hip  On the day of admission, she was brought to surgery, successfully underwent a Right hip replacement  She tolerated the procedure well  Following surgery, she was taken to the recovery room, at which time, there was a consult placed for Dr Edward Castaneda for the patient's medical management  After a short stay in the recovery room, she was transferred to the orthopedic floor at which time, she was resumed on her routine home medications per the hospitalist group  On postop day #1, she was able to start some physical therapy and was able to tolerate it well  At this time, she was in stable condition, showing no sign of deep vein thrombosis or pulmonary embolism  Incision was healing well at the time and showed no signs of infection  DISCHARGE DIAGNOSIS: Primary osteoarthritis of right hip [M16 11]  She is now status post Right total hip replacement, which she underwent during the hospital stay  Medications  See after visit summary for reconciled discharge medications provided to patient and family  Allergies  Allergies   Allergen Reactions    Morphine Other (See Comments) and Headache     Other reaction(s): Other (See Comments)  severe headache  "severe headache"    Other Rash     Adhesive tape    Pain Meds,,,, caused N/V    Codeine Headache     Category:  Adverse Reaction;     Tramadol Headache     Category: Adverse Reaction;     Medical Tape Rash       Outpatient Follow-Up  Future Appointments   Date Time Provider Daya Ferreira   4/26/2021  2:00 PM Denise Tobin PT [facility]   7/27/2021  4:00 PM Mar EricDO [facility]   7/29/2021  1:30 PM Genet Amador DO [facility]         Consults   Medical Management - Dr Abbey Montes  Lab Results   Component Value Date    HCT 31 9 (L) 04/22/2021     Lab Results   Component Value Date    CALCIUM 8 2 (L) 04/23/2021     08/04/2017    K 3 2 (L) 04/23/2021    CO2 26 04/23/2021     04/23/2021    BUN 9 04/23/2021    CREATININE 0 63 04/23/2021        Discharge Disposition  Home    Operative Procedures Performed  Procedure(s):  ARTHROPLASTY HIP TOTAL ANTERIOR    Discharge Instructions  ASA x 6 weeks for DVT prophylaxis   WBAT  Follow up x 3 weeks in the office  Suture ends trimmed POD #10 to skin level  ?     Catracho Mir PA-C  6:07 PM, 4/23/2021

## 2021-04-23 NOTE — PHYSICAL THERAPY NOTE
PT PROGRESS NOTE    Name: Laura Christianson  AGE: 56 y o  MRN: [id number]   LENGTH OF STAY: 0        04/23/21 1053   PT Last Visit   PT Visit Date 04/23/21   Note Type   Note Type Treatment   Pain Assessment   Pain Assessment Tool 0-10   Pain Score 8  (0/10 at rest; 8/10 EOB; 7/10 mid amb)   Hospital Pain Intervention(s) Cold applied;Repositioned; Ambulation/increased activity; Emotional support; Rest   Restrictions/Precautions   Weight Bearing Precautions Per Order Yes   RLE Weight Bearing Per Order WBAT   Other Precautions WBS; Fall Risk;Pain  (WBAT RLE)   General   Chart Reviewed Yes   Response to Previous Treatment Other (Comment)  (Pt reported inc soreness but able to participate)   Family/Caregiver Present No   Cognition   Overall Cognitive Status WFL   Arousal/Participation Alert   Attention Within functional limits   Orientation Level Oriented X4   Following Commands Follows all commands and directions without difficulty   Comments Cooperative and pleasant   Subjective   Subjective Pt agreeable to PT   Bed Mobility   Supine to Sit 5  Supervision   Additional items HOB elevated; Bedrails; Increased time required;Verbal cues  (Use of blanket for RLE management)   Sit to Supine Unable to assess   Additional Comments Cues for use of blanket for RLE management   Transfers   Sit to Stand 5  Supervision   Additional items Increased time required;Verbal cues   Stand to Sit 5  Supervision   Additional items Armrests; Increased time required;Verbal cues   Additional Comments Cues for technique and safety   Ambulation/Elevation   Gait pattern Antalgic;Decreased foot clearance;Decreased R stance; Short stride; Excessively slow  (reciprocal gait pattern)   Gait Assistance 5  Supervision   Additional items Verbal cues   Assistive Device Rolling walker   Distance 500'x1   Stair Management Assistance 4  Minimal assist   Additional items Assist x 1;Verbal cues; Tactile cues; Increased time required   Stair Management Technique One rail R;Step to pattern;Nonreciprocal;With cane   Number of Stairs 5  (4x stair )   Balance   Static Sitting Good   Dynamic Sitting Fair +   Static Standing Fair +  (w/ RW)   Dynamic Standing Fair  (w/ RW)   Ambulatory Fair  (w/ RW)   Endurance Deficit   Endurance Deficit Yes   Endurance Deficit Description pain; fatigue   Activity Tolerance   Activity Tolerance Patient limited by fatigue;Patient limited by pain   Nurse Made Aware RN Renpadmaja   Exercises   Quad Sets Supine;10 reps;AROM; Right   Glute Sets Supine;10 reps;AROM; Right   Ankle Pumps Supine;10 reps;AROM; Right   Assessment   Prognosis Good   Problem List Decreased strength;Decreased range of motion;Decreased endurance; Impaired balance;Decreased mobility;Orthopedic restrictions;Pain  (WBAT RLE)   Assessment Patient was seen today per POC  Overall, pt demonstrated improved mobility and inc tolerance to activity  Pain at rest 0/10, pain at EOB 8/10, pain w/ amb 7/10  Required S for bed mobility, transfers, and amb; minAx1 for stair navigation  Pt educated on use of sheet for RLE management in/out of bed  BP supine in bed 98/63  Mild reports of dizziness when seated EOB; BP 77/54  Pt performed ankle pumps and LAQ EOB  BP inc to 104/64 w/ no reports of dizziness  BP in standing 83/62 however asymptomatic  Patient was able to amb 500' w/ RW and reciprocal gait pattern and a chair follow for safety 2* vitals  Gait deviations as mentioned above, dec R foot stance but no gross LOB noted  Good tolerance to AROM therapeutic exercises in supine with mild fatigue  Pt educated on all exercises and provided a handout  Pt states she received the same exercises from her pre operative PT evaluation w/ no further questions regarding exercises at this time  Pt was able to perform stair navigation w/ minAx1 and use of SPC on L  Pt was able to ascend the stairs w/o hand rails w/ SPC and step to pattern  Pt required R hand rail and SPC to descend stairs w/ step to pattern   BP at the end of session seated in chair 91/62  Nsg staff most recent vital signs as follows: /56   Pulse 95   Temp 97 8 °F (36 6 °C) (Temporal)   Resp 18   Ht 5' 4" (1 626 m)   Wt 77 7 kg (171 lb 4 8 oz)   SpO2 94%   BMI 29 40 kg/m²   At end of session, pt OOB in chair in stable condition w/ call bell and phone in reach  Will continue to see pt per POC as tolerated  The patient's AM-PAC Basic Mobility Inpatient Short Form Raw Score is 19, Standardized Score is 42 48  A standardized score less than 42 9 suggests the patient may benefit from discharge to post-acute rehabilitation services  Please also refer to the recommendation of the Physical Therapist for safe discharge planning  Despite AM-PAC score, from PT standpoint continued recommendation for home w/ OPPT at D/C when medically cleared based objective findings and current function  All PT questions answered at this time  Nsg staff to continue to mobilized pt (OOB in chair for all meals & ambulate in room/unit) as tolerated to prevent further decline in function  Nsg staff notified  Goals   Patient Goals to go home today   STG Expiration Date 05/02/21   PT Treatment Day 2   Plan   Treatment/Interventions Functional transfer training;LE strengthening/ROM; Elevations; Therapeutic exercise; Endurance training;Patient/family training;Bed mobility;Gait training;Spoke to nursing;OT   Progress Progressing toward goals   PT Frequency 7x/wk; Twice a day;Weekend   Recommendation   PT Discharge Recommendation Home with outpatient rehabilitation  (OPPT)   Equipment Recommended Walker  (Pt owns walker)   PT - OK to Discharge Yes  (when medically cleared)   AM-PAC Basic Mobility Inpatient   Turning in Bed Without Bedrails 4   Lying on Back to Sitting on Edge of Flat Bed 3   Moving Bed to Chair 3   Standing Up From Chair 3   Walk in Room 3   Climb 3-5 Stairs 3   Basic Mobility Inpatient Raw Score 19   Basic Mobility Standardized Score 42 48       Caroline Hassan

## 2021-04-23 NOTE — PLAN OF CARE
Problem: PHYSICAL THERAPY ADULT  Goal: Performs mobility at highest level of function for planned discharge setting  See evaluation for individualized goals  Description: Treatment/Interventions: Functional transfer training, LE strengthening/ROM, Elevations, Therapeutic exercise, Endurance training, Patient/family training, Bed mobility, Gait training, Spoke to nursing, OT  Equipment Recommended: Walker(Pt owns walker)       See flowsheet documentation for full assessment, interventions and recommendations  Outcome: Progressing  Note: Prognosis: Good  Problem List: Decreased strength, Decreased range of motion, Decreased endurance, Impaired balance, Decreased mobility, Orthopedic restrictions, Pain(WBAT RLE)  Assessment: Patient was seen today per POC  Overall, pt demonstrated improved mobility and inc tolerance to activity  Pain at rest 0/10, pain at EOB 8/10, pain w/ amb 7/10  Required S for bed mobility, transfers, and amb; minAx1 for stair navigation  Pt educated on use of sheet for RLE management in/out of bed  BP supine in bed 98/63  Mild reports of dizziness when seated EOB; BP 77/54  Pt performed ankle pumps and LAQ EOB  BP inc to 104/64 w/ no reports of dizziness  BP in standing 83/62 however asymptomatic  Patient was able to amb 500' w/ RW and reciprocal gait pattern and a chair follow for safety 2* vitals  Gait deviations as mentioned above, dec R foot stance but no gross LOB noted  Good tolerance to AROM therapeutic exercises in supine with mild fatigue  Pt educated on all exercises and provided a handout  Pt states she received the same exercises from her pre operative PT evaluation w/ no further questions regarding exercises at this time  Pt was able to perform stair navigation w/ minAx1 and use of SPC on L  Pt was able to ascend the stairs w/o hand rails w/ SPC and step to pattern  Pt required R hand rail and SPC to descend stairs w/ step to pattern   BP at the end of session seated in chair 91/62  Nsg staff most recent vital signs as follows: /56   Pulse 95   Temp 97 8 °F (36 6 °C) (Temporal)   Resp 18   Ht 5' 4" (1 626 m)   Wt 77 7 kg (171 lb 4 8 oz)   SpO2 94%   BMI 29 40 kg/m²   At end of session, pt OOB in chair in stable condition w/ call bell and phone in reach  Will continue to see pt per POC as tolerated  The patient's AM-PAC Basic Mobility Inpatient Short Form Raw Score is 19, Standardized Score is 42 48  A standardized score less than 42 9 suggests the patient may benefit from discharge to post-acute rehabilitation services  Please also refer to the recommendation of the Physical Therapist for safe discharge planning  Despite AM-PAC score, from PT standpoint continued recommendation for home w/ OPPT at D/C when medically cleared based objective findings and current function  All PT questions answered at this time  Nsg staff to continue to mobilized pt (OOB in chair for all meals & ambulate in room/unit) as tolerated to prevent further decline in function  Nsg staff notified  Barriers to Discharge: Inaccessible home environment  Barriers to Discharge Comments: stairs     PT Discharge Recommendation: Home with outpatient rehabilitation(OPPT)     PT - OK to Discharge: Yes(when medically cleared)    See flowsheet documentation for full assessment

## 2021-04-23 NOTE — PROGRESS NOTES
Orthopedic Total Hip Progress Note    ASSESSMENT & PLAN:  Status post- RIGHT total hip arthroplasty: Doing well postoperatively  Pain Relief: Patient feeling medications is controlling  her pain better  Has been out of bed and to bathroom on own  Activity: Patient may be WBAT  Weight Bearing: Weight bearing as tollerated     LOS: 0 days     SUBJECTIVE:      Systemic or Specific Complaints: No Complaints    OBJECTIVE:  Vital signs in last 24 hours:  Temp:  [97 6 °F (36 4 °C)-101 1 °F (38 4 °C)] 98 8 °F (37 1 °C)  HR:  [80-99] 82  Resp:  [16-20] 20  BP: ()/(54-65) 105/56  General: alert, appears stated age and cooperative   Neurovascular: Tibial nerve: Intact, Superficial peroneal nerve: Intact and Deep peroneal nerve: Intact  Dorsalis pedis pulse: 2+, Posterior tibial pulse: 2+ and Capillary refill: Normal   Wound: No Erythema   Range of Motion: Normal and As expected post THR   DVT Exam: Negative Octavio's sign  No cords or calf tenderness  No significant calf/ankle edema  Data Review  CBC:   Lab Results   Component Value Date    WBC 8 70 04/22/2021    WBC 8 2 08/04/2017    RBC 3 32 (L) 04/22/2021    RBC 4 43 08/04/2017    HGB 10 2 (L) 04/22/2021    HGB 13 3 08/04/2017    HCT 31 9 (L) 04/22/2021    HCT 40 5 08/04/2017     04/22/2021     08/04/2017       Plan: Mobilize with PT / OT  DVT prophylaxis - ASA  D/C Planning for Disposition - Plan to d/c home today if doing well  Plan to start PT at home  WBAT    ?     Gaylan Shone, PA-C  Date: 4/23/2021  Time: 7:50 AM

## 2021-04-26 ENCOUNTER — OFFICE VISIT (OUTPATIENT)
Dept: PHYSICAL THERAPY | Facility: REHABILITATION | Age: 61
End: 2021-04-26
Payer: COMMERCIAL

## 2021-04-26 DIAGNOSIS — Z96.641 STATUS POST TOTAL HIP REPLACEMENT, RIGHT: ICD-10-CM

## 2021-04-26 DIAGNOSIS — M25.551 RIGHT HIP PAIN: Primary | ICD-10-CM

## 2021-04-26 PROCEDURE — 97112 NEUROMUSCULAR REEDUCATION: CPT | Performed by: PHYSICAL THERAPIST

## 2021-04-26 PROCEDURE — 97110 THERAPEUTIC EXERCISES: CPT | Performed by: PHYSICAL THERAPIST

## 2021-04-26 PROCEDURE — 97164 PT RE-EVAL EST PLAN CARE: CPT | Performed by: PHYSICAL THERAPIST

## 2021-04-26 NOTE — PROGRESS NOTES
PT Re-Evaluation     Today's date: 2021  Patient name: Mila Alonzo  : 1960  MRN: [id number]  Referring provider: Spencer Mccoy MD  Dx:   Encounter Diagnosis     ICD-10-CM    1  Right hip pain  M25 551    2  Status post total hip replacement, right  [id number]        Start Time: 1408  Stop Time: 1500  Total time in clinic (min): 52 minutes    Assessment  Assessment details: Patient is a 56 y o  female that presents post-op R JEREMY on 2021  Patient presents with decreased strength, decreased ROM, and gait abnormaitities  Patient has difficulty with ambulation, transfers, negotiation of stairs, recreational activities, and house/yard work secondary to impairments  Patient would benefit from skilled physical therapy services to address impairments to maximize function  Impairments: abnormal gait, abnormal muscle firing, abnormal or restricted ROM, activity intolerance, impaired balance, impaired physical strength, lacks appropriate home exercise program, pain with function and weight-bearing intolerance  Understanding of Dx/Px/POC: good   Prognosis: good    Goals  Impairment:  1  Patient will reports 50% reduction in pain to maximize function  2   Patient will improve strength to 4/5 in all planes to maximize function  3   Patient will improve ROM to [facility] to maximize function  Functional:  1  Patient will improve FOTO by 33 points to 66/100 at discharge to maximize function  2  Patient will be independent with HEP in 4 weeks to maximize function  3  Patient will report no difficulty with ambulation at discharge to maximize function  4  Patient will report no difficulty with negotiation of stairs at discharge to maximize function  5  Patient will report no difficulty with transfers to maximize function  Plan  Plan details: Patient will be a RE in 4 weeks        Patient would benefit from: skilled physical therapy  Planned modality interventions: cryotherapy  Planned therapy interventions: abdominal trunk stabilization, activity modification, balance, patient education, neuromuscular re-education, manual therapy, therapeutic activities, stretching, strengthening, therapeutic exercise, gait training, functional ROM exercises and home exercise program  Frequency: 2x week  Duration in visits: 9  Duration in weeks: 4  Treatment plan discussed with: patient        Subjective Evaluation    History of Present Illness  Date of surgery: 2021  Mechanism of injury: Patient presents post-op R JEREMY with anterior approach on 2021  Patient has had right hip pain for about 5 years  She also has a history of left hip pain (3-4 years) and bilateral knee pain (14 years)  She plans to have both knees and her left hip replaced within the next year  Patient reports difficulty with ambulation, transfers, negotiation of stairs, ADLS, and housework  Pain  At best pain ratin  At worst pain ratin  Location: R hip  Quality: sharp  Relieving factors: ice  Aggravating factors: walking, standing and stair climbing  Progression: improved    Social Support  Steps to enter house: yes  Stairs in house: no     Treatments  Current treatment: physical therapy  Patient Goals  Patient goals for therapy: decreased pain, increased motion, increased strength and independence with ADLs/IADLs          Objective     Tenderness     Right Hip   No tenderness in the greater trochanter  Neurological Testing     Sensation     Lumbar   Left   Intact: light touch    Right   Intact: light touch    Passive Range of Motion     Right Hip   Flexion: WFL  Extension: -10 degrees with pain  External rotation (90/90): [facility]  Internal rotation (90/90):  WFL    Additional Passive Range of Motion Details  Hamstring length: WFL R    Strength/Myotome Testing     Left Hip   Planes of Motion   Flexion: 4  External rotation: 4  Internal rotation: 4    Right Hip   Planes of Motion   Flexion: 2+  External rotation: 4-  Internal rotation: 4    Left Knee   Flexion: 4+  Extension: 4+    Right Knee   Flexion: 4  Extension: 4    Ambulation   Weight-Bearing Status   Weight-Bearing Status (Right): full weight-bearing    Assistive device used: front-wheeled walker    Observational Gait   Decreased walking speed and stride length                Precautions: hx cancer, L foot surgery, zhane knee and hip pain      Manuals 3/9 4/26           R hip PROM  5 min           RE  MC                                     Neuro Re-Ed             Quad set issued 5" 10x           Glut set issued 5" 10x           Standing march issued                                                                Ther Ex             Recumbent bike             SLR             SAQ issued 5" 10x           Heel slide flexion issued 3" 10x                                                               Ther Activity                                       Gait Training                                       Modalities             Ice lateral/anterior thigh  10 min

## 2021-04-27 ENCOUNTER — OFFICE VISIT (OUTPATIENT)
Dept: PHYSICAL THERAPY | Facility: REHABILITATION | Age: 61
End: 2021-04-27
Payer: COMMERCIAL

## 2021-04-27 DIAGNOSIS — M25.551 RIGHT HIP PAIN: Primary | ICD-10-CM

## 2021-04-27 DIAGNOSIS — Z96.641 STATUS POST TOTAL HIP REPLACEMENT, RIGHT: ICD-10-CM

## 2021-04-27 PROCEDURE — 97110 THERAPEUTIC EXERCISES: CPT | Performed by: PHYSICAL THERAPIST

## 2021-04-27 PROCEDURE — 97112 NEUROMUSCULAR REEDUCATION: CPT | Performed by: PHYSICAL THERAPIST

## 2021-04-27 NOTE — PROGRESS NOTES
Daily Note     Today's date: 2021  Patient name: Vishal Guardado  : 1960  MRN: [id number]  Referring provider: Faith Montana MD  Dx:   Encounter Diagnosis     ICD-10-CM    1  Right hip pain  M25 551    2  Status post total hip replacement, right  [id number] [id number]        Start Time: 174  Stop Time: 183  Total time in clinic (min): 50 minutes    Subjective: Patient presents for first visit post IE  Patient reports soreness and fatigue following evaluation yesterday  She was surprised at how tired she was from therapy and her activity that day  Objective: See treatment diary below      Assessment: Tolerated treatment well  Patient demonstrated fatigue post treatment, exhibited good technique with therapeutic exercises and would benefit from continued PT  Patient has improved tolerance for exercise this visit compared to yesterday  She is challenged by hip flexion AROM on right LE limiting ability to complete transfers  Continue to progress as able  Plan: Progress treatment as tolerated         Precautions: hx cancer, L foot surgery, zhane knee and hip pain      Manuals 3/9 4/26 4/27          R hip PROM  5 min 5 min          RE                                       Neuro Re-Ed             Quad set issued 5" 10x 5" 15x          Glut set issued 5" 10x 5" 15x          Standing march issued  10x zhane                                                              Ther Ex             Recumbent bike   5 min          SLR             SAQ issued 5" 10x 5" 15x          Heel slide flexion issued 3" 10x 3" 15x          LLLD stretch into hip extension table   5 min                                                 Ther Activity                                       Gait Training                                       Modalities             Ice lateral/anterior thigh  10 min 10 min

## 2021-05-04 ENCOUNTER — OFFICE VISIT (OUTPATIENT)
Dept: PHYSICAL THERAPY | Facility: REHABILITATION | Age: 61
End: 2021-05-04
Payer: COMMERCIAL

## 2021-05-04 DIAGNOSIS — M25.551 RIGHT HIP PAIN: Primary | ICD-10-CM

## 2021-05-04 DIAGNOSIS — Z96.641 STATUS POST TOTAL HIP REPLACEMENT, RIGHT: ICD-10-CM

## 2021-05-04 PROCEDURE — 97112 NEUROMUSCULAR REEDUCATION: CPT

## 2021-05-04 PROCEDURE — 97110 THERAPEUTIC EXERCISES: CPT

## 2021-05-04 NOTE — PROGRESS NOTES
Daily Note     Today's date: 2021  Patient name: Becca Billings  : 1960  MRN: [id number]  Referring provider: Nick Franco MD  Dx:   Encounter Diagnosis     ICD-10-CM    1  Right hip pain  M25 551    2  Status post total hip replacement, right  [id number] [id number]        Start Time: 1500  Stop Time: 1600  Total time in clinic (min): 60 minutes    Subjective: Patient reports sleeping is difficult, notes difficulty getting comfortable and has to get up every hour or so to use the bathroom  Pt notes fairly consistent soreness, but is utilizing ice frequently throughout the day and has been compliant with HEP  Objective: See treatment diary below  Precautions: hx cancer, L foot surgery, zhane knee and hip pain      Manuals 3/9 4/26 4/27 5/4         R hip PROM  5 min 5 min 5 min         RE  MC                                     Neuro Re-Ed             Quad set issued 5" 10x 5" 15x 5"x15         Glut set issued 5" 10x 5" 15x 5"x20         Standing march issued  10x zhane x10 ea                                                             Ther Ex             Recumbent bike   5 min 6 min         SLR    5"x10 min A         SAQ issued 5" 10x 5" 15x 5"x20         Heel slide flexion issued 3" 10x 3" 15x 3"x15         LLLD stretch into hip extension table   5 min 5 min                                                Ther Activity                                       Gait Training                                       Modalities             Ice lateral/anterior thigh  10 min 10 min 10 min                            Assessment: Tolerated treatment well  Mild hip compensation with quad sets, but is able to correct with cues and demonstrates good quad control with SAQ  Initiated SLR with min A from clinician  Pt is anxious to progress every exercise each visit, discussed progressing within tolerance and not pushing into pain or severe fatigue  Pt is pain free with hip PROM  Concluded with CP at end of session    Patient demonstrated fatigue post treatment, exhibited good technique with therapeutic exercises and would benefit from continued PT to improve R hip ROM, R LE strength, and maximize overall level of function  Plan: Progress treatment as tolerated

## 2021-05-06 ENCOUNTER — OFFICE VISIT (OUTPATIENT)
Dept: PHYSICAL THERAPY | Facility: REHABILITATION | Age: 61
End: 2021-05-06
Payer: COMMERCIAL

## 2021-05-06 DIAGNOSIS — M25.551 RIGHT HIP PAIN: Primary | ICD-10-CM

## 2021-05-06 DIAGNOSIS — Z96.641 STATUS POST TOTAL HIP REPLACEMENT, RIGHT: ICD-10-CM

## 2021-05-06 PROCEDURE — 97112 NEUROMUSCULAR REEDUCATION: CPT

## 2021-05-06 PROCEDURE — 97110 THERAPEUTIC EXERCISES: CPT

## 2021-05-06 NOTE — PROGRESS NOTES
Daily Note     Today's date: 2021  Patient name: Ray Burk  : 1960  MRN: [id number]  Referring provider: Tamra Nettles MD  Dx:   Encounter Diagnosis     ICD-10-CM    1  Right hip pain  M25 551    2  Status post total hip replacement, right  [id number] [id number]        Start Time: 1700  Stop Time: 1755  Total time in clinic (min): 55 minutes    Subjective: Patient reports having severe soreness after last treatment with progressions and going to the grocery store immediately after  Pt states "I think I did too much "   Pt reports soreness continues to be present arriving to PT this visit  Objective: See treatment diary below  Precautions: hx cancer, L foot surgery, zhane knee and hip pain      Manuals 3/9 4/26 4/27 5/4 5/6        R hip PROM  5 min 5 min 5 min 5 min        RE  MC                                     Neuro Re-Ed             Quad set issued 5" 10x 5" 15x 5"x15 5"x15        Glut set issued 5" 10x 5" 15x 5"x20 5"x20        Standing march issued  10x zhane x10 ea x10 ea                                                            Ther Ex             Recumbent bike   5 min 6 min 6 min        SLR    5"x10 min A nv        SAQ issued 5" 10x 5" 15x 5"x20 5"x15        Heel slide flexion issued 3" 10x 3" 15x 3"x15 3"x15        LLLD stretch into hip extension table   5 min 5 min nv                                               Ther Activity                                       Gait Training                                       Modalities             Ice lateral/anterior thigh  10 min 10 min 10 min 10 min                           Assessment: Tolerated treatment well  TE performed as noted to patient tolerance this visit  Pt notes having more L hip pain with bike w/u, but no R hip pain  Held SLR due to soreness last session  Hip PROM is pain free in all directions  Good relief with CP at end of session    Patient demonstrated fatigue post treatment, exhibited good technique with therapeutic exercises and would benefit from continued PT to improve R hip ROM, R LE strength, and maximize overall level of function  Plan: Progress treatment as tolerated

## 2021-05-10 ENCOUNTER — APPOINTMENT (OUTPATIENT)
Dept: PHYSICAL THERAPY | Facility: REHABILITATION | Age: 61
End: 2021-05-10
Payer: COMMERCIAL

## 2021-05-11 ENCOUNTER — OFFICE VISIT (OUTPATIENT)
Dept: PHYSICAL THERAPY | Facility: REHABILITATION | Age: 61
End: 2021-05-11
Payer: COMMERCIAL

## 2021-05-11 DIAGNOSIS — M25.551 RIGHT HIP PAIN: Primary | ICD-10-CM

## 2021-05-11 DIAGNOSIS — Z96.641 STATUS POST TOTAL HIP REPLACEMENT, RIGHT: ICD-10-CM

## 2021-05-11 PROCEDURE — 97112 NEUROMUSCULAR REEDUCATION: CPT

## 2021-05-11 PROCEDURE — 97110 THERAPEUTIC EXERCISES: CPT

## 2021-05-11 NOTE — PROGRESS NOTES
Daily Note     Today's date: 2021  Patient name: Mila Alonzo  : 1960  MRN: [id number]  Referring provider: Spencer Mccoy MD  Dx:   Encounter Diagnosis     ICD-10-CM    1  Right hip pain  M25 551    2  Status post total hip replacement, right  Z96 [id number]        Start Time: 1530  Stop Time: 1625  Total time in clinic (min): 55 minutes    Subjective: Patient reports better tolerance to last treatment session with less soreness, but notes being very frustrated with how painful and sore the L hip and knee have been  Pt reports R knee has been doing well and has no c/o pain arriving to PT this visit  Pt reports she has a f/u with surgeon on Thursday, notes she will be getting an injection in L hip  Objective: See treatment diary below  Precautions: hx cancer, L foot surgery, zhane knee and hip pain      Manuals 3/9 4/26 4/27 5/4 5/6 5/11       R hip PROM  5 min 5 min 5 min 5 min 5 min       RE  MC                                     Neuro Re-Ed             Quad set issued 5" 10x 5" 15x 5"x15 5"x15 5"x20       Glut set issued 5" 10x 5" 15x 5"x20 5"x20 5"x20       Standing march issued  10x zhane x10 ea x10 ea x12 ea                                                           Ther Ex             Recumbent bike   5 min 6 min 6 min 7 min       SLR    5"x10 min A nv 5"x5 min A       SAQ issued 5" 10x 5" 15x 5"x20 5"x15 5"x20       Heel slide flexion issued 3" 10x 3" 15x 3"x15 3"x15 3"x15       LLLD stretch into hip extension table   5 min 5 min nv 5 min                                              Ther Activity                                       Gait Training                                       Modalities             Ice lateral/anterior thigh  10 min 10 min 10 min 10 min 10 min                          Assessment: Tolerated treatment well  Pt has discomfort at times on bike due to L hip, but no increase in pain with R  Good tolerance to PROM without pain   Pt is most challenged by hip flexor activation, trialed addition of SLR back into program with min A and limited repetitions to assess tolerance  Good quad control isometrically and with SLR  Patient demonstrated fatigue post treatment, exhibited good technique with therapeutic exercises and would benefit from continued PT to improve R hip ROM, R LE strength, and maximize overall level of function  Plan: Progress treatment as tolerated

## 2021-05-17 ENCOUNTER — OFFICE VISIT (OUTPATIENT)
Dept: PHYSICAL THERAPY | Facility: REHABILITATION | Age: 61
End: 2021-05-17
Payer: COMMERCIAL

## 2021-05-17 ENCOUNTER — HOSPITAL ENCOUNTER (OUTPATIENT)
Dept: MAMMOGRAPHY | Facility: MEDICAL CENTER | Age: 61
Discharge: HOME | End: 2021-05-17
Payer: COMMERCIAL

## 2021-05-17 VITALS — HEIGHT: 64 IN | BODY MASS INDEX: 29.19 KG/M2 | WEIGHT: 171 LBS

## 2021-05-17 DIAGNOSIS — M25.551 RIGHT HIP PAIN: Primary | ICD-10-CM

## 2021-05-17 DIAGNOSIS — Z96.641 STATUS POST TOTAL HIP REPLACEMENT, RIGHT: ICD-10-CM

## 2021-05-17 DIAGNOSIS — Z12.31 ENCOUNTER FOR SCREENING MAMMOGRAM FOR BREAST CANCER: ICD-10-CM

## 2021-05-17 PROCEDURE — 77063 BREAST TOMOSYNTHESIS BI: CPT

## 2021-05-17 PROCEDURE — 77067 SCR MAMMO BI INCL CAD: CPT

## 2021-05-17 PROCEDURE — 97110 THERAPEUTIC EXERCISES: CPT

## 2021-05-17 PROCEDURE — 97112 NEUROMUSCULAR REEDUCATION: CPT

## 2021-05-17 NOTE — PROGRESS NOTES
Daily Note     Today's date: 2021  Patient name: Varun Graff  : 1960  MRN: [id number]  Referring provider: Terese Goodman MD  Dx:   Encounter Diagnosis     ICD-10-CM    1  Right hip pain  M25 551    2  Status post total hip replacement, right  [id number]        Start Time: 1530   Stop Time: 1625  Total time in clinic (min): 55 minutes    Subjective: Patient denies soreness after last treatment  Pt reports having f/u with surgeon since last visit, notes she was cleared to continue with R hip strengthening and was given L hip injection  Pt notes L hip injection has provided minimal relief and is scheduled for L JEREMY in October  Pt reports ambulating primarily without SPC and is negotiating stairs reciprocally  Objective: See treatment diary below  Precautions: hx cancer, L foot surgery, zhane knee and hip pain      Manuals 3/9 4/26 4/27 5/4 5/6 5/11 5/17      R hip PROM  5 min 5 min 5 min 5 min 5 min 5 min      RE  MC                                     Neuro Re-Ed             Quad set issued 5" 10x 5" 15x 5"x15 5"x15 5"x20 5"x20      Glut set issued 5" 10x 5" 15x 5"x20 5"x20 5"x20 np      Standing march issued  10x zhane x10 ea x10 ea x12 ea x15 ea                                                          Ther Ex             Recumbent bike   5 min 6 min 6 min 7 min 10 min      SLR    5"x10 min A nv 5"x5 min A 5" 2x5      SAQ issued 5" 10x 5" 15x 5"x20 5"x15 5"x20 5"x20      Heel slide flexion issued 3" 10x 3" 15x 3"x15 3"x15 3"x15 3"x20      LLLD stretch into hip extension table   5 min 5 min nv 5 min 5 min      Mini Squats       x10                                Ther Activity                                       Gait Training                                       Modalities             Ice lateral/anterior thigh  10 min 10 min 10 min 10 min 10 min 10 min                         Assessment: Tolerated treatment well   Pt ambulates without SPC with mild gait deviation, but is safe with both ambulation and transfers  Initiated mini squats in which patient demonstrates good technique with minimal cueing  Good quad strength with SLR and good tolerance to progressions without increase in sx's  Overall patient demonstrates improved tolerance to exercise and progressions, assess response to treatment NV  Patient demonstrated fatigue post treatment, exhibited good technique with therapeutic exercises and would benefit from continued PT to improve R hip ROM, R LE strength, and maximize overall level of function  Plan: Progress treatment as tolerated

## 2021-05-20 ENCOUNTER — APPOINTMENT (OUTPATIENT)
Dept: PHYSICAL THERAPY | Facility: REHABILITATION | Age: 61
End: 2021-05-20
Payer: COMMERCIAL

## 2021-05-24 ENCOUNTER — EVALUATION (OUTPATIENT)
Dept: PHYSICAL THERAPY | Facility: REHABILITATION | Age: 61
End: 2021-05-24
Payer: COMMERCIAL

## 2021-05-24 DIAGNOSIS — M25.551 RIGHT HIP PAIN: Primary | ICD-10-CM

## 2021-05-24 DIAGNOSIS — Z96.641 STATUS POST TOTAL HIP REPLACEMENT, RIGHT: ICD-10-CM

## 2021-05-24 PROCEDURE — 97164 PT RE-EVAL EST PLAN CARE: CPT | Performed by: PHYSICAL THERAPIST

## 2021-05-24 PROCEDURE — 97110 THERAPEUTIC EXERCISES: CPT | Performed by: PHYSICAL THERAPIST

## 2021-05-24 PROCEDURE — 97110 THERAPEUTIC EXERCISES: CPT

## 2021-05-24 PROCEDURE — 97140 MANUAL THERAPY 1/> REGIONS: CPT | Performed by: PHYSICAL THERAPIST

## 2021-05-24 NOTE — PROGRESS NOTES
PT Re-Evaluation     Today's date: 2021  Patient name: Jordy Salcedo  : 1960  MRN: [id number]  Referring provider: Ricardo Garsia MD  Dx:   Encounter Diagnosis     ICD-10-CM    1  Right hip pain  M25 551    2  Status post total hip replacement, right  [id number] [id number]        Start Time: 1530  Stop Time: 1630  Total time in clinic (min): 60 minutes    Assessment  Assessment details: Patient is a 56 y o  female that presents post-op R JEREMY on 2021  Patient reports 50% improvement with skilled physical therapy services  Patient reports improvement with transfers, pain, strength, ambulation, and negotiation of stairs  Patient reports continued difficulty with strength and transfers in/out of bed  Patient has made good progress towards goals established for physical therapy  Patient would benefit from continued skilled physical therapy services for continued strengthening to maximize function  Impairments: abnormal gait, activity intolerance and impaired physical strength  Understanding of Dx/Px/POC: good   Prognosis: good    Goals  Impairment:  1  Patient will reports 50% reduction in pain to maximize function  -MET  2  Patient will improve strength to 4/5 in all planes to maximize function  -PARTIALLY MET  3  Patient will improve ROM to [facility] to maximize function  -MET    Functional:  1  Patient will improve FOTO by 33 points to 66/100 at discharge to maximize function  -NOT ASSESSED  2  Patient will be independent with HEP in 4 weeks to maximize function  -MET  3  Patient will report no difficulty with ambulation at discharge to maximize function  -MET  4  Patient will report no difficulty with negotiation of stairs at discharge to maximize function  -MET  5  Patient will report no difficulty with transfers to maximize function  -PARTIALLY MET        Plan  Plan details: Patient will be a RE in 4 weeks        Patient would benefit from: skilled physical therapy  Planned modality interventions: cryotherapy  Planned therapy interventions: abdominal trunk stabilization, activity modification, balance, patient education, neuromuscular re-education, manual therapy, therapeutic activities, stretching, strengthening, therapeutic exercise, gait training, functional ROM exercises and home exercise program  Frequency: 1x week  Duration in visits: 4  Duration in weeks: 4  Treatment plan discussed with: patient        Subjective Evaluation    History of Present Illness  Date of surgery: 2021  Mechanism of injury: Patient presents post-op R JEREMY with anterior approach on 2021  Patient has had right hip pain for about 5 years  She also has a history of left hip pain (3-4 years) and bilateral knee pain (14 years)  She plans to have both knees and her left hip replaced within the next year  Patient reports difficulty with ambulation, transfers, negotiation of stairs, ADLS, and housework  Pain  At best pain ratin  At worst pain ratin  Location: R hip  Relieving factors: ice  Aggravating factors: walking, standing and stair climbing  Progression: improved    Social Support  Steps to enter house: yes  Stairs in house: no     Treatments  Current treatment: physical therapy  Patient Goals  Patient goals for therapy: decreased pain, increased motion, increased strength and independence with ADLs/IADLs          Objective     Tenderness     Right Hip   No tenderness in the greater trochanter  Neurological Testing     Sensation     Lumbar   Left   Intact: light touch    Right   Intact: light touch    Passive Range of Motion     Right Hip   Flexion: WFL  [facility]  External rotation (90/90): [facility]  Internal rotation (90/90):  WFL    Additional Passive Range of Motion Details  Hamstring length: WFL R    Strength/Myotome Testing     Left Hip   Planes of Motion   Flexion: 4  Extension: 4 (supine)  Abduction: 4- (supine)  External rotation: 4  Internal rotation: 4    Right Hip   Planes of Motion   Flexion: 3-  Extension: 4 (supine)  Abduction: 4 (supine)  External rotation: 4-  Internal rotation: 4    Left Knee   Flexion: 4+  Extension: 4+    Right Knee   Flexion: 4  Extension: 4    Ambulation   Weight-Bearing Status   Weight-Bearing Status (Right): full weight-bearing    Assistive device used: none    Observational Gait   Decreased walking speed and stride length                Precautions: hx cancer, L foot surgery, zhane knee and hip pain        Manuals 3/9 4/26 4/27 5/4 5/6 5/11 5/17  5/24   R hip PROM   5 min 5 min 5 min 5 min 5 min 5 min  5 min   RE   MC            15 min/MC                                           Neuro Re-Ed                   Quad set issued 5" 10x 5" 15x 5"x15 5"x15 5"x20 5"x20     Glut set issued 5" 10x 5" 15x 5"x20 5"x20 5"x20 np     Standing march issued   10x zhane x10 ea x10 ea x12 ea x15 ea  15x                                                                                   Ther Ex                   Recumbent bike     5 min 6 min 6 min 7 min 10 min  10 min   SLR       5"x10 min A nv 5"x5 min A 5" 2x5     SAQ issued 5" 10x 5" 15x 5"x20 5"x15 5"x20 5"x20  5" 20x   Heel slide flexion issued 3" 10x 3" 15x 3"x15 3"x15 3"x15 3"x20     LLLD stretch into hip extension table     5 min 5 min nv 5 min 5 min     Mini Squats             x10  10x    supine hip extension isometric                5" 10x    supine hip abduction isometric                5" 10x   Seated march        5" 10x   Seated isometric hip ER        5" 10x   patient edu        10 min   Ther Activity                                                           Gait Training                                                           Modalities                   Ice lateral/anterior thigh   10 min 10 min 10 min 10 min 10 min 10 min

## 2021-05-27 ENCOUNTER — APPOINTMENT (OUTPATIENT)
Dept: PHYSICAL THERAPY | Facility: REHABILITATION | Age: 61
End: 2021-05-27
Payer: COMMERCIAL

## 2021-05-28 DIAGNOSIS — E55.9 VITAMIN D DEFICIENCY: ICD-10-CM

## 2021-05-28 RX ORDER — ERGOCALCIFEROL 1.25 MG/1
CAPSULE ORAL
Qty: 12 CAPSULE | Refills: 3 | Status: SHIPPED | OUTPATIENT
Start: 2021-05-28 | End: 2022-05-17

## 2021-07-06 NOTE — PROGRESS NOTES
7/6/2021    Patient cancelled all remaining appointments self-discharging from skilled physical therapy services  Please see last re-evaluation for most updated goals and measurements

## 2021-07-07 ENCOUNTER — VBI (OUTPATIENT)
Dept: ADMINISTRATIVE | Facility: OTHER | Age: 61
End: 2021-07-07

## 2021-07-20 LAB
25(OH)D3 SERPL-MCNC: 55 NG/ML (ref 30–100)
ALBUMIN SERPL-MCNC: 4.5 G/DL (ref 3.6–5.1)
ALBUMIN/GLOB SERPL: 2.1 (CALC) (ref 1–2.5)
ALP SERPL-CCNC: 92 U/L (ref 37–153)
ALT SERPL-CCNC: 38 U/L (ref 6–29)
APPEARANCE UR: CLEAR
AST SERPL-CCNC: 31 U/L (ref 10–35)
BASOPHILS # BLD AUTO: 48 CELLS/UL (ref 0–200)
BASOPHILS NFR BLD AUTO: 0.6 %
BILIRUB SERPL-MCNC: 0.6 MG/DL (ref 0.2–1.2)
BILIRUB UR QL STRIP: NEGATIVE
BUN SERPL-MCNC: 16 MG/DL (ref 7–25)
BUN/CREAT SERPL: ABNORMAL (CALC) (ref 6–22)
CALCIUM SERPL-MCNC: 9.2 MG/DL (ref 8.6–10.4)
CHLORIDE SERPL-SCNC: 101 MMOL/L (ref 98–110)
CHOLEST SERPL-MCNC: 220 MG/DL
CHOLEST/HDLC SERPL: 3.3 (CALC)
CO2 SERPL-SCNC: 28 MMOL/L (ref 20–32)
COLOR UR: NORMAL
CREAT SERPL-MCNC: 0.73 MG/DL (ref 0.5–0.99)
EOSINOPHIL # BLD AUTO: 256 CELLS/UL (ref 15–500)
EOSINOPHIL NFR BLD AUTO: 3.2 %
ERYTHROCYTE [DISTWIDTH] IN BLOOD BY AUTOMATED COUNT: 12.9 % (ref 11–15)
GLOBULIN SER CALC-MCNC: 2.1 G/DL (CALC) (ref 1.9–3.7)
GLUCOSE SERPL-MCNC: 81 MG/DL (ref 65–99)
GLUCOSE UR QL STRIP: NEGATIVE
HBA1C MFR BLD: 5.3 % OF TOTAL HGB
HCT VFR BLD AUTO: 39.8 % (ref 35–45)
HDLC SERPL-MCNC: 66 MG/DL
HGB BLD-MCNC: 13.4 G/DL (ref 11.7–15.5)
HGB UR QL STRIP: NEGATIVE
KETONES UR QL STRIP: NEGATIVE
LDLC SERPL CALC-MCNC: 126 MG/DL (CALC)
LEUKOCYTE ESTERASE UR QL STRIP: NEGATIVE
LYMPHOCYTES # BLD AUTO: 2376 CELLS/UL (ref 850–3900)
LYMPHOCYTES NFR BLD AUTO: 29.7 %
MCH RBC QN AUTO: 31.2 PG (ref 27–33)
MCHC RBC AUTO-ENTMCNC: 33.7 G/DL (ref 32–36)
MCV RBC AUTO: 92.6 FL (ref 80–100)
MONOCYTES # BLD AUTO: 496 CELLS/UL (ref 200–950)
MONOCYTES NFR BLD AUTO: 6.2 %
NEUTROPHILS # BLD AUTO: 4824 CELLS/UL (ref 1500–7800)
NEUTROPHILS NFR BLD AUTO: 60.3 %
NITRITE UR QL STRIP: NEGATIVE
NONHDLC SERPL-MCNC: 154 MG/DL (CALC)
PH UR STRIP: NORMAL [PH] (ref 5–8)
PLATELET # BLD AUTO: 258 THOUSAND/UL (ref 140–400)
PMV BLD REES-ECKER: 9.7 FL (ref 7.5–12.5)
POTASSIUM SERPL-SCNC: 4.3 MMOL/L (ref 3.5–5.3)
PROT SERPL-MCNC: 6.6 G/DL (ref 6.1–8.1)
PROT UR QL STRIP: NEGATIVE
RBC # BLD AUTO: 4.3 MILLION/UL (ref 3.8–5.1)
SL AMB EGFR AFRICAN AMERICAN: 102 ML/MIN/1.73M2
SL AMB EGFR NON AFRICAN AMERICAN: 88 ML/MIN/1.73M2
SODIUM SERPL-SCNC: 137 MMOL/L (ref 135–146)
SP GR UR STRIP: 1.03 (ref 1–1.03)
T4 SERPL-MCNC: 14.3 MCG/DL (ref 5.1–11.9)
TRIGL SERPL-MCNC: 166 MG/DL
TSH SERPL-ACNC: 2.44 MIU/L (ref 0.4–4.5)
WBC # BLD AUTO: 8 THOUSAND/UL (ref 3.8–10.8)

## 2021-07-21 ENCOUNTER — TELEPHONE (OUTPATIENT)
Dept: GASTROENTEROLOGY | Facility: HOSPITAL | Age: 61
End: 2021-07-21

## 2021-07-22 ENCOUNTER — RA CDI HCC (OUTPATIENT)
Dept: OTHER | Facility: HOSPITAL | Age: 61
End: 2021-07-22

## 2021-07-22 NOTE — PROGRESS NOTES
[facility]  coding opportunities          Chart reviewed, no opportunity found: CHART REVIEWED, NO OPPORTUNITY FOUND                     Patients insurance company: [employer] (Medicare Advantage and Commercial)

## 2021-07-23 ENCOUNTER — TELEPHONE (OUTPATIENT)
Dept: GASTROENTEROLOGY | Facility: HOSPITAL | Age: 61
End: 2021-07-23

## 2021-07-23 ENCOUNTER — TELEPHONE (OUTPATIENT)
Dept: FAMILY MEDICINE CLINIC | Facility: CLINIC | Age: 61
End: 2021-07-23

## 2021-07-23 ENCOUNTER — TELEPHONE (OUTPATIENT)
Dept: CARDIOLOGY CLINIC | Facility: CLINIC | Age: 61
End: 2021-07-23

## 2021-07-23 DIAGNOSIS — Z01.818 PREOP TESTING: Primary | ICD-10-CM

## 2021-07-23 RX ORDER — GENTAMICIN SULFATE 80 MG/50ML
80 INJECTION, SOLUTION INTRAVENOUS ONCE
Status: CANCELLED | OUTPATIENT
Start: 2021-07-25

## 2021-07-23 NOTE — TELEPHONE ENCOUNTER
Pt calling about having surgery on september 28 th for her hip replacement  She needs to be cleared  Can we do this based on the April appointment?

## 2021-07-23 NOTE — LETTER
To whom it may concern:    Patient Oliva Walker : 56 is under our care and from a cardiology standpoint, there is not a medical     contraindication for undergoing elective surgery with a general and/or regional anesthesia      Sincerely,     Dr Keke Becerra

## 2021-07-26 ENCOUNTER — ANESTHESIA EVENT (OUTPATIENT)
Dept: GASTROENTEROLOGY | Facility: HOSPITAL | Age: 61
End: 2021-07-26

## 2021-07-26 ENCOUNTER — ANESTHESIA (OUTPATIENT)
Dept: GASTROENTEROLOGY | Facility: HOSPITAL | Age: 61
End: 2021-07-26

## 2021-07-26 ENCOUNTER — HOSPITAL ENCOUNTER (OUTPATIENT)
Dept: GASTROENTEROLOGY | Facility: HOSPITAL | Age: 61
Setting detail: OUTPATIENT SURGERY
Discharge: HOME | End: 2021-07-26
Attending: COLON & RECTAL SURGERY | Admitting: COLON & RECTAL SURGERY
Payer: COMMERCIAL

## 2021-07-26 VITALS
OXYGEN SATURATION: 98 % | SYSTOLIC BLOOD PRESSURE: 116 MMHG | BODY MASS INDEX: 30.73 KG/M2 | HEART RATE: 69 BPM | TEMPERATURE: 98.7 F | WEIGHT: 180 LBS | DIASTOLIC BLOOD PRESSURE: 68 MMHG | RESPIRATION RATE: 16 BRPM | HEIGHT: 64 IN

## 2021-07-26 DIAGNOSIS — Z12.11 ENCOUNTER FOR SCREENING FOR MALIGNANT NEOPLASM OF COLON: ICD-10-CM

## 2021-07-26 PROBLEM — K63.5 COLON POLYP: Status: ACTIVE | Noted: 2021-07-26

## 2021-07-26 PROCEDURE — 88305 TISSUE EXAM BY PATHOLOGIST: CPT | Performed by: PATHOLOGY

## 2021-07-26 RX ORDER — GENTAMICIN SULFATE 80 MG/50ML
80 INJECTION, SOLUTION INTRAVENOUS ONCE
Status: COMPLETED | OUTPATIENT
Start: 2021-07-26 | End: 2021-07-26

## 2021-07-26 RX ORDER — PROPOFOL 10 MG/ML
INJECTION, EMULSION INTRAVENOUS AS NEEDED
Status: DISCONTINUED | OUTPATIENT
Start: 2021-07-26 | End: 2021-07-26

## 2021-07-26 RX ORDER — ACETAMINOPHEN, ASPIRIN AND CAFFEINE 250; 250; 65 MG/1; MG/1; MG/1
2 TABLET, FILM COATED ORAL EVERY 6 HOURS PRN
COMMUNITY

## 2021-07-26 RX ORDER — SODIUM CHLORIDE 9 MG/ML
125 INJECTION, SOLUTION INTRAVENOUS CONTINUOUS
Status: DISCONTINUED | OUTPATIENT
Start: 2021-07-26 | End: 2021-07-30 | Stop reason: HOSPADM

## 2021-07-26 RX ADMIN — PROPOFOL 30 MG: 10 INJECTION, EMULSION INTRAVENOUS at 11:28

## 2021-07-26 RX ADMIN — SODIUM CHLORIDE 125 ML/HR: 0.9 INJECTION, SOLUTION INTRAVENOUS at 10:30

## 2021-07-26 RX ADMIN — PROPOFOL 50 MG: 10 INJECTION, EMULSION INTRAVENOUS at 11:22

## 2021-07-26 RX ADMIN — PROPOFOL 30 MG: 10 INJECTION, EMULSION INTRAVENOUS at 11:26

## 2021-07-26 RX ADMIN — PROPOFOL 20 MG: 10 INJECTION, EMULSION INTRAVENOUS at 11:23

## 2021-07-26 RX ADMIN — PROPOFOL 100 MG: 10 INJECTION, EMULSION INTRAVENOUS at 11:20

## 2021-07-26 RX ADMIN — AMPICILLIN SODIUM 2000 MG: 2 INJECTION, POWDER, FOR SOLUTION INTRAMUSCULAR; INTRAVENOUS at 10:29

## 2021-07-26 RX ADMIN — PROPOFOL 30 MG: 10 INJECTION, EMULSION INTRAVENOUS at 11:25

## 2021-07-26 RX ADMIN — GENTAMICIN SULFATE 80 MG: 80 INJECTION, SOLUTION INTRAVENOUS at 11:17

## 2021-07-26 NOTE — ANESTHESIA PREPROCEDURE EVALUATION
Procedure:  COLONOSCOPY    Relevant Problems   CARDIO   (+) Essential hypertension   (+) Mixed hyperlipidemia   (+) Paroxysmal supraventricular tachycardia (HCC)      ENDO   (+) Postoperative hypothyroidism      MUSCULOSKELETAL   (+) Osteoarthritis   (+) Osteoarthritis of both knees   (+) Primary osteoarthritis of right hip      NEURO/PSYCH   (+) Headache        Physical Exam    Airway    Mallampati score: II  TM Distance: >3 FB  Neck ROM: full     Dental       Cardiovascular  Rhythm: regular, Rate: normal, Cardiovascular exam normal    Pulmonary  Pulmonary exam normal Breath sounds clear to auscultation,     Other Findings        Anesthesia Plan  ASA Score- 3     Anesthesia Type- general with ASA Monitors  Additional Monitors:   Airway Plan:           Plan Factors-Exercise tolerance (METS): >4 METS  Chart reviewed  Existing labs reviewed  Patient is not a current smoker  Obstructive sleep apnea risk education given perioperatively  Induction- intravenous  Postoperative Plan-     Informed Consent- Anesthetic plan and risks discussed with patient

## 2021-07-26 NOTE — INTERVAL H&P NOTE
H&P reviewed  After examining the patient I find no changes in the patients condition since the H&P had been written      Vitals:    07/26/21 1017   BP: 146/85   Pulse: 69   Resp: 18   Temp: 98 7 °F (37 1 °C)   SpO2: 97%

## 2021-07-26 NOTE — DISCHARGE INSTRUCTIONS
[facility] [address], [address] [place] [address] [place]  Phone: [phone]    DISCHARGE INSTRUCTIONS:    1   ___ Complete Exam - Normal    2   ___ Exam normal, but entire colon not seen  We will discuss this with you  3   ___ Polyp(s) removed by "burning" - NO pathology report will follow    4   _1__ Polyp(s) removed by excision  Pathology report will be available in 4-5 days   Someone from our office will call you with results  5   ___ Exam prompted biopsies  Pathology report will be available in 4-5 days   Someone from our office will call you with results  6   ___ Exam demonstrated findings that need treatment  Prescriptions will be   Given to you  Return to our office in ____ weeks  Please call for appt  7   ___ Original office visit or colonoscopy findings necessitate an office visit  Please call to set up a new appointment    8   ___ Medication  __________________________________________        [address]:    - Go straight home and rest today    - No driving or drinking alcohol for 24 hours    - Resume regular diet and medications unless otherwise instructed  Coumadin and Plavix are blood thinners  You can resume these medications on __________  IF YOU ARE HAVING ANY FEVER, BLEEDING OR PERSISTENT PAIN IN THE ABDOMEN, PLEASE CALL OUR OFFICE ANY DAY OR TIME  [phone]    Colorectal Polyps   WHAT YOU NEED TO KNOW:   Colorectal polyps are small growths of tissue in the lining of the colon and rectum  Most polyps are hyperplastic polyps and are usually benign (noncancerous)  Certain types of polyps, called adenomatous polyps, may turn into cancer  DISCHARGE INSTRUCTIONS:   Follow up with your healthcare provider or gastroenterologist as directed: You may need to return for more tests, such as another colonoscopy  Write down your questions so you remember to ask them during your visits    Reduce your risk for colorectal polyps: · Eat a variety of healthy foods:  Healthy foods include fruit, vegetables, whole-grain breads, low-fat dairy products, beans, lean meat, and fish  Ask if you need to be on a special diet  · Maintain a healthy weight:  Ask your healthcare provider if you need to lose weight and how much you need to lose  Ask for help with a weight loss program     · Exercise:  Begin to exercise slowly and do more as you get stronger  Talk with your healthcare provider before you start an exercise program      · Limit alcohol:  Your risk for polyps increases the more you drink  · Do not smoke: If you smoke, it is never too late to quit  Ask for information about how to stop  For support and more information:   · Faby Castillo ([facility])  [address], [place] [address]  Phone: [phone]  Web Address: www digestive  [facility] gov    Contact your healthcare provider or gastroenterologist if:   · You have a fever  · You have chills, a cough, or feel weak and achy  · You have abdominal pain that does not go away or gets worse after you take medicine  · Your abdomen is swollen  · You are losing weight without trying  · You have questions or concerns about your condition or care  Seek care immediately or call 911 if:   · You have sudden shortness of breath  · You have a fast heart rate, fast breathing, or are too dizzy to stand up  · You have severe abdominal pain  · You see blood in your bowel movement  © Copyright [address] Information is for End User's use only and may not be sold, redistributed or otherwise used for commercial purposes  All illustrations and images included in CareNotes® are the copyrighted property of [employer]  or [facility] Samantha Lamar   The above information is an  only  It is not intended as medical advice for individual conditions or treatments   Talk to your doctor, nurse or pharmacist before following any medical regimen to see if it is safe and effective for you

## 2021-07-27 ENCOUNTER — PREP FOR PROCEDURE (OUTPATIENT)
Dept: OBGYN CLINIC | Facility: OTHER | Age: 61
End: 2021-07-27

## 2021-07-27 DIAGNOSIS — M16.12 PRIMARY OSTEOARTHRITIS OF LEFT HIP: Primary | ICD-10-CM

## 2021-07-27 NOTE — TELEPHONE ENCOUNTER
If no change in symptoms she can be 'cleared' in my opinion  Surgery may want her seen prior if you can reach out and ask them please  Thank you

## 2021-07-29 ENCOUNTER — OFFICE VISIT (OUTPATIENT)
Dept: FAMILY MEDICINE CLINIC | Facility: CLINIC | Age: 61
End: 2021-07-29
Payer: COMMERCIAL

## 2021-07-29 VITALS
HEART RATE: 68 BPM | DIASTOLIC BLOOD PRESSURE: 70 MMHG | WEIGHT: 186.2 LBS | SYSTOLIC BLOOD PRESSURE: 118 MMHG | BODY MASS INDEX: 31.79 KG/M2 | HEIGHT: 64 IN | RESPIRATION RATE: 16 BRPM

## 2021-07-29 DIAGNOSIS — R73.9 HYPERGLYCEMIA: ICD-10-CM

## 2021-07-29 DIAGNOSIS — Z12.4 SCREENING FOR CERVICAL CANCER: ICD-10-CM

## 2021-07-29 DIAGNOSIS — I47.1 PAROXYSMAL SUPRAVENTRICULAR TACHYCARDIA (HCC): ICD-10-CM

## 2021-07-29 DIAGNOSIS — M16.11 PRIMARY OSTEOARTHRITIS OF RIGHT HIP: ICD-10-CM

## 2021-07-29 DIAGNOSIS — E66.9 OBESITY (BMI 30-39.9): ICD-10-CM

## 2021-07-29 DIAGNOSIS — R74.01 TRANSAMINITIS: ICD-10-CM

## 2021-07-29 DIAGNOSIS — Z11.59 NEED FOR HEPATITIS C SCREENING TEST: ICD-10-CM

## 2021-07-29 DIAGNOSIS — E89.0 POSTOPERATIVE HYPOTHYROIDISM: ICD-10-CM

## 2021-07-29 DIAGNOSIS — I10 ESSENTIAL HYPERTENSION: ICD-10-CM

## 2021-07-29 DIAGNOSIS — E55.9 VITAMIN D DEFICIENCY: ICD-10-CM

## 2021-07-29 DIAGNOSIS — E78.2 MIXED HYPERLIPIDEMIA: ICD-10-CM

## 2021-07-29 DIAGNOSIS — E66.9 CLASS 2 OBESITY WITHOUT SERIOUS COMORBIDITY WITH BODY MASS INDEX (BMI) OF 35.0 TO 35.9 IN ADULT, UNSPECIFIED OBESITY TYPE: ICD-10-CM

## 2021-07-29 DIAGNOSIS — Z11.4 SCREENING FOR HIV (HUMAN IMMUNODEFICIENCY VIRUS): ICD-10-CM

## 2021-07-29 DIAGNOSIS — K63.5 POLYP OF COLON, UNSPECIFIED PART OF COLON, UNSPECIFIED TYPE: Primary | ICD-10-CM

## 2021-07-29 PROBLEM — Z12.11 SCREENING FOR COLON CANCER: Status: RESOLVED | Noted: 2018-02-22 | Resolved: 2021-07-29

## 2021-07-29 PROBLEM — Z12.39 SCREENING FOR BREAST CANCER: Status: RESOLVED | Noted: 2018-02-22 | Resolved: 2021-07-29

## 2021-07-29 PROCEDURE — 3008F BODY MASS INDEX DOCD: CPT | Performed by: FAMILY MEDICINE

## 2021-07-29 PROCEDURE — 1036F TOBACCO NON-USER: CPT | Performed by: FAMILY MEDICINE

## 2021-07-29 PROCEDURE — 99214 OFFICE O/P EST MOD 30 MIN: CPT | Performed by: FAMILY MEDICINE

## 2021-07-29 PROCEDURE — 3078F DIAST BP <80 MM HG: CPT | Performed by: FAMILY MEDICINE

## 2021-07-29 PROCEDURE — 3074F SYST BP LT 130 MM HG: CPT | Performed by: FAMILY MEDICINE

## 2021-07-29 RX ORDER — PROPRANOLOL HCL 60 MG
60 CAPSULE, EXTENDED RELEASE 24HR ORAL DAILY
Qty: 90 CAPSULE | Refills: 3 | Status: SHIPPED | OUTPATIENT
Start: 2021-07-29 | End: 2021-09-27

## 2021-07-29 RX ORDER — ATORVASTATIN CALCIUM 40 MG/1
40 TABLET, FILM COATED ORAL
Qty: 90 TABLET | Refills: 3 | Status: SHIPPED | OUTPATIENT
Start: 2021-07-29 | End: 2021-09-27

## 2021-07-29 NOTE — TELEPHONE ENCOUNTER
Mariya Reyna from [facility] states that they dont really need a clearance, but they will take a letter stating that she is okay to have the surgery       Fax number is [phone]

## 2021-07-29 NOTE — ASSESSMENT & PLAN NOTE
TSH is normal T4 mildly elevated will continue to monitor continue same dose of levothyroxine, 112 mcg daily

## 2021-07-29 NOTE — PATIENT INSTRUCTIONS
Complete blood work in 4 weeks for liver function/ hepatitis screening and HIV screening   Diet exercise weight loss recommended  Follow specialist per their instructions   Patient to see Cardiology free preoperative clearance for right hip replacement   Routine follow-up scheduled for 6 months for office visit blood work    I will see sooner for her preoperative clearance for hip replacement

## 2021-07-29 NOTE — PROGRESS NOTES
Assessment and Plan:  1  Colon polyp status post colonoscopy 07/26/2021   2  Hypothyroidism, stable TSH normal T4 mildly elevated continue present therapy continue to monitor  3  Hypertension, stable continue present therapy   4  PSVT, status post ablation doing well patient will need preoperative clearance before hip replacement  5  Right hip DJD, for upcoming hip replacement patient does have scheduled preoperative clearance and preoperative testing  6  Hyperglycemia diet controlled  7  Hyperlipidemia, stable atorvastatin 40 mg daily  8  BMI 31 96 patient gained 15 lb, diet exercise weight loss recommended  9  Vitamin-D deficiency, stable continue present therapy  10  Transaminitis, mild elevation of ALT repeat hepatitis panel/hepatic function panel in 4 weeks   11  Screening  HIV/ hepatitis-C, blood work as above will also have patient complete HIV screening patient is aware  15  Routine follow-up is scheduled for 6 months for office visit blood work    Will see next month for preoperative clearance    Problem List Items Addressed This Visit        Digestive    Colon polyp - Primary       Colonoscopy 07/26/2021         Relevant Orders    CBC    Comprehensive metabolic panel    Lipid Panel with Direct LDL reflex    Hemoglobin A1C    TSH, 3rd generation with Free T4 reflex    T4    UA (URINE) with reflex to Scope    Vitamin D 25 hydroxy    T3       Endocrine    Postoperative hypothyroidism      TSH is normal T4 mildly elevated will continue to monitor continue same dose of levothyroxine, 112 mcg daily         Relevant Medications    propranolol (INDERAL LA) 60 mg 24 hr capsule    Other Relevant Orders    CBC    Comprehensive metabolic panel    Lipid Panel with Direct LDL reflex    Hemoglobin A1C    TSH, 3rd generation with Free T4 reflex    T4    UA (URINE) with reflex to Scope    Vitamin D 25 hydroxy    T3       Cardiovascular and Mediastinum    Essential hypertension      Stable continue present therapy Relevant Medications    propranolol (INDERAL LA) 60 mg 24 hr capsule    Other Relevant Orders    CBC    Comprehensive metabolic panel    Lipid Panel with Direct LDL reflex    Hemoglobin A1C    TSH, 3rd generation with Free T4 reflex    T4    UA (URINE) with reflex to Scope    Vitamin D 25 hydroxy    T3    Paroxysmal supraventricular tachycardia (HCC)      Stable patient will need cardiac clearance before her hip replacement         Relevant Medications    propranolol (INDERAL LA) 60 mg 24 hr capsule    Other Relevant Orders    CBC    Comprehensive metabolic panel    Lipid Panel with Direct LDL reflex    Hemoglobin A1C    TSH, 3rd generation with Free T4 reflex    T4    UA (URINE) with reflex to Scope    Vitamin D 25 hydroxy    T3       Musculoskeletal and Integument    Primary osteoarthritis of right hip      Patient will be having upcoming hip replacement         Relevant Orders    CBC    Comprehensive metabolic panel    Lipid Panel with Direct LDL reflex    Hemoglobin A1C    TSH, 3rd generation with Free T4 reflex    T4    UA (URINE) with reflex to Scope    Vitamin D 25 hydroxy    T3       Other    Mixed hyperlipidemia      Stable on atorvastatin 40 mg daily         Relevant Medications    atorvastatin (LIPITOR) 40 mg tablet    Other Relevant Orders    CBC    Comprehensive metabolic panel    Lipid Panel with Direct LDL reflex    Hemoglobin A1C    TSH, 3rd generation with Free T4 reflex    T4    UA (URINE) with reflex to Scope    Vitamin D 25 hydroxy    T3    Hyperglycemia      Diet controlled         Relevant Orders    CBC    Comprehensive metabolic panel    Lipid Panel with Direct LDL reflex    Hemoglobin A1C    TSH, 3rd generation with Free T4 reflex    T4    UA (URINE) with reflex to Scope    Vitamin D 25 hydroxy    T3    Vitamin D deficiency      Stable continue present therapy         Relevant Orders    CBC    Comprehensive metabolic panel    Lipid Panel with Direct LDL reflex    Hemoglobin A1C    TSH, 3rd generation with Free T4 reflex    T4    UA (URINE) with reflex to Scope    Vitamin D 25 hydroxy    T3    Obesity (BMI 30-39  9)      BMI 31 96 patient gained 15 lb diet exercise weight loss recommended         Relevant Orders    CBC    Comprehensive metabolic panel    Lipid Panel with Direct LDL reflex    Hemoglobin A1C    TSH, 3rd generation with Free T4 reflex    T4    UA (URINE) with reflex to Scope    Vitamin D 25 hydroxy    T3      Other Visit Diagnoses     Need for hepatitis C screening test        Relevant Orders    CBC    Comprehensive metabolic panel    Lipid Panel with Direct LDL reflex    Hemoglobin A1C    TSH, 3rd generation with Free T4 reflex    T4    UA (URINE) with reflex to Scope    Vitamin D 25 hydroxy    T3    Screening for HIV (human immunodeficiency virus)        Relevant Orders    HIV 1/2 Antigen/Antibody (4th Generation) w Reflex [facility]    CBC    Comprehensive metabolic panel    Lipid Panel with Direct LDL reflex    Hemoglobin A1C    TSH, 3rd generation with Free T4 reflex    T4    UA (URINE) with reflex to Scope    Vitamin D 25 hydroxy    T3    Screening for cervical cancer        Relevant Orders    Ambulatory referral to Obstetrics / Gynecology    CBC    Comprehensive metabolic panel    Lipid Panel with Direct LDL reflex    Hemoglobin A1C    TSH, 3rd generation with Free T4 reflex    T4    UA (URINE) with reflex to Scope    Vitamin D 25 hydroxy    T3    Transaminitis        Relevant Orders    Hepatitis panel, acute    Hepatic function panel    CBC    Comprehensive metabolic panel    Lipid Panel with Direct LDL reflex    Hemoglobin A1C    TSH, 3rd generation with Free T4 reflex    T4    UA (URINE) with reflex to Scope    Vitamin D 25 hydroxy    T3    Class 2 obesity without serious comorbidity with body mass index (BMI) of 35 0 to 35 9 in adult, unspecified obesity type        Relevant Medications    atorvastatin (LIPITOR) 40 mg tablet                 Diagnoses and all orders for this visit:    Polyp of colon, unspecified part of colon, unspecified type  -     CBC; Future  -     Comprehensive metabolic panel; Future  -     Lipid Panel with Direct LDL reflex; Future  -     Hemoglobin A1C; Future  -     TSH, 3rd generation with Free T4 reflex; Future  -     T4; Future  -     UA (URINE) with reflex to Scope; Future  -     Vitamin D 25 hydroxy; Future  -     T3; Future    Need for hepatitis C screening test  -     CBC; Future  -     Comprehensive metabolic panel; Future  -     Lipid Panel with Direct LDL reflex; Future  -     Hemoglobin A1C; Future  -     TSH, 3rd generation with Free T4 reflex; Future  -     T4; Future  -     UA (URINE) with reflex to Scope; Future  -     Vitamin D 25 hydroxy; Future  -     T3; Future    Screening for HIV (human immunodeficiency virus)  -     HIV 1/2 Antigen/Antibody (4th Generation) w Reflex [facility]; Future  -     CBC; Future  -     Comprehensive metabolic panel; Future  -     Lipid Panel with Direct LDL reflex; Future  -     Hemoglobin A1C; Future  -     TSH, 3rd generation with Free T4 reflex; Future  -     T4; Future  -     UA (URINE) with reflex to Scope; Future  -     Vitamin D 25 hydroxy; Future  -     T3; Future    Screening for cervical cancer  -     Ambulatory referral to Obstetrics / Gynecology; Future  -     CBC; Future  -     Comprehensive metabolic panel; Future  -     Lipid Panel with Direct LDL reflex; Future  -     Hemoglobin A1C; Future  -     TSH, 3rd generation with Free T4 reflex; Future  -     T4; Future  -     UA (URINE) with reflex to Scope; Future  -     Vitamin D 25 hydroxy; Future  -     T3; Future    Postoperative hypothyroidism  -     CBC; Future  -     Comprehensive metabolic panel; Future  -     Lipid Panel with Direct LDL reflex; Future  -     Hemoglobin A1C; Future  -     TSH, 3rd generation with Free T4 reflex; Future  -     T4; Future  -     UA (URINE) with reflex to Scope; Future  -     Vitamin D 25 hydroxy;  Future  -     T3; Future    Essential hypertension  -     CBC; Future  -     Comprehensive metabolic panel; Future  -     Lipid Panel with Direct LDL reflex; Future  -     Hemoglobin A1C; Future  -     TSH, 3rd generation with Free T4 reflex; Future  -     T4; Future  -     UA (URINE) with reflex to Scope; Future  -     Vitamin D 25 hydroxy; Future  -     T3; Future  -     propranolol (INDERAL LA) 60 mg 24 hr capsule; Take 1 capsule (60 mg total) by mouth daily    Paroxysmal supraventricular tachycardia (HCC)  -     CBC; Future  -     Comprehensive metabolic panel; Future  -     Lipid Panel with Direct LDL reflex; Future  -     Hemoglobin A1C; Future  -     TSH, 3rd generation with Free T4 reflex; Future  -     T4; Future  -     UA (URINE) with reflex to Scope; Future  -     Vitamin D 25 hydroxy; Future  -     T3; Future  -     propranolol (INDERAL LA) 60 mg 24 hr capsule; Take 1 capsule (60 mg total) by mouth daily    Primary osteoarthritis of right hip  -     CBC; Future  -     Comprehensive metabolic panel; Future  -     Lipid Panel with Direct LDL reflex; Future  -     Hemoglobin A1C; Future  -     TSH, 3rd generation with Free T4 reflex; Future  -     T4; Future  -     UA (URINE) with reflex to Scope; Future  -     Vitamin D 25 hydroxy; Future  -     T3; Future    Hyperglycemia  -     CBC; Future  -     Comprehensive metabolic panel; Future  -     Lipid Panel with Direct LDL reflex; Future  -     Hemoglobin A1C; Future  -     TSH, 3rd generation with Free T4 reflex; Future  -     T4; Future  -     UA (URINE) with reflex to Scope; Future  -     Vitamin D 25 hydroxy; Future  -     T3; Future    Mixed hyperlipidemia  -     CBC; Future  -     Comprehensive metabolic panel; Future  -     Lipid Panel with Direct LDL reflex; Future  -     Hemoglobin A1C; Future  -     TSH, 3rd generation with Free T4 reflex; Future  -     T4; Future  -     UA (URINE) with reflex to Scope; Future  -     Vitamin D 25 hydroxy;  Future  -     T3; Future    Obesity (BMI 30-39 9)  -     CBC; Future  -     Comprehensive metabolic panel; Future  -     Lipid Panel with Direct LDL reflex; Future  -     Hemoglobin A1C; Future  -     TSH, 3rd generation with Free T4 reflex; Future  -     T4; Future  -     UA (URINE) with reflex to Scope; Future  -     Vitamin D 25 hydroxy; Future  -     T3; Future    Vitamin D deficiency  -     CBC; Future  -     Comprehensive metabolic panel; Future  -     Lipid Panel with Direct LDL reflex; Future  -     Hemoglobin A1C; Future  -     TSH, 3rd generation with Free T4 reflex; Future  -     T4; Future  -     UA (URINE) with reflex to Scope; Future  -     Vitamin D 25 hydroxy; Future  -     T3; Future    Transaminitis  -     Hepatitis panel, acute; Future  -     Hepatic function panel; Future  -     CBC; Future  -     Comprehensive metabolic panel; Future  -     Lipid Panel with Direct LDL reflex; Future  -     Hemoglobin A1C; Future  -     TSH, 3rd generation with Free T4 reflex; Future  -     T4; Future  -     UA (URINE) with reflex to Scope; Future  -     Vitamin D 25 hydroxy; Future  -     T3; Future    Class 2 obesity without serious comorbidity with body mass index (BMI) of 35 0 to 35 9 in adult, unspecified obesity type  -     atorvastatin (LIPITOR) 40 mg tablet; Take 1 tablet (40 mg total) by mouth daily at bedtime    Other orders  -     Cancel: Hepatitis C Antibody ([facility]); Future              Subjective:      Patient ID: Edwar Mims is a 59 y o  female  CC:    Chief Complaint   Patient presents with    Follow-up     pt here for a follow up and to review lab results  Sadia Carty       HPI:     Patient will be having upcoming hip replacement late September and patient does have an appointment for preoperative clearance  Patient is getting appointment for cardiac clearance  Blood work was discussed with the patient  Patient gained 15 lb since last office visit        The following portions of the patient's history were reviewed and updated as appropriate: allergies, current medications, past family history, past medical history, past social history, past surgical history and problem list       Review of Systems   Constitutional:        HPI   HENT: Negative  Eyes: Negative  Respiratory: Negative  Cardiovascular:        HPI   Gastrointestinal:        Had colonoscopy earlier this week   Endocrine: Negative  Genitourinary: Negative  Musculoskeletal:        HPI   Skin: Negative  Allergic/Immunologic: Negative  Neurological: Negative  Hematological: Negative  Psychiatric/Behavioral: Negative  Data to review:       Objective:    Vitals:    07/29/21 1335   BP: 118/70   BP Location: Left arm   Patient Position: Sitting   Cuff Size: Large   Pulse: 68   Resp: 16   Weight: 84 5 kg (186 lb 3 2 oz)   Height: 5' 4" (1 626 m)        Physical Exam  Vitals and nursing note reviewed  Constitutional:       Appearance: Normal appearance  HENT:      Head: Normocephalic and atraumatic  Eyes:      General: No scleral icterus  Neck:      Vascular: No carotid bruit  Cardiovascular:      Rate and Rhythm: Normal rate and regular rhythm  Heart sounds: Normal heart sounds  Pulmonary:      Effort: Pulmonary effort is normal       Breath sounds: Normal breath sounds  Abdominal:      General: Bowel sounds are normal       Palpations: Abdomen is soft  Tenderness: There is no abdominal tenderness  Musculoskeletal:      Cervical back: Neck supple  Right lower leg: No edema  Left lower leg: No edema  Skin:     General: Skin is warm and dry  Neurological:      General: No focal deficit present  Mental Status: She is alert  Psychiatric:         Mood and Affect: Mood normal               BMI Counseling: Body mass index is 31 96 kg/m²  The BMI is above normal  Nutrition recommendations include moderation in carbohydrate intake and reducing intake of cholesterol   Exercise recommendations include exercising 3-5 times per week

## 2021-08-03 ENCOUNTER — VBI (OUTPATIENT)
Dept: ADMINISTRATIVE | Facility: OTHER | Age: 61
End: 2021-08-03

## 2021-08-26 LAB
25(OH)D3 SERPL-MCNC: 75 NG/ML (ref 30–100)
ALBUMIN SERPL-MCNC: 4.6 G/DL (ref 3.6–5.1)
ALBUMIN SERPL-MCNC: 4.6 G/DL (ref 3.6–5.1)
ALBUMIN/GLOB SERPL: 2.1 (CALC) (ref 1–2.5)
ALBUMIN/GLOB SERPL: 2.1 (CALC) (ref 1–2.5)
ALP SERPL-CCNC: 75 U/L (ref 37–153)
ALP SERPL-CCNC: 75 U/L (ref 37–153)
ALT SERPL-CCNC: 15 U/L (ref 6–29)
ALT SERPL-CCNC: 15 U/L (ref 6–29)
APPEARANCE UR: CLEAR
AST SERPL-CCNC: 21 U/L (ref 10–35)
AST SERPL-CCNC: 21 U/L (ref 10–35)
BASOPHILS # BLD AUTO: 50 CELLS/UL (ref 0–200)
BASOPHILS NFR BLD AUTO: 0.6 %
BILIRUB DIRECT SERPL-MCNC: 0.1 MG/DL
BILIRUB INDIRECT SERPL-MCNC: 0.4 MG/DL (CALC) (ref 0.2–1.2)
BILIRUB SERPL-MCNC: 0.5 MG/DL (ref 0.2–1.2)
BILIRUB SERPL-MCNC: 0.5 MG/DL (ref 0.2–1.2)
BILIRUB UR QL STRIP: NEGATIVE
BUN SERPL-MCNC: 22 MG/DL (ref 7–25)
BUN/CREAT SERPL: NORMAL (CALC) (ref 6–22)
CALCIUM SERPL-MCNC: 9.4 MG/DL (ref 8.6–10.4)
CHLORIDE SERPL-SCNC: 103 MMOL/L (ref 98–110)
CHOLEST SERPL-MCNC: 157 MG/DL
CHOLEST/HDLC SERPL: 2.8 (CALC)
CO2 SERPL-SCNC: 27 MMOL/L (ref 20–32)
COLOR UR: YELLOW
CREAT SERPL-MCNC: 0.71 MG/DL (ref 0.5–0.99)
EOSINOPHIL # BLD AUTO: 202 CELLS/UL (ref 15–500)
EOSINOPHIL NFR BLD AUTO: 2.4 %
ERYTHROCYTE [DISTWIDTH] IN BLOOD BY AUTOMATED COUNT: 12.7 % (ref 11–15)
GLOBULIN SER CALC-MCNC: 2.2 G/DL (CALC) (ref 1.9–3.7)
GLOBULIN SER CALC-MCNC: 2.2 G/DL (CALC) (ref 1.9–3.7)
GLUCOSE SERPL-MCNC: 86 MG/DL (ref 65–99)
GLUCOSE UR QL STRIP: NEGATIVE
HAV IGM SERPL QL IA: NORMAL
HBA1C MFR BLD: 5.3 % OF TOTAL HGB
HBV CORE IGM SERPL QL IA: NORMAL
HBV SURFACE AG SERPL QL IA: NORMAL
HCT VFR BLD AUTO: 40.6 % (ref 35–45)
HCV AB S/CO SERPL IA: 0.15
HCV AB SERPL QL IA: NORMAL
HDLC SERPL-MCNC: 57 MG/DL
HGB BLD-MCNC: 13.6 G/DL (ref 11.7–15.5)
HGB UR QL STRIP: NEGATIVE
HIV 1+2 AB+HIV1 P24 AG SERPL QL IA: NORMAL
KETONES UR QL STRIP: NEGATIVE
LDLC SERPL CALC-MCNC: 80 MG/DL (CALC)
LEUKOCYTE ESTERASE UR QL STRIP: NEGATIVE
LYMPHOCYTES # BLD AUTO: 2386 CELLS/UL (ref 850–3900)
LYMPHOCYTES NFR BLD AUTO: 28.4 %
MCH RBC QN AUTO: 30.4 PG (ref 27–33)
MCHC RBC AUTO-ENTMCNC: 33.5 G/DL (ref 32–36)
MCV RBC AUTO: 90.6 FL (ref 80–100)
MONOCYTES # BLD AUTO: 529 CELLS/UL (ref 200–950)
MONOCYTES NFR BLD AUTO: 6.3 %
NEUTROPHILS # BLD AUTO: 5233 CELLS/UL (ref 1500–7800)
NEUTROPHILS NFR BLD AUTO: 62.3 %
NITRITE UR QL STRIP: NEGATIVE
NONHDLC SERPL-MCNC: 100 MG/DL (CALC)
PH UR STRIP: 6.5 [PH] (ref 5–8)
PLATELET # BLD AUTO: 240 THOUSAND/UL (ref 140–400)
PMV BLD REES-ECKER: 10 FL (ref 7.5–12.5)
POTASSIUM SERPL-SCNC: 3.8 MMOL/L (ref 3.5–5.3)
PROT SERPL-MCNC: 6.8 G/DL (ref 6.1–8.1)
PROT SERPL-MCNC: 6.8 G/DL (ref 6.1–8.1)
PROT UR QL STRIP: NEGATIVE
RBC # BLD AUTO: 4.48 MILLION/UL (ref 3.8–5.1)
SL AMB EGFR AFRICAN AMERICAN: 105 ML/MIN/1.73M2
SL AMB EGFR NON AFRICAN AMERICAN: 91 ML/MIN/1.73M2
SODIUM SERPL-SCNC: 140 MMOL/L (ref 135–146)
SP GR UR STRIP: 1.01 (ref 1–1.03)
T3 SERPL-MCNC: 91 NG/DL (ref 76–181)
T4 SERPL-MCNC: 14.6 MCG/DL (ref 5.1–11.9)
TRIGL SERPL-MCNC: 106 MG/DL
TSH SERPL-ACNC: 2.39 MIU/L (ref 0.4–4.5)
WBC # BLD AUTO: 8.4 THOUSAND/UL (ref 3.8–10.8)

## 2021-09-01 ENCOUNTER — RA CDI HCC (OUTPATIENT)
Dept: OTHER | Facility: HOSPITAL | Age: 61
End: 2021-09-01

## 2021-09-01 NOTE — PROGRESS NOTES
[facility]  coding opportunities       Chart reviewed, no opportunity found: CHART REVIEWED, NO OPPORTUNITY FOUND                        Patients insurance company: [employer] (Medicare Advantage and Commercial)

## 2021-09-08 ENCOUNTER — OFFICE VISIT (OUTPATIENT)
Dept: FAMILY MEDICINE CLINIC | Facility: CLINIC | Age: 61
End: 2021-09-08
Payer: COMMERCIAL

## 2021-09-08 VITALS
BODY MASS INDEX: 31.18 KG/M2 | HEART RATE: 72 BPM | HEIGHT: 64 IN | SYSTOLIC BLOOD PRESSURE: 118 MMHG | RESPIRATION RATE: 16 BRPM | DIASTOLIC BLOOD PRESSURE: 72 MMHG | WEIGHT: 182.6 LBS

## 2021-09-08 DIAGNOSIS — M25.552 CHRONIC LEFT HIP PAIN: ICD-10-CM

## 2021-09-08 DIAGNOSIS — G89.29 CHRONIC LEFT HIP PAIN: ICD-10-CM

## 2021-09-08 DIAGNOSIS — Z01.818 PREOPERATIVE CLEARANCE: Primary | ICD-10-CM

## 2021-09-08 DIAGNOSIS — E89.0 POSTOPERATIVE HYPOTHYROIDISM: ICD-10-CM

## 2021-09-08 DIAGNOSIS — E55.9 VITAMIN D DEFICIENCY: ICD-10-CM

## 2021-09-08 DIAGNOSIS — I10 ESSENTIAL HYPERTENSION: ICD-10-CM

## 2021-09-08 DIAGNOSIS — M15.9 PRIMARY OSTEOARTHRITIS INVOLVING MULTIPLE JOINTS: ICD-10-CM

## 2021-09-08 DIAGNOSIS — E78.2 MIXED HYPERLIPIDEMIA: ICD-10-CM

## 2021-09-08 DIAGNOSIS — I47.1 PAROXYSMAL SUPRAVENTRICULAR TACHYCARDIA (HCC): ICD-10-CM

## 2021-09-08 DIAGNOSIS — R73.9 HYPERGLYCEMIA: ICD-10-CM

## 2021-09-08 PROBLEM — M19.90 OSTEOARTHRITIS: Status: RESOLVED | Noted: 2020-07-30 | Resolved: 2021-09-08

## 2021-09-08 PROBLEM — M16.11 PRIMARY OSTEOARTHRITIS OF RIGHT HIP: Status: RESOLVED | Noted: 2021-04-21 | Resolved: 2021-09-08

## 2021-09-08 PROBLEM — M19.90 OSTEOARTHRITIS: Status: ACTIVE | Noted: 2021-09-08

## 2021-09-08 PROCEDURE — 99214 OFFICE O/P EST MOD 30 MIN: CPT | Performed by: FAMILY MEDICINE

## 2021-09-08 PROCEDURE — 1036F TOBACCO NON-USER: CPT | Performed by: FAMILY MEDICINE

## 2021-09-08 PROCEDURE — 3008F BODY MASS INDEX DOCD: CPT | Performed by: FAMILY MEDICINE

## 2021-09-08 PROCEDURE — 3725F SCREEN DEPRESSION PERFORMED: CPT | Performed by: FAMILY MEDICINE

## 2021-09-08 PROCEDURE — 93000 ELECTROCARDIOGRAM COMPLETE: CPT | Performed by: FAMILY MEDICINE

## 2021-09-08 NOTE — PROGRESS NOTES
Assessment and Plan:  1  Preoperative clearance  Patient was examined, EKG was normal, blood work reviewed  Patient needs PT and PTT before surgery otherwise patient fully cleared  Addendum 09/09/2021, PT, PTT both normal patient is fully cleared for upcoming orthopedic surgery  2  DJD  3  Chronic left hip pain  Patient for total left hip replacement at [facility] on 09/28/2021 by Dr Yanira Sheridan  4  Hypertension, stable continue present therapy  5  PSVT status post ablation EKG shows normal sinus rhythm  6  Hypothyroidism, stable continue present therapy  7  Hyperglycemia diet-controlled  8  Hyperlipidemia, stable continue present therapy   9  Vitamin-D deficiency, stable continue present therapy  10   Patient return at scheduled appointment sooner if needed    Problem List Items Addressed This Visit        Endocrine    Postoperative hypothyroidism      Stable continue present therapy, levothyroxine 112 mcg daily            Cardiovascular and Mediastinum    Essential hypertension      Stable continue present therapy         Paroxysmal supraventricular tachycardia (HCC)      EKG normal sinus rhythm            Musculoskeletal and Integument    Osteoarthritis    Relevant Orders    Protime-INR    APTT       Other    Mixed hyperlipidemia      Stable on atorvastatin 20 mg daily         Hyperglycemia      Diet controlled         Vitamin D deficiency      Stable continue present therapy           Other Visit Diagnoses     Preoperative clearance    -  Primary    Relevant Orders    POCT ECG (Completed)    Protime-INR    APTT    Chronic left hip pain        Relevant Orders    Protime-INR    APTT                 Diagnoses and all orders for this visit:    Preoperative clearance  -     POCT ECG  -     Protime-INR  -     APTT    Primary osteoarthritis involving multiple joints  -     Protime-INR  -     APTT    Chronic left hip pain  -     Protime-INR  -     APTT    Essential hypertension    Paroxysmal supraventricular tachycardia (HCC)    Mixed hyperlipidemia    Vitamin D deficiency    Hyperglycemia    Postoperative hypothyroidism              Subjective:      Patient ID: Ayanna Alejandre is a 59 y o  female  CC:    Chief Complaint   Patient presents with    Pre-op Exam     pt here for a preop clearaance- surgery is on 9/28/21 with LON rush       HPI:     Patient is here for preoperative clearance  Patient will be having a left total hip replacement on 09/28/2021 at [facility] with Dr MUNIZ [facility]  Patient did have blood work to review from the end of August   The only thing missing is a PT, PTT will order this per for surgery  Patient's EKG in office today was normal      The following portions of the patient's history were reviewed and updated as appropriate: allergies, current medications, past family history, past medical history, past social history, past surgical history and problem list       Review of Systems   Constitutional: Negative  HENT: Negative  Eyes: Negative  Respiratory: Negative  Cardiovascular:        HPI   Gastrointestinal: Negative  Endocrine: Negative  Genitourinary: Negative  Musculoskeletal:        HPI   Skin: Negative  Allergic/Immunologic: Negative  Neurological: Negative  Hematological: Negative  Psychiatric/Behavioral: Negative  Data to review:       Objective:    Vitals:    09/08/21 1510   BP: 118/72   BP Location: Left arm   Patient Position: Sitting   Cuff Size: Standard   Pulse: 72   Resp: 16   Weight: 82 8 kg (182 lb 9 6 oz)   Height: 5' 4" (1 626 m)        Physical Exam  Vitals and nursing note reviewed  Constitutional:       Appearance: Normal appearance  HENT:      Head: Normocephalic and atraumatic  Right Ear: Tympanic membrane normal       Left Ear: Tympanic membrane normal       Nose: Nose normal       Mouth/Throat:      Mouth: Mucous membranes are moist       Pharynx: Oropharynx is clear   No oropharyngeal exudate or posterior oropharyngeal erythema  Eyes:      General: No scleral icterus  Neck:      Vascular: No carotid bruit  Cardiovascular:      Rate and Rhythm: Normal rate and regular rhythm  Heart sounds: Normal heart sounds  Pulmonary:      Effort: Pulmonary effort is normal       Breath sounds: Normal breath sounds  Abdominal:      General: Bowel sounds are normal       Palpations: Abdomen is soft  Tenderness: There is no abdominal tenderness  Musculoskeletal:      Cervical back: Neck supple  Right lower leg: No edema  Left lower leg: No edema  Skin:     General: Skin is warm and dry  Neurological:      General: No focal deficit present  Mental Status: She is alert     Psychiatric:         Mood and Affect: Mood normal

## 2021-09-21 ENCOUNTER — APPOINTMENT (OUTPATIENT)
Dept: LAB | Facility: MEDICAL CENTER | Age: 61
End: 2021-09-21
Payer: COMMERCIAL

## 2021-09-21 DIAGNOSIS — Z01.818 PREOPERATIVE EXAMINATION, UNSPECIFIED: ICD-10-CM

## 2021-09-21 PROCEDURE — 86900 BLOOD TYPING SEROLOGIC ABO: CPT

## 2021-09-21 PROCEDURE — U0005 INFEC AGEN DETEC AMPLI PROBE: HCPCS | Performed by: ORTHOPAEDIC SURGERY

## 2021-09-21 PROCEDURE — 86850 RBC ANTIBODY SCREEN: CPT

## 2021-09-21 PROCEDURE — U0003 INFECTIOUS AGENT DETECTION BY NUCLEIC ACID (DNA OR RNA); SEVERE ACUTE RESPIRATORY SYNDROME CORONAVIRUS 2 (SARS-COV-2) (CORONAVIRUS DISEASE [COVID-19]), AMPLIFIED PROBE TECHNIQUE, MAKING USE OF HIGH THROUGHPUT TECHNOLOGIES AS DESCRIBED BY CMS-2020-01-R: HCPCS | Performed by: ORTHOPAEDIC SURGERY

## 2021-09-21 PROCEDURE — 86901 BLOOD TYPING SEROLOGIC RH(D): CPT

## 2021-09-21 PROCEDURE — 36415 COLL VENOUS BLD VENIPUNCTURE: CPT

## 2021-09-22 LAB
ABO GROUP BLD: NORMAL
BLD GP AB SCN SERPL QL: NEGATIVE
RH BLD: POSITIVE
SPECIMEN EXPIRATION DATE: NORMAL

## 2021-09-25 DIAGNOSIS — I47.1 PAROXYSMAL SUPRAVENTRICULAR TACHYCARDIA (HCC): ICD-10-CM

## 2021-09-25 DIAGNOSIS — I10 ESSENTIAL HYPERTENSION: ICD-10-CM

## 2021-09-25 DIAGNOSIS — E66.9 CLASS 2 OBESITY WITHOUT SERIOUS COMORBIDITY WITH BODY MASS INDEX (BMI) OF 35.0 TO 35.9 IN ADULT, UNSPECIFIED OBESITY TYPE: ICD-10-CM

## 2021-09-26 ENCOUNTER — ANESTHESIA EVENT (OUTPATIENT)
Dept: PERIOP | Facility: HOSPITAL | Age: 61
End: 2021-09-26
Payer: COMMERCIAL

## 2021-09-27 RX ORDER — ATORVASTATIN CALCIUM 40 MG/1
TABLET, FILM COATED ORAL
Qty: 90 TABLET | Refills: 3 | Status: SHIPPED | OUTPATIENT
Start: 2021-09-27 | End: 2022-08-03 | Stop reason: SDUPTHER

## 2021-09-27 RX ORDER — PROPRANOLOL HCL 60 MG
CAPSULE, EXTENDED RELEASE 24HR ORAL
Qty: 90 CAPSULE | Refills: 3 | Status: SHIPPED | OUTPATIENT
Start: 2021-09-27 | End: 2021-10-19

## 2021-09-28 ENCOUNTER — APPOINTMENT (OUTPATIENT)
Dept: RADIOLOGY | Facility: HOSPITAL | Age: 61
End: 2021-09-28
Payer: COMMERCIAL

## 2021-09-28 ENCOUNTER — HOSPITAL ENCOUNTER (OUTPATIENT)
Facility: HOSPITAL | Age: 61
Setting detail: OUTPATIENT SURGERY
Discharge: HOME | End: 2021-10-01
Attending: ORTHOPAEDIC SURGERY | Admitting: ORTHOPAEDIC SURGERY
Payer: COMMERCIAL

## 2021-09-28 ENCOUNTER — ANESTHESIA (OUTPATIENT)
Dept: PERIOP | Facility: HOSPITAL | Age: 61
End: 2021-09-28
Payer: COMMERCIAL

## 2021-09-28 ENCOUNTER — HOSPITAL ENCOUNTER (OUTPATIENT)
Dept: RADIOLOGY | Facility: HOSPITAL | Age: 61
Setting detail: OUTPATIENT SURGERY
Discharge: HOME | End: 2021-09-28
Payer: COMMERCIAL

## 2021-09-28 DIAGNOSIS — R00.2 PALPITATIONS: ICD-10-CM

## 2021-09-28 DIAGNOSIS — M16.12 PRIMARY OSTEOARTHRITIS OF LEFT HIP: Primary | ICD-10-CM

## 2021-09-28 DIAGNOSIS — M16.12 PRIMARY OSTEOARTHRITIS OF LEFT HIP: ICD-10-CM

## 2021-09-28 PROBLEM — E66.9 OBESITY (BMI 30-39.9): Status: RESOLVED | Noted: 2021-01-28 | Resolved: 2021-09-28

## 2021-09-28 PROCEDURE — C1776 JOINT DEVICE (IMPLANTABLE): HCPCS | Performed by: ORTHOPAEDIC SURGERY

## 2021-09-28 PROCEDURE — 73501 X-RAY EXAM HIP UNI 1 VIEW: CPT

## 2021-09-28 PROCEDURE — 73502 X-RAY EXAM HIP UNI 2-3 VIEWS: CPT

## 2021-09-28 DEVICE — CORAIL HIP SYSTEM CEMENTLESS FEMORAL STEM 12/14 AMT 135 DEGREES KA SIZE 9 HA COATED STANDARD COLLAR
Type: IMPLANTABLE DEVICE | Site: HIP | Status: FUNCTIONAL
Brand: CORAIL

## 2021-09-28 DEVICE — PINNACLE POROCOAT ACETABULAR SHELL SECTOR II 50MM OD
Type: IMPLANTABLE DEVICE | Site: HIP | Status: FUNCTIONAL
Brand: PINNACLE POROCOAT

## 2021-09-28 DEVICE — BIOLOX DELTA CERAMIC FEMORAL HEAD 32MM DIA +1 12/14 TAPER
Type: IMPLANTABLE DEVICE | Site: HIP | Status: FUNCTIONAL
Brand: BIOLOX DELTA

## 2021-09-28 DEVICE — PINNACLE HIP SOLUTIONS ALTRX POLYETHYLENE ACETABULAR LINER NEUTRAL 32MM ID 50MM OD
Type: IMPLANTABLE DEVICE | Site: HIP | Status: FUNCTIONAL
Brand: PINNACLE ALTRX

## 2021-09-28 RX ORDER — PROPOFOL 10 MG/ML
INJECTION, EMULSION INTRAVENOUS CONTINUOUS PRN
Status: DISCONTINUED | OUTPATIENT
Start: 2021-09-28 | End: 2021-09-28

## 2021-09-28 RX ORDER — MIDAZOLAM HYDROCHLORIDE 2 MG/2ML
INJECTION, SOLUTION INTRAMUSCULAR; INTRAVENOUS AS NEEDED
Status: DISCONTINUED | OUTPATIENT
Start: 2021-09-28 | End: 2021-09-28

## 2021-09-28 RX ORDER — SENNOSIDES 8.6 MG
1 TABLET ORAL DAILY
Status: DISCONTINUED | OUTPATIENT
Start: 2021-09-29 | End: 2021-10-01 | Stop reason: HOSPADM

## 2021-09-28 RX ORDER — FERROUS SULFATE 325(65) MG
325 TABLET ORAL
Status: DISCONTINUED | OUTPATIENT
Start: 2021-09-29 | End: 2021-10-01 | Stop reason: HOSPADM

## 2021-09-28 RX ORDER — FENTANYL CITRATE/PF 50 MCG/ML
25 SYRINGE (ML) INJECTION
Status: COMPLETED | OUTPATIENT
Start: 2021-09-28 | End: 2021-09-28

## 2021-09-28 RX ORDER — HYDROMORPHONE HCL/PF 1 MG/ML
0.5 SYRINGE (ML) INJECTION ONCE
Status: COMPLETED | OUTPATIENT
Start: 2021-09-28 | End: 2021-09-28

## 2021-09-28 RX ORDER — SODIUM CHLORIDE, SODIUM LACTATE, POTASSIUM CHLORIDE, CALCIUM CHLORIDE 600; 310; 30; 20 MG/100ML; MG/100ML; MG/100ML; MG/100ML
100 INJECTION, SOLUTION INTRAVENOUS CONTINUOUS
Status: DISCONTINUED | OUTPATIENT
Start: 2021-09-28 | End: 2021-09-30

## 2021-09-28 RX ORDER — HYDROMORPHONE HCL/PF 1 MG/ML
0.5 SYRINGE (ML) INJECTION
Status: DISCONTINUED | OUTPATIENT
Start: 2021-09-28 | End: 2021-09-28

## 2021-09-28 RX ORDER — ONDANSETRON 2 MG/ML
4 INJECTION INTRAMUSCULAR; INTRAVENOUS ONCE AS NEEDED
Status: DISCONTINUED | OUTPATIENT
Start: 2021-09-28 | End: 2021-09-28 | Stop reason: HOSPADM

## 2021-09-28 RX ORDER — BUPIVACAINE HYDROCHLORIDE 7.5 MG/ML
INJECTION, SOLUTION INTRASPINAL AS NEEDED
Status: DISCONTINUED | OUTPATIENT
Start: 2021-09-28 | End: 2021-09-28

## 2021-09-28 RX ORDER — HYDROMORPHONE HCL 110MG/55ML
0.25 PATIENT CONTROLLED ANALGESIA SYRINGE INTRAVENOUS
Status: ACTIVE | OUTPATIENT
Start: 2021-09-28 | End: 2021-09-28

## 2021-09-28 RX ORDER — DOCUSATE SODIUM 100 MG/1
100 CAPSULE, LIQUID FILLED ORAL 2 TIMES DAILY
Status: DISCONTINUED | OUTPATIENT
Start: 2021-09-28 | End: 2021-10-01 | Stop reason: HOSPADM

## 2021-09-28 RX ORDER — SCOLOPAMINE TRANSDERMAL SYSTEM 1 MG/1
1 PATCH, EXTENDED RELEASE TRANSDERMAL ONCE AS NEEDED
Status: CANCELLED | OUTPATIENT
Start: 2021-09-28

## 2021-09-28 RX ORDER — SODIUM CHLORIDE 9 MG/ML
125 INJECTION, SOLUTION INTRAVENOUS CONTINUOUS
Status: DISCONTINUED | OUTPATIENT
Start: 2021-09-28 | End: 2021-09-30

## 2021-09-28 RX ORDER — HYDROMORPHONE HCL/PF 1 MG/ML
0.5 SYRINGE (ML) INJECTION
Status: DISCONTINUED | OUTPATIENT
Start: 2021-09-28 | End: 2021-09-28 | Stop reason: HOSPADM

## 2021-09-28 RX ORDER — TRANEXAMIC ACID 100 MG/ML
INJECTION, SOLUTION INTRAVENOUS AS NEEDED
Status: DISCONTINUED | OUTPATIENT
Start: 2021-09-28 | End: 2021-09-28

## 2021-09-28 RX ORDER — ACETAMINOPHEN 325 MG/1
650 TABLET ORAL EVERY 6 HOURS PRN
Status: DISCONTINUED | OUTPATIENT
Start: 2021-09-28 | End: 2021-09-29

## 2021-09-28 RX ORDER — PROPOFOL 10 MG/ML
INJECTION, EMULSION INTRAVENOUS AS NEEDED
Status: DISCONTINUED | OUTPATIENT
Start: 2021-09-28 | End: 2021-09-28

## 2021-09-28 RX ORDER — OXYCODONE HYDROCHLORIDE 5 MG/1
5 TABLET ORAL EVERY 4 HOURS PRN
Status: DISCONTINUED | OUTPATIENT
Start: 2021-09-28 | End: 2021-10-01 | Stop reason: HOSPADM

## 2021-09-28 RX ORDER — CEFAZOLIN SODIUM 1 G/50ML
1000 SOLUTION INTRAVENOUS EVERY 8 HOURS
Status: COMPLETED | OUTPATIENT
Start: 2021-09-28 | End: 2021-09-29

## 2021-09-28 RX ORDER — CEFAZOLIN SODIUM 1 G/50ML
1000 SOLUTION INTRAVENOUS ONCE
Status: COMPLETED | OUTPATIENT
Start: 2021-09-28 | End: 2021-09-28

## 2021-09-28 RX ORDER — HYDROMORPHONE HCL/PF 1 MG/ML
0.25 SYRINGE (ML) INJECTION
Status: COMPLETED | OUTPATIENT
Start: 2021-09-28 | End: 2021-09-28

## 2021-09-28 RX ORDER — ONDANSETRON 2 MG/ML
4 INJECTION INTRAMUSCULAR; INTRAVENOUS EVERY 6 HOURS PRN
Status: DISCONTINUED | OUTPATIENT
Start: 2021-09-28 | End: 2021-10-01 | Stop reason: HOSPADM

## 2021-09-28 RX ORDER — MAGNESIUM HYDROXIDE 1200 MG/15ML
LIQUID ORAL AS NEEDED
Status: DISCONTINUED | OUTPATIENT
Start: 2021-09-28 | End: 2021-09-28 | Stop reason: HOSPADM

## 2021-09-28 RX ORDER — HYDROMORPHONE HCL/PF 1 MG/ML
0.5 SYRINGE (ML) INJECTION EVERY 2 HOUR PRN
Status: DISCONTINUED | OUTPATIENT
Start: 2021-09-28 | End: 2021-10-01 | Stop reason: HOSPADM

## 2021-09-28 RX ORDER — KETOROLAC TROMETHAMINE 30 MG/ML
15 INJECTION, SOLUTION INTRAMUSCULAR; INTRAVENOUS EVERY 6 HOURS PRN
Status: DISCONTINUED | OUTPATIENT
Start: 2021-09-28 | End: 2021-10-01 | Stop reason: HOSPADM

## 2021-09-28 RX ORDER — ASPIRIN 325 MG
325 TABLET ORAL 2 TIMES DAILY
Status: DISCONTINUED | OUTPATIENT
Start: 2021-09-28 | End: 2021-10-01 | Stop reason: HOSPADM

## 2021-09-28 RX ORDER — OXYCODONE HYDROCHLORIDE 10 MG/1
10 TABLET ORAL EVERY 4 HOURS PRN
Status: DISCONTINUED | OUTPATIENT
Start: 2021-09-28 | End: 2021-10-01 | Stop reason: HOSPADM

## 2021-09-28 RX ADMIN — HYDROMORPHONE HYDROCHLORIDE 0.25 MG: 1 INJECTION, SOLUTION INTRAMUSCULAR; INTRAVENOUS; SUBCUTANEOUS at 15:09

## 2021-09-28 RX ADMIN — BUPIVACAINE HYDROCHLORIDE IN DEXTROSE 2 ML: 7.5 INJECTION, SOLUTION SUBARACHNOID at 10:46

## 2021-09-28 RX ADMIN — FENTANYL CITRATE 25 MCG: 50 INJECTION INTRAMUSCULAR; INTRAVENOUS at 13:44

## 2021-09-28 RX ADMIN — SODIUM CHLORIDE, SODIUM LACTATE, POTASSIUM CHLORIDE, AND CALCIUM CHLORIDE 100 ML/HR: .6; .31; .03; .02 INJECTION, SOLUTION INTRAVENOUS at 14:45

## 2021-09-28 RX ADMIN — HYDROMORPHONE HYDROCHLORIDE 0.5 MG: 1 INJECTION, SOLUTION INTRAMUSCULAR; INTRAVENOUS; SUBCUTANEOUS at 17:10

## 2021-09-28 RX ADMIN — SODIUM CHLORIDE 125 ML/HR: 0.9 INJECTION, SOLUTION INTRAVENOUS at 09:36

## 2021-09-28 RX ADMIN — PROPOFOL 80 MCG/KG/MIN: 10 INJECTION, EMULSION INTRAVENOUS at 10:55

## 2021-09-28 RX ADMIN — SODIUM CHLORIDE, SODIUM LACTATE, POTASSIUM CHLORIDE, AND CALCIUM CHLORIDE 1000 ML: .6; .31; .03; .02 INJECTION, SOLUTION INTRAVENOUS at 13:09

## 2021-09-28 RX ADMIN — SODIUM CHLORIDE, SODIUM LACTATE, POTASSIUM CHLORIDE, AND CALCIUM CHLORIDE 100 ML/HR: .6; .31; .03; .02 INJECTION, SOLUTION INTRAVENOUS at 17:19

## 2021-09-28 RX ADMIN — ASPIRIN 325 MG ORAL TABLET 325 MG: 325 PILL ORAL at 20:23

## 2021-09-28 RX ADMIN — MIDAZOLAM 4 MG: 1 INJECTION INTRAMUSCULAR; INTRAVENOUS at 10:38

## 2021-09-28 RX ADMIN — HYDROMORPHONE HYDROCHLORIDE 0.5 MG: 1 INJECTION, SOLUTION INTRAMUSCULAR; INTRAVENOUS; SUBCUTANEOUS at 15:16

## 2021-09-28 RX ADMIN — TRANEXAMIC ACID 1 G: 1 INJECTION, SOLUTION INTRAVENOUS at 10:58

## 2021-09-28 RX ADMIN — HYDROMORPHONE HYDROCHLORIDE 0.5 MG: 1 INJECTION, SOLUTION INTRAMUSCULAR; INTRAVENOUS; SUBCUTANEOUS at 16:53

## 2021-09-28 RX ADMIN — FENTANYL CITRATE 25 MCG: 50 INJECTION INTRAMUSCULAR; INTRAVENOUS at 14:15

## 2021-09-28 RX ADMIN — CEFAZOLIN SODIUM 1000 MG: 1 SOLUTION INTRAVENOUS at 20:23

## 2021-09-28 RX ADMIN — HYDROMORPHONE HYDROCHLORIDE 0.5 MG: 1 INJECTION, SOLUTION INTRAMUSCULAR; INTRAVENOUS; SUBCUTANEOUS at 16:38

## 2021-09-28 RX ADMIN — CEFAZOLIN SODIUM 1000 MG: 1 SOLUTION INTRAVENOUS at 10:54

## 2021-09-28 RX ADMIN — DOCUSATE SODIUM 100 MG: 100 CAPSULE ORAL at 20:23

## 2021-09-28 RX ADMIN — SODIUM CHLORIDE: 0.9 INJECTION, SOLUTION INTRAVENOUS at 11:34

## 2021-09-28 RX ADMIN — PROPOFOL 60 MG: 10 INJECTION, EMULSION INTRAVENOUS at 10:55

## 2021-09-28 RX ADMIN — HYDROMORPHONE HYDROCHLORIDE 0.25 MG: 1 INJECTION, SOLUTION INTRAMUSCULAR; INTRAVENOUS; SUBCUTANEOUS at 14:54

## 2021-09-28 RX ADMIN — FENTANYL CITRATE 25 MCG: 50 INJECTION INTRAMUSCULAR; INTRAVENOUS at 13:20

## 2021-09-28 RX ADMIN — ONDANSETRON 4 MG: 2 INJECTION INTRAMUSCULAR; INTRAVENOUS at 21:18

## 2021-09-28 RX ADMIN — OXYCODONE HYDROCHLORIDE 5 MG: 5 TABLET ORAL at 23:11

## 2021-09-28 RX ADMIN — SODIUM CHLORIDE: 0.9 INJECTION, SOLUTION INTRAVENOUS at 12:46

## 2021-09-28 RX ADMIN — FENTANYL CITRATE 25 MCG: 50 INJECTION INTRAMUSCULAR; INTRAVENOUS at 13:53

## 2021-09-28 NOTE — ANESTHESIA PREPROCEDURE EVALUATION
Review of Systems/Medical History  Patient summary reviewed  Chart reviewed  History of anesthetic complications PONV    Cardiovascular  EKG reviewed , Hyperlipidemia, Hypertension controlled, Dysrhythmias , history of PSVT,   Comment: S/p cardiac ablation ,  Pulmonary       GI/Hepatic    GERD well controlled,             Endo/Other     GYN       Hematology   Musculoskeletal  Osteoarthritis,        Neurology    Headaches,    Psychology           Physical Exam    Airway    Mallampati score: I  TM Distance: >3 FB  Neck ROM: full     Dental   No notable dental hx     Cardiovascular  Rhythm: regular, Rate: normal, Cardiovascular exam normal    Pulmonary  Pulmonary exam normal Breath sounds clear to auscultation,     Other Findings        Anesthesia Plan  ASA Score- 2     Anesthesia Type- spinal with ASA Monitors  Additional Monitors:   Airway Plan:     Comment: Had contralateral hop done 4/2021  Described significant pain after spinal wore off  Medicate in PACU prior to transfer to floor?         Plan Factors-    Chart reviewed  EKG reviewed  Existing labs reviewed  Patient summary reviewed  Patient is not a current smoker  Patient instructed to abstain from smoking on day of procedure  Patient did not smoke on day of surgery  Induction- intravenous  Postoperative Plan-     Informed Consent- Anesthetic plan and risks discussed with patient

## 2021-09-28 NOTE — ANESTHESIA PROCEDURE NOTES
Spinal Block    Patient location during procedure: OR  Start time: 9/28/2021 10:46 AM  Reason for block: at surgeon's request and primary anesthetic  Staffing  Performed: CRNA   Resident/CRNA: Alondra Watson CRNA  Preanesthetic Checklist  Completed: patient identified, IV checked, site marked, risks and benefits discussed, surgical consent, monitors and equipment checked, pre-op evaluation and timeout performed  Spinal Block  Patient position: sitting  Prep: Betadine  Patient monitoring: heart rate, continuous pulse ox and frequent blood pressure checks  Approach: midline  Location: L3-4  Injection technique: single-shot  Needle  Needle type: pencil-tip   Needle gauge: 24 G  Needle length: 5 cm  Assessment  Sensory level: T4  Injection Assessment:  negative aspiration for heme, no paresthesia on injection and positive aspiration for clear CSF

## 2021-09-29 LAB
ANION GAP SERPL CALCULATED.3IONS-SCNC: 8 MMOL/L (ref 4–13)
ATRIAL RATE: 87 BPM
BUN SERPL-MCNC: 6 MG/DL (ref 5–25)
CALCIUM SERPL-MCNC: 7.8 MG/DL (ref 8.3–10.1)
CHLORIDE SERPL-SCNC: 104 MMOL/L (ref 100–108)
CO2 SERPL-SCNC: 26 MMOL/L (ref 21–32)
CREAT SERPL-MCNC: 0.59 MG/DL (ref 0.6–1.3)
ERYTHROCYTE [DISTWIDTH] IN BLOOD BY AUTOMATED COUNT: 12.9 % (ref 11.6–15.1)
GFR SERPL CREATININE-BSD FRML MDRD: 98 ML/MIN/1.73SQ M
GLUCOSE P FAST SERPL-MCNC: 111 MG/DL (ref 65–99)
GLUCOSE SERPL-MCNC: 111 MG/DL (ref 65–140)
HCT VFR BLD AUTO: 29.7 % (ref 34.8–46.1)
HCT VFR BLD AUTO: 31.3 % (ref 34.8–46.1)
HGB BLD-MCNC: 10 G/DL (ref 11.5–15.4)
HGB BLD-MCNC: 10.4 G/DL (ref 11.5–15.4)
MCH RBC QN AUTO: 31.4 PG (ref 26.8–34.3)
MCHC RBC AUTO-ENTMCNC: 33.7 G/DL (ref 31.4–37.4)
MCV RBC AUTO: 93 FL (ref 82–98)
P AXIS: 65 DEGREES
PLATELET # BLD AUTO: 145 THOUSANDS/UL (ref 149–390)
PMV BLD AUTO: 8.9 FL (ref 8.9–12.7)
POTASSIUM SERPL-SCNC: 3.4 MMOL/L (ref 3.5–5.3)
PR INTERVAL: 186 MS
QRS AXIS: 73 DEGREES
QRSD INTERVAL: 92 MS
QT INTERVAL: 388 MS
QTC INTERVAL: 466 MS
RBC # BLD AUTO: 3.18 MILLION/UL (ref 3.81–5.12)
SODIUM SERPL-SCNC: 138 MMOL/L (ref 136–145)
T WAVE AXIS: 63 DEGREES
VENTRICULAR RATE: 87 BPM
WBC # BLD AUTO: 9.1 THOUSAND/UL (ref 4.31–10.16)

## 2021-09-29 PROCEDURE — 97166 OT EVAL MOD COMPLEX 45 MIN: CPT

## 2021-09-29 PROCEDURE — 80048 BASIC METABOLIC PNL TOTAL CA: CPT | Performed by: PHYSICIAN ASSISTANT

## 2021-09-29 PROCEDURE — 85014 HEMATOCRIT: CPT | Performed by: INTERNAL MEDICINE

## 2021-09-29 PROCEDURE — 93010 ELECTROCARDIOGRAM REPORT: CPT

## 2021-09-29 PROCEDURE — 97116 GAIT TRAINING THERAPY: CPT

## 2021-09-29 PROCEDURE — 85018 HEMOGLOBIN: CPT | Performed by: INTERNAL MEDICINE

## 2021-09-29 PROCEDURE — 97530 THERAPEUTIC ACTIVITIES: CPT

## 2021-09-29 PROCEDURE — 97163 PT EVAL HIGH COMPLEX 45 MIN: CPT

## 2021-09-29 PROCEDURE — 97110 THERAPEUTIC EXERCISES: CPT

## 2021-09-29 PROCEDURE — 85027 COMPLETE CBC AUTOMATED: CPT | Performed by: ORTHOPAEDIC SURGERY

## 2021-09-29 PROCEDURE — 93005 ELECTROCARDIOGRAM TRACING: CPT

## 2021-09-29 RX ORDER — DOCUSATE SODIUM 100 MG/1
100 CAPSULE, LIQUID FILLED ORAL 2 TIMES DAILY
Refills: 0
Start: 2021-09-29 | End: 2022-06-15

## 2021-09-29 RX ORDER — FERROUS SULFATE 325(65) MG
325 TABLET ORAL
Status: DISCONTINUED | OUTPATIENT
Start: 2021-09-30 | End: 2021-09-29

## 2021-09-29 RX ORDER — ACETAMINOPHEN 325 MG/1
650 TABLET ORAL EVERY 8 HOURS SCHEDULED
Status: DISCONTINUED | OUTPATIENT
Start: 2021-09-29 | End: 2021-10-01 | Stop reason: HOSPADM

## 2021-09-29 RX ORDER — ACETAMINOPHEN 325 MG/1
650 TABLET ORAL EVERY 6 HOURS PRN
Refills: 0
Start: 2021-09-29 | End: 2021-10-19

## 2021-09-29 RX ORDER — OXYCODONE HYDROCHLORIDE 5 MG/1
5 TABLET ORAL EVERY 4 HOURS PRN
Refills: 0
Start: 2021-09-29 | End: 2021-10-09

## 2021-09-29 RX ORDER — PROPRANOLOL HCL 60 MG
60 CAPSULE, EXTENDED RELEASE 24HR ORAL DAILY
Status: DISCONTINUED | OUTPATIENT
Start: 2021-09-30 | End: 2021-10-01

## 2021-09-29 RX ORDER — LEVOTHYROXINE SODIUM 112 UG/1
112 TABLET ORAL
Status: DISCONTINUED | OUTPATIENT
Start: 2021-09-30 | End: 2021-10-01 | Stop reason: HOSPADM

## 2021-09-29 RX ORDER — UREA 10 %
500 LOTION (ML) TOPICAL DAILY
Status: DISCONTINUED | OUTPATIENT
Start: 2021-09-30 | End: 2021-10-01 | Stop reason: HOSPADM

## 2021-09-29 RX ORDER — ATORVASTATIN CALCIUM 40 MG/1
40 TABLET, FILM COATED ORAL
Status: DISCONTINUED | OUTPATIENT
Start: 2021-09-29 | End: 2021-10-01 | Stop reason: HOSPADM

## 2021-09-29 RX ORDER — FERROUS SULFATE 325(65) MG
325 TABLET ORAL
Refills: 0
Start: 2021-09-29 | End: 2021-12-01 | Stop reason: ALTCHOICE

## 2021-09-29 RX ORDER — ASPIRIN 325 MG
325 TABLET ORAL 2 TIMES DAILY
Qty: 41 TABLET | Refills: 0
Start: 2021-09-29 | End: 2021-12-01 | Stop reason: ALTCHOICE

## 2021-09-29 RX ADMIN — FERROUS SULFATE TAB 325 MG (65 MG ELEMENTAL FE) 325 MG: 325 (65 FE) TAB at 07:55

## 2021-09-29 RX ADMIN — SODIUM CHLORIDE, SODIUM LACTATE, POTASSIUM CHLORIDE, AND CALCIUM CHLORIDE 1000 ML: .6; .31; .03; .02 INJECTION, SOLUTION INTRAVENOUS at 09:45

## 2021-09-29 RX ADMIN — SODIUM CHLORIDE, SODIUM LACTATE, POTASSIUM CHLORIDE, AND CALCIUM CHLORIDE 1000 ML: .6; .31; .03; .02 INJECTION, SOLUTION INTRAVENOUS at 04:24

## 2021-09-29 RX ADMIN — ASPIRIN 325 MG ORAL TABLET 325 MG: 325 PILL ORAL at 09:07

## 2021-09-29 RX ADMIN — POTASSIUM CHLORIDE 30 MEQ: 1500 TABLET, EXTENDED RELEASE ORAL at 17:51

## 2021-09-29 RX ADMIN — KETOROLAC TROMETHAMINE 15 MG: 30 INJECTION, SOLUTION INTRAMUSCULAR; INTRAVENOUS at 03:43

## 2021-09-29 RX ADMIN — OXYCODONE HYDROCHLORIDE 5 MG: 5 TABLET ORAL at 20:42

## 2021-09-29 RX ADMIN — KETOROLAC TROMETHAMINE 15 MG: 30 INJECTION, SOLUTION INTRAMUSCULAR; INTRAVENOUS at 14:12

## 2021-09-29 RX ADMIN — KETOROLAC TROMETHAMINE 15 MG: 30 INJECTION, SOLUTION INTRAMUSCULAR; INTRAVENOUS at 22:49

## 2021-09-29 RX ADMIN — DOCUSATE SODIUM 100 MG: 100 CAPSULE ORAL at 09:07

## 2021-09-29 RX ADMIN — ONDANSETRON 4 MG: 2 INJECTION INTRAMUSCULAR; INTRAVENOUS at 03:43

## 2021-09-29 RX ADMIN — SODIUM CHLORIDE, SODIUM LACTATE, POTASSIUM CHLORIDE, AND CALCIUM CHLORIDE 150 ML/HR: .6; .31; .03; .02 INJECTION, SOLUTION INTRAVENOUS at 20:14

## 2021-09-29 RX ADMIN — ACETAMINOPHEN 650 MG: 325 TABLET, FILM COATED ORAL at 14:12

## 2021-09-29 RX ADMIN — CEFAZOLIN SODIUM 1000 MG: 1 SOLUTION INTRAVENOUS at 03:49

## 2021-09-29 RX ADMIN — ASPIRIN 325 MG ORAL TABLET 325 MG: 325 PILL ORAL at 20:42

## 2021-09-29 RX ADMIN — ATORVASTATIN CALCIUM 40 MG: 40 TABLET, FILM COATED ORAL at 22:45

## 2021-09-29 RX ADMIN — DOCUSATE SODIUM 100 MG: 100 CAPSULE ORAL at 17:45

## 2021-09-29 RX ADMIN — ACETAMINOPHEN 650 MG: 325 TABLET, FILM COATED ORAL at 22:45

## 2021-09-29 RX ADMIN — SENNOSIDES 8.6 MG: 8.6 TABLET ORAL at 09:07

## 2021-09-29 RX ADMIN — OXYCODONE HYDROCHLORIDE 10 MG: 10 TABLET ORAL at 07:55

## 2021-09-29 RX ADMIN — SODIUM CHLORIDE, SODIUM LACTATE, POTASSIUM CHLORIDE, AND CALCIUM CHLORIDE 100 ML/HR: .6; .31; .03; .02 INJECTION, SOLUTION INTRAVENOUS at 03:44

## 2021-09-29 NOTE — ANESTHESIA POSTPROCEDURE EVALUATION
Post-Op Assessment Note    CV Status:  Stable  Pain Score: 0    Pain management: adequate     Mental Status:  Alert and awake   Hydration Status:  Euvolemic   PONV Controlled:  Controlled   Airway Patency:  Patent      Post Op Vitals Reviewed: Yes      Staff: Anesthesiologist         No complications documented      BP     Temp     Pulse     Resp      SpO2

## 2021-09-30 ENCOUNTER — APPOINTMENT (OUTPATIENT)
Dept: NON INVASIVE DIAGNOSTICS | Facility: HOSPITAL | Age: 61
End: 2021-09-30
Payer: COMMERCIAL

## 2021-09-30 LAB
ANION GAP SERPL CALCULATED.3IONS-SCNC: 7 MMOL/L (ref 4–13)
BUN SERPL-MCNC: 8 MG/DL (ref 5–25)
CALCIUM SERPL-MCNC: 8.1 MG/DL (ref 8.3–10.1)
CHLORIDE SERPL-SCNC: 110 MMOL/L (ref 100–108)
CO2 SERPL-SCNC: 27 MMOL/L (ref 21–32)
CREAT SERPL-MCNC: 0.63 MG/DL (ref 0.6–1.3)
ERYTHROCYTE [DISTWIDTH] IN BLOOD BY AUTOMATED COUNT: 13.2 % (ref 11.6–15.1)
GFR SERPL CREATININE-BSD FRML MDRD: 96 ML/MIN/1.73SQ M
GLUCOSE SERPL-MCNC: 127 MG/DL (ref 65–140)
HCT VFR BLD AUTO: 27.1 % (ref 34.8–46.1)
HGB BLD-MCNC: 8.9 G/DL (ref 11.5–15.4)
MAGNESIUM SERPL-MCNC: 2 MG/DL (ref 1.6–2.6)
MCH RBC QN AUTO: 31.3 PG (ref 26.8–34.3)
MCHC RBC AUTO-ENTMCNC: 32.8 G/DL (ref 31.4–37.4)
MCV RBC AUTO: 95 FL (ref 82–98)
PLATELET # BLD AUTO: 150 THOUSANDS/UL (ref 149–390)
PMV BLD AUTO: 9.6 FL (ref 8.9–12.7)
POTASSIUM SERPL-SCNC: 3.6 MMOL/L (ref 3.5–5.3)
RBC # BLD AUTO: 2.84 MILLION/UL (ref 3.81–5.12)
SODIUM SERPL-SCNC: 144 MMOL/L (ref 136–145)
WBC # BLD AUTO: 8.51 THOUSAND/UL (ref 4.31–10.16)

## 2021-09-30 PROCEDURE — 85027 COMPLETE CBC AUTOMATED: CPT | Performed by: INTERNAL MEDICINE

## 2021-09-30 PROCEDURE — 97110 THERAPEUTIC EXERCISES: CPT

## 2021-09-30 PROCEDURE — 83735 ASSAY OF MAGNESIUM: CPT | Performed by: INTERNAL MEDICINE

## 2021-09-30 PROCEDURE — 99204 OFFICE O/P NEW MOD 45 MIN: CPT

## 2021-09-30 PROCEDURE — 80048 BASIC METABOLIC PNL TOTAL CA: CPT | Performed by: PHYSICIAN ASSISTANT

## 2021-09-30 PROCEDURE — 97530 THERAPEUTIC ACTIVITIES: CPT

## 2021-09-30 PROCEDURE — 97116 GAIT TRAINING THERAPY: CPT

## 2021-09-30 PROCEDURE — 93306 TTE W/DOPPLER COMPLETE: CPT

## 2021-09-30 RX ORDER — SODIUM CHLORIDE 9 MG/ML
100 INJECTION, SOLUTION INTRAVENOUS CONTINUOUS
Status: DISCONTINUED | OUTPATIENT
Start: 2021-09-30 | End: 2021-10-01 | Stop reason: HOSPADM

## 2021-09-30 RX ORDER — ACETAMINOPHEN 325 MG/1
650 TABLET ORAL EVERY 8 HOURS SCHEDULED
Refills: 0
Start: 2021-09-30 | End: 2021-10-14

## 2021-09-30 RX ADMIN — ASPIRIN 325 MG ORAL TABLET 325 MG: 325 PILL ORAL at 17:22

## 2021-09-30 RX ADMIN — SODIUM CHLORIDE, SODIUM LACTATE, POTASSIUM CHLORIDE, AND CALCIUM CHLORIDE 100 ML/HR: .6; .31; .03; .02 INJECTION, SOLUTION INTRAVENOUS at 05:05

## 2021-09-30 RX ADMIN — ACETAMINOPHEN 650 MG: 325 TABLET, FILM COATED ORAL at 14:18

## 2021-09-30 RX ADMIN — ATORVASTATIN CALCIUM 40 MG: 40 TABLET, FILM COATED ORAL at 21:17

## 2021-09-30 RX ADMIN — KETOROLAC TROMETHAMINE 15 MG: 30 INJECTION, SOLUTION INTRAMUSCULAR; INTRAVENOUS at 06:31

## 2021-09-30 RX ADMIN — DOCUSATE SODIUM 100 MG: 100 CAPSULE ORAL at 17:22

## 2021-09-30 RX ADMIN — Medication 500 MG: at 10:11

## 2021-09-30 RX ADMIN — OXYCODONE HYDROCHLORIDE 10 MG: 10 TABLET ORAL at 10:10

## 2021-09-30 RX ADMIN — SENNOSIDES 8.6 MG: 8.6 TABLET ORAL at 10:10

## 2021-09-30 RX ADMIN — DOCUSATE SODIUM 100 MG: 100 CAPSULE ORAL at 10:10

## 2021-09-30 RX ADMIN — HYDROMORPHONE HYDROCHLORIDE 0.5 MG: 1 INJECTION, SOLUTION INTRAMUSCULAR; INTRAVENOUS; SUBCUTANEOUS at 17:22

## 2021-09-30 RX ADMIN — ACETAMINOPHEN 650 MG: 325 TABLET, FILM COATED ORAL at 21:17

## 2021-09-30 RX ADMIN — OXYCODONE HYDROCHLORIDE 10 MG: 10 TABLET ORAL at 14:18

## 2021-09-30 RX ADMIN — ASPIRIN 325 MG ORAL TABLET 325 MG: 325 PILL ORAL at 10:10

## 2021-09-30 RX ADMIN — FERROUS SULFATE TAB 325 MG (65 MG ELEMENTAL FE) 325 MG: 325 (65 FE) TAB at 10:10

## 2021-09-30 RX ADMIN — OXYCODONE HYDROCHLORIDE 10 MG: 10 TABLET ORAL at 23:31

## 2021-09-30 RX ADMIN — ACETAMINOPHEN 650 MG: 325 TABLET, FILM COATED ORAL at 05:04

## 2021-09-30 RX ADMIN — SODIUM CHLORIDE 100 ML/HR: 0.9 INJECTION, SOLUTION INTRAVENOUS at 17:25

## 2021-09-30 RX ADMIN — LEVOTHYROXINE SODIUM 112 MCG: 112 TABLET ORAL at 05:04

## 2021-10-01 VITALS
RESPIRATION RATE: 18 BRPM | DIASTOLIC BLOOD PRESSURE: 75 MMHG | TEMPERATURE: 97.2 F | HEART RATE: 81 BPM | BODY MASS INDEX: 32.07 KG/M2 | HEIGHT: 64 IN | SYSTOLIC BLOOD PRESSURE: 108 MMHG | OXYGEN SATURATION: 94 % | WEIGHT: 187.83 LBS

## 2021-10-01 LAB
ANION GAP SERPL CALCULATED.3IONS-SCNC: 8 MMOL/L (ref 4–13)
BUN SERPL-MCNC: 9 MG/DL (ref 5–25)
CALCIUM SERPL-MCNC: 8.2 MG/DL (ref 8.3–10.1)
CHLORIDE SERPL-SCNC: 108 MMOL/L (ref 100–108)
CO2 SERPL-SCNC: 26 MMOL/L (ref 21–32)
CREAT SERPL-MCNC: 0.6 MG/DL (ref 0.6–1.3)
GFR SERPL CREATININE-BSD FRML MDRD: 97 ML/MIN/1.73SQ M
GLUCOSE P FAST SERPL-MCNC: 83 MG/DL (ref 65–99)
GLUCOSE SERPL-MCNC: 83 MG/DL (ref 65–140)
HCT VFR BLD AUTO: 26.5 % (ref 34.8–46.1)
HGB BLD-MCNC: 8.7 G/DL (ref 11.5–15.4)
POTASSIUM SERPL-SCNC: 3.5 MMOL/L (ref 3.5–5.3)
SODIUM SERPL-SCNC: 142 MMOL/L (ref 136–145)

## 2021-10-01 PROCEDURE — 97116 GAIT TRAINING THERAPY: CPT

## 2021-10-01 PROCEDURE — 80048 BASIC METABOLIC PNL TOTAL CA: CPT | Performed by: PHYSICIAN ASSISTANT

## 2021-10-01 PROCEDURE — 97110 THERAPEUTIC EXERCISES: CPT

## 2021-10-01 PROCEDURE — 93306 TTE W/DOPPLER COMPLETE: CPT

## 2021-10-01 PROCEDURE — 85014 HEMATOCRIT: CPT | Performed by: INTERNAL MEDICINE

## 2021-10-01 PROCEDURE — 99213 OFFICE O/P EST LOW 20 MIN: CPT

## 2021-10-01 PROCEDURE — 97530 THERAPEUTIC ACTIVITIES: CPT

## 2021-10-01 PROCEDURE — 85018 HEMOGLOBIN: CPT | Performed by: INTERNAL MEDICINE

## 2021-10-01 RX ORDER — PROPRANOLOL HYDROCHLORIDE 20 MG/1
20 TABLET ORAL EVERY 12 HOURS SCHEDULED
Status: DISCONTINUED | OUTPATIENT
Start: 2021-10-01 | End: 2021-10-01 | Stop reason: HOSPADM

## 2021-10-01 RX ORDER — POTASSIUM CHLORIDE 20 MEQ/1
20 TABLET, EXTENDED RELEASE ORAL ONCE
Status: COMPLETED | OUTPATIENT
Start: 2021-10-01 | End: 2021-10-01

## 2021-10-01 RX ADMIN — Medication 500 MG: at 09:31

## 2021-10-01 RX ADMIN — FERROUS SULFATE TAB 325 MG (65 MG ELEMENTAL FE) 325 MG: 325 (65 FE) TAB at 09:00

## 2021-10-01 RX ADMIN — ASPIRIN 325 MG ORAL TABLET 325 MG: 325 PILL ORAL at 09:00

## 2021-10-01 RX ADMIN — OXYCODONE HYDROCHLORIDE 10 MG: 10 TABLET ORAL at 13:28

## 2021-10-01 RX ADMIN — ACETAMINOPHEN 650 MG: 325 TABLET, FILM COATED ORAL at 13:28

## 2021-10-01 RX ADMIN — SENNOSIDES 8.6 MG: 8.6 TABLET ORAL at 08:59

## 2021-10-01 RX ADMIN — DOCUSATE SODIUM 100 MG: 100 CAPSULE ORAL at 09:00

## 2021-10-01 RX ADMIN — POTASSIUM CHLORIDE 20 MEQ: 1500 TABLET, EXTENDED RELEASE ORAL at 11:28

## 2021-10-01 RX ADMIN — LEVOTHYROXINE SODIUM 112 MCG: 112 TABLET ORAL at 05:15

## 2021-10-01 RX ADMIN — OXYCODONE HYDROCHLORIDE 10 MG: 10 TABLET ORAL at 09:32

## 2021-10-01 RX ADMIN — ACETAMINOPHEN 650 MG: 325 TABLET, FILM COATED ORAL at 05:15

## 2021-10-04 ENCOUNTER — OFFICE VISIT (OUTPATIENT)
Dept: PHYSICAL THERAPY | Facility: REHABILITATION | Age: 61
End: 2021-10-04
Payer: COMMERCIAL

## 2021-10-04 DIAGNOSIS — M25.552 LEFT HIP PAIN: Primary | ICD-10-CM

## 2021-10-04 DIAGNOSIS — Z96.642 STATUS POST TOTAL HIP REPLACEMENT, LEFT: ICD-10-CM

## 2021-10-04 PROCEDURE — 97162 PT EVAL MOD COMPLEX 30 MIN: CPT | Performed by: PHYSICAL THERAPIST

## 2021-10-04 PROCEDURE — 97110 THERAPEUTIC EXERCISES: CPT | Performed by: PHYSICAL THERAPIST

## 2021-10-07 ENCOUNTER — OFFICE VISIT (OUTPATIENT)
Dept: PHYSICAL THERAPY | Facility: REHABILITATION | Age: 61
End: 2021-10-07
Payer: COMMERCIAL

## 2021-10-07 DIAGNOSIS — Z96.642 STATUS POST TOTAL HIP REPLACEMENT, LEFT: ICD-10-CM

## 2021-10-07 DIAGNOSIS — M25.552 LEFT HIP PAIN: Primary | ICD-10-CM

## 2021-10-07 PROCEDURE — 97110 THERAPEUTIC EXERCISES: CPT

## 2021-10-07 PROCEDURE — 97112 NEUROMUSCULAR REEDUCATION: CPT

## 2021-10-11 ENCOUNTER — OFFICE VISIT (OUTPATIENT)
Dept: PHYSICAL THERAPY | Facility: REHABILITATION | Age: 61
End: 2021-10-11
Payer: COMMERCIAL

## 2021-10-11 DIAGNOSIS — Z96.642 STATUS POST TOTAL HIP REPLACEMENT, LEFT: ICD-10-CM

## 2021-10-11 DIAGNOSIS — M25.552 LEFT HIP PAIN: Primary | ICD-10-CM

## 2021-10-11 PROCEDURE — 97112 NEUROMUSCULAR REEDUCATION: CPT

## 2021-10-11 PROCEDURE — 97110 THERAPEUTIC EXERCISES: CPT

## 2021-10-14 ENCOUNTER — OFFICE VISIT (OUTPATIENT)
Dept: PHYSICAL THERAPY | Facility: REHABILITATION | Age: 61
End: 2021-10-14
Payer: COMMERCIAL

## 2021-10-14 DIAGNOSIS — M25.552 LEFT HIP PAIN: Primary | ICD-10-CM

## 2021-10-14 DIAGNOSIS — Z96.642 STATUS POST TOTAL HIP REPLACEMENT, LEFT: ICD-10-CM

## 2021-10-14 PROCEDURE — 97110 THERAPEUTIC EXERCISES: CPT

## 2021-10-14 PROCEDURE — 97112 NEUROMUSCULAR REEDUCATION: CPT

## 2021-10-18 ENCOUNTER — VBI (OUTPATIENT)
Dept: ADMINISTRATIVE | Facility: OTHER | Age: 61
End: 2021-10-18

## 2021-10-18 ENCOUNTER — OFFICE VISIT (OUTPATIENT)
Dept: PHYSICAL THERAPY | Facility: REHABILITATION | Age: 61
End: 2021-10-18
Payer: COMMERCIAL

## 2021-10-18 DIAGNOSIS — M25.552 LEFT HIP PAIN: Primary | ICD-10-CM

## 2021-10-18 DIAGNOSIS — Z96.642 STATUS POST TOTAL HIP REPLACEMENT, LEFT: ICD-10-CM

## 2021-10-18 PROCEDURE — 97110 THERAPEUTIC EXERCISES: CPT

## 2021-10-19 ENCOUNTER — OFFICE VISIT (OUTPATIENT)
Dept: FAMILY MEDICINE CLINIC | Facility: CLINIC | Age: 61
End: 2021-10-19
Payer: COMMERCIAL

## 2021-10-19 ENCOUNTER — HOSPITAL ENCOUNTER (OUTPATIENT)
Dept: CT IMAGING | Facility: HOSPITAL | Age: 61
Discharge: HOME | End: 2021-10-19
Payer: COMMERCIAL

## 2021-10-19 ENCOUNTER — TELEPHONE (OUTPATIENT)
Dept: FAMILY MEDICINE CLINIC | Facility: CLINIC | Age: 61
End: 2021-10-19

## 2021-10-19 VITALS
DIASTOLIC BLOOD PRESSURE: 80 MMHG | HEIGHT: 64 IN | HEART RATE: 86 BPM | OXYGEN SATURATION: 96 % | WEIGHT: 184.25 LBS | TEMPERATURE: 98.3 F | SYSTOLIC BLOOD PRESSURE: 130 MMHG | BODY MASS INDEX: 31.45 KG/M2

## 2021-10-19 DIAGNOSIS — R09.02 HYPOXEMIA: ICD-10-CM

## 2021-10-19 DIAGNOSIS — M16.12 PRIMARY OSTEOARTHRITIS OF LEFT HIP: ICD-10-CM

## 2021-10-19 DIAGNOSIS — R07.89 OTHER CHEST PAIN: Primary | ICD-10-CM

## 2021-10-19 DIAGNOSIS — Z96.642 HISTORY OF LEFT HIP REPLACEMENT: ICD-10-CM

## 2021-10-19 DIAGNOSIS — I10 ESSENTIAL HYPERTENSION: ICD-10-CM

## 2021-10-19 DIAGNOSIS — M62.830 MUSCLE SPASM OF BACK: ICD-10-CM

## 2021-10-19 DIAGNOSIS — R07.89 OTHER CHEST PAIN: ICD-10-CM

## 2021-10-19 DIAGNOSIS — M15.9 PRIMARY OSTEOARTHRITIS INVOLVING MULTIPLE JOINTS: ICD-10-CM

## 2021-10-19 DIAGNOSIS — I47.1 PAROXYSMAL SUPRAVENTRICULAR TACHYCARDIA (HCC): ICD-10-CM

## 2021-10-19 PROBLEM — Z01.810 PREOPERATIVE CARDIOVASCULAR EXAMINATION: Status: RESOLVED | Noted: 2021-04-09 | Resolved: 2021-10-19

## 2021-10-19 PROCEDURE — 71275 CT ANGIOGRAPHY CHEST: CPT

## 2021-10-19 PROCEDURE — 93000 ELECTROCARDIOGRAM COMPLETE: CPT | Performed by: FAMILY MEDICINE

## 2021-10-19 PROCEDURE — 99214 OFFICE O/P EST MOD 30 MIN: CPT | Performed by: FAMILY MEDICINE

## 2021-10-19 PROCEDURE — G1004 CDSM NDSC: HCPCS

## 2021-10-19 RX ADMIN — IOHEXOL 100 ML: 350 INJECTION, SOLUTION INTRAVENOUS at 13:20

## 2021-10-21 ENCOUNTER — OFFICE VISIT (OUTPATIENT)
Dept: PHYSICAL THERAPY | Facility: REHABILITATION | Age: 61
End: 2021-10-21
Payer: COMMERCIAL

## 2021-10-21 DIAGNOSIS — M25.552 LEFT HIP PAIN: Primary | ICD-10-CM

## 2021-10-21 DIAGNOSIS — M54.50 ACUTE RIGHT-SIDED LOW BACK PAIN WITHOUT SCIATICA: ICD-10-CM

## 2021-10-21 DIAGNOSIS — Z96.642 STATUS POST TOTAL HIP REPLACEMENT, LEFT: ICD-10-CM

## 2021-10-21 PROCEDURE — 97140 MANUAL THERAPY 1/> REGIONS: CPT | Performed by: PHYSICAL THERAPIST

## 2021-10-21 PROCEDURE — 97164 PT RE-EVAL EST PLAN CARE: CPT | Performed by: PHYSICAL THERAPIST

## 2021-10-21 PROCEDURE — 97110 THERAPEUTIC EXERCISES: CPT | Performed by: PHYSICAL THERAPIST

## 2021-10-25 ENCOUNTER — OFFICE VISIT (OUTPATIENT)
Dept: PHYSICAL THERAPY | Facility: REHABILITATION | Age: 61
End: 2021-10-25
Payer: COMMERCIAL

## 2021-10-25 DIAGNOSIS — M25.552 LEFT HIP PAIN: Primary | ICD-10-CM

## 2021-10-25 DIAGNOSIS — Z96.642 STATUS POST TOTAL HIP REPLACEMENT, LEFT: ICD-10-CM

## 2021-10-25 DIAGNOSIS — M54.50 ACUTE RIGHT-SIDED LOW BACK PAIN WITHOUT SCIATICA: ICD-10-CM

## 2021-10-25 PROCEDURE — 97110 THERAPEUTIC EXERCISES: CPT | Performed by: PHYSICAL THERAPIST

## 2021-10-25 PROCEDURE — 97140 MANUAL THERAPY 1/> REGIONS: CPT | Performed by: PHYSICAL THERAPIST

## 2021-10-25 PROCEDURE — 97112 NEUROMUSCULAR REEDUCATION: CPT | Performed by: PHYSICAL THERAPIST

## 2021-10-26 ENCOUNTER — TELEPHONE (OUTPATIENT)
Dept: FAMILY MEDICINE CLINIC | Facility: CLINIC | Age: 61
End: 2021-10-26

## 2021-10-26 ENCOUNTER — OFFICE VISIT (OUTPATIENT)
Dept: FAMILY MEDICINE CLINIC | Facility: CLINIC | Age: 61
End: 2021-10-26
Payer: COMMERCIAL

## 2021-10-26 VITALS
DIASTOLIC BLOOD PRESSURE: 82 MMHG | BODY MASS INDEX: 31.31 KG/M2 | TEMPERATURE: 98.8 F | WEIGHT: 183.38 LBS | SYSTOLIC BLOOD PRESSURE: 134 MMHG | HEIGHT: 64 IN

## 2021-10-26 DIAGNOSIS — K76.89 SIMPLE HEPATIC CYST: ICD-10-CM

## 2021-10-26 DIAGNOSIS — I10 ESSENTIAL HYPERTENSION: ICD-10-CM

## 2021-10-26 DIAGNOSIS — Z96.642 HISTORY OF TOTAL LEFT HIP REPLACEMENT: ICD-10-CM

## 2021-10-26 DIAGNOSIS — R10.9 ABDOMINAL PAIN, UNSPECIFIED ABDOMINAL LOCATION: ICD-10-CM

## 2021-10-26 DIAGNOSIS — R07.89 OTHER CHEST PAIN: Primary | ICD-10-CM

## 2021-10-26 PROCEDURE — 1036F TOBACCO NON-USER: CPT | Performed by: FAMILY MEDICINE

## 2021-10-26 PROCEDURE — 99214 OFFICE O/P EST MOD 30 MIN: CPT | Performed by: FAMILY MEDICINE

## 2021-10-26 PROCEDURE — 3008F BODY MASS INDEX DOCD: CPT | Performed by: FAMILY MEDICINE

## 2021-10-27 LAB
ALBUMIN SERPL-MCNC: 4.5 G/DL (ref 3.6–5.1)
ALBUMIN/GLOB SERPL: 1.9 (CALC) (ref 1–2.5)
ALP SERPL-CCNC: 105 U/L (ref 37–153)
ALT SERPL-CCNC: 18 U/L (ref 6–29)
AMYLASE SERPL-CCNC: 51 U/L (ref 21–101)
APPEARANCE UR: ABNORMAL
AST SERPL-CCNC: 23 U/L (ref 10–35)
BASOPHILS # BLD AUTO: 40 CELLS/UL (ref 0–200)
BASOPHILS NFR BLD AUTO: 0.7 %
BILIRUB SERPL-MCNC: 0.4 MG/DL (ref 0.2–1.2)
BILIRUB UR QL STRIP: NEGATIVE
BUN SERPL-MCNC: 17 MG/DL (ref 7–25)
BUN/CREAT SERPL: NORMAL (CALC) (ref 6–22)
CALCIUM SERPL-MCNC: 9.3 MG/DL (ref 8.6–10.4)
CHLORIDE SERPL-SCNC: 106 MMOL/L (ref 98–110)
CO2 SERPL-SCNC: 28 MMOL/L (ref 20–32)
COLOR UR: YELLOW
CREAT SERPL-MCNC: 0.62 MG/DL (ref 0.5–0.99)
EOSINOPHIL # BLD AUTO: 530 CELLS/UL (ref 15–500)
EOSINOPHIL NFR BLD AUTO: 9.3 %
ERYTHROCYTE [DISTWIDTH] IN BLOOD BY AUTOMATED COUNT: 13.1 % (ref 11–15)
GLOBULIN SER CALC-MCNC: 2.4 G/DL (CALC) (ref 1.9–3.7)
GLUCOSE SERPL-MCNC: 84 MG/DL (ref 65–139)
GLUCOSE UR QL STRIP: NEGATIVE
HCT VFR BLD AUTO: 37.6 % (ref 35–45)
HGB BLD-MCNC: 12.5 G/DL (ref 11.7–15.5)
HGB UR QL STRIP: NEGATIVE
KETONES UR QL STRIP: NEGATIVE
LEUKOCYTE ESTERASE UR QL STRIP: NEGATIVE
LIPASE SERPL-CCNC: 26 U/L (ref 7–60)
LYMPHOCYTES # BLD AUTO: 2246 CELLS/UL (ref 850–3900)
LYMPHOCYTES NFR BLD AUTO: 39.4 %
MCH RBC QN AUTO: 30.9 PG (ref 27–33)
MCHC RBC AUTO-ENTMCNC: 33.2 G/DL (ref 32–36)
MCV RBC AUTO: 93.1 FL (ref 80–100)
MONOCYTES # BLD AUTO: 410 CELLS/UL (ref 200–950)
MONOCYTES NFR BLD AUTO: 7.2 %
NEUTROPHILS # BLD AUTO: 2474 CELLS/UL (ref 1500–7800)
NEUTROPHILS NFR BLD AUTO: 43.4 %
NITRITE UR QL STRIP: NEGATIVE
PH UR STRIP: ABNORMAL [PH] (ref 5–8)
PLATELET # BLD AUTO: 220 THOUSAND/UL (ref 140–400)
PMV BLD REES-ECKER: 9.7 FL (ref 7.5–12.5)
POTASSIUM SERPL-SCNC: 4.6 MMOL/L (ref 3.5–5.3)
PROT SERPL-MCNC: 6.9 G/DL (ref 6.1–8.1)
PROT UR QL STRIP: NEGATIVE
RBC # BLD AUTO: 4.04 MILLION/UL (ref 3.8–5.1)
SL AMB EGFR AFRICAN AMERICAN: 111 ML/MIN/1.73M2
SL AMB EGFR NON AFRICAN AMERICAN: 96 ML/MIN/1.73M2
SODIUM SERPL-SCNC: 142 MMOL/L (ref 135–146)
SP GR UR STRIP: 1.02 (ref 1–1.03)
SPECIMEN SOURCE CVX/VAG CYTO: NORMAL
TRANSFUSION STATUS PATIENT QL: NORMAL
VZV AG SPEC QL IF: NO GROWTH
WBC # BLD AUTO: 5.7 THOUSAND/UL (ref 3.8–10.8)

## 2021-10-28 ENCOUNTER — OFFICE VISIT (OUTPATIENT)
Dept: PHYSICAL THERAPY | Facility: REHABILITATION | Age: 61
End: 2021-10-28
Payer: COMMERCIAL

## 2021-10-28 DIAGNOSIS — M54.50 ACUTE RIGHT-SIDED LOW BACK PAIN WITHOUT SCIATICA: ICD-10-CM

## 2021-10-28 DIAGNOSIS — Z96.642 STATUS POST TOTAL HIP REPLACEMENT, LEFT: ICD-10-CM

## 2021-10-28 DIAGNOSIS — M25.552 LEFT HIP PAIN: Primary | ICD-10-CM

## 2021-10-28 PROCEDURE — 97140 MANUAL THERAPY 1/> REGIONS: CPT

## 2021-10-28 PROCEDURE — 97110 THERAPEUTIC EXERCISES: CPT

## 2021-10-28 PROCEDURE — 97112 NEUROMUSCULAR REEDUCATION: CPT

## 2021-11-01 ENCOUNTER — APPOINTMENT (OUTPATIENT)
Dept: PHYSICAL THERAPY | Facility: REHABILITATION | Age: 61
End: 2021-11-01
Payer: COMMERCIAL

## 2021-11-01 ENCOUNTER — HOSPITAL ENCOUNTER (OUTPATIENT)
Dept: CT IMAGING | Facility: HOSPITAL | Age: 61
Discharge: HOME | End: 2021-11-01
Payer: COMMERCIAL

## 2021-11-01 ENCOUNTER — OFFICE VISIT (OUTPATIENT)
Dept: FAMILY MEDICINE CLINIC | Facility: CLINIC | Age: 61
End: 2021-11-01
Payer: COMMERCIAL

## 2021-11-01 VITALS
HEART RATE: 84 BPM | DIASTOLIC BLOOD PRESSURE: 78 MMHG | HEIGHT: 64 IN | SYSTOLIC BLOOD PRESSURE: 118 MMHG | WEIGHT: 186 LBS | BODY MASS INDEX: 31.76 KG/M2 | TEMPERATURE: 99 F

## 2021-11-01 DIAGNOSIS — E89.0 POSTOPERATIVE HYPOTHYROIDISM: ICD-10-CM

## 2021-11-01 DIAGNOSIS — R10.9 ABDOMINAL PAIN, UNSPECIFIED ABDOMINAL LOCATION: ICD-10-CM

## 2021-11-01 DIAGNOSIS — K76.89 SIMPLE HEPATIC CYST: ICD-10-CM

## 2021-11-01 DIAGNOSIS — M54.6 THORACOLUMBAR BACK PAIN: Primary | ICD-10-CM

## 2021-11-01 DIAGNOSIS — R07.89 OTHER CHEST PAIN: ICD-10-CM

## 2021-11-01 DIAGNOSIS — I10 ESSENTIAL HYPERTENSION: ICD-10-CM

## 2021-11-01 DIAGNOSIS — Z96.642 HISTORY OF TOTAL LEFT HIP REPLACEMENT: ICD-10-CM

## 2021-11-01 DIAGNOSIS — M54.50 THORACOLUMBAR BACK PAIN: Primary | ICD-10-CM

## 2021-11-01 PROCEDURE — 74177 CT ABD & PELVIS W/CONTRAST: CPT

## 2021-11-01 PROCEDURE — 3008F BODY MASS INDEX DOCD: CPT | Performed by: FAMILY MEDICINE

## 2021-11-01 PROCEDURE — 99214 OFFICE O/P EST MOD 30 MIN: CPT | Performed by: FAMILY MEDICINE

## 2021-11-01 PROCEDURE — 1036F TOBACCO NON-USER: CPT | Performed by: FAMILY MEDICINE

## 2021-11-01 PROCEDURE — G1004 CDSM NDSC: HCPCS

## 2021-11-01 RX ADMIN — IOHEXOL 100 ML: 350 INJECTION, SOLUTION INTRAVENOUS at 10:43

## 2021-11-04 ENCOUNTER — OFFICE VISIT (OUTPATIENT)
Dept: PHYSICAL THERAPY | Facility: REHABILITATION | Age: 61
End: 2021-11-04
Payer: COMMERCIAL

## 2021-11-04 DIAGNOSIS — M54.50 ACUTE RIGHT-SIDED LOW BACK PAIN WITHOUT SCIATICA: ICD-10-CM

## 2021-11-04 DIAGNOSIS — M25.552 LEFT HIP PAIN: Primary | ICD-10-CM

## 2021-11-04 DIAGNOSIS — Z96.642 STATUS POST TOTAL HIP REPLACEMENT, LEFT: ICD-10-CM

## 2021-11-04 PROCEDURE — 97110 THERAPEUTIC EXERCISES: CPT

## 2021-11-04 PROCEDURE — 97140 MANUAL THERAPY 1/> REGIONS: CPT

## 2021-11-04 PROCEDURE — 97112 NEUROMUSCULAR REEDUCATION: CPT

## 2021-11-08 ENCOUNTER — OFFICE VISIT (OUTPATIENT)
Dept: PHYSICAL THERAPY | Facility: REHABILITATION | Age: 61
End: 2021-11-08
Payer: COMMERCIAL

## 2021-11-08 DIAGNOSIS — M54.50 ACUTE RIGHT-SIDED LOW BACK PAIN WITHOUT SCIATICA: ICD-10-CM

## 2021-11-08 DIAGNOSIS — Z96.642 STATUS POST TOTAL HIP REPLACEMENT, LEFT: ICD-10-CM

## 2021-11-08 DIAGNOSIS — M25.552 LEFT HIP PAIN: Primary | ICD-10-CM

## 2021-11-08 PROCEDURE — 97110 THERAPEUTIC EXERCISES: CPT | Performed by: PHYSICAL THERAPIST

## 2021-11-08 PROCEDURE — 97112 NEUROMUSCULAR REEDUCATION: CPT | Performed by: PHYSICAL THERAPIST

## 2021-11-11 ENCOUNTER — OFFICE VISIT (OUTPATIENT)
Dept: PHYSICAL THERAPY | Facility: REHABILITATION | Age: 61
End: 2021-11-11
Payer: COMMERCIAL

## 2021-11-11 DIAGNOSIS — Z96.642 STATUS POST TOTAL HIP REPLACEMENT, LEFT: ICD-10-CM

## 2021-11-11 DIAGNOSIS — M25.552 LEFT HIP PAIN: Primary | ICD-10-CM

## 2021-11-11 DIAGNOSIS — M54.50 ACUTE RIGHT-SIDED LOW BACK PAIN WITHOUT SCIATICA: ICD-10-CM

## 2021-11-11 PROCEDURE — 97112 NEUROMUSCULAR REEDUCATION: CPT

## 2021-11-11 PROCEDURE — 97110 THERAPEUTIC EXERCISES: CPT

## 2021-11-15 ENCOUNTER — OFFICE VISIT (OUTPATIENT)
Dept: PHYSICAL THERAPY | Facility: REHABILITATION | Age: 61
End: 2021-11-15
Payer: COMMERCIAL

## 2021-11-15 DIAGNOSIS — M54.50 ACUTE RIGHT-SIDED LOW BACK PAIN WITHOUT SCIATICA: ICD-10-CM

## 2021-11-15 DIAGNOSIS — M25.552 LEFT HIP PAIN: Primary | ICD-10-CM

## 2021-11-15 DIAGNOSIS — Z96.642 STATUS POST TOTAL HIP REPLACEMENT, LEFT: ICD-10-CM

## 2021-11-15 PROCEDURE — 97110 THERAPEUTIC EXERCISES: CPT

## 2021-11-15 PROCEDURE — 97112 NEUROMUSCULAR REEDUCATION: CPT

## 2021-11-18 ENCOUNTER — EVALUATION (OUTPATIENT)
Dept: PHYSICAL THERAPY | Facility: REHABILITATION | Age: 61
End: 2021-11-18
Payer: COMMERCIAL

## 2021-11-18 DIAGNOSIS — M54.50 ACUTE RIGHT-SIDED LOW BACK PAIN WITHOUT SCIATICA: ICD-10-CM

## 2021-11-18 DIAGNOSIS — Z96.642 STATUS POST TOTAL HIP REPLACEMENT, LEFT: ICD-10-CM

## 2021-11-18 DIAGNOSIS — M25.552 LEFT HIP PAIN: Primary | ICD-10-CM

## 2021-11-18 PROCEDURE — 97530 THERAPEUTIC ACTIVITIES: CPT

## 2021-11-18 PROCEDURE — 97140 MANUAL THERAPY 1/> REGIONS: CPT

## 2021-11-18 PROCEDURE — 97110 THERAPEUTIC EXERCISES: CPT

## 2021-11-18 PROCEDURE — 97112 NEUROMUSCULAR REEDUCATION: CPT

## 2021-11-22 ENCOUNTER — OFFICE VISIT (OUTPATIENT)
Dept: PHYSICAL THERAPY | Facility: REHABILITATION | Age: 61
End: 2021-11-22
Payer: COMMERCIAL

## 2021-11-22 DIAGNOSIS — M54.50 ACUTE RIGHT-SIDED LOW BACK PAIN WITHOUT SCIATICA: ICD-10-CM

## 2021-11-22 DIAGNOSIS — Z96.642 STATUS POST TOTAL HIP REPLACEMENT, LEFT: ICD-10-CM

## 2021-11-22 DIAGNOSIS — M25.552 LEFT HIP PAIN: Primary | ICD-10-CM

## 2021-11-22 PROCEDURE — 97110 THERAPEUTIC EXERCISES: CPT

## 2021-11-22 PROCEDURE — 97112 NEUROMUSCULAR REEDUCATION: CPT

## 2021-11-29 ENCOUNTER — OFFICE VISIT (OUTPATIENT)
Dept: PHYSICAL THERAPY | Facility: REHABILITATION | Age: 61
End: 2021-11-29
Payer: COMMERCIAL

## 2021-11-29 DIAGNOSIS — M25.552 LEFT HIP PAIN: Primary | ICD-10-CM

## 2021-11-29 DIAGNOSIS — M54.50 ACUTE RIGHT-SIDED LOW BACK PAIN WITHOUT SCIATICA: ICD-10-CM

## 2021-11-29 DIAGNOSIS — Z96.642 STATUS POST TOTAL HIP REPLACEMENT, LEFT: ICD-10-CM

## 2021-11-29 PROCEDURE — 97112 NEUROMUSCULAR REEDUCATION: CPT

## 2021-11-29 PROCEDURE — 97110 THERAPEUTIC EXERCISES: CPT

## 2021-12-01 ENCOUNTER — OFFICE VISIT (OUTPATIENT)
Dept: CARDIOLOGY CLINIC | Facility: CLINIC | Age: 61
End: 2021-12-01
Payer: COMMERCIAL

## 2021-12-01 VITALS
BODY MASS INDEX: 31.58 KG/M2 | DIASTOLIC BLOOD PRESSURE: 88 MMHG | OXYGEN SATURATION: 100 % | HEIGHT: 64 IN | SYSTOLIC BLOOD PRESSURE: 148 MMHG | WEIGHT: 185 LBS | HEART RATE: 80 BPM

## 2021-12-01 DIAGNOSIS — E78.2 MIXED HYPERLIPIDEMIA: ICD-10-CM

## 2021-12-01 DIAGNOSIS — I10 ESSENTIAL HYPERTENSION: ICD-10-CM

## 2021-12-01 DIAGNOSIS — R00.2 PALPITATIONS: Primary | ICD-10-CM

## 2021-12-01 DIAGNOSIS — E66.9 OBESITY (BMI 30-39.9): ICD-10-CM

## 2021-12-01 DIAGNOSIS — K76.89 LIVER CYST: ICD-10-CM

## 2021-12-01 DIAGNOSIS — I47.1 PAROXYSMAL SUPRAVENTRICULAR TACHYCARDIA (HCC): ICD-10-CM

## 2021-12-01 PROCEDURE — 3008F BODY MASS INDEX DOCD: CPT | Performed by: INTERNAL MEDICINE

## 2021-12-01 PROCEDURE — 99214 OFFICE O/P EST MOD 30 MIN: CPT | Performed by: INTERNAL MEDICINE

## 2021-12-01 PROCEDURE — 1036F TOBACCO NON-USER: CPT | Performed by: INTERNAL MEDICINE

## 2021-12-01 PROCEDURE — 3008F BODY MASS INDEX DOCD: CPT | Performed by: FAMILY MEDICINE

## 2021-12-01 RX ORDER — METHOCARBAMOL 750 MG/1
TABLET, FILM COATED ORAL
COMMUNITY
Start: 2021-11-22 | End: 2022-01-31

## 2021-12-01 RX ORDER — PROPRANOLOL HCL 60 MG
60 CAPSULE, EXTENDED RELEASE 24HR ORAL DAILY
Qty: 90 CAPSULE | Refills: 3
Start: 2021-12-01 | End: 2022-06-15 | Stop reason: SDUPTHER

## 2021-12-01 RX ORDER — OXYCODONE HYDROCHLORIDE 5 MG/1
TABLET ORAL
COMMUNITY
Start: 2021-11-15 | End: 2022-01-31

## 2021-12-02 PROBLEM — K76.89 LIVER CYST: Status: ACTIVE | Noted: 2021-12-02

## 2021-12-06 ENCOUNTER — HOSPITAL ENCOUNTER (OUTPATIENT)
Dept: ULTRASOUND IMAGING | Facility: MEDICAL CENTER | Age: 61
Discharge: HOME | End: 2021-12-06
Payer: COMMERCIAL

## 2021-12-06 DIAGNOSIS — N83.202 CYST OF LEFT OVARY: ICD-10-CM

## 2021-12-06 PROCEDURE — 76856 US EXAM PELVIC COMPLETE: CPT

## 2021-12-06 PROCEDURE — 76830 TRANSVAGINAL US NON-OB: CPT

## 2021-12-09 PROBLEM — N83.202 CYST OF LEFT OVARY: Status: ACTIVE | Noted: 2021-12-09

## 2021-12-10 ENCOUNTER — VBI (OUTPATIENT)
Dept: ADMINISTRATIVE | Facility: OTHER | Age: 61
End: 2021-12-10

## 2021-12-10 ENCOUNTER — HOSPITAL ENCOUNTER (OUTPATIENT)
Dept: ULTRASOUND IMAGING | Facility: MEDICAL CENTER | Age: 61
Discharge: HOME | End: 2021-12-10
Payer: COMMERCIAL

## 2021-12-10 DIAGNOSIS — K76.89 LIVER CYST: ICD-10-CM

## 2021-12-10 PROCEDURE — 76700 US EXAM ABDOM COMPLETE: CPT

## 2021-12-15 ENCOUNTER — TELEPHONE (OUTPATIENT)
Dept: CARDIOLOGY CLINIC | Facility: CLINIC | Age: 61
End: 2021-12-15

## 2022-01-26 LAB
25(OH)D3 SERPL-MCNC: 65 NG/ML (ref 30–100)
ALBUMIN SERPL-MCNC: 4.6 G/DL (ref 3.6–5.1)
ALBUMIN/GLOB SERPL: 1.8 (CALC) (ref 1–2.5)
ALP SERPL-CCNC: 87 U/L (ref 37–153)
ALT SERPL-CCNC: 27 U/L (ref 6–29)
APPEARANCE UR: CLEAR
AST SERPL-CCNC: 26 U/L (ref 10–35)
BASOPHILS # BLD AUTO: 42 CELLS/UL (ref 0–200)
BASOPHILS NFR BLD AUTO: 0.6 %
BILIRUB SERPL-MCNC: 0.5 MG/DL (ref 0.2–1.2)
BILIRUB UR QL STRIP: NEGATIVE
BUN SERPL-MCNC: 20 MG/DL (ref 7–25)
BUN/CREAT SERPL: NORMAL (CALC) (ref 6–22)
CALCIUM SERPL-MCNC: 9.4 MG/DL (ref 8.6–10.4)
CHLORIDE SERPL-SCNC: 101 MMOL/L (ref 98–110)
CHOLEST SERPL-MCNC: 246 MG/DL
CHOLEST/HDLC SERPL: 3.8 (CALC)
CO2 SERPL-SCNC: 29 MMOL/L (ref 20–32)
COLOR UR: YELLOW
CREAT SERPL-MCNC: 0.76 MG/DL (ref 0.5–0.99)
EOSINOPHIL # BLD AUTO: 252 CELLS/UL (ref 15–500)
EOSINOPHIL NFR BLD AUTO: 3.6 %
ERYTHROCYTE [DISTWIDTH] IN BLOOD BY AUTOMATED COUNT: 13.2 % (ref 11–15)
GLOBULIN SER CALC-MCNC: 2.6 G/DL (CALC) (ref 1.9–3.7)
GLUCOSE SERPL-MCNC: 88 MG/DL (ref 65–99)
GLUCOSE UR QL STRIP: NEGATIVE
HBA1C MFR BLD: 5.8 % OF TOTAL HGB
HCT VFR BLD AUTO: 43.8 % (ref 35–45)
HDLC SERPL-MCNC: 64 MG/DL
HGB BLD-MCNC: 14.2 G/DL (ref 11.7–15.5)
HGB UR QL STRIP: NEGATIVE
KETONES UR QL STRIP: NEGATIVE
LDLC SERPL CALC-MCNC: 143 MG/DL (CALC)
LEUKOCYTE ESTERASE UR QL STRIP: NEGATIVE
LYMPHOCYTES # BLD AUTO: 2240 CELLS/UL (ref 850–3900)
LYMPHOCYTES NFR BLD AUTO: 32 %
MCH RBC QN AUTO: 29.5 PG (ref 27–33)
MCHC RBC AUTO-ENTMCNC: 32.4 G/DL (ref 32–36)
MCV RBC AUTO: 91.1 FL (ref 80–100)
MONOCYTES # BLD AUTO: 371 CELLS/UL (ref 200–950)
MONOCYTES NFR BLD AUTO: 5.3 %
NEUTROPHILS # BLD AUTO: 4095 CELLS/UL (ref 1500–7800)
NEUTROPHILS NFR BLD AUTO: 58.5 %
NITRITE UR QL STRIP: NEGATIVE
NONHDLC SERPL-MCNC: 182 MG/DL (CALC)
PH UR STRIP: 5.5 [PH] (ref 5–8)
PLATELET # BLD AUTO: 238 THOUSAND/UL (ref 140–400)
PMV BLD REES-ECKER: 9.5 FL (ref 7.5–12.5)
POTASSIUM SERPL-SCNC: 4.2 MMOL/L (ref 3.5–5.3)
PROT SERPL-MCNC: 7.2 G/DL (ref 6.1–8.1)
PROT UR QL STRIP: NEGATIVE
RBC # BLD AUTO: 4.81 MILLION/UL (ref 3.8–5.1)
SL AMB EGFR AFRICAN AMERICAN: 97 ML/MIN/1.73M2
SL AMB EGFR NON AFRICAN AMERICAN: 83 ML/MIN/1.73M2
SODIUM SERPL-SCNC: 139 MMOL/L (ref 135–146)
SP GR UR STRIP: 1.01 (ref 1–1.03)
T3 SERPL-MCNC: 77 NG/DL (ref 76–181)
T4 FREE SERPL-MCNC: 1.5 NG/DL (ref 0.8–1.8)
T4 SERPL-MCNC: 10.5 MCG/DL (ref 5.1–11.9)
TRIGL SERPL-MCNC: 251 MG/DL
TSH SERPL-ACNC: 8.95 MIU/L (ref 0.4–4.5)
WBC # BLD AUTO: 7 THOUSAND/UL (ref 3.8–10.8)

## 2022-01-27 ENCOUNTER — RA CDI HCC (OUTPATIENT)
Dept: OTHER | Facility: HOSPITAL | Age: 62
End: 2022-01-27

## 2022-01-27 NOTE — PROGRESS NOTES
The following dx found on active problem list - please assess using MEAT for  billing    Paroxysmal supraventricular tachycardia ([facility] ) [I47 1]    [facility]  coding opportunities          Number of diagnosis code(s) already on the problem list added to FYI fla                     Patients insurance company: [employer] (Medicare Advantage and Commercial)             [facility]          Number of diagnosis code(s) already on the problem list added to [employer] fla               Number of suggestions used: 1         Patients insurance company: [employer] (Medicare Advantage and Commercial)     Visit status: Patient arrived for their scheduled appointment

## 2022-01-31 ENCOUNTER — OFFICE VISIT (OUTPATIENT)
Dept: FAMILY MEDICINE CLINIC | Facility: CLINIC | Age: 62
End: 2022-01-31
Payer: COMMERCIAL

## 2022-01-31 VITALS
BODY MASS INDEX: 32.78 KG/M2 | HEART RATE: 80 BPM | RESPIRATION RATE: 16 BRPM | SYSTOLIC BLOOD PRESSURE: 122 MMHG | DIASTOLIC BLOOD PRESSURE: 74 MMHG | HEIGHT: 64 IN | WEIGHT: 192 LBS

## 2022-01-31 DIAGNOSIS — K63.5 POLYP OF COLON, UNSPECIFIED PART OF COLON, UNSPECIFIED TYPE: ICD-10-CM

## 2022-01-31 DIAGNOSIS — E78.2 MIXED HYPERLIPIDEMIA: ICD-10-CM

## 2022-01-31 DIAGNOSIS — E55.9 VITAMIN D DEFICIENCY: ICD-10-CM

## 2022-01-31 DIAGNOSIS — I47.1 PAROXYSMAL SUPRAVENTRICULAR TACHYCARDIA (HCC): ICD-10-CM

## 2022-01-31 DIAGNOSIS — E66.9 OBESITY (BMI 30-39.9): ICD-10-CM

## 2022-01-31 DIAGNOSIS — I10 ESSENTIAL HYPERTENSION: ICD-10-CM

## 2022-01-31 DIAGNOSIS — M15.9 PRIMARY OSTEOARTHRITIS INVOLVING MULTIPLE JOINTS: ICD-10-CM

## 2022-01-31 DIAGNOSIS — R73.9 HYPERGLYCEMIA: ICD-10-CM

## 2022-01-31 DIAGNOSIS — E89.0 POSTOPERATIVE HYPOTHYROIDISM: Primary | ICD-10-CM

## 2022-01-31 PROCEDURE — 99214 OFFICE O/P EST MOD 30 MIN: CPT | Performed by: FAMILY MEDICINE

## 2022-01-31 PROCEDURE — 3008F BODY MASS INDEX DOCD: CPT | Performed by: INTERNAL MEDICINE

## 2022-01-31 RX ORDER — LEVOTHYROXINE SODIUM 0.12 MG/1
125 TABLET ORAL
Qty: 30 TABLET | Refills: 5 | Status: SHIPPED | OUTPATIENT
Start: 2022-01-31 | End: 2022-05-23

## 2022-01-31 NOTE — PATIENT INSTRUCTIONS
Diet exercise weight loss recommended  Discontinue levothyroxine 112 mcg   Start levothyroxine 125 mcg   Check thyroid blood work, TSH, in 4 weeks nonfasting  Return in 6 months for office visit and blood work sooner if needed

## 2022-01-31 NOTE — ASSESSMENT & PLAN NOTE
Low-fat low-cholesterol diet recommended patient really has increased cholesterol from 157 up to 246

## 2022-01-31 NOTE — PROGRESS NOTES
Assessment and Plan:  1  Hypothyroidism TSH over 8  Increase levothyroxine from 01/12 up to 125 check TSH 4 weeks  2  Colon polyp up-to-date with colonoscopy  3  Hypertension, stable continue present therapy  4  PSVT, stable status post ablation follows with Cardiology  5  DJD, currently asymptomatic   6  Hyperglycemia, low sugar low-carbohydrate diet recommended  7  Hyperlipidemia, cholesterol is elevated from 1057-246  Will make euthyroid patient use diet exercise if still uncontrolled next office visit will consider increasing atorvastatin from 40-80 mg  8  Vitamin-D deficiency, stable continue present therapy  9  BMI 32 96 patient gained 7 lb diet exercise weight loss recommended  10  Patient return in 6 months for office visit blood work sooner if needed      Problem List Items Addressed This Visit        Digestive    Colon polyp     Up-to-date with colonoscopy         Relevant Orders    CBC    Comprehensive metabolic panel    Hemoglobin A1C    Lipid Panel with Direct LDL reflex    T4    TSH, 3rd generation with Free T4 reflex    UA (URINE) with reflex to Scope    Vitamin D 25 hydroxy       Endocrine    Postoperative hypothyroidism - Primary     TSH is over 8 will increase levothyroxine from 112 mcg up to 125 mcg check TSH 4 weeks         Relevant Medications    levothyroxine (Euthyrox) 125 mcg tablet    Other Relevant Orders    TSH, 3rd generation with Free T4 reflex    CBC    Comprehensive metabolic panel    Hemoglobin A1C    Lipid Panel with Direct LDL reflex    T4    TSH, 3rd generation with Free T4 reflex    UA (URINE) with reflex to Scope    Vitamin D 25 hydroxy       Cardiovascular and Mediastinum    Essential hypertension     Stable continue present therapy         Relevant Orders    CBC    Comprehensive metabolic panel    Hemoglobin A1C    Lipid Panel with Direct LDL reflex    T4    TSH, 3rd generation with Free T4 reflex    UA (URINE) with reflex to Scope    Vitamin D 25 hydroxy    Paroxysmal supraventricular tachycardia ([facility] [address] )     Status post ablation follows with Cardiology         Relevant Orders    CBC    Comprehensive metabolic panel    Hemoglobin A1C    Lipid Panel with Direct LDL reflex    T4    TSH, 3rd generation with Free T4 reflex    UA (URINE) with reflex to Scope    Vitamin D 25 hydroxy       Musculoskeletal and Integument    Osteoarthritis     Asymptomatic status post bilateral hip replacements         Relevant Orders    CBC    Comprehensive metabolic panel    Hemoglobin A1C    Lipid Panel with Direct LDL reflex    T4    TSH, 3rd generation with Free T4 reflex    UA (URINE) with reflex to Scope    Vitamin D 25 hydroxy       Other    Mixed hyperlipidemia     Low-fat low-cholesterol diet recommended patient really has increased cholesterol from 157 up to 246         Relevant Orders    CBC    Comprehensive metabolic panel    Hemoglobin A1C    Lipid Panel with Direct LDL reflex    T4    TSH, 3rd generation with Free T4 reflex    UA (URINE) with reflex to Scope    Vitamin D 25 hydroxy    Hyperglycemia     Diet controlled         Relevant Orders    CBC    Comprehensive metabolic panel    Hemoglobin A1C    Lipid Panel with Direct LDL reflex    T4    TSH, 3rd generation with Free T4 reflex    UA (URINE) with reflex to Scope    Vitamin D 25 hydroxy    Vitamin D deficiency     Stable continue present therapy         Relevant Orders    CBC    Comprehensive metabolic panel    Hemoglobin A1C    Lipid Panel with Direct LDL reflex    T4    TSH, 3rd generation with Free T4 reflex    UA (URINE) with reflex to Scope    Vitamin D 25 hydroxy    Obesity (BMI 30-39  9)     BMI 32 96 patient gained 7 lb diet exercise weight loss recommended         Relevant Orders    CBC    Comprehensive metabolic panel    Hemoglobin A1C    Lipid Panel with Direct LDL reflex    T4    TSH, 3rd generation with Free T4 reflex    UA (URINE) with reflex to Scope    Vitamin D 25 hydroxy                 Diagnoses and all orders for this visit:    Postoperative hypothyroidism  -     levothyroxine (Euthyrox) 125 mcg tablet; Take 1 tablet (125 mcg total) by mouth daily in the early morning  -     TSH, 3rd generation with Free T4 reflex; Future  -     CBC; Future  -     Comprehensive metabolic panel; Future  -     Hemoglobin A1C; Future  -     Lipid Panel with Direct LDL reflex; Future  -     T4; Future  -     TSH, 3rd generation with Free T4 reflex; Future  -     UA (URINE) with reflex to Scope; Future  -     Vitamin D 25 hydroxy; Future    Polyp of colon, unspecified part of colon, unspecified type  -     CBC; Future  -     Comprehensive metabolic panel; Future  -     Hemoglobin A1C; Future  -     Lipid Panel with Direct LDL reflex; Future  -     T4; Future  -     TSH, 3rd generation with Free T4 reflex; Future  -     UA (URINE) with reflex to Scope; Future  -     Vitamin D 25 hydroxy; Future    Essential hypertension  -     CBC; Future  -     Comprehensive metabolic panel; Future  -     Hemoglobin A1C; Future  -     Lipid Panel with Direct LDL reflex; Future  -     T4; Future  -     TSH, 3rd generation with Free T4 reflex; Future  -     UA (URINE) with reflex to Scope; Future  -     Vitamin D 25 hydroxy; Future    Paroxysmal supraventricular tachycardia (HCC)  -     CBC; Future  -     Comprehensive metabolic panel; Future  -     Hemoglobin A1C; Future  -     Lipid Panel with Direct LDL reflex; Future  -     T4; Future  -     TSH, 3rd generation with Free T4 reflex; Future  -     UA (URINE) with reflex to Scope; Future  -     Vitamin D 25 hydroxy; Future    Primary osteoarthritis involving multiple joints  -     CBC; Future  -     Comprehensive metabolic panel; Future  -     Hemoglobin A1C; Future  -     Lipid Panel with Direct LDL reflex; Future  -     T4; Future  -     TSH, 3rd generation with Free T4 reflex; Future  -     UA (URINE) with reflex to Scope; Future  -     Vitamin D 25 hydroxy; Future    Hyperglycemia  -     CBC;  Future  - Comprehensive metabolic panel; Future  -     Hemoglobin A1C; Future  -     Lipid Panel with Direct LDL reflex; Future  -     T4; Future  -     TSH, 3rd generation with Free T4 reflex; Future  -     UA (URINE) with reflex to Scope; Future  -     Vitamin D 25 hydroxy; Future    Mixed hyperlipidemia  -     CBC; Future  -     Comprehensive metabolic panel; Future  -     Hemoglobin A1C; Future  -     Lipid Panel with Direct LDL reflex; Future  -     T4; Future  -     TSH, 3rd generation with Free T4 reflex; Future  -     UA (URINE) with reflex to Scope; Future  -     Vitamin D 25 hydroxy; Future    Obesity (BMI 30-39 9)  -     CBC; Future  -     Comprehensive metabolic panel; Future  -     Hemoglobin A1C; Future  -     Lipid Panel with Direct LDL reflex; Future  -     T4; Future  -     TSH, 3rd generation with Free T4 reflex; Future  -     UA (URINE) with reflex to Scope; Future  -     Vitamin D 25 hydroxy; Future    Vitamin D deficiency  -     CBC; Future  -     Comprehensive metabolic panel; Future  -     Hemoglobin A1C; Future  -     Lipid Panel with Direct LDL reflex; Future  -     T4; Future  -     TSH, 3rd generation with Free T4 reflex; Future  -     UA (URINE) with reflex to Scope; Future  -     Vitamin D 25 hydroxy; Future              Subjective:      Patient ID: Milka Loza is a 59 y o  female  CC:    Chief Complaint   Patient presents with    Follow-up     pt here for a follow up and to review lab results  ELISE rush       HPI:    Patient doing well without any medical complaints concerns present time  Blood work was discussed  Patient gained 7 lb since last office visit      The following portions of the patient's history were reviewed and updated as appropriate: allergies, current medications, past family history, past medical history, past social history, past surgical history and problem list       Review of Systems   Constitutional: Negative  HENT: Negative  Eyes: Negative      Respiratory: Negative  Cardiovascular: Negative  Gastrointestinal: Negative  Endocrine: Negative  Genitourinary: Negative  Musculoskeletal: Negative  Skin: Negative  Allergic/Immunologic: Negative  Neurological: Negative  Hematological: Negative  Psychiatric/Behavioral: Negative  Data to review:       Objective:    Vitals:    01/31/22 1410   BP: 122/74   BP Location: Left arm   Patient Position: Sitting   Cuff Size: Large   Pulse: 80   Resp: 16   Weight: 87 1 kg (192 lb)   Height: 5' 4" (1 626 m)        Physical Exam  Vitals and nursing note reviewed  Constitutional:       Appearance: Normal appearance  HENT:      Head: Normocephalic and atraumatic  Eyes:      General: No scleral icterus  Neck:      Vascular: No carotid bruit  Cardiovascular:      Rate and Rhythm: Normal rate and regular rhythm  Heart sounds: Normal heart sounds  Pulmonary:      Effort: Pulmonary effort is normal       Breath sounds: Normal breath sounds  Abdominal:      General: Bowel sounds are normal       Palpations: Abdomen is soft  Tenderness: There is no abdominal tenderness  Musculoskeletal:      Cervical back: Neck supple  Right lower leg: No edema  Left lower leg: No edema  Skin:     General: Skin is warm and dry  Neurological:      General: No focal deficit present  Mental Status: She is alert  Psychiatric:         Mood and Affect: Mood normal            BMI Counseling: Body mass index is 32 96 kg/m²  The BMI is above normal  Nutrition recommendations include moderation in carbohydrate intake and reducing intake of cholesterol  Exercise recommendations include exercising 3-5 times per week  Rationale for BMI follow-up plan is due to patient being overweight or obese

## 2022-03-02 ENCOUNTER — VBI (OUTPATIENT)
Dept: ADMINISTRATIVE | Facility: OTHER | Age: 62
End: 2022-03-02

## 2022-03-21 ENCOUNTER — APPOINTMENT (OUTPATIENT)
Dept: RADIOLOGY | Facility: MEDICAL CENTER | Age: 62
End: 2022-03-21
Payer: COMMERCIAL

## 2022-03-21 ENCOUNTER — OFFICE VISIT (OUTPATIENT)
Dept: FAMILY MEDICINE CLINIC | Facility: CLINIC | Age: 62
End: 2022-03-21
Payer: COMMERCIAL

## 2022-03-21 VITALS
BODY MASS INDEX: 32.27 KG/M2 | SYSTOLIC BLOOD PRESSURE: 124 MMHG | DIASTOLIC BLOOD PRESSURE: 78 MMHG | TEMPERATURE: 97.7 F | HEIGHT: 64 IN | HEART RATE: 74 BPM | WEIGHT: 189 LBS

## 2022-03-21 DIAGNOSIS — J30.9 ALLERGIC RHINITIS, UNSPECIFIED SEASONALITY, UNSPECIFIED TRIGGER: ICD-10-CM

## 2022-03-21 DIAGNOSIS — R06.2 WHEEZING: ICD-10-CM

## 2022-03-21 DIAGNOSIS — J18.9 ATYPICAL PNEUMONIA: Primary | ICD-10-CM

## 2022-03-21 DIAGNOSIS — R04.2 HEMOPTYSIS: ICD-10-CM

## 2022-03-21 DIAGNOSIS — I10 ESSENTIAL HYPERTENSION: ICD-10-CM

## 2022-03-21 DIAGNOSIS — J18.9 ATYPICAL PNEUMONIA: ICD-10-CM

## 2022-03-21 DIAGNOSIS — U07.1 COVID-19: ICD-10-CM

## 2022-03-21 DIAGNOSIS — B34.9 VIRAL INFECTION, UNSPECIFIED: ICD-10-CM

## 2022-03-21 DIAGNOSIS — R05.9 COUGH: ICD-10-CM

## 2022-03-21 DIAGNOSIS — Z12.31 ENCOUNTER FOR SCREENING MAMMOGRAM FOR MALIGNANT NEOPLASM OF BREAST: ICD-10-CM

## 2022-03-21 LAB — SARS-COV-2 RNA RESP QL NAA+PROBE: NEGATIVE

## 2022-03-21 PROCEDURE — 99214 OFFICE O/P EST MOD 30 MIN: CPT | Performed by: FAMILY MEDICINE

## 2022-03-21 PROCEDURE — U0005 INFEC AGEN DETEC AMPLI PROBE: HCPCS | Performed by: FAMILY MEDICINE

## 2022-03-21 PROCEDURE — 71046 X-RAY EXAM CHEST 2 VIEWS: CPT

## 2022-03-21 PROCEDURE — U0003 INFECTIOUS AGENT DETECTION BY NUCLEIC ACID (DNA OR RNA); SEVERE ACUTE RESPIRATORY SYNDROME CORONAVIRUS 2 (SARS-COV-2) (CORONAVIRUS DISEASE [COVID-19]), AMPLIFIED PROBE TECHNIQUE, MAKING USE OF HIGH THROUGHPUT TECHNOLOGIES AS DESCRIBED BY CMS-2020-01-R: HCPCS | Performed by: FAMILY MEDICINE

## 2022-03-21 PROCEDURE — 3008F BODY MASS INDEX DOCD: CPT | Performed by: FAMILY MEDICINE

## 2022-03-21 PROCEDURE — 1036F TOBACCO NON-USER: CPT | Performed by: FAMILY MEDICINE

## 2022-03-21 RX ORDER — ALBUTEROL SULFATE 90 UG/1
2 AEROSOL, METERED RESPIRATORY (INHALATION) EVERY 6 HOURS PRN
Qty: 18 G | Refills: 0 | Status: SHIPPED | OUTPATIENT
Start: 2022-03-21 | End: 2022-04-08

## 2022-03-21 RX ORDER — PROMETHAZINE HYDROCHLORIDE 6.25 MG/5ML
SYRUP ORAL
Qty: 120 ML | Refills: 1 | Status: SHIPPED | OUTPATIENT
Start: 2022-03-21 | End: 2022-06-15 | Stop reason: ALTCHOICE

## 2022-03-21 RX ORDER — DOXYCYCLINE HYCLATE 100 MG/1
100 CAPSULE ORAL EVERY 12 HOURS SCHEDULED
Qty: 20 CAPSULE | Refills: 0 | Status: SHIPPED | OUTPATIENT
Start: 2022-03-21 | End: 2022-03-31

## 2022-03-21 RX ORDER — MONTELUKAST SODIUM 10 MG/1
TABLET ORAL
Qty: 30 TABLET | Refills: 5 | Status: SHIPPED | OUTPATIENT
Start: 2022-03-21 | End: 2022-06-15 | Stop reason: ALTCHOICE

## 2022-03-21 NOTE — PROGRESS NOTES
Assessment and Plan:  1  Atypical pneumonia/hemoptysis  COVID swab was sent   Doxycycline is ordered photo toxicity discussed   Chest x-rays ordered  I recommend patient mask indoors and outdoors and to result is known  As it has been well over 5 days no need to isolate  2  Cough  As above, promethazine ordered drowsiness discussed  3  Wheezing, as above, albuterol was ordered   4  Allergic rhinitis, singular was ordered  5  Hypertension, stable on Inderal LA 60   6  Return in 1 week if still symptoms    Problem List Items Addressed This Visit        Respiratory    Allergic rhinitis     Singular prescribed         Relevant Medications    montelukast (SINGULAIR) 10 mg tablet       Cardiovascular and Mediastinum    Essential hypertension     Stable, continue propranolol LA 60 mg daily           Other Visit Diagnoses     Atypical pneumonia    -  Primary    Relevant Medications    montelukast (SINGULAIR) 10 mg tablet    promethazine (PHENERGAN) 12 5 mg/10 mL syrup    albuterol (PROVENTIL HFA,VENTOLIN HFA) 90 mcg/act inhaler    doxycycline hyclate (VIBRAMYCIN) 100 mg capsule    Other Relevant Orders    XR chest pa & lateral    Encounter for screening mammogram for malignant neoplasm of breast        Relevant Orders    Mammo screening bilateral w 3d & cad    Cough        Relevant Medications    promethazine (PHENERGAN) 12 5 mg/10 mL syrup    Wheezing        Relevant Medications    albuterol (PROVENTIL HFA,VENTOLIN HFA) 90 mcg/act inhaler    Hemoptysis                     Diagnoses and all orders for this visit:    Atypical pneumonia  -     doxycycline hyclate (VIBRAMYCIN) 100 mg capsule; Take 1 capsule (100 mg total) by mouth every 12 (twelve) hours for 10 days  -     XR chest pa & lateral; Future    Encounter for screening mammogram for malignant neoplasm of breast  -     Mammo screening bilateral w 3d & cad; Future    Cough  -     promethazine (PHENERGAN) 12 5 mg/10 mL syrup;  Take 1 tsp 4 times daily and 2 tsp at bedtime as needed for cough    Wheezing  -     albuterol (PROVENTIL HFA,VENTOLIN HFA) 90 mcg/act inhaler; Inhale 2 puffs every 6 (six) hours as needed for wheezing    Allergic rhinitis, unspecified seasonality, unspecified trigger  -     montelukast (SINGULAIR) 10 mg tablet; Take 1 tablet daily for allergy    Essential hypertension    Hemoptysis              Subjective:      Patient ID: Becca Billings is a 59 y o  female  CC:    Chief Complaint   Patient presents with    Cough     ongoing for last two weeks, pt did not do at home covid swab nor is vaccinated   Nasal Congestion    Fever    Chills       HPI:    For over 2 weeks possibly up to a month patient has had head congestion cough fever and chills  Patient is not vaccinated  Patient did not check home COVID test   Patient did have episode of blood-tinged sputum  Patient does have some wheezing    Cough  This is a new problem  The current episode started 1 to 4 weeks ago  The problem has been waxing and waning  The problem occurs hourly  The cough is productive of sputum and productive of blood-tinged sputum  Associated symptoms include chills, a fever, heartburn, hemoptysis, myalgias, postnasal drip, rhinorrhea and wheezing  Pertinent negatives include no chest pain, ear congestion, ear pain, headaches, nasal congestion, rash, sore throat, shortness of breath, sweats or weight loss  The symptoms are aggravated by lying down  Risk factors for lung disease include animal exposure  Her past medical history is significant for environmental allergies  The following portions of the patient's history were reviewed and updated as appropriate: allergies, current medications, past family history, past medical history, past social history, past surgical history and problem list       Review of Systems   Constitutional: Positive for chills and fever  Negative for weight loss  HENT: Positive for postnasal drip and rhinorrhea   Negative for ear pain and sore throat  Respiratory: Positive for cough, hemoptysis and wheezing  Negative for shortness of breath  Cardiovascular: Negative for chest pain  Gastrointestinal: Positive for heartburn  Negative for abdominal pain  Endocrine: Negative  Genitourinary: Negative  Musculoskeletal: Positive for myalgias  Skin: Negative for rash  Allergic/Immunologic: Positive for environmental allergies  Neurological: Negative for headaches  Hematological: Negative  Psychiatric/Behavioral: Negative  Data to review:       Objective:    Vitals:    03/21/22 0905   BP: 124/78   BP Location: Right arm   Patient Position: Sitting   Cuff Size: Adult   Pulse: 74   Temp: 97 7 °F (36 5 °C)   TempSrc: Tympanic   Weight: 85 7 kg (189 lb)   Height: 5' 4" (1 626 m)        Physical Exam  Vitals and nursing note reviewed  Constitutional:       Appearance: Normal appearance  HENT:      Head: Normocephalic and atraumatic  Right Ear: Tympanic membrane normal       Left Ear: Tympanic membrane normal       Nose:      Comments: Mildly boggy turbinates negative sinus tenderness to percussion     Mouth/Throat:      Mouth: Mucous membranes are moist       Pharynx: Oropharynx is clear  No oropharyngeal exudate or posterior oropharyngeal erythema  Eyes:      General: No scleral icterus  Right eye: No discharge  Left eye: No discharge  Conjunctiva/sclera: Conjunctivae normal    Cardiovascular:      Rate and Rhythm: Normal rate and regular rhythm  Heart sounds: Normal heart sounds  Pulmonary:      Effort: Pulmonary effort is normal       Breath sounds: Wheezing present  Comments: Right basilar wheezing  Musculoskeletal:      Cervical back: Neck supple  Right lower leg: No edema  Left lower leg: No edema  Lymphadenopathy:      Cervical: No cervical adenopathy  Skin:     General: Skin is warm and dry  Neurological:      General: No focal deficit present        Mental Status: She is alert     Psychiatric:         Mood and Affect: Mood normal

## 2022-03-21 NOTE — PATIENT INSTRUCTIONS
Complete chest x-ray   Finish doxycycline  Use Singulair daily for allergies  Use albuterol 2 puffs every 6 hours as needed for wheezing or congestion  Use promethazine for cough    Do not drive or operate machinery until side effect is known this will cause drowsiness  Return in 1 week if still symptoms

## 2022-03-29 ENCOUNTER — TELEPHONE (OUTPATIENT)
Dept: FAMILY MEDICINE CLINIC | Facility: CLINIC | Age: 62
End: 2022-03-29

## 2022-03-29 NOTE — TELEPHONE ENCOUNTER
I would recommend she continue antibiotic therapy and follow-up with Dr Lindsey Costa  after her next chest x-ray

## 2022-03-29 NOTE — TELEPHONE ENCOUNTER
----- Message from Cheo Mansfield sent at 3/28/2022  5:28 PM EDT -----  Regarding: FW: Suggested Follow-Up Visit if Not Better in 1 Week    ----- Message -----  From: Ray Burk  Sent: 3/28/2022  12:04 PM EDT  To: [facility]  Subject: Suggested Follow-Up Visit if Not Better in 1#    Dr Jyoti Lyn: Today (Mon 3/28/2022) I am following up to let you know how I am doing  My coughing has reduced from hourly to mostly coughing in the mornings when I wake up and at bedtime when I lay down to sleep for the night  I am still very tired & lethargic  I still seem to have no energy to do anything physical including walking any significant distance  As it has always been since I came down with this virus   my coughs have been & still are productive & I have no trouble coughing up sputum/phlegm/mucous  Still coughing up clear/white phlegm & frothy and/or stretchy clear mucous & still wheezing (whistling sounds or crackling sounds) slightly  I have had no trouble breathing so I have had no need to use the albuterol inhaler  I have been taking the other 3 meds (Doxycycline Antibiotic every 12 hrs; Montelukast Sodium every 24 hrs; and Promethazine Cough Syrup) as directed  I am better than I was a week ago when I came in to see you on Mon 3/21/2022  Therefore, I am   not sure if you need me to come back in to see you until I have taken my full 10-day dose of antibiotic  Please have your office call me this afternoon if I really need to make an appt to have to come in to see you  If you need to see me this week, I am available & can come in to see you anytime tomorrow (Tues 3/29/2022) or Thursday 3/31, or Fri 4/1  Otherwise, I will continue to take my antibiotics until I go get my 2nd round of chest X-rays done you ordered for me to get done on Mon 4/4/2022  Patient educated on need for urine specimen. Patient unable to provide sample at this time.

## 2022-04-04 ENCOUNTER — APPOINTMENT (OUTPATIENT)
Dept: RADIOLOGY | Facility: MEDICAL CENTER | Age: 62
End: 2022-04-04
Payer: COMMERCIAL

## 2022-04-04 DIAGNOSIS — J18.9 PNEUMONIA OF BOTH LUNGS DUE TO INFECTIOUS ORGANISM, UNSPECIFIED PART OF LUNG: ICD-10-CM

## 2022-04-04 PROCEDURE — 71046 X-RAY EXAM CHEST 2 VIEWS: CPT

## 2022-04-05 LAB — TSH SERPL-ACNC: 1.33 MIU/L (ref 0.4–4.5)

## 2022-04-08 DIAGNOSIS — R06.2 WHEEZING: ICD-10-CM

## 2022-04-08 RX ORDER — ALBUTEROL SULFATE 90 UG/1
AEROSOL, METERED RESPIRATORY (INHALATION)
Qty: 18 G | Refills: 1 | Status: SHIPPED | OUTPATIENT
Start: 2022-04-08 | End: 2022-06-15 | Stop reason: ALTCHOICE

## 2022-05-15 DIAGNOSIS — E55.9 VITAMIN D DEFICIENCY: ICD-10-CM

## 2022-05-17 RX ORDER — ERGOCALCIFEROL 1.25 MG/1
CAPSULE ORAL
Qty: 12 CAPSULE | Refills: 3 | Status: SHIPPED | OUTPATIENT
Start: 2022-05-17

## 2022-05-22 DIAGNOSIS — E89.0 POSTOPERATIVE HYPOTHYROIDISM: ICD-10-CM

## 2022-05-23 RX ORDER — LEVOTHYROXINE SODIUM 0.12 MG/1
125 TABLET ORAL
Qty: 90 TABLET | Refills: 1 | Status: SHIPPED | OUTPATIENT
Start: 2022-05-23 | End: 2022-08-03 | Stop reason: SDUPTHER

## 2022-05-23 NOTE — TELEPHONE ENCOUNTER
Requested Prescriptions     Pending Prescriptions Disp Refills    levothyroxine 125 mcg tablet [Pharmacy Med Name: LEVOTHYROXINE 125 MCG TABLET] 90 tablet 1     Sig: TAKE 1 TABLET (125 MCG TOTAL) BY MOUTH DAILY IN THE EARLY MORNING     LOV 3/21/22, F/U 8/10/22, labs pending

## 2022-06-15 ENCOUNTER — OFFICE VISIT (OUTPATIENT)
Dept: CARDIOLOGY CLINIC | Facility: CLINIC | Age: 62
End: 2022-06-15
Payer: COMMERCIAL

## 2022-06-15 VITALS
DIASTOLIC BLOOD PRESSURE: 80 MMHG | BODY MASS INDEX: 33.09 KG/M2 | WEIGHT: 193.8 LBS | OXYGEN SATURATION: 97 % | HEIGHT: 64 IN | SYSTOLIC BLOOD PRESSURE: 158 MMHG | HEART RATE: 78 BPM

## 2022-06-15 DIAGNOSIS — K76.89 LIVER CYST: ICD-10-CM

## 2022-06-15 DIAGNOSIS — E66.9 OBESITY (BMI 30-39.9): ICD-10-CM

## 2022-06-15 DIAGNOSIS — I10 ESSENTIAL HYPERTENSION: ICD-10-CM

## 2022-06-15 DIAGNOSIS — R00.2 PALPITATIONS: Primary | ICD-10-CM

## 2022-06-15 DIAGNOSIS — I47.1 PAROXYSMAL SUPRAVENTRICULAR TACHYCARDIA (HCC): ICD-10-CM

## 2022-06-15 DIAGNOSIS — E78.2 MIXED HYPERLIPIDEMIA: ICD-10-CM

## 2022-06-15 DIAGNOSIS — I34.0 NONRHEUMATIC MITRAL VALVE REGURGITATION: ICD-10-CM

## 2022-06-15 PROCEDURE — 99214 OFFICE O/P EST MOD 30 MIN: CPT | Performed by: INTERNAL MEDICINE

## 2022-06-15 PROCEDURE — 1036F TOBACCO NON-USER: CPT | Performed by: INTERNAL MEDICINE

## 2022-06-15 PROCEDURE — 3008F BODY MASS INDEX DOCD: CPT | Performed by: INTERNAL MEDICINE

## 2022-06-15 RX ORDER — PROPRANOLOL HCL 60 MG
60 CAPSULE, EXTENDED RELEASE 24HR ORAL DAILY
Qty: 90 CAPSULE | Refills: 3
Start: 2022-06-15 | End: 2022-08-03 | Stop reason: SDUPTHER

## 2022-06-15 RX ORDER — PROPRANOLOL HCL 60 MG
60 CAPSULE, EXTENDED RELEASE 24HR ORAL DAILY
Qty: 90 CAPSULE | Refills: 3
Start: 2022-06-15 | End: 2022-06-15 | Stop reason: SDUPTHER

## 2022-06-15 RX ORDER — DOCUSATE SODIUM 250 MG
250 CAPSULE ORAL DAILY
COMMUNITY

## 2022-06-15 NOTE — PROGRESS NOTES
Cardiology Office Visit    Lakshmi Cooper  [id number]  1960    [facility]  [address] [facility] [address] [facility] Claus Nicholson [facility] [place] [address] [phone]      Dear Maycol Roche DO,    I had the pleasure of seeing your patient at our [address] [facility] office today 6/15/2022  As you know she is a pleasant 57y o  year old female with a medical history as described below  Patient previously followed at The [address] [place] [address] Drive (Dr Desmond Reyes)  Reason for office visit: Follow up palpitations with history of SVT  1  Palpitations  Assessment & Plan:  Patient reported initially intermittent episodes of palpitations which started around 10/26/2020-11/18/2020  I had recommended the addition of magnesium 500 mg daily as well as aspirin 81 mg daily  Holter monitor showed predominantly normal sinus rhythm with 7 runs of atrial tachycardia with the longest lasting 10 beats and a low PVC burden of 1 6%  Palpitations did correlate with PVCs and atrial tachycardia  Symptoms have improved significantly with the use of magnesium  Palpitations are currently quiescent  She did have some breakthrough palpitations when she transitioned off of her magnesium and started a supplement with a combination of vit/minerals  She resumed magnesium and palpitations are quiescent once again  Patient will continue to monitor for recurrent symptoms  Longer event monitoring can be considered if palpitations reoccur  Continue magnesium 250 mg daily  Would hold aspirin 81 mg daily for now as she is taking Excedrin 2 tablets typically now 1-2 times a day vs previous every 6 hours that she was taking at her previous office visit  She is still exceeding 81 mg daily  I discussed the negative GI side effects of continued high frequency Excedrin use  Orders:  -     propranolol (INDERAL LA) 60 mg 24 hr capsule; Take 1 capsule (60 mg total) by mouth daily    2   Paroxysmal supraventricular tachycardia [facility])  Assessment & Plan:  Patient has a history of SVT with prior SVT ablation 11/2004 (details unknown) and records not able to be found  She had done well up until around October of 2020 when she started to have increased palpitations  Please see discussion under palpitations  Orders:  -     propranolol (INDERAL LA) 60 mg 24 hr capsule; Take 1 capsule (60 mg total) by mouth daily    3  Essential hypertension  Assessment & Plan:  Blood pressure acceptable on my personal recheck  Ectopy on exam    Continue propranolol 60 mg daily  Patient instructed to monitor blood pressure intermittently  4  Mixed hyperlipidemia  Assessment & Plan:  Lipid panel 8/26/2021: C 157  T 106  H 57  L 80  Continue atorvastatin 40 mg daily  Patient should have updated lipid panel 8/2022        5  Obesity (BMI 30-39  9)  Assessment & Plan: Body mass index is 33 27 kg/m²  Discussed the importance of diet and weight loss  Unfortunately the patient's mobility is limited due to bilateral hip and knee pain which limits her ability to exercise  6  Liver cyst  Assessment & Plan:  Patient noted to have multiple liver cysts on imaging (2 separate CT scans)  Has completed follow up  7  Nonrheumatic mitral valve regurgitation  Assessment & Plan:  Mild to moderate mitral regurgitation on echocardiogram 9/30/2021  Repeat echocardiogram 9/20/2022  Orders:  -     Echo complete w/ contrast if indicated; Future; Expected date: 10/03/2022           HPI     1/14/2021: Patient presents to the office today regarding palpitations in the setting of prior history of SVT  Patient underwent previous SVT ablation in 2004 by Dr Lauryn López and was followed by Dr Lacey Mitchell  She tells me that from October 26 through November 18th she has noted increased palpitations  She describes increased palpitations as feeling as if her heart rate is fast and then does a flip/flop    She does describe episodes of chest tightness that occur with her palpitations  She also describes feeling "faint" as well as feeling pressure in her head  And a feeling of "heaviness when walking requiring her to sit down  I had recommended that she add magnesium 500 mg daily as well as 81 mg of aspirin when she had call the office prior to visit  She tells me that this seemed to help significantly with palpitations  She also describes intermittent sharp right-sided chest discomfort which is short-lived in nature  She notes that her palpitations seem very infrequent  She denies any exertional chest pain  4/9/2021: Patient presents to the office today for follow up  She is feeling well with the exception of hip pain  She is scheduled for total right hip replacement 4/21/2021 and eventually will need her left hip done as well as both knees  She is feeling well  She denies any chest heaviness since her palpitations have been controlled  She denies any recurrent palpitations  Her functional capacity is limited due to her hip/knee pain  She is taking 2 excedrin every 6 hours for pain  12/1/2021: Patient presents to the office today for follow up  She did have right hip surgery 4/2021 and noted to have low blood pressure and she was noted to have a heart murmur  Left hip replaced 9/28/2021 and blood pressure was low once again and she was discharged home without her blood pressure medications (propranolol)  She did have an echocardiogram done in the hospital  She is started having right low back pain in October  She was in the bathroom October 17th she couldn't get up (straighten out)  That night she was watching TV and she got right sided chest pain  Felt like a hot josie up and down central chest  She called the doctor the next day and she was sent for CT to rule out pulmonary embolism  CTA 10/19/2021 showed numerous hepatic cysts but no PE  Repeat CT scan 11/1/2021 showed left ovarian cyst and one or more hepatic cysts   She is set up for a pelvic ultrasound next week  She is set up to see pain management with [facility]  She has not recently been checking her blood pressure  She did have some palpitations when she transitioned off of her magnesium and started a combination vitamin tablet  She is taking Excedrin 1 or 2 times a day  She takes OxyContin after physical therapy  6/15/2022: Patient returns to the office today for follow up  She did have bilateral hip replacements (4/2021 and 9/2021)  She needs knee replacements at some point  She is having a lot of shoulder pain  No further chest pain  She has not been checking her blood pressure at home  Blood pressure was low after both surgeries  She did have an echocardiogram done 10/1/2021 due to them noting heart murmur while she was hospitalized for second hip replacement  EF 75%  Mild posterior mitral valve prolapse with mild to moderate regurgitation  She was noted to have turbulent flow in LVOT without evidence of obstruction  No recurrent chest pain  No palpitations  She is still taking Excedrin 1-2 times a day  She did have epistaxis and decreased tumeric once a week  No shortness of breath  Some edema if she is on her feet all day  She had pneumonia 2/2022  Patient Active Problem List   Diagnosis    Allergic rhinitis    Benign hematuria    Headache    Mixed hyperlipidemia    Essential hypertension    Menopause    Paroxysmal supraventricular tachycardia (HCC)    Postoperative hypothyroidism    Health care maintenance    Hyperglycemia    Vitamin D deficiency    Obesity (BMI 30-39  9)    Vertigo    Palpitations    Colon polyp    Osteoarthritis    History of total left hip replacement    Liver cyst    Cyst of left ovary    Nonrheumatic mitral valve regurgitation     Past Medical History:   Diagnosis Date    Arthritis     Bruises easily     Cancer ([address] )     thyroid    Chronic pain disorder     Dental crowns present     Headache     Hip pain, chronic, left     L THR for today 2021    History of vertigo     Hyperlipidemia     Hypertension     Irregular heart beat     PSVT    Joint pain     Knee pain, bilateral     Low back pain     Migraines     Mild acid reflux     Motion sickness     Osteoarthritis     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     last assessed - [id number]    PONV (postoperative nausea and vomiting)     Risk for falls     SVT (supraventricular tachycardia) (HCC)     history/none recent/ has had heart ablation in     Thyroid cancer ([facility] [address] ) 2007    Status post thyroidectomy, no longer sees endocrinology; last assessed - [id number]    Use of cane as ambulatory aid     occas    Varicose veins of leg with edema     last assessed - [id number]    Walking pneumonia     Wears glasses      Social History     Socioeconomic History    Marital status: /Civil Union     Spouse name: Not on file    Number of children: Not on file    Years of education: Not on file    Highest education level: Not on file   Occupational History    Not on file   Tobacco Use    Smoking status: Former Smoker     Packs/day: 0 25     Years: 45 00     Pack years: 11 25     Types: Cigarettes     Quit date: 3/6/2021     Years since quittin 2    Smokeless tobacco: Never Used    Tobacco comment: pt quit 10 years ago but has an occ cigarette   Vaping Use    Vaping Use: Never used   Substance and Sexual Activity    Alcohol use: Yes     Comment: socially    Drug use: No    Sexual activity: Yes   Other Topics Concern    Not on file   Social History Narrative    Not on file     Social Determinants of Health     Financial Resource Strain: Not on file   Food Insecurity: Not on file   Transportation Needs: Not on file   Physical Activity: Not on file   Stress: Not on file   Social Connections: Not on file   Intimate Partner Violence: Not on file   Housing Stability: Not on file      Family History   Problem Relation Age of Onset    Alzheimer's disease Mother     Stroke Family     Diabetes Family     Hypertension Family     Heart attack Family     Prostate cancer Father     No Known Problems Sister     No Known Problems Daughter     No Known Problems Maternal Grandmother     No Known Problems Maternal Grandfather     No Known Problems Paternal Grandmother     No Known Problems Paternal Grandfather      Past Surgical History:   Procedure Laterality Date    AV NODE ABLATION       SECTION  10/27/1992    COLONOSCOPY      Complete colonoscopy    ENDOMETRIAL ABLATION      Thermal; Resolved - Oct 2005    FOOT SURGERY Left     x 2, 265[address] Mon[address] removed ganglion cyst    INFERIOR OBLIQUE MYECTOMY      removed 8 fibroid tumors     JOINT REPLACEMENT      KNEE ARTHROSCOPY Left     KNEE CARTILAGE SURGERY Left     Left knee torn meniscus;  Resolved - Sep 2008    LAPAROSCOPIC ENDOMETRIOSIS FULGURATION      LIMBAL STEM CELL TRANSPLANT      LIPOMA RESECTION      MYOMECTOMY      Anorectal    OTHER SURGICAL HISTORY      heart ablation - atrial tachycardia; ablation for SVT    OVARIAN CYST REMOVAL      IA TOTAL HIP ARTHROPLASTY Right 2021    Procedure: ARTHROPLASTY HIP TOTAL ANTERIOR;  Surgeon: Sanna Lincoln MD;  [facility]: [facility];  Service: Orthopedics    IA TOTAL HIP ARTHROPLASTY Left 2021    Procedure: ANTERIOR TOTAL HIP REPLACEMENT;  Surgeon: Sanna Lincoln MD;  [facility];  Service: Orthopedics    THYROIDECTOMY      VAGINAL MASS EXCISION Right 2017    Procedure: EXCISION RIGHT PERINEAL LIPOMA/ MASS, REMOVAL OF SKIN TAG;  Surgeon: Sary Page MD;  Location: [facility];  Service: General    WISDOM TOOTH EXTRACTION         Current Outpatient Medications:     aspirin-acetaminophen-caffeine (EXCEDRIN MIGRAINE) 250-250-65 MG per tablet, Take 2 tablets by mouth every 6 (six) hours as needed for headaches, Disp: , Rfl:     atorvastatin (LIPITOR) 40 mg tablet, TAKE 1 TABLET BY MOUTH DAILY AT BEDTIME, Disp: 90 tablet, Rfl: 3   Cholecalciferol (Vitamin D3) 25 MCG TABS, Take 1 tablet by mouth daily , Disp: , Rfl:     cyanocobalamin (VITAMIN B-12) 2000 MCG tablet, Take 2,500 mcg by mouth daily, Disp: , Rfl:     diphenhydrAMINE (BENADRYL) 25 mg tablet, Take 25 mg by mouth daily at bedtime as needed for itching , Disp: , Rfl:     docusate sodium (COLACE) 250 MG capsule, Take 250 mg by mouth daily, Disp: , Rfl:     ergocalciferol (VITAMIN D2) 50,000 units, TAKE 1 CAPSULE BY MOUTH ONE TIME PER WEEK, Disp: 12 capsule, Rfl: 3    L-Lysine 500 MG CAPS, Take 500 mg by mouth daily , Disp: , Rfl:     levothyroxine 125 mcg tablet, TAKE 1 TABLET (125 MCG TOTAL) BY MOUTH DAILY IN THE EARLY MORNING, Disp: 90 tablet, Rfl: 1    Magnesium 500 MG TABS, Take 1 tablet (500 mg total) by mouth daily, Disp: 30 tablet, Rfl: 11    multivitamin (THERAGRAN) TABS, Take 1 tablet by mouth daily , Disp: , Rfl:     propranolol (INDERAL LA) 60 mg 24 hr capsule, Take 1 capsule (60 mg total) by mouth daily, Disp: 90 capsule, Rfl: 3    TURMERIC PO, Take 500 mg by mouth once a week, Disp: , Rfl:     Vitamin Mixture (JAY-C PO), Take 1,000 mg by mouth daily , Disp: , Rfl:     zinc gluconate 50 mg tablet, Take 50 mg by mouth daily , Disp: , Rfl:      Allergies   Allergen Reactions    Morphine Other (See Comments) and Headache     Other reaction(s): Other (See Comments)  severe headache  "severe headache"    Other Rash     Adhesive tape    Pain Meds,,,, caused N/V  Rash    Codeine Headache     Category: Adverse Reaction;     Tramadol Headache     Category: Adverse Reaction;     Medical Tape Rash         Cardiac Test Results:     Echocardiogram 9/30/2021:   EF 75%  Septal aneurysm without evidence of shunt  Mild JAMEY with mild posterior mitral valve leaflet prolapse  Mild to moderate mitral regurgitation  Turbulent LVOT flow  Mild tricuspid regurgitation  Lipid panel 8/26/2021: C 157  T 106  H 57  L 80  ECG 4/6/2021: Normal sinus rhythm   First degree AV block  ECG 01/14/2021:  Sinus bradycardia with first-degree AV block at 59 beats per minute  Nonspecific ST T wave abnormality  Holter monitor 11/20/2020:   Predominantly normal sinus rhythm the average heart rate of 71 beats per minute  Seven runs of atrial tachycardia with the longest lasting 10 beats  1 6% PVC burden  Rare PACs  Symptoms correlated with PVCs and atrial tachycardia  There is no evidence of any pauses or advanced heart block  Lipid panel 1/15/21: C 177  T 108    H 54  L 103  Review of Systems:    Review of Systems   Constitutional: Positive for unexpected weight change  Negative for activity change, appetite change and fatigue  HENT: Negative for congestion, hearing loss, tinnitus and trouble swallowing  Eyes: Negative for visual disturbance  Respiratory: Negative for cough, chest tightness, shortness of breath and wheezing  Snoring   Cardiovascular: Positive for leg swelling ( intermittent)  Negative for chest pain and palpitations  Gastrointestinal: Negative for abdominal distention, abdominal pain, blood in stool, nausea and vomiting  Genitourinary: Negative for difficulty urinating  Nocturia   Musculoskeletal: Positive for arthralgias, back pain, joint swelling and myalgias  Skin: Positive for rash  Neurological: Positive for weakness, numbness and headaches  Negative for dizziness, syncope and light-headedness  Hematological: Bruises/bleeds easily  Psychiatric/Behavioral: Positive for sleep disturbance  Negative for confusion  The patient is not nervous/anxious  All other systems reviewed and are negative  Vitals:    06/15/22 1506   BP: 158/80   Pulse: 78   SpO2: 97%   Weight: 87 9 kg (193 lb 12 8 oz)   Height: 5' 4" (1 626 m)     Vitals:    06/15/22 1506   Weight: 87 9 kg (193 lb 12 8 oz)     Height: 5' 4" (162 6 cm)     Physical Exam  Vitals reviewed  Constitutional:       Appearance: She is well-developed     HENT: Head: Normocephalic and atraumatic  Eyes:      Conjunctiva/sclera: Conjunctivae normal       Pupils: Pupils are equal, round, and reactive to light  Neck:      Vascular: No JVD  Cardiovascular:      Rate and Rhythm: Normal rate and regular rhythm  Occasional extrasystoles are present  Heart sounds: Murmur heard  No friction rub  No gallop  Pulmonary:      Effort: Pulmonary effort is normal       Breath sounds: Normal breath sounds  Abdominal:      General: Bowel sounds are normal       Palpations: Abdomen is soft  Musculoskeletal:      Cervical back: Normal range of motion  Skin:     General: Skin is warm and dry  Neurological:      Mental Status: She is alert and oriented to person, place, and time     Psychiatric:         Behavior: Behavior normal

## 2022-06-15 NOTE — PATIENT INSTRUCTIONS
Continue current medications  Would plan repeat echocardiogram 10/2022  Call me with any change in symptoms  Should have updated cholesterol panel 9/2022

## 2022-06-16 PROBLEM — I34.0 NONRHEUMATIC MITRAL VALVE REGURGITATION: Status: ACTIVE | Noted: 2022-06-16

## 2022-06-16 NOTE — ASSESSMENT & PLAN NOTE
Blood pressure acceptable on my personal recheck  Ectopy on exam    Continue propranolol 60 mg daily  Patient instructed to monitor blood pressure intermittently

## 2022-06-16 NOTE — ASSESSMENT & PLAN NOTE
Patient reported initially intermittent episodes of palpitations which started around 10/26/2020-11/18/2020  I had recommended the addition of magnesium 500 mg daily as well as aspirin 81 mg daily  Holter monitor showed predominantly normal sinus rhythm with 7 runs of atrial tachycardia with the longest lasting 10 beats and a low PVC burden of 1 6%  Palpitations did correlate with PVCs and atrial tachycardia  Symptoms have improved significantly with the use of magnesium  Palpitations are currently quiescent  She did have some breakthrough palpitations when she transitioned off of her magnesium and started a supplement with a combination of vit/minerals  She resumed magnesium and palpitations are quiescent once again  Patient will continue to monitor for recurrent symptoms  Longer event monitoring can be considered if palpitations reoccur  Continue magnesium 250 mg daily  Would hold aspirin 81 mg daily for now as she is taking Excedrin 2 tablets typically now 1-2 times a day vs previous every 6 hours that she was taking at her previous office visit  She is still exceeding 81 mg daily  I discussed the negative GI side effects of continued high frequency Excedrin use

## 2022-06-16 NOTE — ASSESSMENT & PLAN NOTE
Lipid panel 8/26/2021: C 157  T 106  H 57  L 80  Continue atorvastatin 40 mg daily  Patient should have updated lipid panel 8/2022

## 2022-06-16 NOTE — ASSESSMENT & PLAN NOTE
Body mass index is 33 27 kg/m²  Discussed the importance of diet and weight loss  Unfortunately the patient's mobility is limited due to bilateral hip and knee pain which limits her ability to exercise

## 2022-06-16 NOTE — ASSESSMENT & PLAN NOTE
Patient noted to have multiple liver cysts on imaging (2 separate CT scans)  Has completed follow up

## 2022-07-30 LAB
25(OH)D3 SERPL-MCNC: 61 NG/ML (ref 30–100)
ALBUMIN SERPL-MCNC: 4.3 G/DL (ref 3.6–5.1)
ALBUMIN/GLOB SERPL: 2 (CALC) (ref 1–2.5)
ALP SERPL-CCNC: 79 U/L (ref 37–153)
ALT SERPL-CCNC: 20 U/L (ref 6–29)
APPEARANCE UR: CLEAR
AST SERPL-CCNC: 21 U/L (ref 10–35)
BASOPHILS # BLD AUTO: 41 CELLS/UL (ref 0–200)
BASOPHILS NFR BLD AUTO: 0.6 %
BILIRUB SERPL-MCNC: 0.6 MG/DL (ref 0.2–1.2)
BILIRUB UR QL STRIP: NEGATIVE
BUN SERPL-MCNC: 17 MG/DL (ref 7–25)
BUN/CREAT SERPL: NORMAL (CALC) (ref 6–22)
CALCIUM SERPL-MCNC: 8.9 MG/DL (ref 8.6–10.4)
CHLORIDE SERPL-SCNC: 104 MMOL/L (ref 98–110)
CHOLEST SERPL-MCNC: 200 MG/DL
CHOLEST/HDLC SERPL: 3.6 (CALC)
CO2 SERPL-SCNC: 27 MMOL/L (ref 20–32)
COLOR UR: YELLOW
CREAT SERPL-MCNC: 0.61 MG/DL (ref 0.5–1.05)
EOSINOPHIL # BLD AUTO: 197 CELLS/UL (ref 15–500)
EOSINOPHIL NFR BLD AUTO: 2.9 %
ERYTHROCYTE [DISTWIDTH] IN BLOOD BY AUTOMATED COUNT: 12.5 % (ref 11–15)
GFR/BSA.PRED SERPLBLD CYS-BASED-ARV: 100 ML/MIN/1.73M2
GLOBULIN SER CALC-MCNC: 2.2 G/DL (CALC) (ref 1.9–3.7)
GLUCOSE SERPL-MCNC: 76 MG/DL (ref 65–99)
GLUCOSE UR QL STRIP: NEGATIVE
HBA1C MFR BLD: 5.3 % OF TOTAL HGB
HCT VFR BLD AUTO: 38.6 % (ref 35–45)
HDLC SERPL-MCNC: 56 MG/DL
HGB BLD-MCNC: 13.3 G/DL (ref 11.7–15.5)
HGB UR QL STRIP: NEGATIVE
KETONES UR QL STRIP: NEGATIVE
LDLC SERPL CALC-MCNC: 110 MG/DL (CALC)
LEUKOCYTE ESTERASE UR QL STRIP: NEGATIVE
LYMPHOCYTES # BLD AUTO: 2237 CELLS/UL (ref 850–3900)
LYMPHOCYTES NFR BLD AUTO: 32.9 %
MCH RBC QN AUTO: 31.4 PG (ref 27–33)
MCHC RBC AUTO-ENTMCNC: 34.5 G/DL (ref 32–36)
MCV RBC AUTO: 91 FL (ref 80–100)
MONOCYTES # BLD AUTO: 490 CELLS/UL (ref 200–950)
MONOCYTES NFR BLD AUTO: 7.2 %
NEUTROPHILS # BLD AUTO: 3835 CELLS/UL (ref 1500–7800)
NEUTROPHILS NFR BLD AUTO: 56.4 %
NITRITE UR QL STRIP: NEGATIVE
NONHDLC SERPL-MCNC: 144 MG/DL (CALC)
PH UR STRIP: 6.5 [PH] (ref 5–8)
PLATELET # BLD AUTO: 234 THOUSAND/UL (ref 140–400)
PMV BLD REES-ECKER: 9.4 FL (ref 7.5–12.5)
POTASSIUM SERPL-SCNC: 4.2 MMOL/L (ref 3.5–5.3)
PROT SERPL-MCNC: 6.5 G/DL (ref 6.1–8.1)
PROT UR QL STRIP: NEGATIVE
RBC # BLD AUTO: 4.24 MILLION/UL (ref 3.8–5.1)
SODIUM SERPL-SCNC: 138 MMOL/L (ref 135–146)
SP GR UR STRIP: 1.02 (ref 1–1.03)
T4 SERPL-MCNC: 10.7 MCG/DL (ref 5.1–11.9)
TRIGL SERPL-MCNC: 225 MG/DL
TSH SERPL-ACNC: 2.47 MIU/L (ref 0.4–4.5)
WBC # BLD AUTO: 6.8 THOUSAND/UL (ref 3.8–10.8)

## 2022-08-03 ENCOUNTER — TELEPHONE (OUTPATIENT)
Dept: FAMILY MEDICINE CLINIC | Facility: CLINIC | Age: 62
End: 2022-08-03

## 2022-08-03 DIAGNOSIS — I47.1 PAROXYSMAL SUPRAVENTRICULAR TACHYCARDIA (HCC): ICD-10-CM

## 2022-08-03 DIAGNOSIS — E89.0 POSTOPERATIVE HYPOTHYROIDISM: ICD-10-CM

## 2022-08-03 DIAGNOSIS — E66.9 CLASS 2 OBESITY WITHOUT SERIOUS COMORBIDITY WITH BODY MASS INDEX (BMI) OF 35.0 TO 35.9 IN ADULT, UNSPECIFIED OBESITY TYPE: ICD-10-CM

## 2022-08-03 DIAGNOSIS — R00.2 PALPITATIONS: ICD-10-CM

## 2022-08-03 RX ORDER — ATORVASTATIN CALCIUM 40 MG/1
40 TABLET, FILM COATED ORAL
Qty: 90 TABLET | Refills: 3 | Status: SHIPPED | OUTPATIENT
Start: 2022-08-03

## 2022-08-03 RX ORDER — LEVOTHYROXINE SODIUM 0.12 MG/1
125 TABLET ORAL
Qty: 90 TABLET | Refills: 3 | Status: SHIPPED | OUTPATIENT
Start: 2022-08-03 | End: 2022-08-30

## 2022-08-03 RX ORDER — CEPHALEXIN 500 MG/1
2000 CAPSULE ORAL
COMMUNITY
Start: 2022-07-28

## 2022-08-03 RX ORDER — PROPRANOLOL HCL 60 MG
60 CAPSULE, EXTENDED RELEASE 24HR ORAL DAILY
Qty: 90 CAPSULE | Refills: 3 | Status: SHIPPED | OUTPATIENT
Start: 2022-08-03

## 2022-08-03 NOTE — TELEPHONE ENCOUNTER
----- Message from Lori NEWMAN sent at 8/3/2022  4:18 PM EDT -----  Regarding: FW: Hernan Menon [address]    ----- Message -----  From: Milaskyler Alonzo  Sent: 8/3/2022   4:13 PM EDT  To: [facility]  Subject: Need New Prescriptions Called Into [facility]    In addition to my message sent on 08/03/2022  Rosamaria Powers I am requesting and would like to have both the Atorvastatin and the Propranolol prescriptions called into [facility], [address] [place] [address] this month (August)     so I can fill them both BEFORE  I leave to go out of town September 1, 2022  I plan to be traveling in and out of town for the next two months (Sept and Oct) and want to make sure I have enough of each prescription so I do not run out while I am away and out of town  Note:   I will be returning home in November 2022  (before winter) when I will be able to fill the new 90-day Levothyroxine prescription that you will need to call into [facility] by November 2022   as my next and last refill will run out near the end of November 2022  Thanks so much  I greatly appreciate you tending to my prescription needs as requested  I will see you soon for my upcoming scheduled appt on August 15, 2022

## 2022-08-04 ENCOUNTER — VBI (OUTPATIENT)
Dept: ADMINISTRATIVE | Facility: OTHER | Age: 62
End: 2022-08-04

## 2022-08-15 ENCOUNTER — OFFICE VISIT (OUTPATIENT)
Dept: FAMILY MEDICINE CLINIC | Facility: CLINIC | Age: 62
End: 2022-08-15
Payer: COMMERCIAL

## 2022-08-15 VITALS
DIASTOLIC BLOOD PRESSURE: 78 MMHG | OXYGEN SATURATION: 95 % | WEIGHT: 199 LBS | HEIGHT: 64 IN | HEART RATE: 68 BPM | SYSTOLIC BLOOD PRESSURE: 118 MMHG | BODY MASS INDEX: 33.97 KG/M2

## 2022-08-15 DIAGNOSIS — E78.2 MIXED HYPERLIPIDEMIA: ICD-10-CM

## 2022-08-15 DIAGNOSIS — I47.1 PAROXYSMAL SUPRAVENTRICULAR TACHYCARDIA (HCC): ICD-10-CM

## 2022-08-15 DIAGNOSIS — E66.9 OBESITY (BMI 30-39.9): ICD-10-CM

## 2022-08-15 DIAGNOSIS — I34.0 NONRHEUMATIC MITRAL VALVE REGURGITATION: ICD-10-CM

## 2022-08-15 DIAGNOSIS — Z12.4 CERVICAL CANCER SCREENING: ICD-10-CM

## 2022-08-15 DIAGNOSIS — R06.09 DYSPNEA ON EXERTION: ICD-10-CM

## 2022-08-15 DIAGNOSIS — R73.9 HYPERGLYCEMIA: ICD-10-CM

## 2022-08-15 DIAGNOSIS — E55.9 VITAMIN D DEFICIENCY: ICD-10-CM

## 2022-08-15 DIAGNOSIS — E89.0 POSTOPERATIVE HYPOTHYROIDISM: Primary | ICD-10-CM

## 2022-08-15 DIAGNOSIS — K63.5 POLYP OF COLON, UNSPECIFIED PART OF COLON, UNSPECIFIED TYPE: ICD-10-CM

## 2022-08-15 DIAGNOSIS — I10 ESSENTIAL HYPERTENSION: ICD-10-CM

## 2022-08-15 DIAGNOSIS — R00.2 PALPITATIONS: ICD-10-CM

## 2022-08-15 PROCEDURE — 99214 OFFICE O/P EST MOD 30 MIN: CPT | Performed by: FAMILY MEDICINE

## 2022-08-15 NOTE — PATIENT INSTRUCTIONS
Follow-up with cardiology as planned  Complete pulmonary function testing  Diet exercise weight loss recommended  I recommend seeing gyn for cervical cancer screening exam  Return in 6 months for office visit blood work sooner if need

## 2022-08-29 DIAGNOSIS — E89.0 POSTOPERATIVE HYPOTHYROIDISM: ICD-10-CM

## 2022-08-29 NOTE — TELEPHONE ENCOUNTER
Requested Prescriptions     Pending Prescriptions Disp Refills    levothyroxine 125 mcg tablet [Pharmacy Med Name: LEVOTHYROXINE 125 MCG TABLET] 90 tablet 3     Sig: TAKE 1 TABLET (125 MCG TOTAL) BY MOUTH DAILY IN THE EARLY MORNING     LOV 8/15/22, F/U 2/22/23, Labs active

## 2022-08-30 RX ORDER — LEVOTHYROXINE SODIUM 0.12 MG/1
125 TABLET ORAL
Qty: 90 TABLET | Refills: 3 | Status: SHIPPED | OUTPATIENT
Start: 2022-08-30

## 2022-10-11 ENCOUNTER — HOSPITAL ENCOUNTER (OUTPATIENT)
Dept: PULMONOLOGY | Facility: HOSPITAL | Age: 62
Discharge: HOME | End: 2022-10-11
Payer: COMMERCIAL

## 2022-10-11 DIAGNOSIS — R06.09 DYSPNEA ON EXERTION: ICD-10-CM

## 2022-10-11 PROCEDURE — 94726 PLETHYSMOGRAPHY LUNG VOLUMES: CPT

## 2022-10-11 PROCEDURE — 94729 DIFFUSING CAPACITY: CPT

## 2022-10-11 PROCEDURE — 94060 EVALUATION OF WHEEZING: CPT

## 2022-10-11 PROCEDURE — 94726 PLETHYSMOGRAPHY LUNG VOLUMES: CPT | Performed by: INTERNAL MEDICINE

## 2022-10-11 PROCEDURE — 94060 EVALUATION OF WHEEZING: CPT | Performed by: INTERNAL MEDICINE

## 2022-10-11 PROCEDURE — 94760 N-INVAS EAR/PLS OXIMETRY 1: CPT

## 2022-10-11 PROCEDURE — 94729 DIFFUSING CAPACITY: CPT | Performed by: INTERNAL MEDICINE

## 2022-10-11 RX ORDER — ALBUTEROL SULFATE 2.5 MG/3ML
2.5 SOLUTION RESPIRATORY (INHALATION) ONCE AS NEEDED
Status: COMPLETED | OUTPATIENT
Start: 2022-10-11 | End: 2022-10-11

## 2022-10-11 RX ADMIN — ALBUTEROL SULFATE 2.5 MG: 2.5 SOLUTION RESPIRATORY (INHALATION) at 13:12

## 2022-10-17 DIAGNOSIS — J44.9 COPD MIXED TYPE (HCC): Primary | ICD-10-CM

## 2022-10-17 RX ORDER — ALBUTEROL SULFATE 90 UG/1
2 AEROSOL, METERED RESPIRATORY (INHALATION) EVERY 6 HOURS PRN
Qty: 18 G | Refills: 1 | Status: SHIPPED | OUTPATIENT
Start: 2022-10-17

## 2022-10-18 ENCOUNTER — HOSPITAL ENCOUNTER (OUTPATIENT)
Dept: NON INVASIVE DIAGNOSTICS | Facility: HOSPITAL | Age: 62
Discharge: HOME | End: 2022-10-18
Attending: INTERNAL MEDICINE
Payer: COMMERCIAL

## 2022-10-18 VITALS
HEIGHT: 64 IN | WEIGHT: 199 LBS | BODY MASS INDEX: 33.97 KG/M2 | SYSTOLIC BLOOD PRESSURE: 118 MMHG | HEART RATE: 72 BPM | DIASTOLIC BLOOD PRESSURE: 78 MMHG

## 2022-10-18 DIAGNOSIS — I34.0 NONRHEUMATIC MITRAL VALVE REGURGITATION: ICD-10-CM

## 2022-10-18 LAB
AORTIC ROOT: 3.3 CM
AORTIC VALVE MEAN VELOCITY: 10.5 M/S
APICAL FOUR CHAMBER EJECTION FRACTION: 68 %
ASCENDING AORTA: 3.4 CM
AV AREA BY CONTINUOUS VTI: 2.3 CM2
AV AREA PEAK VELOCITY: 2.2 CM2
AV LVOT MEAN GRADIENT: 2 MMHG
AV LVOT PEAK GRADIENT: 5 MMHG
AV MEAN GRADIENT: 5 MMHG
AV PEAK GRADIENT: 11 MMHG
AV VALVE AREA: 2.25 CM2
AV VELOCITY RATIO: 0.68
DOP CALC AO PEAK VEL: 1.68 M/S
DOP CALC AO VTI: 32.84 CM
DOP CALC LVOT AREA: 3.14 CM2
DOP CALC LVOT DIAMETER: 2 CM
DOP CALC LVOT PEAK VEL VTI: 23.53 CM
DOP CALC LVOT PEAK VEL: 1.15 M/S
DOP CALC LVOT STROKE INDEX: 39 ML/M2
DOP CALC LVOT STROKE VOLUME: 73.88
E WAVE DECELERATION TIME: 231 MS
FRACTIONAL SHORTENING: 25 (ref 28–44)
INTERVENTRICULAR SEPTUM IN DIASTOLE (PARASTERNAL SHORT AXIS VIEW): 1.5 CM
INTERVENTRICULAR SEPTUM: 1.5 CM (ref 0.6–1.1)
LAAS-AP2: 14.1 CM2
LAAS-AP4: 15.8 CM2
LEFT ATRIUM SIZE: 3.3 CM
LEFT INTERNAL DIMENSION IN SYSTOLE: 2.7 CM (ref 2.1–4)
LEFT VENTRICLE DIASTOLIC VOLUME (MOD BIPLANE): 84 ML
LEFT VENTRICLE SYSTOLIC VOLUME (MOD BIPLANE): 30 ML
LEFT VENTRICULAR INTERNAL DIMENSION IN DIASTOLE: 3.6 CM (ref 3.5–6)
LEFT VENTRICULAR POSTERIOR WALL IN END DIASTOLE: 1.1 CM
LEFT VENTRICULAR STROKE VOLUME: 30 ML
LV EF: 65 %
LVSV (TEICH): 30 ML
MITRAL REGURGITATION PEAK VELOCITY: 5.61 M/S
MITRAL VALVE MEAN INFLOW VELOCITY: 4.2 M/S
MITRAL VALVE REGURGITANT PEAK GRADIENT: 126 MMHG
MV E'TISSUE VEL-LAT: 6 CM/S
MV E'TISSUE VEL-SEP: 6 CM/S
MV PEAK A VEL: 0.65 M/S
MV PEAK E VEL: 62 CM/S
MV STENOSIS PRESSURE HALF TIME: 67 MS
MV VALVE AREA P 1/2 METHOD: 3.28
PV PEAK GRADIENT: 3 MMHG
RIGHT ATRIUM AREA SYSTOLE A4C: 19 CM2
RIGHT VENTRICLE ID DIMENSION: 3.5 CM
SL CV DOP CALC MV VTI RETROGRADE: 147.1 CM
SL CV LEFT ATRIUM LENGTH A2C: 3.8 CM
SL CV MV MEAN GRADIENT RETROGRADE: 79 MMHG
SL CV PED ECHO LEFT VENTRICLE DIASTOLIC VOLUME (MOD BIPLANE) 2D: 56 ML
SL CV PED ECHO LEFT VENTRICLE SYSTOLIC VOLUME (MOD BIPLANE) 2D: 26 ML
TR MAX PG: 18 MMHG
TR PEAK VELOCITY: 2.1 M/S
TRICUSPID VALVE PEAK REGURGITATION VELOCITY: 2.11 M/S

## 2022-10-18 PROCEDURE — 93306 TTE W/DOPPLER COMPLETE: CPT

## 2022-10-20 ENCOUNTER — TELEPHONE (OUTPATIENT)
Dept: CARDIOLOGY CLINIC | Facility: CLINIC | Age: 62
End: 2022-10-20

## 2022-10-20 NOTE — TELEPHONE ENCOUNTER
----- Message from Edda Klein sent at 10/19/2022  6:28 PM EDT -----  Please let patient know that her echocardiogram shows similar findings to 10/2021 study  Dr Dennis Service will review in detail at follow up  Thank you!

## 2022-10-26 ENCOUNTER — VBI (OUTPATIENT)
Dept: ADMINISTRATIVE | Facility: OTHER | Age: 62
End: 2022-10-26

## 2022-12-09 ENCOUNTER — VBI (OUTPATIENT)
Dept: ADMINISTRATIVE | Facility: OTHER | Age: 62
End: 2022-12-09

## 2023-01-12 ENCOUNTER — VBI (OUTPATIENT)
Dept: ADMINISTRATIVE | Facility: OTHER | Age: 63
End: 2023-01-12

## 2023-01-16 ENCOUNTER — TELEPHONE (OUTPATIENT)
Dept: FAMILY MEDICINE CLINIC | Facility: CLINIC | Age: 63
End: 2023-01-16

## 2023-01-16 DIAGNOSIS — N83.202 CYST OF LEFT OVARY: Primary | ICD-10-CM

## 2023-01-16 NOTE — TELEPHONE ENCOUNTER
I was notified by [facility] radiology this patient had a pelvic ultrasound on 12/6/2021  She had a left ovarian cyst which was incompletely evaluated OB/GYN consultation was suggested and ordered  I do not see this completed in her chart    I am ordering another pelvic ultrasound and putting another referral into GYN for this ovarian cyst

## 2023-02-15 ENCOUNTER — RA CDI HCC (OUTPATIENT)
Dept: OTHER | Facility: HOSPITAL | Age: 63
End: 2023-02-15

## 2023-02-15 NOTE — PROGRESS NOTES
Cardiology Office Visit    Charan Hayes  [id number]  1960    [facility]  [address] [facility] [address] [facility] [facility] [place] [id number] [phone]      Dear Benja Griffith DO,    I had the pleasure of seeing your patient at our [facility] office today 1/14/2021  As you know she is a pleasant 56y o  year old female with a medical history as described below  Patient previously followed at The [address] [place] [address] Drive (Dr Lakeisha Guevara)  Reason for office visit: Palpitations with history of SVT  1  Palpitations  Assessment & Plan:  Patient reports intermittent episodes of palpitations which started around 10/26/2020-11/18/2020  I had recommended the addition of magnesium 500 mg daily as well as aspirin 81 mg daily prior to this office visit  Holter monitor showed predominantly normal sinus rhythm with 7 runs of atrial tachycardia with the longest lasting 10 beats and a low PVC burden of 1 6%  Palpitations did correlate with PVCs and atrial tachycardia  Symptoms have improved significantly with the use of magnesium  Patient will continue to monitor for recurrent symptoms  Longer event monitoring would be considered if palpitations reoccur  Orders:  -     POCT ECG    2  Paroxysmal supraventricular tachycardia [facility])  Assessment & Plan:  Patient has a history of SVT with prior SVT ablation 11/2004 (details unknown) and records not able to be found  She had done well up until around October of 2020 when she started to have increased palpitations  Please see discussion under palpitations  3  Essential hypertension  Assessment & Plan:  Blood pressure is well controlled on propranolol  4  Mixed hyperlipidemia  Assessment & Plan:  Patient is currently on atorvastatin 40 mg daily  Lipid panel 1/17/2020: C 153  T 159  H 49  L 78  Recommend dietary modification for triglyceride elevation        5  SVT (supraventricular tachycardia) [facility])  -     POCT ECG           HPI   Patient presents to the office today regarding palpitations in the setting of prior history of SVT  Patient underwent previous SVT ablation in 2004 by Dr Kaylyn Lea and was followed by Dr Alejandro Page  She tells me that from October 26 through November 18th she has noted increased palpitations  She describes increased palpitations as feeling as if her heart rate is fast and then does a flip/flop  She does describe episodes of chest tightness that occur with her palpitations  She also describes feeling "faint" as well as feeling pressure in her head  And a feeling of "heaviness when walking requiring her to sit down  I had recommended that she add magnesium 500 mg daily as well as 81 mg of aspirin when she had call the office prior to visit  She tells me that this seemed to help significantly with palpitations  She also describes intermittent sharp right-sided chest discomfort which is short-lived in nature  She notes that her palpitations seem very infrequent  She denies any exertional chest pain  Patient Active Problem List   Diagnosis    Allergic rhinitis    Benign hematuria    Headache    Mixed hyperlipidemia    Essential hypertension    Menopause    Osteoarthritis of both knees    Osteoarthritis of left hip    Paroxysmal supraventricular tachycardia (HCC)    Postoperative hypothyroidism    Screening for colon cancer    Screening for breast cancer    Health care maintenance    Hyperglycemia    Osteoarthritis    Vitamin D deficiency    Obesity (BMI 30-39  9)    Vertigo    Palpitations     Past Medical History:   Diagnosis Date    Bruises easily     Cancer ([address] )     thyroid    Dental crowns present     Headache     Hyperlipidemia     Hypertension     Joint pain     Knee pain, bilateral     Low back pain     Migraines     Mild acid reflux     Osteoarthritis     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     last assessed - [id number]    PONV (postoperative nausea and vomiting)     Risk for falls     SVT (supraventricular tachycardia) (HCC)     history/none recent/ has had heart ablation in 2004    Thyroid cancer ([facility] ) 2007    Status post thyroidectomy, no longer sees endocrinology; last assessed - [id number]    Use of cane as ambulatory aid     occas    Varicose veins of leg with edema     last assessed - [id number]    Walking pneumonia     Wears glasses      Social History     Socioeconomic History    Marital status: /Civil Union     Spouse name: Not on file    Number of children: Not on file    Years of education: Not on file    Highest education level: Not on file   Occupational History    Not on file   Social Needs    Financial resource strain: Not on file    Food insecurity     Worry: Not on file     Inability: Not on file   [employer] needs     Medical: Not on file     Non-medical: Not on file   Tobacco Use    Smoking status: Former Smoker     Types: Cigarettes    Smokeless tobacco: Never Used    Tobacco comment: quit 10 years ago; social smoker never a consistent smoker   Substance and Sexual Activity    Alcohol use: Yes     Comment: socially    Drug use: No    Sexual activity: Never   Lifestyle    Physical activity     Days per week: Not on file     Minutes per session: Not on file    Stress: Not on file   Relationships    Social connections     Talks on phone: Not on file     Gets together: Not on file     Attends [employer] service: Not on file     Active member of club or organization: Not on file     Attends meetings of clubs or organizations: Not on file     Relationship status: Not on file    Intimate partner violence     Fear of current or ex partner: Not on file     Emotionally abused: Not on file     Physically abused: Not on file     Forced sexual activity: Not on file   Other Topics Concern    Not on file   Social History Narrative    Not on file      Family History   Problem Relation Age of Onset    Alzheimer's disease Mother     Stroke Family     Diabetes Family     Hypertension Family     Heart attack Family      Past Surgical History:   Procedure Laterality Date    AV NODE ABLATION       SECTION  10/27/1992    COLONOSCOPY      Complete colonoscopy    ENDOMETRIAL ABLATION      Thermal; Resolved - Oct 2005    FOOT SURGERY Left     x 2, 2657 Monique Snyder removed ganglion cyst    INFERIOR OBLIQUE MYECTOMY      removed 8 fibroid tumors     KNEE ARTHROSCOPY Left     KNEE CARTILAGE SURGERY Left     Left knee torn meniscus;  Resolved - Sep 2008    LAPAROSCOPIC ENDOMETRIOSIS FULGURATION      LIPOMA RESECTION      MYOMECTOMY      Anorectal    OTHER SURGICAL HISTORY      heart ablation - atrial tachycardia; ablation for SVT    OVARIAN CYST REMOVAL      THYROIDECTOMY      THYROIDECTOMY      VAGINAL MASS EXCISION Right 2017    Procedure: EXCISION RIGHT PERINEAL LIPOMA/ MASS, REMOVAL OF SKIN TAG;  Surgeon: Leah He MD;  Location: [facility];  Service: General       Current Outpatient Medications:     aspirin (ECOTRIN LOW STRENGTH) 81 mg EC tablet, Take 1 tablet (81 mg total) by mouth daily, Disp: 30 tablet, Rfl: 11    aspirin-acetaminophen-caffeine (EXCEDRIN MIGRAINE) 250-250-65 MG per tablet, Take by mouth, Disp: , Rfl:     atorvastatin (LIPITOR) 40 mg tablet, Take 1 tablet (40 mg total) by mouth daily at bedtime, Disp: 90 tablet, Rfl: 3    B Complex-C (SUPER B COMPLEX PO), Take 60 mg by mouth once a week, Disp: , Rfl:     diphenhydrAMINE (BENADRYL) 25 mg tablet, Take 25 mg by mouth every 6 (six) hours as needed for itching, Disp: , Rfl:     ergocalciferol (VITAMIN D2) 50,000 units, Take 1 capsule (50,000 Units total) by mouth once a week, Disp: 4 capsule, Rfl: 11    L-Lysine 500 MG CAPS, Take 500 mg by mouth once a week, Disp: , Rfl:     Magnesium 500 MG TABS, Take 1 tablet (500 mg total) by mouth daily, Disp: 30 tablet, Rfl: 11    meloxicam (MOBIC) 15 mg tablet, Take 1 daily with food, Disp: 30 tablet, Rfl: 5    multivitamin (THERAGRAN) TABS, Take 1 tablet by mouth once a week, Disp: , Rfl:     propranolol (INDERAL LA) 60 mg 24 hr capsule, Take 1 capsule (60 mg total) by mouth daily, Disp: 90 capsule, Rfl: 3    Vitamin Mixture (JAY-C PO), Take 1,000 mg by mouth once a week, Disp: , Rfl:     zinc gluconate 50 mg tablet, Take 50 mg by mouth once a week, Disp: , Rfl:     levothyroxine 112 mcg tablet, TAKE 1 TABLET BY MOUTH DAILY, Disp: 90 tablet, Rfl: 3    meclizine (ANTIVERT) 25 mg tablet, Take 1 tablet (25 mg total) by mouth 3 (three) times a day as needed for dizziness, Disp: 30 tablet, Rfl: 0     Allergies   Allergen Reactions    Morphine Other (See Comments) and Headache     Other reaction(s): Other (See Comments)  severe headache  "severe headache"    Other Rash     Adhesive tape    Pain Meds,,,, caused N/V    Codeine Headache     Category: Adverse Reaction;     Tramadol Headache     Category: Adverse Reaction;     Medical Tape Rash         Cardiac Test Results:     ECG 01/14/2021:  Sinus bradycardia with first-degree AV block at 59 beats per minute  Nonspecific ST T wave abnormality  Holter monitor 11/20/2020:   Predominantly normal sinus rhythm the average heart rate of 71 beats per minute  Seven runs of atrial tachycardia with the longest lasting 10 beats  1 6% PVC burden  Rare PACs  Symptoms correlated with PVCs and atrial tachycardia  There is no evidence of any pauses or advanced heart block  Lipid panel 1/17/2020: C 153  T 159  H 49  L 78  Review of Systems:    Review of Systems   Constitutional: Positive for fatigue and unexpected weight change  Negative for activity change and appetite change  HENT: Negative for congestion, hearing loss, tinnitus and trouble swallowing  Eyes: Negative for visual disturbance  Respiratory: Positive for shortness of breath  Negative for cough, chest tightness and wheezing           Snoring   Cardiovascular: Positive for chest pain ( occasional sharp short-lived chest pain), palpitations and leg swelling ( intermittent)  Gastrointestinal: Positive for blood in stool  Negative for abdominal distention, abdominal pain, nausea and vomiting  Genitourinary: Negative for difficulty urinating  Nocturia   Musculoskeletal: Positive for arthralgias, back pain, joint swelling and myalgias  Skin: Positive for rash  Neurological: Positive for weakness, numbness and headaches  Negative for dizziness, syncope and light-headedness  Hematological: Bruises/bleeds easily  Psychiatric/Behavioral: Positive for sleep disturbance  Negative for confusion  The patient is not nervous/anxious  All other systems reviewed and are negative  Vitals:    01/14/21 1059   BP: 104/66   BP Location: Left arm   Patient Position: Sitting   Cuff Size: Standard   Pulse: 59   SpO2: 98%   Weight: 82 6 kg (182 lb)   Height: 5' 4" (1 626 m)     Vitals:    01/14/21 1059   Weight: 82 6 kg (182 lb)     Height: 5' 4" (162 6 cm)     Physical Exam   Constitutional: She is oriented to person, place, and time  She appears well-developed and well-nourished  HENT:   Head: Normocephalic and atraumatic  Eyes: Pupils are equal, round, and reactive to light  Conjunctivae are normal    Neck: Normal range of motion  No JVD present  Cardiovascular: Normal rate, regular rhythm and normal heart sounds  Exam reveals no gallop and no friction rub  No murmur heard  Pulmonary/Chest: Effort normal and breath sounds normal    Abdominal: Soft  Bowel sounds are normal    Musculoskeletal:         General: No edema  Neurological: She is alert and oriented to person, place, and time  Skin: Skin is warm and dry  Psychiatric: She has a normal mood and affect  Her behavior is normal    Vitals reviewed  Detail Level: Detailed Size Of Lesion: 2.5 cm

## 2023-02-15 NOTE — PROGRESS NOTES
[facility]  coding opportunities       Chart reviewed, no opportunity found: CHART REVIEWED, NO OPPORTUNITY FOUND        Patients Insurance        Commercial Insurance: [employer]

## 2023-05-06 LAB
25(OH)D3 SERPL-MCNC: 104 NG/ML (ref 30–100)
ALBUMIN SERPL-MCNC: 4.1 G/DL (ref 3.6–5.1)
ALBUMIN/GLOB SERPL: 1.9 (CALC) (ref 1–2.5)
ALP SERPL-CCNC: 66 U/L (ref 37–153)
ALT SERPL-CCNC: 24 U/L (ref 6–29)
APPEARANCE UR: CLEAR
AST SERPL-CCNC: 25 U/L (ref 10–35)
BASOPHILS # BLD AUTO: 43 CELLS/UL (ref 0–200)
BASOPHILS NFR BLD AUTO: 0.5 %
BILIRUB SERPL-MCNC: 0.4 MG/DL (ref 0.2–1.2)
BILIRUB UR QL STRIP: NEGATIVE
BUN SERPL-MCNC: 15 MG/DL (ref 7–25)
BUN/CREAT SERPL: NORMAL (CALC) (ref 6–22)
CALCIUM SERPL-MCNC: 9 MG/DL (ref 8.6–10.4)
CHLORIDE SERPL-SCNC: 103 MMOL/L (ref 98–110)
CHOLEST SERPL-MCNC: 156 MG/DL
CHOLEST/HDLC SERPL: 2.8 (CALC)
CO2 SERPL-SCNC: 24 MMOL/L (ref 20–32)
COLOR UR: YELLOW
CREAT SERPL-MCNC: 0.62 MG/DL (ref 0.5–1.05)
EOSINOPHIL # BLD AUTO: 138 CELLS/UL (ref 15–500)
EOSINOPHIL NFR BLD AUTO: 1.6 %
ERYTHROCYTE [DISTWIDTH] IN BLOOD BY AUTOMATED COUNT: 13.2 % (ref 11–15)
GFR/BSA.PRED SERPLBLD CYS-BASED-ARV: 99 ML/MIN/1.73M2
GLOBULIN SER CALC-MCNC: 2.2 G/DL (CALC) (ref 1.9–3.7)
GLUCOSE SERPL-MCNC: 86 MG/DL (ref 65–99)
GLUCOSE UR QL STRIP: NEGATIVE
HBA1C MFR BLD: 5.3 % OF TOTAL HGB
HCT VFR BLD AUTO: 38.7 % (ref 35–45)
HDLC SERPL-MCNC: 56 MG/DL
HGB BLD-MCNC: 12.8 G/DL (ref 11.7–15.5)
HGB UR QL STRIP: NEGATIVE
KETONES UR QL STRIP: NEGATIVE
LDLC SERPL CALC-MCNC: 82 MG/DL (CALC)
LEUKOCYTE ESTERASE UR QL STRIP: NEGATIVE
LYMPHOCYTES # BLD AUTO: 1746 CELLS/UL (ref 850–3900)
LYMPHOCYTES NFR BLD AUTO: 20.3 %
MCH RBC QN AUTO: 30.9 PG (ref 27–33)
MCHC RBC AUTO-ENTMCNC: 33.1 G/DL (ref 32–36)
MCV RBC AUTO: 93.5 FL (ref 80–100)
MONOCYTES # BLD AUTO: 585 CELLS/UL (ref 200–950)
MONOCYTES NFR BLD AUTO: 6.8 %
NEUTROPHILS # BLD AUTO: 6089 CELLS/UL (ref 1500–7800)
NEUTROPHILS NFR BLD AUTO: 70.8 %
NITRITE UR QL STRIP: NEGATIVE
NONHDLC SERPL-MCNC: 100 MG/DL (CALC)
PH UR STRIP: 6.5 [PH] (ref 5–8)
PLATELET # BLD AUTO: 241 THOUSAND/UL (ref 140–400)
PMV BLD REES-ECKER: 9.8 FL (ref 7.5–12.5)
POTASSIUM SERPL-SCNC: 4 MMOL/L (ref 3.5–5.3)
PROT SERPL-MCNC: 6.3 G/DL (ref 6.1–8.1)
PROT UR QL STRIP: NEGATIVE
RBC # BLD AUTO: 4.14 MILLION/UL (ref 3.8–5.1)
SODIUM SERPL-SCNC: 139 MMOL/L (ref 135–146)
SP GR UR STRIP: 1.01 (ref 1–1.03)
T4 SERPL-MCNC: 15.1 MCG/DL (ref 5.1–11.9)
TRIGL SERPL-MCNC: 90 MG/DL
TSH SERPL-ACNC: 0.47 MIU/L (ref 0.4–4.5)
WBC # BLD AUTO: 8.6 THOUSAND/UL (ref 3.8–10.8)

## 2023-05-11 ENCOUNTER — OFFICE VISIT (OUTPATIENT)
Dept: FAMILY MEDICINE CLINIC | Facility: CLINIC | Age: 63
End: 2023-05-11

## 2023-05-11 VITALS
WEIGHT: 194 LBS | HEIGHT: 65 IN | HEART RATE: 84 BPM | OXYGEN SATURATION: 96 % | BODY MASS INDEX: 32.32 KG/M2 | RESPIRATION RATE: 20 BRPM | DIASTOLIC BLOOD PRESSURE: 78 MMHG | SYSTOLIC BLOOD PRESSURE: 114 MMHG

## 2023-05-11 DIAGNOSIS — E55.9 VITAMIN D DEFICIENCY: ICD-10-CM

## 2023-05-11 DIAGNOSIS — E78.2 MIXED HYPERLIPIDEMIA: ICD-10-CM

## 2023-05-11 DIAGNOSIS — Z00.00 HEALTH CARE MAINTENANCE: Primary | ICD-10-CM

## 2023-05-11 DIAGNOSIS — I10 ESSENTIAL HYPERTENSION: ICD-10-CM

## 2023-05-11 DIAGNOSIS — K63.5 POLYP OF COLON, UNSPECIFIED PART OF COLON, UNSPECIFIED TYPE: ICD-10-CM

## 2023-05-11 DIAGNOSIS — Z78.0 POSTMENOPAUSAL ESTROGEN DEFICIENCY: ICD-10-CM

## 2023-05-11 DIAGNOSIS — Z12.31 BREAST CANCER SCREENING BY MAMMOGRAM: ICD-10-CM

## 2023-05-11 DIAGNOSIS — Z23 NEED FOR TDAP VACCINATION: ICD-10-CM

## 2023-05-11 DIAGNOSIS — J44.9 COPD MIXED TYPE (HCC): ICD-10-CM

## 2023-05-11 DIAGNOSIS — E89.0 POSTOPERATIVE HYPOTHYROIDISM: ICD-10-CM

## 2023-05-11 DIAGNOSIS — N83.202 CYST OF LEFT OVARY: ICD-10-CM

## 2023-05-11 DIAGNOSIS — R73.9 HYPERGLYCEMIA: ICD-10-CM

## 2023-05-11 DIAGNOSIS — Z00.00 ANNUAL PHYSICAL EXAM: ICD-10-CM

## 2023-05-11 DIAGNOSIS — I47.1 PAROXYSMAL SUPRAVENTRICULAR TACHYCARDIA (HCC): ICD-10-CM

## 2023-05-11 RX ORDER — LEVOTHYROXINE SODIUM 112 UG/1
112 TABLET ORAL
Qty: 90 TABLET | Refills: 3 | Status: SHIPPED | OUTPATIENT
Start: 2023-05-11

## 2023-05-11 NOTE — PROGRESS NOTES
[address] [facility] PRIMARY [facility]ARE    NAME: Rehan Artis  AGE: 57 y o  SEX: female  : 1960     DATE: 2023     Assessment and Plan:     Problem List Items Addressed This Visit        Digestive    Colon polyp       Endocrine    Postoperative hypothyroidism    Cyst of left ovary       Respiratory    COPD mixed type [facility])       Cardiovascular and Mediastinum    Essential hypertension    Paroxysmal supraventricular tachycardia ([address] )       Other    Mixed hyperlipidemia    Health care maintenance - Primary    Hyperglycemia    Vitamin D deficiency   Other Visit Diagnoses     Breast cancer screening by mammogram        Relevant Orders    Mammo screening bilateral w 3d & cad    Annual physical exam              Immunizations and preventive care screenings were discussed with patient today  Appropriate education was printed on patient's after visit summary  Counseling:  {Annual Physical; Counselin}    BMI Counseling: Body mass index is 32 28 kg/m²  The BMI is above normal  Nutrition recommendations include moderation in carbohydrate intake and reducing intake of cholesterol  Exercise recommendations include exercising 3-5 times per week  Rationale for BMI follow-up plan is due to patient being overweight or obese  Depression Screening and Follow-up Plan: Patient was screened for depression during today's encounter  They screened negative with a PHQ-2 score of 0  No follow-ups on file  Chief Complaint:     Chief Complaint   Patient presents with   • Physical Exam      History of Present Illness:     Adult Annual Physical   Patient here for a comprehensive physical exam  The patient reports {problems:97832}  Diet and Physical Activity  Diet/Nutrition: {annual physical; diet:76084666}  Exercise: {annual physical; exercise:24824003}        Depression Screening  PHQ-2/9 Depression Screening    Little interest or pleasure in doing things: 0 - not at all  Feeling down, depressed, or hopeless: 0 - not at all  PHQ-2 Score: 0  PHQ-2 Interpretation: Negative depression screen       General Health  Sleep: {annual physical; sleep:2102}  Hearing: {annual physical; hearin}  Vision: {annual physical; vision:}  Dental: {annual physical; dental:}  /GYN Health  Patient is: {Menopause:92965}  Last menstrual period: ***  Contraceptive method: {contraceptive options:}       Review of Systems:     Review of Systems   Past Medical History:     Past Medical History:   Diagnosis Date   • Arthritis    • Bruises easily    • [facility] )     thyroid   • Chronic pain disorder    • Dental crowns present    • Headache    • Hip pain, chronic, left     L THR for today 2021   • History of vertigo    • Hyperlipidemia    • Hypertension    • Irregular heart beat     PSVT   • Joint pain    • Knee pain, bilateral    • Low back pain    • Migraines    • Mild acid reflux    • Motion sickness    • Osteoarthritis    • 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     last assessed - 2017   • PONV (postoperative nausea and vomiting)    • Risk for falls    • SVT (supraventricular tachycardia) ([facility] )     history/none recent/ has had heart ablation in    • Thyroid cancer ([facility] ) 2007    Status post thyroidectomy, no longer sees endocrinology; last assessed - 2014   • Use of cane as ambulatory aid     occas   • Varicose veins of leg with edema     last assessed - 2014   • Walking pneumonia    • Wears glasses       Past Surgical History:     Past Surgical History:   Procedure Laterality Date   • AV NODE ABLATION     •  SECTION  10/27/1992   • COLONOSCOPY      Complete colonoscopy   • ENDOMETRIAL ABLATION      Thermal; Resolved - Oct 2005   • FOOT SURGERY Left     x 5, 1053 Monique Snyder removed ganglion cyst   • INFERIOR OBLIQUE MYECTOMY      removed 8 fibroid tumors    • JOINT REPLACEMENT     • KNEE ARTHROSCOPY Left    • KNEE CARTILAGE SURGERY Left     Left knee torn meniscus;  Resolved - Sep 2008   • LAPAROSCOPIC ENDOMETRIOSIS FULGURATION     • LIMBAL STEM CELL TRANSPLANT     • LIPOMA RESECTION     • MYOMECTOMY      Anorectal   • OTHER SURGICAL HISTORY      heart ablation - atrial tachycardia; ablation for SVT   • OVARIAN CYST REMOVAL     • DE ARTHRP ACETBLR/PROX FEM PROSTC AGRFT/ALGRFT Right 2021    Procedure: ARTHROPLASTY HIP TOTAL ANTERIOR;  Surgeon: Seng Aldana MD;  [facility];  Service: Orthopedics   • DE ARTHRP ACETBLR/PROX FEM PROSTC AGRFT/ALGRFT Left 2021    Procedure: ANTERIOR TOTAL HIP REPLACEMENT;  Surgeon: Seng Aldana MD;  [facility];  Service: Orthopedics   • THYROIDECTOMY     • VAGINAL MASS EXCISION Right 2017    Procedure: EXCISION RIGHT PERINEAL LIPOMA/ MASS, REMOVAL OF SKIN TAG;  Surgeon: Amado Saha MD;  [facility];  Service: General   • WISDOM TOOTH EXTRACTION        Social History:     Social History     Socioeconomic History   • Marital status: /Civil Union     Spouse name: None   • Number of children: None   • Years of education: None   • Highest education level: None   Occupational History   • None   Tobacco Use   • Smoking status: Former     Packs/day: 0 25     Years: 45 00     Pack years: 11 25     Types: Cigarettes     Quit date: 3/6/2021     Years since quittin 1   • Smokeless tobacco: Never   • Tobacco comments:     pt quit 10 years ago but has an occ cigarette   Vaping Use   • Vaping Use: Never used   Substance and Sexual Activity   • Alcohol use: Yes     Comment: socially   • Drug use: No   • Sexual activity: Yes   Other Topics Concern   • None   Social History Narrative   • None     Social Determinants of Health     Financial Resource Strain: Not on file   Food Insecurity: Not on file   Transportation Needs: Not on file   Physical Activity: Not on file   Stress: Not on file   Social Connections: Not on file   Intimate Partner Violence: Not on file Housing Stability: Not on file      Family History:     Family History   Problem Relation Age of Onset   • Alzheimer's disease Mother    • Stroke Family    • Diabetes Family    • Hypertension Family    • Heart attack Family    • Prostate cancer Father    • No Known Problems Sister    • No Known Problems Daughter    • No Known Problems Maternal Grandmother    • No Known Problems Maternal Grandfather    • No Known Problems Paternal Grandmother    • No Known Problems Paternal Grandfather       Current Medications:     Current Outpatient Medications   Medication Sig Dispense Refill   • albuterol (PROVENTIL HFA,VENTOLIN HFA) 90 mcg/act inhaler Inhale 2 puffs every 6 (six) hours as needed for wheezing 18 g 1   • aspirin-acetaminophen-caffeine (EXCEDRIN MIGRAINE) 250-250-65 MG per tablet Take 2 tablets by mouth every 6 (six) hours as needed for headaches     • atorvastatin (LIPITOR) 40 mg tablet Take 1 tablet (40 mg total) by mouth daily at bedtime 90 tablet 3   • cephalexin (KEFLEX) 500 mg capsule Take 2,000 mg by mouth 60 minutes pre-procedure Prior to dental procedures     • Cholecalciferol (Vitamin D3) 25 MCG TABS Take 1 tablet by mouth daily      • cyanocobalamin (VITAMIN B-12) 2000 MCG tablet Take 2,500 mcg by mouth daily     • diphenhydrAMINE (BENADRYL) 25 mg tablet Take 25 mg by mouth daily at bedtime as needed for itching      • docusate sodium (COLACE) 250 MG capsule Take 250 mg by mouth daily     • ergocalciferol (VITAMIN D2) 50,000 units TAKE 1 CAPSULE BY MOUTH ONE TIME PER WEEK 12 capsule 3   • L-Lysine 500 MG CAPS Take 500 mg by mouth daily      • levothyroxine 125 mcg tablet TAKE 1 TABLET (125 MCG TOTAL) BY MOUTH DAILY IN THE EARLY MORNING 90 tablet 3   • Magnesium 500 MG TABS Take 1 tablet (500 mg total) by mouth daily 30 tablet 11   • propranolol (INDERAL LA) 60 mg 24 hr capsule Take 1 capsule (60 mg total) by mouth daily 90 capsule 3   • Vitamin Mixture (JAY-C PO) Take 1,000 mg by mouth daily      • "zinc gluconate 50 mg tablet Take 50 mg by mouth daily        No current facility-administered medications for this visit  Allergies: Allergies   Allergen Reactions   • Morphine Other (See Comments) and Headache     Other reaction(s): Other (See Comments)  severe headache  \"severe headache\"   • Other Rash     Adhesive tape    Pain Meds,,,, caused N/V  Rash   • Codeine Headache     Category: Adverse Reaction;    • Tramadol Headache     Category:  Adverse Reaction;    • Medical Tape Rash      Physical Exam:     /90 (BP Location: Right arm, Patient Position: Sitting, Cuff Size: Standard)   Pulse 84   Resp 20   Ht 5' 5\" (1 651 m)   Wt 88 kg (194 lb)   SpO2 96%   BMI 32 28 kg/m²     Physical Exam     Noel Ramirez DO  [facility]    "

## 2023-05-11 NOTE — PROGRESS NOTES
Name: Keyanna Reddy      : 1960      MRN: [id number]  Encounter Provider: Primitivo Navarro DO  Encounter Date: 2023   Encounter department: [facility] PRIMARY [facility]ARE    Assessment & Plan   21  Healthcare maintenance  Patient seen examined chart reviewed  Adacel given  Mammography ordered  DEXA scan is ordered  2  Colon polyp up-to-date with colonoscopy  3  Paroxysmal SVT status post ablation, follows with cardiology is on NSR by auscultation  4  COPD, stable lungs are clear  5  Hypothyroidism, currently hyperthyroid decrease levothyroxine from 125 down to 112 mcg daily check TSH 4 weeks #5  Left ovarian cyst patient has not completed ultrasound of pelvis ordered twice before, new order placed  6  Vitamin D deficiency, supratherapeutic, may discontinue prescription vitamin D  7  Hyperlipidemia, stable continue present therapy #8  Hyperglycemia diet controlled  9  BMI 32 2 weight diet exercise weight loss recommended  10  Patient to return in 6 months for office visit, blood work, and AWV welcome to Medicare        1  Health care maintenance  Assessment & Plan:  Patient seen and examined Adacel given, mammography, DEXA scan ordered      2  Breast cancer screening by mammogram  -     Mammo screening bilateral w 3d & cad; Future; Expected date: 2023  -     CBC; Future; Expected date: 2023  -     Comprehensive metabolic panel; Future; Expected date: 2023  -     Hemoglobin A1C; Future; Expected date: 2023  -     Lipid Panel with Direct LDL reflex; Future; Expected date: 2023  -     T4; Future; Expected date: 2023  -     TSH, 3rd generation with Free T4 reflex; Future; Expected date: 2023  -     UA (URINE) with reflex to Scope; Future; Expected date: 2023  -     Vitamin D 25 hydroxy; Future; Expected date: 2023    3  Annual physical exam  -     CBC; Future; Expected date: 2023  -     Comprehensive metabolic panel;  Future; Expected date: 11/13/2023  -     Hemoglobin A1C; Future; Expected date: 11/13/2023  -     Lipid Panel with Direct LDL reflex; Future; Expected date: 11/13/2023  -     T4; Future; Expected date: 11/13/2023  -     TSH, 3rd generation with Free T4 reflex; Future; Expected date: 11/13/2023  -     UA (URINE) with reflex to Scope; Future; Expected date: 11/13/2023  -     Vitamin D 25 hydroxy; Future; Expected date: 11/13/2023    4  Paroxysmal supraventricular tachycardia (HCC)  Assessment & Plan:  Status post ablation, in NSR by auscultation follows with cardiology    Orders:  -     CBC; Future; Expected date: 11/13/2023  -     Comprehensive metabolic panel; Future; Expected date: 11/13/2023  -     Hemoglobin A1C; Future; Expected date: 11/13/2023  -     Lipid Panel with Direct LDL reflex; Future; Expected date: 11/13/2023  -     T4; Future; Expected date: 11/13/2023  -     TSH, 3rd generation with Free T4 reflex; Future; Expected date: 11/13/2023  -     UA (URINE) with reflex to Scope; Future; Expected date: 11/13/2023  -     Vitamin D 25 hydroxy; Future; Expected date: 11/13/2023    5  COPD mixed type [facility])  Assessment & Plan:  Stable, continue present therapy lungs are clear    Orders:  -     CBC; Future; Expected date: 11/13/2023  -     Comprehensive metabolic panel; Future; Expected date: 11/13/2023  -     Hemoglobin A1C; Future; Expected date: 11/13/2023  -     Lipid Panel with Direct LDL reflex; Future; Expected date: 11/13/2023  -     T4; Future; Expected date: 11/13/2023  -     TSH, 3rd generation with Free T4 reflex; Future; Expected date: 11/13/2023  -     UA (URINE) with reflex to Scope; Future; Expected date: 11/13/2023  -     Vitamin D 25 hydroxy; Future; Expected date: 11/13/2023    6  Polyp of colon, unspecified part of colon, unspecified type  Assessment & Plan:  Up-to-date with colonoscopy    Orders:  -     CBC; Future; Expected date: 11/13/2023  -     Comprehensive metabolic panel;  Future; Expected date: 11/13/2023  -     Hemoglobin A1C; Future; Expected date: 11/13/2023  -     Lipid Panel with Direct LDL reflex; Future; Expected date: 11/13/2023  -     T4; Future; Expected date: 11/13/2023  -     TSH, 3rd generation with Free T4 reflex; Future; Expected date: 11/13/2023  -     UA (URINE) with reflex to Scope; Future; Expected date: 11/13/2023  -     Vitamin D 25 hydroxy; Future; Expected date: 11/13/2023    7  Postoperative hypothyroidism  Assessment & Plan:  A hyperthyroid decrease levothyroxine 125 down 112 mcg check TSH 4 weeks    Orders:  -     levothyroxine (Euthyrox) 112 mcg tablet; Take 1 tablet (112 mcg total) by mouth daily in the early morning  -     CBC; Future; Expected date: 11/13/2023  -     Comprehensive metabolic panel; Future; Expected date: 11/13/2023  -     Hemoglobin A1C; Future; Expected date: 11/13/2023  -     Lipid Panel with Direct LDL reflex; Future; Expected date: 11/13/2023  -     T4; Future; Expected date: 11/13/2023  -     TSH, 3rd generation with Free T4 reflex; Future; Expected date: 11/13/2023  -     UA (URINE) with reflex to Scope; Future; Expected date: 11/13/2023  -     Vitamin D 25 hydroxy; Future; Expected date: 11/13/2023  -     TSH, 3rd generation with Free T4 reflex; Future; Expected date: 06/12/2023    8  Cyst of left ovary  Assessment & Plan:  Patient never completed pelvic ultrasound that was ordered previously will reorder today    Orders:  -     US pelvis complete non OB; Future; Expected date: 05/11/2023  -     CBC; Future; Expected date: 11/13/2023  -     Comprehensive metabolic panel; Future; Expected date: 11/13/2023  -     Hemoglobin A1C; Future; Expected date: 11/13/2023  -     Lipid Panel with Direct LDL reflex; Future; Expected date: 11/13/2023  -     T4; Future; Expected date: 11/13/2023  -     TSH, 3rd generation with Free T4 reflex; Future; Expected date: 11/13/2023  -     UA (URINE) with reflex to Scope;  Future; Expected date: 11/13/2023  -     Vitamin D 25 hydroxy; Future; Expected date: 11/13/2023    9  Essential hypertension  Assessment & Plan:  Stable continue present therapy    Orders:  -     CBC; Future; Expected date: 11/13/2023  -     Comprehensive metabolic panel; Future; Expected date: 11/13/2023  -     Hemoglobin A1C; Future; Expected date: 11/13/2023  -     Lipid Panel with Direct LDL reflex; Future; Expected date: 11/13/2023  -     T4; Future; Expected date: 11/13/2023  -     TSH, 3rd generation with Free T4 reflex; Future; Expected date: 11/13/2023  -     UA (URINE) with reflex to Scope; Future; Expected date: 11/13/2023  -     Vitamin D 25 hydroxy; Future; Expected date: 11/13/2023    10  Vitamin D deficiency  Assessment & Plan:  Vitamin D level supratherapeutic patient may discontinue prescription vitamin D    Orders:  -     CBC; Future; Expected date: 11/13/2023  -     Comprehensive metabolic panel; Future; Expected date: 11/13/2023  -     Hemoglobin A1C; Future; Expected date: 11/13/2023  -     Lipid Panel with Direct LDL reflex; Future; Expected date: 11/13/2023  -     T4; Future; Expected date: 11/13/2023  -     TSH, 3rd generation with Free T4 reflex; Future; Expected date: 11/13/2023  -     UA (URINE) with reflex to Scope; Future; Expected date: 11/13/2023  -     Vitamin D 25 hydroxy; Future; Expected date: 11/13/2023    11  Mixed hyperlipidemia  Assessment & Plan:  Able continue Lipitor 40 mg daily    Orders:  -     CBC; Future; Expected date: 11/13/2023  -     Comprehensive metabolic panel; Future; Expected date: 11/13/2023  -     Hemoglobin A1C; Future; Expected date: 11/13/2023  -     Lipid Panel with Direct LDL reflex; Future; Expected date: 11/13/2023  -     T4; Future; Expected date: 11/13/2023  -     TSH, 3rd generation with Free T4 reflex; Future; Expected date: 11/13/2023  -     UA (URINE) with reflex to Scope; Future; Expected date: 11/13/2023  -     Vitamin D 25 hydroxy; Future; Expected date: 11/13/2023    12  Hyperglycemia  Assessment & Plan:  Diet controlled    Orders:  -     CBC; Future; Expected date: 11/13/2023  -     Comprehensive metabolic panel; Future; Expected date: 11/13/2023  -     Hemoglobin A1C; Future; Expected date: 11/13/2023  -     Lipid Panel with Direct LDL reflex; Future; Expected date: 11/13/2023  -     T4; Future; Expected date: 11/13/2023  -     TSH, 3rd generation with Free T4 reflex; Future; Expected date: 11/13/2023  -     UA (URINE) with reflex to Scope; Future; Expected date: 11/13/2023  -     Vitamin D 25 hydroxy; Future; Expected date: 11/13/2023    13  Need for Tdap vaccination  -     TDAP VACCINE GREATER THAN OR EQUAL TO 8YO IM  -     CBC; Future; Expected date: 11/13/2023  -     Comprehensive metabolic panel; Future; Expected date: 11/13/2023  -     Hemoglobin A1C; Future; Expected date: 11/13/2023  -     Lipid Panel with Direct LDL reflex; Future; Expected date: 11/13/2023  -     T4; Future; Expected date: 11/13/2023  -     TSH, 3rd generation with Free T4 reflex; Future; Expected date: 11/13/2023  -     UA (URINE) with reflex to Scope; Future; Expected date: 11/13/2023  -     Vitamin D 25 hydroxy; Future; Expected date: 11/13/2023    14  Postmenopausal estrogen deficiency  -     DXA bone density spine hip and pelvis; Future; Expected date: 05/11/2023  -     CBC; Future; Expected date: 11/13/2023  -     Comprehensive metabolic panel; Future; Expected date: 11/13/2023  -     Hemoglobin A1C; Future; Expected date: 11/13/2023  -     Lipid Panel with Direct LDL reflex; Future; Expected date: 11/13/2023  -     T4; Future; Expected date: 11/13/2023  -     TSH, 3rd generation with Free T4 reflex; Future; Expected date: 11/13/2023  -     UA (URINE) with reflex to Scope; Future; Expected date: 11/13/2023  -     Vitamin D 25 hydroxy;  Future; Expected date: 11/13/2023           Subjective      Patient is here for usual yearly physical exam patient without any medical plaints concerns at present time   Blood work was reviewed patient did lose 5 pounds since last office visit    Review of Systems   Constitutional: Negative  HENT: Negative  Eyes: Negative  Respiratory: Negative  Cardiovascular: Negative  Gastrointestinal: Negative  Endocrine: Negative  Genitourinary:        2021 patient had a ovarian cyst was recommended to repeat  Last year this was reordered patient still has not completed   Musculoskeletal: Negative  Skin: Negative  Allergic/Immunologic: Negative  Neurological: Negative  Hematological: Negative  Psychiatric/Behavioral: Negative          Current Outpatient Medications on File Prior to Visit   Medication Sig   • albuterol (PROVENTIL HFA,VENTOLIN HFA) 90 mcg/act inhaler Inhale 2 puffs every 6 (six) hours as needed for wheezing   • aspirin-acetaminophen-caffeine (EXCEDRIN MIGRAINE) 250-250-65 MG per tablet Take 2 tablets by mouth every 6 (six) hours as needed for headaches   • atorvastatin (LIPITOR) 40 mg tablet Take 1 tablet (40 mg total) by mouth daily at bedtime   • cephalexin (KEFLEX) 500 mg capsule Take 2,000 mg by mouth 60 minutes pre-procedure Prior to dental procedures   • Cholecalciferol (Vitamin D3) 25 MCG TABS Take 1 tablet by mouth daily    • cyanocobalamin (VITAMIN B-12) 2000 MCG tablet Take 2,500 mcg by mouth daily   • diphenhydrAMINE (BENADRYL) 25 mg tablet Take 25 mg by mouth daily at bedtime as needed for itching    • docusate sodium (COLACE) 250 MG capsule Take 250 mg by mouth daily   • L-Lysine 500 MG CAPS Take 500 mg by mouth daily    • Magnesium 500 MG TABS Take 1 tablet (500 mg total) by mouth daily   • propranolol (INDERAL LA) 60 mg 24 hr capsule Take 1 capsule (60 mg total) by mouth daily   • Vitamin Mixture (JAY-C PO) Take 1,000 mg by mouth daily    • zinc gluconate 50 mg tablet Take 50 mg by mouth daily    • [DISCONTINUED] ergocalciferol (VITAMIN D2) 50,000 units TAKE 1 CAPSULE BY MOUTH ONE TIME PER WEEK   • [DISCONTINUED] "levothyroxine 125 mcg tablet TAKE 1 TABLET (125 MCG TOTAL) BY MOUTH DAILY IN THE EARLY MORNING   • [DISCONTINUED] multivitamin (THERAGRAN) TABS Take 1 tablet by mouth daily        Objective     /78   Pulse 84   Resp 20   Ht 5' 5\" (1 651 m)   Wt 88 kg (194 lb)   SpO2 96%   BMI 32 28 kg/m²     Physical Exam  Vitals and nursing note reviewed  Constitutional:       Appearance: Normal appearance  HENT:      Head: Normocephalic and atraumatic  Right Ear: Tympanic membrane normal       Left Ear: Tympanic membrane normal       Nose: Nose normal       Mouth/Throat:      Mouth: Mucous membranes are moist       Pharynx: Oropharynx is clear  No oropharyngeal exudate or posterior oropharyngeal erythema  Eyes:      General: No scleral icterus  Neck:      Vascular: No carotid bruit  Cardiovascular:      Rate and Rhythm: Normal rate and regular rhythm  Heart sounds: Normal heart sounds  Pulmonary:      Effort: Pulmonary effort is normal       Breath sounds: Normal breath sounds  Abdominal:      General: Bowel sounds are normal       Palpations: Abdomen is soft  Tenderness: There is no abdominal tenderness  Musculoskeletal:      Cervical back: Neck supple  Right lower leg: No edema  Left lower leg: No edema  Skin:     General: Skin is warm and dry  Comments: Lower extremity spider veins   Neurological:      General: No focal deficit present  Mental Status: She is alert     Psychiatric:         Mood and Affect: Mood normal        Noel Ramirez DO  "

## 2023-05-11 NOTE — PATIENT INSTRUCTIONS
Discontinue levothyroxine 125 mcg  Start levothyroxine 112 mcg daily  Complete thyroid blood work in 4 weeks, nonfasting  Diet exercise weight loss recommended  Complete pelvic ultrasound for follow-up of left ovarian cyst  Complete DEXA scan for osteoporosis screening  Complete mammography for breast cancer screening  Return in 6 months for office visit, blood work, and welcome to [facility]  Wellness Visit for Adults   AMBULATORY CARE:   A wellness visit  is when you see your healthcare provider to get screened for health problems  Your healthcare provider will also give you advice on how to stay healthy  Write down your questions so you remember to ask them  Ask your healthcare provider how often you should have a wellness visit  What happens at a wellness visit:  Your healthcare provider will ask about your health, and your family history of health problems  This includes high blood pressure, heart disease, and cancer  He or she will ask if you have symptoms that concern you, if you smoke, and about your mood  You may also be asked about your intake of medicines, supplements, food, and alcohol  Any of the following may be done: Your weight  will be checked  Your height may also be checked so your body mass index (BMI) can be calculated  Your BMI shows if you are at a healthy weight  Your blood pressure  and heart rate will be checked  Your temperature may also be checked  Blood and urine tests  may be done  Blood tests may be done to check your cholesterol levels  Abnormal cholesterol levels increase your risk for heart disease and stroke  You may also need a blood or urine test to check for diabetes if you are at increased risk  Urine tests may be done to look for signs of an infection or kidney disease  A physical exam  includes checking your heartbeat and lungs with a stethoscope  Your healthcare provider may also check your skin to look for sun damage      Screening tests  may be recommended  A screening test is done to check for diseases that may not cause symptoms  The screening tests you may need depend on your age, gender, family history, and lifestyle habits  For example, colorectal screening may be recommended if you are 48years old or older  Screening tests you need if you are a woman:   A Pap smear  is used to screen for cervical cancer  Pap smears are usually done every 3 to 5 years depending on your age  You may need them more often if you have had abnormal Pap smear test results in the past  Ask your healthcare provider how often you should have a Pap smear  A mammogram  is an x-ray of your breasts to screen for breast cancer  Experts recommend mammograms every 2 years starting at age 48 years  You may need a mammogram at age 52 years or younger if you have an increased risk for breast cancer  Talk to your healthcare provider about when you should start having mammograms and how often you need them  Vaccines you may need:   Get an influenza vaccine  every year  The influenza vaccine protects you from the flu  Several types of viruses cause the flu  The viruses change over time, so new vaccines are made each year  Get a tetanus-diphtheria (Td) booster vaccine  every 10 years  This vaccine protects you against tetanus and diphtheria  Tetanus is a severe infection that may cause painful muscle spasms and lockjaw  Diphtheria is a severe bacterial infection that causes a thick covering in the back of your mouth and throat  Get a human papillomavirus (HPV) vaccine  if you are female and aged 23 to 32 or male 23 to 24 and never received it  This vaccine protects you from HPV infection  HPV is the most common infection spread by sexual contact  HPV may also cause vaginal, penile, and anal cancers  Get a pneumococcal vaccine  if you are aged 72 years or older  The pneumococcal vaccine is an injection given to protect you from pneumococcal disease   Pneumococcal disease is an infection caused by pneumococcal bacteria  The infection may cause pneumonia, meningitis, or an ear infection  Get a shingles vaccine  if you are 60 or older, even if you have had shingles before  The shingles vaccine is an injection to protect you from the varicella-zoster virus  This is the same virus that causes chickenpox  Shingles is a painful rash that develops in people who had chickenpox or have been exposed to the virus  How to eat healthy:  My Plate is a model for planning healthy meals  It shows the types and amounts of foods that should go on your plate  Fruits and vegetables make up about half of your plate, and grains and protein make up the other half  A serving of dairy is included on the side of your plate  The amount of calories and serving sizes you need depends on your age, gender, weight, and height  Examples of healthy foods are listed below:  Eat a variety of vegetables  such as dark green, red, and orange vegetables  You can also include canned vegetables low in sodium (salt) and frozen vegetables without added butter or sauces  Eat a variety of fresh fruits , canned fruit in 100% juice, frozen fruit, and dried fruit  Include whole grains  At least half of the grains you eat should be whole grains  Examples include whole-wheat bread, wheat pasta, brown rice, and whole-grain cereals such as oatmeal     Eat a variety of protein foods such as seafood (fish and shellfish), lean meat, and poultry without skin (turkey and chicken)  Examples of lean meats include pork leg, shoulder, or tenderloin, and beef round, sirloin, tenderloin, and extra lean ground beef  Other protein foods include eggs and egg substitutes, beans, peas, soy products, nuts, and seeds  Choose low-fat dairy products such as skim or 1% milk or low-fat yogurt, cheese, and cottage cheese  Limit unhealthy fats  such as butter, hard margarine, and shortening         Exercise:  Exercise at least 30 minutes per day on most days of the week  Some examples of exercise include walking, biking, dancing, and swimming  You can also fit in more physical activity by taking the stairs instead of the elevator or parking farther away from stores  Include muscle strengthening activities 2 days each week  Regular exercise provides many health benefits  It helps you manage your weight, and decreases your risk for type 2 diabetes, heart disease, stroke, and high blood pressure  Exercise can also help improve your mood  Ask your healthcare provider about the best exercise plan for you  General health and safety guidelines:   Do not smoke  Nicotine and other chemicals in cigarettes and cigars can cause lung damage  Ask your healthcare provider for information if you currently smoke and need help to quit  E-cigarettes or smokeless tobacco still contain nicotine  Talk to your healthcare provider before you use these products  Limit alcohol  A drink of alcohol is 12 ounces of beer, 5 ounces of wine, or 1½ ounces of liquor  Lose weight, if needed  Being overweight increases your risk of certain health conditions  These include heart disease, high blood pressure, type 2 diabetes, and certain types of cancer  Protect your skin  Do not sunbathe or use tanning beds  Use sunscreen with a SPF 15 or higher  Apply sunscreen at least 15 minutes before you go outside  Reapply sunscreen every 2 hours  Wear protective clothing, hats, and sunglasses when you are outside  Drive safely  Always wear your seatbelt  Make sure everyone in your car wears a seatbelt  A seatbelt can save your life if you are in an accident  Do not use your cell phone when you are driving  This could distract you and cause an accident  Pull over if you need to make a call or send a text message  Practice safe sex  Use latex condoms if are sexually active and have more than one partner   Your healthcare provider may recommend screening tests for sexually transmitted infections (STIs)  Wear helmets, lifejackets, and protective gear  Always wear a helmet when you ride a bike or motorcycle, go skiing, or play sports that could cause a head injury  Wear protective equipment when you play sports  Wear a lifejacket when you are on a boat or doing water sports  © Copyright [employer] 2022 Information is for End User's use only and may not be sold, redistributed or otherwise used for commercial purposes  The above information is an  only  It is not intended as medical advice for individual conditions or treatments  Talk to your doctor, nurse or pharmacist before following any medical regimen to see if it is safe and effective for you

## 2023-06-15 ENCOUNTER — OFFICE VISIT (OUTPATIENT)
Dept: CARDIOLOGY CLINIC | Facility: CLINIC | Age: 63
End: 2023-06-15
Payer: MEDICARE

## 2023-06-15 VITALS
HEIGHT: 64 IN | BODY MASS INDEX: 32.64 KG/M2 | HEART RATE: 76 BPM | TEMPERATURE: 97.8 F | DIASTOLIC BLOOD PRESSURE: 82 MMHG | WEIGHT: 191.2 LBS | OXYGEN SATURATION: 97 % | SYSTOLIC BLOOD PRESSURE: 126 MMHG

## 2023-06-15 DIAGNOSIS — R00.2 PALPITATIONS: Primary | ICD-10-CM

## 2023-06-15 DIAGNOSIS — I10 ESSENTIAL HYPERTENSION: ICD-10-CM

## 2023-06-15 DIAGNOSIS — I34.0 NONRHEUMATIC MITRAL VALVE REGURGITATION: ICD-10-CM

## 2023-06-15 DIAGNOSIS — E66.9 OBESITY (BMI 30-39.9): ICD-10-CM

## 2023-06-15 DIAGNOSIS — E78.2 MIXED HYPERLIPIDEMIA: ICD-10-CM

## 2023-06-15 DIAGNOSIS — K76.89 LIVER CYST: ICD-10-CM

## 2023-06-15 DIAGNOSIS — I47.10 PAROXYSMAL SUPRAVENTRICULAR TACHYCARDIA: ICD-10-CM

## 2023-06-15 PROCEDURE — 93000 ELECTROCARDIOGRAM COMPLETE: CPT | Performed by: INTERNAL MEDICINE

## 2023-06-15 PROCEDURE — 99214 OFFICE O/P EST MOD 30 MIN: CPT | Performed by: INTERNAL MEDICINE

## 2023-06-15 NOTE — PATIENT INSTRUCTIONS
Continue current medications without change. ECG today is normal.  Cholesterol and blood pressure are perfect.

## 2023-06-15 NOTE — PROGRESS NOTES
Cardiology Office Visit    Aashish Ip  [id number]  1960    [facility]  [address] [place] [address] [phone]      Dear Yusuf Storey DO,    I had the pleasure of seeing your patient at our [facility] [place] office today 6/15/2023. As you know she is a pleasant 70y.o. year old female with a medical history as described below. Patient previously followed at [facility] [address] (Dr. Jeanie Martínez). Reason for office visit: Follow up palpitations with history of SVT. 1. Palpitations    2. Paroxysmal supraventricular tachycardia ([address])    3. Essential hypertension    4. Mixed hyperlipidemia    5. Obesity (BMI 30-39.9)    6. Liver cyst    7. Nonrheumatic mitral valve regurgitation       Discussion/Plan:   Prema Burrell is doing very well since her last visit 6/15/2022. She has lost 10 pounds. She does note a change in her levothyroxine dose since I last saw her. She has no cardiac complaints today. Palpitations remain quiescent. Lipid panel 5/5/2023 was reviewed. Echocardiogram 10/2022 showed stable findings. Blood pressure is well controlled. ECG today is normal.    Recommendations:   I have recommended that she continue current medications without change. She is up to date with cardiac testing. Would plan repeat echocardiogram in 2 years unless she develops symptoms. I have asked her to call the office with any questions or concerns. Will plan routine follow up in the office in 1 year unless symptoms warrant earlier assessment.   ______________________________        HPI     1/14/2021: Patient presents to the office today regarding palpitations in the setting of prior history of SVT. Patient underwent previous SVT ablation in 2004 by Dr. Luz Elena Whitley and was followed by Dr. Jeanie Martínez. She tells me that from October 26 through November 18th she has noted increased palpitations.  She describes increased palpitations as feeling as if her heart rate is fast and then does a flip/flop. She does describe episodes of chest tightness that occur with her palpitations. She also describes feeling "faint" as well as feeling pressure in her head  And a feeling of "heaviness when walking requiring her to sit down. I had recommended that she add magnesium 500 mg daily as well as 81 mg of aspirin when she had call the office prior to visit. She tells me that this seemed to help significantly with palpitations. She also describes intermittent sharp right-sided chest discomfort which is short-lived in nature. She notes that her palpitations seem very infrequent. She denies any exertional chest pain. 4/9/2021: Patient presents to the office today for follow up. She is feeling well with the exception of hip pain. She is scheduled for total right hip replacement 4/21/2021 and eventually will need her left hip done as well as both knees. She is feeling well. She denies any chest heaviness since her palpitations have been controlled. She denies any recurrent palpitations. Her functional capacity is limited due to her hip/knee pain. She is taking 2 excedrin every 6 hours for pain. 12/1/2021: Patient presents to the office today for follow up. She did have right hip surgery 4/2021 and noted to have low blood pressure and she was noted to have a heart murmur. Left hip replaced 9/28/2021 and blood pressure was low once again and she was discharged home without her blood pressure medications (propranolol). She did have an echocardiogram done in the hospital. She is started having right low back pain in October. She was in the bathroom October 17th she couldn't get up (straighten out). That night she was watching TV and she got right sided chest pain. Felt like a hot josie up and down central chest. She called the doctor the next day and she was sent for CT to rule out pulmonary embolism. CTA 10/19/2021 showed numerous hepatic cysts but no PE.  Repeat CT scan 11/1/2021 showed left ovarian cyst and one or more hepatic cysts. She is set up for a pelvic ultrasound next week. She is set up to see pain management with [facility]. She has not recently been checking her blood pressure. She did have some palpitations when she transitioned off of her magnesium and started a combination vitamin tablet. She is taking Excedrin 1 or 2 times a day. She takes OxyContin after physical therapy. *Echocardiogram 10/18/2022: EF 60%. Aortic sclerosis. Mildly thickened mitral leaflets with systolic anterior motion of the anterior chordal elements. No clear outflow tract gradient seen. Preserved leaflet excursion with mild regurgitation. Mild tricuspid regurgitation. 6/15/2022: Patient returns to the office today for follow up. She did have bilateral hip replacements (4/2021 and 9/2021). She needs knee replacements at some point. She is having a lot of shoulder pain. No further chest pain. She has not been checking her blood pressure at home. Blood pressure was low after both surgeries. She did have an echocardiogram done 10/1/2021 due to them noting heart murmur while she was hospitalized for second hip replacement. EF 75%. Mild posterior mitral valve prolapse with mild to moderate regurgitation. She was noted to have turbulent flow in LVOT without evidence of obstruction. No recurrent chest pain. No palpitations. She is still taking Excedrin 1-2 times a day. She did have epistaxis and decreased tumeric once a week. No shortness of breath. Some edema if she is on her feet all day. She had pneumonia 2/2022.     6/15/2023: Katina Najera presents to the office today for follow up. She is doing well overall. Her levothyroxine dose was adjusted due to abnormal T4. Her vitamin D level was high. She has been dieting and lost 10 pounds. She denies any chest pain, shortness of breath, lightheadedness, dizziness or palpitations.  She does note edema in her feet if she is on her feet all day which is unchanged. ECG today is normal. Lipid panel 5/5/2023. LDL 82. Reviewed results of echocardiogram 10/2022. Patient Active Problem List   Diagnosis    Allergic rhinitis    Benign hematuria    Headache    Mixed hyperlipidemia    Essential hypertension    Menopause    Paroxysmal supraventricular tachycardia (HCC)    Postoperative hypothyroidism    Health care maintenance    Hyperglycemia    Vitamin D deficiency    Obesity (BMI 30-39. 9)    Vertigo    Palpitations    Colon polyp    Osteoarthritis    History of total left hip replacement    Liver cyst    Cyst of left ovary    Nonrheumatic mitral valve regurgitation    COPD mixed type [facility])     Past Medical History:   Diagnosis Date    Arthritis     Bruises easily     Cancer ([address])     thyroid    Chronic pain disorder     Dental crowns present     Headache     Hip pain, chronic, left     L THR for today 9/28/2021    History of vertigo     Hyperlipidemia     Hypertension     Irregular heart beat     PSVT    Joint pain     Knee pain, bilateral     Low back pain     Migraines     Mild acid reflux     Motion sickness     Osteoarthritis     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     last assessed - [id number]    PONV (postoperative nausea and vomiting)     Risk for falls     SVT (supraventricular tachycardia) ([address])     history/none recent/ has had heart ablation in 2004    Thyroid cancer ([address]) 2007    Status post thyroidectomy, no longer sees endocrinology; last assessed - 17Oct2014    Use of cane as ambulatory aid     occas    Varicose veins of leg with edema     last assessed - 22Oct2014    Walking pneumonia     Wears glasses      Social History     Socioeconomic History    Marital status: /Civil Union     Spouse name: Not on file    Number of children: Not on file    Years of education: Not on file    Highest education level: Not on file   Occupational History    Not on file   Tobacco Use    Smoking status: Former     Packs/day: 0.25     Years: 45.00     Total pack years: 11.25 Types: Cigarettes     Quit date: 3/6/2021     Years since quittin.2    Smokeless tobacco: Never    Tobacco comments:     pt quit 10 years ago but has an occ cigarette   Vaping Use    Vaping Use: Never used   Substance and Sexual Activity    Alcohol use: Yes     Comment: socially    Drug use: No    Sexual activity: Yes   Other Topics Concern    Not on file   Social History Narrative    Not on file     Social Determinants of Health     Financial Resource Strain: Not on file   Food Insecurity: Not on file   Transportation Needs: Not on file   Physical Activity: Not on file   Stress: Not on file   Social Connections: Not on file   Intimate Partner Violence: Not on file   Housing Stability: Not on file      Family History   Problem Relation Age of Onset    Alzheimer's disease Mother     Stroke Family     Diabetes Family     Hypertension Family     Heart attack Family     Prostate cancer Father     No Known Problems Sister     No Known Problems Daughter     No Known Problems Maternal Grandmother     No Known Problems Maternal Grandfather     No Known Problems Paternal Grandmother     No Known Problems Paternal Grandfather      Past Surgical History:   Procedure Laterality Date    AV NODE ABLATION       SECTION  10/27/1992    COLONOSCOPY      Complete colonoscopy    ENDOMETRIAL ABLATION      Thermal; Resolved - Oct 2005    FOOT SURGERY Left     x 2, [facility] removed ganglion cyst    INFERIOR OBLIQUE MYECTOMY      removed 8 fibroid tumors     JOINT REPLACEMENT      KNEE ARTHROSCOPY Left     KNEE CARTILAGE SURGERY Left     Left knee torn meniscus;  Resolved - Sep 2008    LAPAROSCOPIC ENDOMETRIOSIS FULGURATION      LIMBAL STEM CELL TRANSPLANT      LIPOMA RESECTION      MYOMECTOMY      Anorectal    OTHER SURGICAL HISTORY      heart ablation - atrial tachycardia; ablation for SVT    OVARIAN CYST REMOVAL      VT ARTHRP ACETBLR/PROX FEM PROSTC AGRFT/ALGRFT Right 2021    Procedure: ARTHROPLASTY HIP TOTAL ANTERIOR;  Surgeon: Nnamdi Granger MD;  [facility]: [facility];  Service: Orthopedics    MD ARTHRP ACETBLR/PROX FEM PROSTC AGRFT/ALGRFT Left 9/28/2021    Procedure: ANTERIOR TOTAL HIP REPLACEMENT;  Surgeon: Nnamdi Granger MD;  Location[facility];  Service: Orthopedics    THYROIDECTOMY      VAGINAL MASS EXCISION Right 11/30/2017    Procedure: EXCISION RIGHT PERINEAL LIPOMA/ MASS, REMOVAL OF SKIN TAG;  Surgeon: Kvng Downs MD;  Location: [facility];  Service: General    WISDOM TOOTH EXTRACTION         Current Outpatient Medications:     aspirin-acetaminophen-caffeine (Padmini Fortis) 250-250-65 MG per tablet, Take 2 tablets by mouth every 6 (six) hours as needed for headaches, Disp: , Rfl:     atorvastatin (LIPITOR) 40 mg tablet, Take 1 tablet (40 mg total) by mouth daily at bedtime, Disp: 90 tablet, Rfl: 3    cyanocobalamin (VITAMIN B-12) 2000 MCG tablet, Take 2,500 mcg by mouth daily, Disp: , Rfl:     diphenhydrAMINE (BENADRYL) 25 mg tablet, Take 25 mg by mouth daily at bedtime as needed for itching , Disp: , Rfl:     docusate sodium (COLACE) 250 MG capsule, Take 250 mg by mouth daily, Disp: , Rfl:     L-Lysine 500 MG CAPS, Take 500 mg by mouth daily , Disp: , Rfl:     levothyroxine (Euthyrox) 112 mcg tablet, Take 1 tablet (112 mcg total) by mouth daily in the early morning, Disp: 90 tablet, Rfl: 3    Magnesium 500 MG TABS, Take 1 tablet (500 mg total) by mouth daily, Disp: 30 tablet, Rfl: 11    propranolol (INDERAL LA) 60 mg 24 hr capsule, Take 1 capsule (60 mg total) by mouth daily, Disp: 90 capsule, Rfl: 3    Vitamin Mixture (JAY-C PO), Take 1,000 mg by mouth daily , Disp: , Rfl:     zinc gluconate 50 mg tablet, Take 50 mg by mouth daily , Disp: , Rfl:     albuterol (PROVENTIL HFA,VENTOLIN HFA) 90 mcg/act inhaler, Inhale 2 puffs every 6 (six) hours as needed for wheezing (Patient not taking: Reported on 6/15/2023), Disp: 18 g, Rfl: 1    cephalexin (KEFLEX) 500 mg capsule, Take 2,000 mg by mouth 60 minutes pre-procedure Prior to dental procedures (Patient not taking: Reported on 6/15/2023), Disp: , Rfl:     Cholecalciferol (Vitamin D3) 25 MCG TABS, Take 1 tablet by mouth daily  (Patient not taking: Reported on 6/15/2023), Disp: , Rfl:      Allergies   Allergen Reactions    Morphine Other (See Comments) and Headache     Other reaction(s): Other (See Comments)  severe headache  "severe headache"    Other Rash     Adhesive tape    Pain Meds,,,, caused N/V  Rash    Codeine Headache     Category: Adverse Reaction;     Tramadol Headache     Category: Adverse Reaction;     Medical Tape Rash         Cardiac Test Results:     ECG 6/15/2023: Normal sinus rhythm. 66 bpm. Normal ECG. Lipid panel 5/5/2023: C 156. T 90. H 56. L 82. Echocardiogram 10/18/2022:   EF 60%. Aortic sclerosis. Mildly thickened mitral leaflets with systolic anterior motion of the anterior chordal elements. No clear outflow tract gradient seen. Preserved leaflet excursion with mild regurgitation. Mild tricuspid regurgitation. Echocardiogram 9/30/2021:   EF 75%. Septal aneurysm without evidence of shunt. Mild JAMEY with mild posterior mitral valve leaflet prolapse. Mild to moderate mitral regurgitation. Turbulent LVOT flow. Mild tricuspid regurgitation. Lipid panel 8/26/2021: C 157. T 106. H 57. L 80. ECG 4/6/2021: Normal sinus rhythm. First degree AV block. ECG 01/14/2021:  Sinus bradycardia with first-degree AV block at 59 beats per minute. Nonspecific ST T wave abnormality. Holter monitor 11/20/2020:   Predominantly normal sinus rhythm the average heart rate of 71 beats per minute. Seven runs of atrial tachycardia with the longest lasting 10 beats. 1.6% PVC burden. Rare PACs. Symptoms correlated with PVCs and atrial tachycardia. There is no evidence of any pauses or advanced heart block. Lipid panel 1/15/21: C 177. T 108. . H 54. L 103.        Review of Systems   Constitutional:  Negative for activity change, appetite change, fatigue and unexpected weight change. HENT:  Negative for congestion, hearing loss, tinnitus and trouble swallowing. Eyes:  Negative for visual disturbance. Respiratory:  Negative for cough, chest tightness, shortness of breath and wheezing. Snoring   Cardiovascular:  Positive for leg swelling ( intermittent). Negative for chest pain and palpitations. Gastrointestinal:  Negative for abdominal distention, abdominal pain, blood in stool, nausea and vomiting. Genitourinary:  Negative for difficulty urinating. Nocturia   Musculoskeletal:  Positive for arthralgias, back pain, joint swelling and myalgias. Skin:  Positive for rash. Neurological:  Negative for dizziness, syncope, weakness, light-headedness, numbness and headaches. Hematological:  Bruises/bleeds easily. Psychiatric/Behavioral:  Positive for sleep disturbance. Negative for confusion. The patient is not nervous/anxious. All other systems reviewed and are negative. Vitals:    06/15/23 1506   BP: 122/82   BP Location: Left arm   Patient Position: Sitting   Cuff Size: Adult   Pulse: 76   Temp: 97.8 °F (36.6 °C)   SpO2: 97%   Weight: 86.7 kg (191 lb 3.2 oz)   Height: 5' 4" (1.626 m)     Vitals:    06/15/23 1506   Weight: 86.7 kg (191 lb 3.2 oz)     Height: 5' 4" (162.6 cm)     Physical Exam  Vitals reviewed. Constitutional:       Appearance: She is well-developed. HENT:      Head: Normocephalic and atraumatic. Eyes:      Conjunctiva/sclera: Conjunctivae normal.      Pupils: Pupils are equal, round, and reactive to light. Neck:      Vascular: No JVD. Cardiovascular:      Rate and Rhythm: Normal rate and regular rhythm. No extrasystoles are present. Heart sounds: Murmur heard. No friction rub. No gallop. Pulmonary:      Effort: Pulmonary effort is normal.      Breath sounds: Normal breath sounds. Abdominal:      General: Bowel sounds are normal.      Palpations: Abdomen is soft. Musculoskeletal:      Cervical back: Normal range of motion. Skin:     General: Skin is warm and dry. Neurological:      Mental Status: She is alert and oriented to person, place, and time.    Psychiatric:         Behavior: Behavior normal.

## 2023-07-28 DIAGNOSIS — I47.1 PAROXYSMAL SUPRAVENTRICULAR TACHYCARDIA (HCC): ICD-10-CM

## 2023-07-28 DIAGNOSIS — E66.9 CLASS 2 OBESITY WITHOUT SERIOUS COMORBIDITY WITH BODY MASS INDEX (BMI) OF 35.0 TO 35.9 IN ADULT, UNSPECIFIED OBESITY TYPE: ICD-10-CM

## 2023-07-28 DIAGNOSIS — R00.2 PALPITATIONS: ICD-10-CM

## 2023-07-28 RX ORDER — PROPRANOLOL HCL 60 MG
60 CAPSULE, EXTENDED RELEASE 24HR ORAL DAILY
Qty: 90 CAPSULE | Refills: 3 | Status: SHIPPED | OUTPATIENT
Start: 2023-07-28

## 2023-07-28 RX ORDER — ATORVASTATIN CALCIUM 40 MG/1
40 TABLET, FILM COATED ORAL
Qty: 90 TABLET | Refills: 3 | Status: SHIPPED | OUTPATIENT
Start: 2023-07-28

## 2023-08-31 LAB
T4 FREE SERPL-MCNC: 1.4 NG/DL (ref 0.8–1.8)
TSH SERPL-ACNC: 5.66 MIU/L (ref 0.4–4.5)

## 2023-09-07 ENCOUNTER — TELEPHONE (OUTPATIENT)
Dept: FAMILY MEDICINE CLINIC | Facility: CLINIC | Age: 63
End: 2023-09-07

## 2023-09-07 DIAGNOSIS — E89.0 POSTOPERATIVE HYPOTHYROIDISM: Primary | ICD-10-CM

## 2023-09-07 RX ORDER — LEVOTHYROXINE SODIUM 0.12 MG/1
125 TABLET ORAL DAILY
Qty: 30 TABLET | Refills: 5 | Status: SHIPPED | OUTPATIENT
Start: 2023-09-07

## 2023-09-07 NOTE — TELEPHONE ENCOUNTER
I recommend patient increase levothyroxine from 112 to 125 mcg daily.   Prescription sent to pharmacy, next week to discuss

## 2023-09-07 NOTE — TELEPHONE ENCOUNTER
----- Message from [address] sent at 9/6/2023  4:46 PM EDT -----  Regarding: FW: 8/30/2023 Bloodwork Test Results (TSH 3rd Generation w/T4 Reflex)  Contact: [phone]  TS, please review labs Thursday. Thank you  ----- Message -----  From: Kallie Reynoso  Sent: 9/6/2023   2:12 PM EDT  To: [facility]  Subject: 8/30/2023 Bloodwork Test Results (TSH 3rd Ge#    Wed 9/6/2023-I called into reception and they informed me that Dr. Lilo Salcido is expected to come back into the office tomorrow (Thursday, 9/7/2023). Though the office scheduled an appt for me to see Dr. Lilo Salcido on Thursday, 9/14/2023 @2:20pm…I left a message requesting Dr. Lilo Salcido to please review my bloodwork test results when he comes in tomorrow and give me a call advising if I should be going back to taking my previous dosage 125 Mcg Levothyroxine as I still have those meds available to take should I need to change back to that dosage. I am just very concerned about my hair falling out like it suddenly started to do near the end of August 2023. My family, friends and my hairdresser noticed how thin the top of my scalp had gotten. I just want to start taking the correct dosage of Levothyroxine ASAP to start reversing this daily hair loss I am experiencing. Thank you.  Kallie Reynoso

## 2023-09-19 ENCOUNTER — OFFICE VISIT (OUTPATIENT)
Dept: FAMILY MEDICINE CLINIC | Facility: CLINIC | Age: 63
End: 2023-09-19
Payer: MEDICARE

## 2023-09-19 VITALS
HEART RATE: 82 BPM | DIASTOLIC BLOOD PRESSURE: 80 MMHG | WEIGHT: 197 LBS | SYSTOLIC BLOOD PRESSURE: 126 MMHG | OXYGEN SATURATION: 97 % | BODY MASS INDEX: 33.63 KG/M2 | RESPIRATION RATE: 16 BRPM | HEIGHT: 64 IN

## 2023-09-19 DIAGNOSIS — I47.10 PAROXYSMAL SUPRAVENTRICULAR TACHYCARDIA: ICD-10-CM

## 2023-09-19 DIAGNOSIS — E89.0 POSTOPERATIVE HYPOTHYROIDISM: ICD-10-CM

## 2023-09-19 DIAGNOSIS — E66.9 OBESITY (BMI 30-39.9): ICD-10-CM

## 2023-09-19 DIAGNOSIS — R94.6 ABNORMAL FINDING ON THYROID FUNCTION TEST: ICD-10-CM

## 2023-09-19 DIAGNOSIS — L65.9 ALOPECIA: Primary | ICD-10-CM

## 2023-09-19 DIAGNOSIS — J44.9 COPD MIXED TYPE (HCC): ICD-10-CM

## 2023-09-19 DIAGNOSIS — I10 ESSENTIAL HYPERTENSION: ICD-10-CM

## 2023-09-19 PROCEDURE — 99214 OFFICE O/P EST MOD 30 MIN: CPT | Performed by: FAMILY MEDICINE

## 2023-09-19 NOTE — PATIENT INSTRUCTIONS
Continue with levothyroxine 125 mcg daily  Check TSH in 2 weeks  Follow with dermatology for evaluation of hair loss  I recommend Rogaine for women until seen by dermatology  Diet exercise weight loss recommended  Return at scheduled appointment sooner if needed

## 2023-09-19 NOTE — PROGRESS NOTES
Name: Aby Duvall      : 1960      MRN: [id number]  Encounter Provider: Shawnee Dodge DO  Encounter Date: 2023   Encounter department: [facility]    Assessment & Plan   #1. Alopecia  Refer to dermatology  I recommend Rogaine for women until seen by dermatology  2. Abnormal thyroid function  3. Hypothyroidism  TSH was mildly elevated at 5.66 with normal T4 of 1.4  Increase levothyroxine from 1 12-1 25  Check TSH 2 weeks  4. Hypertension, stable continue present therapy  5. PSVT status post ablation follows with cardiology in NSR by auscultation  6. BMI 33.81 patient gained 6 pounds diet exercise weight loss recommended COPD, stable lungs are clear  B. Return at scheduled appointment sooner if needed      1. Alopecia  -     Ambulatory Referral to Dermatology; Future    2. Abnormal finding on thyroid function test  -     TSH, 3rd generation with Free T4 reflex; Future; Expected date: 10/02/2023    3. Postoperative hypothyroidism  Assessment & Plan:  Was normal TSH mildly elevated 5.66 levothyroxine increased from 1 12-1 25 check TSH 2 weeks    Orders:  -     TSH, 3rd generation with Free T4 reflex; Future; Expected date: 10/02/2023    4. Essential hypertension  Assessment & Plan:  Able continue present therapy      5. Paroxysmal supraventricular tachycardia ([address])  Assessment & Plan: With cardiology status post ablation in NSR by auscultation      6. Obesity (BMI 30-39. 9)  Assessment & Plan:  BMI 33.81 patient gained 6 pounds diet exercise weight loss recommended      7. COPD mixed type [facility])  Assessment & Plan:  Stable, lungs are clear        BMI Counseling: Body mass index is 33.81 kg/m². The BMI is above normal. Nutrition recommendations include moderation in carbohydrate intake and reducing intake of cholesterol. Exercise recommendations include exercising 3-5 times per week. Rationale for BMI follow-up plan is due to patient being overweight or obese.      Depression Screening and Follow-up Plan: Patient was screened for depression during today's encounter. They screened negative with a PHQ-2 score of 0. Subjective      Patient feels she has had excessive hair loss for the past year or so. Patient denies any family history of thinning hair and women. Patient's TSH was mildly elevated at 5.66 with normal T4 did increase her levothyroxine from 1 12-1 25. We will check TSH in 2 weeks. Will refer to dermatology for evaluation    Review of Systems   Constitutional: Negative. HENT: Negative. Eyes: Negative. Respiratory: Negative. Cardiovascular: Negative. Gastrointestinal: Negative. Endocrine:        HPI   Genitourinary: Negative. Musculoskeletal: Negative. Skin:        HPI   Allergic/Immunologic: Negative. Neurological: Negative. Hematological: Negative. Psychiatric/Behavioral: Negative.         Current Outpatient Medications on File Prior to Visit   Medication Sig   • albuterol (PROVENTIL HFA,VENTOLIN HFA) 90 mcg/act inhaler Inhale 2 puffs every 6 (six) hours as needed for wheezing   • cephalexin (KEFLEX) 500 mg capsule Take 2,000 mg by mouth 60 minutes pre-procedure Prior to dental procedures   • Cholecalciferol (Vitamin D3) 25 MCG TABS Take 1 tablet by mouth daily    • aspirin-acetaminophen-caffeine (EXCEDRIN MIGRAINE) 250-250-65 MG per tablet Take 2 tablets by mouth every 6 (six) hours as needed for headaches   • atorvastatin (LIPITOR) 40 mg tablet TAKE 1 TABLET BY MOUTH DAILY AT BEDTIME   • cyanocobalamin (VITAMIN B-12) 2000 MCG tablet Take 2,500 mcg by mouth daily   • diphenhydrAMINE (BENADRYL) 25 mg tablet Take 25 mg by mouth daily at bedtime as needed for itching    • docusate sodium (COLACE) 250 MG capsule Take 250 mg by mouth daily   • L-Lysine 500 MG CAPS Take 500 mg by mouth daily    • levothyroxine (Euthyrox) 125 mcg tablet Take 1 tablet (125 mcg total) by mouth daily   • Magnesium 500 MG TABS Take 1 tablet (500 mg total) by mouth daily   • propranolol (INDERAL LA) 60 mg 24 hr capsule TAKE 1 CAPSULE BY MOUTH EVERY DAY   • Vitamin Mixture (JAY-C PO) Take 1,000 mg by mouth daily    • zinc gluconate 50 mg tablet Take 50 mg by mouth daily        Objective     /80 (BP Location: Right arm, Patient Position: Sitting, Cuff Size: Large)   Pulse 82   Resp 16   Ht 5' 4" (1.626 m)   Wt 89.4 kg (197 lb)   SpO2 97%   BMI 33.81 kg/m²     Physical Exam  Vitals and nursing note reviewed. Constitutional:       Appearance: Normal appearance. HENT:      Head: Normocephalic and atraumatic. Mouth/Throat:      Mouth: Mucous membranes are moist.   Eyes:      General: No scleral icterus. Neck:      Vascular: No carotid bruit. Cardiovascular:      Rate and Rhythm: Normal rate and regular rhythm. Heart sounds: Normal heart sounds. Pulmonary:      Effort: Pulmonary effort is normal.      Breath sounds: Normal breath sounds. Musculoskeletal:      Cervical back: Neck supple. Skin:     General: Skin is warm and dry. Comments: Negative patches of hair loss on scalp   Neurological:      General: No focal deficit present. Mental Status: She is alert.    Psychiatric:         Mood and Affect: Mood normal.       Noel Ramirez DO

## 2023-10-11 DIAGNOSIS — E89.0 POSTOPERATIVE HYPOTHYROIDISM: ICD-10-CM

## 2023-10-11 LAB — TSH SERPL-ACNC: 1.22 MIU/L (ref 0.4–4.5)

## 2023-10-11 RX ORDER — LEVOTHYROXINE SODIUM 0.12 MG/1
125 TABLET ORAL DAILY
Qty: 90 TABLET | Refills: 2 | Status: SHIPPED | OUTPATIENT
Start: 2023-10-11

## 2023-11-14 LAB
ALBUMIN SERPL-MCNC: 4.2 G/DL (ref 3.6–5.1)
ALBUMIN/GLOB SERPL: 1.7 (CALC) (ref 1–2.5)
ALP SERPL-CCNC: 83 U/L (ref 37–153)
ALT SERPL-CCNC: 24 U/L (ref 6–29)
APPEARANCE UR: CLEAR
AST SERPL-CCNC: 23 U/L (ref 10–35)
BILIRUB SERPL-MCNC: 0.6 MG/DL (ref 0.2–1.2)
BILIRUB UR QL STRIP: NEGATIVE
BUN SERPL-MCNC: 19 MG/DL (ref 7–25)
BUN/CREAT SERPL: NORMAL (CALC) (ref 6–22)
CALCIUM SERPL-MCNC: 9.3 MG/DL (ref 8.6–10.4)
CHLORIDE SERPL-SCNC: 104 MMOL/L (ref 98–110)
CHOLEST SERPL-MCNC: 220 MG/DL
CHOLEST/HDLC SERPL: 3.1 (CALC)
CO2 SERPL-SCNC: 28 MMOL/L (ref 20–32)
COLOR UR: YELLOW
CREAT SERPL-MCNC: 0.62 MG/DL (ref 0.5–1.05)
ERYTHROCYTE [DISTWIDTH] IN BLOOD BY AUTOMATED COUNT: 13 % (ref 11–15)
GFR/BSA.PRED SERPLBLD CYS-BASED-ARV: 99 ML/MIN/1.73M2
GLOBULIN SER CALC-MCNC: 2.5 G/DL (CALC) (ref 1.9–3.7)
GLUCOSE SERPL-MCNC: 83 MG/DL (ref 65–99)
GLUCOSE UR QL STRIP: NEGATIVE
HBA1C MFR BLD: 5.5 % OF TOTAL HGB
HCT VFR BLD AUTO: 40.1 % (ref 35–45)
HDLC SERPL-MCNC: 71 MG/DL
HGB BLD-MCNC: 13.4 G/DL (ref 11.7–15.5)
HGB UR QL STRIP: NEGATIVE
KETONES UR QL STRIP: NEGATIVE
LDLC SERPL CALC-MCNC: 127 MG/DL (CALC)
LEUKOCYTE ESTERASE UR QL STRIP: NEGATIVE
MCH RBC QN AUTO: 30.5 PG (ref 27–33)
MCHC RBC AUTO-ENTMCNC: 33.4 G/DL (ref 32–36)
MCV RBC AUTO: 91.3 FL (ref 80–100)
NITRITE UR QL STRIP: NEGATIVE
NONHDLC SERPL-MCNC: 149 MG/DL (CALC)
PH UR STRIP: 6 [PH] (ref 5–8)
PLATELET # BLD AUTO: 241 THOUSAND/UL (ref 140–400)
PMV BLD REES-ECKER: 9.3 FL (ref 7.5–12.5)
POTASSIUM SERPL-SCNC: 4.9 MMOL/L (ref 3.5–5.3)
PROT SERPL-MCNC: 6.7 G/DL (ref 6.1–8.1)
PROT UR QL STRIP: NEGATIVE
RBC # BLD AUTO: 4.39 MILLION/UL (ref 3.8–5.1)
SERVICE CMNT-IMP: NORMAL
SODIUM SERPL-SCNC: 140 MMOL/L (ref 135–146)
SP GR UR STRIP: 1.01 (ref 1–1.03)
T4 SERPL-MCNC: 13.4 MCG/DL (ref 5.1–11.9)
TRIGL SERPL-MCNC: 114 MG/DL
TSH SERPL-ACNC: 2.21 MIU/L (ref 0.4–4.5)
WBC # BLD AUTO: 8.7 THOUSAND/UL (ref 3.8–10.8)

## 2023-11-16 ENCOUNTER — OFFICE VISIT (OUTPATIENT)
Dept: FAMILY MEDICINE CLINIC | Facility: CLINIC | Age: 63
End: 2023-11-16
Payer: MEDICARE

## 2023-11-16 VITALS
DIASTOLIC BLOOD PRESSURE: 80 MMHG | WEIGHT: 194 LBS | BODY MASS INDEX: 33.12 KG/M2 | SYSTOLIC BLOOD PRESSURE: 132 MMHG | HEART RATE: 71 BPM | OXYGEN SATURATION: 99 % | HEIGHT: 64 IN

## 2023-11-16 DIAGNOSIS — E55.9 VITAMIN D DEFICIENCY: ICD-10-CM

## 2023-11-16 DIAGNOSIS — R73.9 HYPERGLYCEMIA: ICD-10-CM

## 2023-11-16 DIAGNOSIS — E78.2 MIXED HYPERLIPIDEMIA: ICD-10-CM

## 2023-11-16 DIAGNOSIS — K63.5 POLYP OF COLON, UNSPECIFIED PART OF COLON, UNSPECIFIED TYPE: ICD-10-CM

## 2023-11-16 DIAGNOSIS — I34.0 NONRHEUMATIC MITRAL VALVE REGURGITATION: ICD-10-CM

## 2023-11-16 DIAGNOSIS — L65.9 ALOPECIA: ICD-10-CM

## 2023-11-16 DIAGNOSIS — Z00.00 HEALTH CARE MAINTENANCE: ICD-10-CM

## 2023-11-16 DIAGNOSIS — J44.9 COPD MIXED TYPE (HCC): ICD-10-CM

## 2023-11-16 DIAGNOSIS — Z78.0 POSTMENOPAUSAL ESTROGEN DEFICIENCY: ICD-10-CM

## 2023-11-16 DIAGNOSIS — Z12.31 SCREENING MAMMOGRAM FOR BREAST CANCER: ICD-10-CM

## 2023-11-16 DIAGNOSIS — E89.0 POSTOPERATIVE HYPOTHYROIDISM: ICD-10-CM

## 2023-11-16 DIAGNOSIS — I10 ESSENTIAL HYPERTENSION: Primary | ICD-10-CM

## 2023-11-16 DIAGNOSIS — I47.10 PAROXYSMAL SUPRAVENTRICULAR TACHYCARDIA: ICD-10-CM

## 2023-11-16 DIAGNOSIS — E66.9 OBESITY (BMI 30-39.9): ICD-10-CM

## 2023-11-16 DIAGNOSIS — Z23 ENCOUNTER FOR IMMUNIZATION: ICD-10-CM

## 2023-11-16 PROCEDURE — 99214 OFFICE O/P EST MOD 30 MIN: CPT | Performed by: FAMILY MEDICINE

## 2023-11-16 PROCEDURE — 90677 PCV20 VACCINE IM: CPT

## 2023-11-16 PROCEDURE — G0402 INITIAL PREVENTIVE EXAM: HCPCS | Performed by: FAMILY MEDICINE

## 2023-11-16 PROCEDURE — G0009 ADMIN PNEUMOCOCCAL VACCINE: HCPCS

## 2023-11-16 NOTE — ASSESSMENT & PLAN NOTE
Medicare welcome to Medicare physical completed, Prevnar 20 given.   Mammography and DEXA scan is ordered

## 2023-11-16 NOTE — PATIENT INSTRUCTIONS
Continue present therapy  Diet exercise weight loss recommended  Complete mammography for breast cancer screening  Complete DEXA scan for osteoporosis screening  Return in 6 months for office visit and blood work sooner if needed

## 2023-11-16 NOTE — PROGRESS NOTES
Assessment and Plan:     1. Hypertension, stable continue present therapy #2. Mitral regurgitation/PSVT status post ablation, stable follows with cardiology  3. Hypothyroidism, stable continue present therapy  4. Colon polyp update colonoscopy  5. Vitamin D deficiency, currently following with her dermatologist  6. Alopecia, resolved follows with dermatology  7. BMI 33.3 diet exercise weight loss recommended  8. Hyperglycemia diet controlled  9. Healthcare maintenance  Welcome to Medicare physical completed  Prevnar 20 given  Mammogram ordered  DEXA scan ordered  Influenza vaccine refused  COVID-vaccine refused  Patient referred to pharmacy for shingles vaccination  10. COPD, stable lungs are clear  11.   Patient to return in 6 months for office visit blood work sooner if needed    Vision Screening    Right eye Left eye Both eyes   Without correction      With correction 20/30 20/30 20/40        Problem List Items Addressed This Visit          Digestive    Colon polyp     Up-to-date with colonoscopy         Relevant Orders    CBC    Comprehensive metabolic panel    Hemoglobin A1C    Lipid Panel with Direct LDL reflex    TSH, 3rd generation with Free T4 reflex    UA (URINE) with reflex to Scope    T4, free    T3, free       Endocrine    Postoperative hypothyroidism     TSH is normal T4 mildly elevated continue present therapy and monitor         Relevant Orders    CBC    Comprehensive metabolic panel    Hemoglobin A1C    Lipid Panel with Direct LDL reflex    TSH, 3rd generation with Free T4 reflex    UA (URINE) with reflex to Scope    T4, free    T3, free       Respiratory    COPD mixed type (HCC)     Stable lungs are clear         Relevant Orders    CBC    Comprehensive metabolic panel    Hemoglobin A1C    Lipid Panel with Direct LDL reflex    TSH, 3rd generation with Free T4 reflex    UA (URINE) with reflex to Scope    T4, free    T3, free       Cardiovascular and Mediastinum    Essential hypertension - Primary     Stable continue present therapy         Relevant Orders    CBC    Comprehensive metabolic panel    Hemoglobin A1C    Lipid Panel with Direct LDL reflex    TSH, 3rd generation with Free T4 reflex    UA (URINE) with reflex to Scope    T4, free    T3, free    Paroxysmal supraventricular tachycardia     Follows with cardiology status post ablation         Relevant Orders    CBC    Comprehensive metabolic panel    Hemoglobin A1C    Lipid Panel with Direct LDL reflex    TSH, 3rd generation with Free T4 reflex    UA (URINE) with reflex to Scope    T4, free    T3, free    Nonrheumatic mitral valve regurgitation     As above         Relevant Orders    CBC    Comprehensive metabolic panel    Hemoglobin A1C    Lipid Panel with Direct LDL reflex    TSH, 3rd generation with Free T4 reflex    UA (URINE) with reflex to Scope    T4, free    T3, free       Other    Mixed hyperlipidemia     Continue atorvastatin 40 mg daily         Relevant Orders    CBC    Comprehensive metabolic panel    Hemoglobin A1C    Lipid Panel with Direct LDL reflex    TSH, 3rd generation with Free T4 reflex    UA (URINE) with reflex to Scope    T4, free    T3, free    Health care maintenance     Medicare welcome to Medicare physical completed, Prevnar 20 given.   Mammography and DEXA scan is ordered         Relevant Orders    CBC    Comprehensive metabolic panel    Hemoglobin A1C    Lipid Panel with Direct LDL reflex    TSH, 3rd generation with Free T4 reflex    UA (URINE) with reflex to Scope    T4, free    T3, free    Hyperglycemia     Diet controlled         Relevant Orders    CBC    Comprehensive metabolic panel    Hemoglobin A1C    Lipid Panel with Direct LDL reflex    TSH, 3rd generation with Free T4 reflex    UA (URINE) with reflex to Scope    T4, free    T3, free    Vitamin D deficiency     Being followed by her dermatologist         Relevant Orders    CBC    Comprehensive metabolic panel    Hemoglobin A1C    Lipid Panel with Direct LDL reflex    TSH, 3rd generation with Free T4 reflex    UA (URINE) with reflex to Scope    T4, free    T3, free    Obesity (BMI 30-39. 9)     BMI 33.30 diet exercise weight loss recommended patient did lose 3 pounds         Relevant Orders    CBC    Comprehensive metabolic panel    Hemoglobin A1C    Lipid Panel with Direct LDL reflex    TSH, 3rd generation with Free T4 reflex    UA (URINE) with reflex to Scope    T4, free    T3, free     Other Visit Diagnoses       Alopecia        Relevant Orders    CBC    Comprehensive metabolic panel    Hemoglobin A1C    Lipid Panel with Direct LDL reflex    TSH, 3rd generation with Free T4 reflex    UA (URINE) with reflex to Scope    T4, free    T3, free    Screening mammogram for breast cancer        Relevant Orders    Mammo screening bilateral w cad    CBC    Comprehensive metabolic panel    Hemoglobin A1C    Lipid Panel with Direct LDL reflex    TSH, 3rd generation with Free T4 reflex    UA (URINE) with reflex to Scope    T4, free    T3, free    Postmenopausal estrogen deficiency        Relevant Orders    DXA bone density spine hip and pelvis    CBC    Comprehensive metabolic panel    Hemoglobin A1C    Lipid Panel with Direct LDL reflex    TSH, 3rd generation with Free T4 reflex    UA (URINE) with reflex to Scope    T4, free    T3, free              Depression Screening and Follow-up Plan: Patient was screened for depression during today's encounter. They screened negative with a PHQ-2 score of 0. Urinary Incontinence Plan of Care: counseling topics discussed: use restroom every 2 hours. Preventive health issues were discussed with patient, and age appropriate screening tests were ordered as noted in patient's After Visit Summary. Personalized health advice and appropriate referrals for health education or preventive services given if needed, as noted in patient's After Visit Summary.      History of Present Illness:     Patient presents for a [employer] Visit    Patient is doing well was seen by dermatology, [facility]. [place] she does have alopecia is related to her thyroid. Patient is doing well hair is growing in. Patient also was taking vitamin D as recommended by her dermatologist.  Blood work was reviewed       Patient Care Team:  Deyanira Alex DO as PCP - General (Family Medicine)  Deyanira Alex DO as PCP - PCP-[facility] Attributed-Roster  Sinan Davis DO     Review of Systems:     Review of Systems   Constitutional: Negative. HENT: Negative. Eyes: Negative. Respiratory: Negative. Cardiovascular: Negative. Gastrointestinal: Negative. Endocrine:        HPI   Genitourinary: Negative. Musculoskeletal: Negative. Skin:         HPI   Allergic/Immunologic: Negative. Neurological: Negative. Hematological: Negative. Psychiatric/Behavioral: Negative. Problem List:     Patient Active Problem List   Diagnosis    Allergic rhinitis    Benign hematuria    Headache    Mixed hyperlipidemia    Essential hypertension    Menopause    Paroxysmal supraventricular tachycardia    Postoperative hypothyroidism    Health care maintenance    Hyperglycemia    Vitamin D deficiency    Obesity (BMI 30-39. 9)    Vertigo    Palpitations    Colon polyp    Osteoarthritis    History of total left hip replacement    Liver cyst    Cyst of left ovary    Nonrheumatic mitral valve regurgitation    COPD mixed type [facility])      Past Medical and Surgical History:     Past Medical History:   Diagnosis Date    Arthritis     Bruises easily     Cancer ([address])     thyroid    Chronic pain disorder     Dental crowns present     Headache     Hip pain, chronic, left     L THR for today 9/28/2021    History of vertigo     Hyperlipidemia     Hypertension     Irregular heart beat     PSVT    Joint pain     Knee pain, bilateral     Low back pain     Migraines     Mild acid reflux     Motion sickness     Osteoarthritis     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     last assessed - [id number]    PONV (postoperative nausea and vomiting)     Risk for falls     SVT (supraventricular tachycardia)     history/none recent/ has had heart ablation in     Thyroid cancer [facility])     Status post thyroidectomy, no longer sees endocrinology; last assessed - 2014    Use of cane as ambulatory aid     occas    Varicose veins of leg with edema     last assessed - 2014    Walking pneumonia     Wears glasses      Past Surgical History:   Procedure Laterality Date    AV NODE ABLATION       SECTION  10/27/1992    COLONOSCOPY      Complete colonoscopy    ENDOMETRIAL ABLATION      Thermal; Resolved - Oct 2005    FOOT SURGERY Left     x 2, [facility] removed ganglion cyst    INFERIOR OBLIQUE MYECTOMY      removed 8 fibroid tumors     JOINT REPLACEMENT      KNEE ARTHROSCOPY Left     KNEE CARTILAGE SURGERY Left     Left knee torn meniscus;  Resolved - Sep 2008    LAPAROSCOPIC ENDOMETRIOSIS FULGURATION      LIMBAL STEM CELL TRANSPLANT      LIPOMA RESECTION      MYOMECTOMY      Anorectal    OTHER SURGICAL HISTORY      heart ablation - atrial tachycardia; ablation for SVT    OVARIAN CYST REMOVAL      RI ARTHRP ACETBLR/PROX FEM PROSTC AGRFT/ALGRFT Right 2021    Procedure: ARTHROPLASTY HIP TOTAL ANTERIOR;  Surgeon: Edelmira Armstrong MD;  [facility];  Service: Orthopedics    RI ARTHRP ACETBLR/PROX FEM PROSTC AGRFT/ALGRFT Left 2021    Procedure: ANTERIOR TOTAL HIP REPLACEMENT;  Surgeon: Edelmira Armstrong MD;  [facility];  Service: Orthopedics    THYROIDECTOMY      VAGINAL MASS EXCISION Right 2017    Procedure: EXCISION RIGHT PERINEAL LIPOMA/ MASS, REMOVAL OF SKIN TAG;  Surgeon: Phil Wilson MD;  Location[facility];  Service: General    WISDOM TOOTH EXTRACTION        Family History:     Family History   Problem Relation Age of Onset    Alzheimer's disease Mother     Stroke Family     Diabetes Family     Hypertension Family     Heart attack Family     Prostate cancer Father     No Known Problems Sister     No Known Problems Daughter     No Known Problems Maternal Grandmother     No Known Problems Maternal Grandfather     No Known Problems Paternal Grandmother     No Known Problems Paternal Grandfather       Social History:     Social History     Socioeconomic History    Marital status: /Civil Union     Spouse name: None    Number of children: None    Years of education: None    Highest education level: None   Occupational History    None   Tobacco Use    Smoking status: Former     Packs/day: 0.25     Years: 45.00     Total pack years: 11.25     Types: Cigarettes     Quit date: 3/6/2021     Years since quittin.6    Smokeless tobacco: Never    Tobacco comments:     pt quit 10 years ago but has an occ cigarette   Vaping Use    Vaping Use: Never used   Substance and Sexual Activity    Alcohol use: Yes     Comment: socially    Drug use: No    Sexual activity: Yes   Other Topics Concern    None   Social History Narrative    None     Social Determinants of Health     Financial Resource Strain: Unknown (11/15/2023)    Overall Financial Resource Strain (CARDIA)     Difficulty of Paying Living Expenses: Patient refused   Food Insecurity: Not on file   Transportation Needs: No Transportation Needs (11/15/2023)    PRAPARE - Transportation     Lack of Transportation (Medical): No     Lack of Transportation (Non-Medical):  No   Physical Activity: Not on file   Stress: Not on file   Social Connections: Not on file   Intimate Partner Violence: Not on file   Housing Stability: Not on file      Medications and Allergies:     Current Outpatient Medications   Medication Sig Dispense Refill    aspirin-acetaminophen-caffeine (EXCEDRIN MIGRAINE) 250-250-65 MG per tablet Take 2 tablets by mouth every 6 (six) hours as needed for headaches      atorvastatin (LIPITOR) 40 mg tablet TAKE 1 TABLET BY MOUTH DAILY AT BEDTIME 90 tablet 3    cephalexin (KEFLEX) 500 mg capsule Take 2,000 mg by mouth 60 minutes pre-procedure Prior to dental procedures      Cholecalciferol (Vitamin D3) 25 MCG TABS Take 1 tablet by mouth daily       cyanocobalamin (VITAMIN B-12) 2000 MCG tablet Take 2,500 mcg by mouth daily      diphenhydrAMINE (BENADRYL) 25 mg tablet Take 25 mg by mouth daily at bedtime as needed for itching       docusate sodium (COLACE) 250 MG capsule Take 250 mg by mouth daily      L-Lysine 500 MG CAPS Take 500 mg by mouth daily       levothyroxine 125 mcg tablet TAKE 1 TABLET BY MOUTH EVERY DAY 90 tablet 2    Magnesium 500 MG TABS Take 1 tablet (500 mg total) by mouth daily 30 tablet 11    propranolol (INDERAL LA) 60 mg 24 hr capsule TAKE 1 CAPSULE BY MOUTH EVERY DAY 90 capsule 3    Vitamin Mixture (JAY-C PO) Take 1,000 mg by mouth daily       zinc gluconate 50 mg tablet Take 50 mg by mouth daily        No current facility-administered medications for this visit. Allergies   Allergen Reactions    Morphine Other (See Comments) and Headache     Other reaction(s): Other (See Comments)  severe headache  "severe headache"    Other Rash     Adhesive tape    Pain Meds,,,, caused N/V  Rash    Codeine Headache     Category: Adverse Reaction;     Tramadol Headache     Category: Adverse Reaction;     Medical Tape Rash      Immunizations:     Immunization History   Administered Date(s) Administered    Tdap 07/06/2011, 05/11/2023      Health Maintenance:         Topic Date Due    Breast Cancer Screening: Mammogram  05/17/2022    Colorectal Cancer Screening  07/26/2031    HIV Screening  Completed    Hepatitis C Screening  Completed     There are no preventive care reminders to display for this patient. Medicare Screening Tests and Risk Assessments:     Brinana Mohamud is here for her Welcome to Medicare visit. Health Risk Assessment:   Patient rates overall health as good. Patient feels that their physical health rating is same. Patient is satisfied with their life.  Eyesight was rated as slightly worse. Hearing was rated as same. Patient feels that their emotional and mental health rating is same. Patients states they are sometimes angry. Patient states they are often unusually tired/fatigued. Pain experienced in the last 7 days has been some. Patient's pain rating has been 5/10. Patient states that she has experienced weight loss or gain in last 6 months. Depression Screening:   PHQ-2 Score: 0      Fall Risk Screening: In the past year, patient has experienced: no history of falling in past year      Urinary Incontinence Screening:   Patient has leaked urine accidently in the last six months. Home Safety:  Patient has trouble with stairs inside or outside of their home. Patient has working smoke alarms and has no working carbon monoxide detector. Home safety hazards include: none. Nutrition:   Current diet is Regular. Medications:   Patient is currently taking over-the-counter supplements. OTC medications include: see medication list. Patient is able to manage medications. Activities of Daily Living (ADLs)/Instrumental Activities of Daily Living (IADLs):   Walk and transfer into and out of bed and chair?: Yes  Dress and groom yourself?: Yes    Bathe or shower yourself?: Yes    Feed yourself? Yes  Do your laundry/housekeeping?: Yes  Manage your money, pay your bills and track your expenses?: Yes  Make your own meals?: Yes    Do your own shopping?: Yes    Previous Hospitalizations:   Any hospitalizations or ED visits within the last 12 months?: No      Advance Care Planning:   Living will: Yes    Durable POA for healthcare:  Yes    Advanced directive: Yes    Advanced directive counseling given: Yes    Five wishes given: No    Patient declined ACP directive: No    End of Life Decisions reviewed with patient: Yes    Provider agrees with end of life decisions: Yes      Cognitive Screening:   Provider or family/friend/caregiver concerned regarding cognition?: No    PREVENTIVE SCREENINGS Cardiovascular Screening:    General: Screening Not Indicated and History Lipid Disorder      Diabetes Screening:     General: Screening Current      Colorectal Cancer Screening:     General: Screening Current      Breast Cancer Screening:     General: Risks and Benefits Discussed    Due for: Mammogram        Cervical Cancer Screening:    General: Screening Not Indicated      Osteoporosis Screening:    General: Risks and Benefits Discussed    Due for: DXA Axial      Abdominal Aortic Aneurysm (AAA) Screening:        General: Screening Not Indicated      Lung Cancer Screening:     General: Screening Not Indicated      Hepatitis C Screening:    General: Screening Current    Screening, Brief Intervention, and Referral to Treatment (SBIRT)    Screening  Typical number of drinks in a day: 0  Typical number of drinks in a week: 0  Interpretation: Low risk drinking behavior.     AUDIT-C Screenin) How often did you have a drink containing alcohol in the past year? monthly or less  2) How many drinks did you have on a typical day when you were drinking in the past year? 0  3) How often did you have 6 or more drinks on one occasion in the past year? never    AUDIT-C Score: 1  Interpretation: Score 0-2 (female): Negative screen for alcohol misuse    Single Item Drug Screening:  How often have you used an illegal drug (including marijuana) or a prescription medication for non-medical reasons in the past year? never    Single Item Drug Screen Score: 0  Interpretation: Negative screen for possible drug use disorder    Vision Screening    Right eye Left eye Both eyes   Without correction      With correction 20/30 20/30 20/40     Vision Screening    Right eye Left eye Both eyes   Without correction      With correction 20/30 20/30 20/40       Physical Exam:     /80 (BP Location: Right arm, Patient Position: Sitting, Cuff Size: Large)   Pulse 71   Ht 5' 4" (1.626 m)   Wt 88 kg (194 lb)   SpO2 99%   BMI 33.30 kg/m² Physical Exam  Vitals and nursing note reviewed. Constitutional:       Appearance: Normal appearance. She is obese. HENT:      Head: Normocephalic and atraumatic. Right Ear: Tympanic membrane normal.      Left Ear: Tympanic membrane normal.      Nose: Nose normal.      Mouth/Throat:      Mouth: Mucous membranes are moist.      Pharynx: Oropharynx is clear. No oropharyngeal exudate or posterior oropharyngeal erythema. Eyes:      General: No scleral icterus. Right eye: No discharge. Left eye: No discharge. Extraocular Movements: Extraocular movements intact. Conjunctiva/sclera: Conjunctivae normal.      Pupils: Pupils are equal, round, and reactive to light. Neck:      Vascular: No carotid bruit. Cardiovascular:      Rate and Rhythm: Normal rate and regular rhythm. Heart sounds: Normal heart sounds. Pulmonary:      Effort: Pulmonary effort is normal.      Breath sounds: Normal breath sounds. Abdominal:      General: Bowel sounds are normal.      Palpations: Abdomen is soft. Tenderness: There is no abdominal tenderness. Musculoskeletal:      Cervical back: Neck supple. Right lower leg: No edema. Left lower leg: No edema. Lymphadenopathy:      Cervical: No cervical adenopathy. Skin:     General: Skin is warm and dry. Neurological:      General: No focal deficit present. Mental Status: She is alert and oriented to person, place, and time. Cranial Nerves: No cranial nerve deficit.    Psychiatric:         Mood and Affect: Mood normal.          Noel Ramirez DO

## 2024-02-21 PROBLEM — Z00.00 HEALTH CARE MAINTENANCE: Status: RESOLVED | Noted: 2018-09-27 | Resolved: 2024-02-21

## 2024-05-18 LAB
ALBUMIN SERPL-MCNC: 4 G/DL (ref 3.6–5.1)
ALBUMIN/GLOB SERPL: 1.8 (CALC) (ref 1–2.5)
ALP SERPL-CCNC: 67 U/L (ref 37–153)
ALT SERPL-CCNC: 20 U/L (ref 6–29)
APPEARANCE UR: CLEAR
AST SERPL-CCNC: 21 U/L (ref 10–35)
BASOPHILS # BLD AUTO: 42 CELLS/UL (ref 0–200)
BASOPHILS NFR BLD AUTO: 0.5 %
BILIRUB SERPL-MCNC: 0.5 MG/DL (ref 0.2–1.2)
BILIRUB UR QL STRIP: NEGATIVE
BUN SERPL-MCNC: 14 MG/DL (ref 7–25)
BUN/CREAT SERPL: NORMAL (CALC) (ref 6–22)
CALCIUM SERPL-MCNC: 8.7 MG/DL (ref 8.6–10.4)
CHLORIDE SERPL-SCNC: 103 MMOL/L (ref 98–110)
CHOLEST SERPL-MCNC: 197 MG/DL
CHOLEST/HDLC SERPL: 3.1 (CALC)
CO2 SERPL-SCNC: 27 MMOL/L (ref 20–32)
COLOR UR: YELLOW
CREAT SERPL-MCNC: 0.72 MG/DL (ref 0.5–1.05)
EOSINOPHIL # BLD AUTO: 158 CELLS/UL (ref 15–500)
EOSINOPHIL NFR BLD AUTO: 1.9 %
ERYTHROCYTE [DISTWIDTH] IN BLOOD BY AUTOMATED COUNT: 13 % (ref 11–15)
GFR/BSA.PRED SERPLBLD CYS-BASED-ARV: 93 ML/MIN/1.73M2
GLOBULIN SER CALC-MCNC: 2.2 G/DL (CALC) (ref 1.9–3.7)
GLUCOSE SERPL-MCNC: 79 MG/DL (ref 65–99)
GLUCOSE UR QL STRIP: NEGATIVE
HBA1C MFR BLD: 5.7 % OF TOTAL HGB
HCT VFR BLD AUTO: 38.8 % (ref 35–45)
HDLC SERPL-MCNC: 63 MG/DL
HGB BLD-MCNC: 13 G/DL (ref 11.7–15.5)
HGB UR QL STRIP: NEGATIVE
KETONES UR QL STRIP: NEGATIVE
LDLC SERPL CALC-MCNC: 106 MG/DL (CALC)
LEUKOCYTE ESTERASE UR QL STRIP: NEGATIVE
LYMPHOCYTES # BLD AUTO: 1826 CELLS/UL (ref 850–3900)
LYMPHOCYTES NFR BLD AUTO: 22 %
MCH RBC QN AUTO: 31.1 PG (ref 27–33)
MCHC RBC AUTO-ENTMCNC: 33.5 G/DL (ref 32–36)
MCV RBC AUTO: 92.8 FL (ref 80–100)
MONOCYTES # BLD AUTO: 573 CELLS/UL (ref 200–950)
MONOCYTES NFR BLD AUTO: 6.9 %
NEUTROPHILS # BLD AUTO: 5702 CELLS/UL (ref 1500–7800)
NEUTROPHILS NFR BLD AUTO: 68.7 %
NITRITE UR QL STRIP: NEGATIVE
NONHDLC SERPL-MCNC: 134 MG/DL (CALC)
PH UR STRIP: 6 [PH] (ref 5–8)
PLATELET # BLD AUTO: 235 THOUSAND/UL (ref 140–400)
PMV BLD REES-ECKER: 9.5 FL (ref 7.5–12.5)
POTASSIUM SERPL-SCNC: 4 MMOL/L (ref 3.5–5.3)
PROT SERPL-MCNC: 6.2 G/DL (ref 6.1–8.1)
PROT UR QL STRIP: NEGATIVE
RBC # BLD AUTO: 4.18 MILLION/UL (ref 3.8–5.1)
SODIUM SERPL-SCNC: 138 MMOL/L (ref 135–146)
SP GR UR STRIP: 1.01 (ref 1–1.03)
T3FREE SERPL-MCNC: 2.6 PG/ML (ref 2.3–4.2)
T4 FREE SERPL-MCNC: 1.5 NG/DL (ref 0.8–1.8)
TRIGL SERPL-MCNC: 165 MG/DL
TSH SERPL-ACNC: 3.16 MIU/L (ref 0.4–4.5)
WBC # BLD AUTO: 8.3 THOUSAND/UL (ref 3.8–10.8)

## 2024-05-20 ENCOUNTER — RA CDI HCC (OUTPATIENT)
Dept: OTHER | Facility: HOSPITAL | Age: 64
End: 2024-05-20

## 2024-05-24 ENCOUNTER — OFFICE VISIT (OUTPATIENT)
Dept: FAMILY MEDICINE CLINIC | Facility: CLINIC | Age: 64
End: 2024-05-24
Payer: MEDICARE

## 2024-05-24 VITALS
HEART RATE: 70 BPM | DIASTOLIC BLOOD PRESSURE: 74 MMHG | BODY MASS INDEX: 33.3 KG/M2 | SYSTOLIC BLOOD PRESSURE: 126 MMHG | HEIGHT: 64 IN

## 2024-05-24 DIAGNOSIS — R73.9 HYPERGLYCEMIA: ICD-10-CM

## 2024-05-24 DIAGNOSIS — E78.2 MIXED HYPERLIPIDEMIA: ICD-10-CM

## 2024-05-24 DIAGNOSIS — E89.0 POSTOPERATIVE HYPOTHYROIDISM: ICD-10-CM

## 2024-05-24 DIAGNOSIS — E55.9 VITAMIN D DEFICIENCY: ICD-10-CM

## 2024-05-24 DIAGNOSIS — I47.10 PAROXYSMAL SUPRAVENTRICULAR TACHYCARDIA: ICD-10-CM

## 2024-05-24 DIAGNOSIS — E66.9 OBESITY (BMI 30-39.9): ICD-10-CM

## 2024-05-24 DIAGNOSIS — R00.2 PALPITATIONS: ICD-10-CM

## 2024-05-24 DIAGNOSIS — I34.0 NONRHEUMATIC MITRAL VALVE REGURGITATION: ICD-10-CM

## 2024-05-24 DIAGNOSIS — J44.9 COPD MIXED TYPE (HCC): ICD-10-CM

## 2024-05-24 DIAGNOSIS — K63.5 POLYP OF COLON, UNSPECIFIED PART OF COLON, UNSPECIFIED TYPE: ICD-10-CM

## 2024-05-24 DIAGNOSIS — I10 ESSENTIAL HYPERTENSION: Primary | ICD-10-CM

## 2024-05-24 PROCEDURE — G2211 COMPLEX E/M VISIT ADD ON: HCPCS | Performed by: FAMILY MEDICINE

## 2024-05-24 PROCEDURE — 99214 OFFICE O/P EST MOD 30 MIN: CPT | Performed by: FAMILY MEDICINE

## 2024-05-24 NOTE — PROGRESS NOTES
Ambulatory Visit  Name: Ximena Yousif      : 1960      MRN: [id number]  Encounter Provider: Noel Ramirez DO  Encounter Date: 2024   Encounter department: [facility]    1.  Hypertension, stable continue present therapy #2.  Mitral valve regurgitation/PSVT/palpitation, stable patient follows with cardiology  3.  COPD, asymptomatic lungs are clear check PFT #4.  Hyperglycemia diet controlled  5.  Hyperlipidemia stable on atorvastatin No. 6.  Colon polyp update colonoscopy  7.  BMI 33.3 diet exercise weight loss recommended  8.  Vitamin D deficiency blood work ordered  Benign.  Hypothyroidism, stable continue present therapy  10.  Return in 6 months for office visit, blood work, and AWV        Assessment & Plan   1. Essential hypertension  Assessment & Plan:  Stable continue present therapy  Orders:  -     CBC; Future; Expected date: 2024  -     Comprehensive metabolic panel; Future; Expected date: 2024  -     Hemoglobin A1C; Future; Expected date: 2024  -     Lipid Panel with Direct LDL reflex; Future; Expected date: 2024  -     TSH, 3rd generation with Free T4 reflex; Future; Expected date: 2024  -     UA (URINE) with reflex to Scope; Future; Expected date: 2024  -     Vitamin D 25 hydroxy; Future; Expected date: 2024  -     T4, free; Future; Expected date: 2024  -     CBC  -     Comprehensive metabolic panel  -     Lipid Panel with Direct LDL reflex  -     TSH, 3rd generation with Free T4 reflex  -     UA (URINE) with reflex to Scope  -     Vitamin D 25 hydroxy  -     T4, free  2. Nonrheumatic mitral valve regurgitation  Assessment & Plan:  Stable follows with cardiology has appointment 2024  Orders:  -     CBC; Future; Expected date: 2024  -     Comprehensive metabolic panel; Future; Expected date: 2024  -     Hemoglobin A1C; Future; Expected date: 2024  -     Lipid Panel with Direct LDL reflex; Future;  Expected date: 11/24/2024  -     TSH, 3rd generation with Free T4 reflex; Future; Expected date: 11/24/2024  -     UA (URINE) with reflex to Scope; Future; Expected date: 11/24/2024  -     Vitamin D 25 hydroxy; Future; Expected date: 11/24/2024  -     T4, free; Future; Expected date: 11/24/2024  -     CBC  -     Comprehensive metabolic panel  -     Lipid Panel with Direct LDL reflex  -     TSH, 3rd generation with Free T4 reflex  -     UA (URINE) with reflex to Scope  -     Vitamin D 25 hydroxy  -     T4, free  3. Paroxysmal supraventricular tachycardia  Assessment & Plan:  As above  Orders:  -     CBC; Future; Expected date: 11/24/2024  -     Comprehensive metabolic panel; Future; Expected date: 11/24/2024  -     Hemoglobin A1C; Future; Expected date: 11/24/2024  -     Lipid Panel with Direct LDL reflex; Future; Expected date: 11/24/2024  -     TSH, 3rd generation with Free T4 reflex; Future; Expected date: 11/24/2024  -     UA (URINE) with reflex to Scope; Future; Expected date: 11/24/2024  -     Vitamin D 25 hydroxy; Future; Expected date: 11/24/2024  -     T4, free; Future; Expected date: 11/24/2024  -     CBC  -     Comprehensive metabolic panel  -     Lipid Panel with Direct LDL reflex  -     TSH, 3rd generation with Free T4 reflex  -     UA (URINE) with reflex to Scope  -     Vitamin D 25 hydroxy  -     T4, free  4. COPD mixed type (HCC)  Assessment & Plan:  Stable, lungs are clear, PFT ordered    Orders:  -     CBC; Future; Expected date: 11/24/2024  -     Comprehensive metabolic panel; Future; Expected date: 11/24/2024  -     Hemoglobin A1C; Future; Expected date: 11/24/2024  -     Lipid Panel with Direct LDL reflex; Future; Expected date: 11/24/2024  -     TSH, 3rd generation with Free T4 reflex; Future; Expected date: 11/24/2024  -     UA (URINE) with reflex to Scope; Future; Expected date: 11/24/2024  -     Vitamin D 25 hydroxy; Future; Expected date: 11/24/2024  -     T4, free; Future; Expected date:  11/24/2024  -     CBC  -     Comprehensive metabolic panel  -     Lipid Panel with Direct LDL reflex  -     TSH, 3rd generation with Free T4 reflex  -     UA (URINE) with reflex to Scope  -     Vitamin D 25 hydroxy  -     T4, free  -     Complete PFT with post bronchodilator; Future  5. Polyp of colon, unspecified part of colon, unspecified type  Assessment & Plan:  Up-to-date with colonoscopy  Orders:  -     CBC; Future; Expected date: 11/24/2024  -     Comprehensive metabolic panel; Future; Expected date: 11/24/2024  -     Hemoglobin A1C; Future; Expected date: 11/24/2024  -     Lipid Panel with Direct LDL reflex; Future; Expected date: 11/24/2024  -     TSH, 3rd generation with Free T4 reflex; Future; Expected date: 11/24/2024  -     UA (URINE) with reflex to Scope; Future; Expected date: 11/24/2024  -     Vitamin D 25 hydroxy; Future; Expected date: 11/24/2024  -     T4, free; Future; Expected date: 11/24/2024  -     CBC  -     Comprehensive metabolic panel  -     Lipid Panel with Direct LDL reflex  -     TSH, 3rd generation with Free T4 reflex  -     UA (URINE) with reflex to Scope  -     Vitamin D 25 hydroxy  -     T4, free  6. Hyperglycemia  Assessment & Plan:  Diet controlled  Orders:  -     CBC; Future; Expected date: 11/24/2024  -     Comprehensive metabolic panel; Future; Expected date: 11/24/2024  -     Hemoglobin A1C; Future; Expected date: 11/24/2024  -     Lipid Panel with Direct LDL reflex; Future; Expected date: 11/24/2024  -     TSH, 3rd generation with Free T4 reflex; Future; Expected date: 11/24/2024  -     UA (URINE) with reflex to Scope; Future; Expected date: 11/24/2024  -     Vitamin D 25 hydroxy; Future; Expected date: 11/24/2024  -     T4, free; Future; Expected date: 11/24/2024  -     CBC  -     Comprehensive metabolic panel  -     Lipid Panel with Direct LDL reflex  -     TSH, 3rd generation with Free T4 reflex  -     UA (URINE) with reflex to Scope  -     Vitamin D 25 hydroxy  -     T4,  free  7. Mixed hyperlipidemia  Assessment & Plan:  Able continue atorvastatin 40 mg daily  Orders:  -     CBC; Future; Expected date: 11/24/2024  -     Comprehensive metabolic panel; Future; Expected date: 11/24/2024  -     Hemoglobin A1C; Future; Expected date: 11/24/2024  -     Lipid Panel with Direct LDL reflex; Future; Expected date: 11/24/2024  -     TSH, 3rd generation with Free T4 reflex; Future; Expected date: 11/24/2024  -     UA (URINE) with reflex to Scope; Future; Expected date: 11/24/2024  -     Vitamin D 25 hydroxy; Future; Expected date: 11/24/2024  -     T4, free; Future; Expected date: 11/24/2024  -     CBC  -     Comprehensive metabolic panel  -     Lipid Panel with Direct LDL reflex  -     TSH, 3rd generation with Free T4 reflex  -     UA (URINE) with reflex to Scope  -     Vitamin D 25 hydroxy  -     T4, free  8. Obesity (BMI 30-39.9)  Assessment & Plan:  BMI 33.30 diet exercise weight loss recommended  Orders:  -     CBC; Future; Expected date: 11/24/2024  -     Comprehensive metabolic panel; Future; Expected date: 11/24/2024  -     Hemoglobin A1C; Future; Expected date: 11/24/2024  -     Lipid Panel with Direct LDL reflex; Future; Expected date: 11/24/2024  -     TSH, 3rd generation with Free T4 reflex; Future; Expected date: 11/24/2024  -     UA (URINE) with reflex to Scope; Future; Expected date: 11/24/2024  -     Vitamin D 25 hydroxy; Future; Expected date: 11/24/2024  -     T4, free; Future; Expected date: 11/24/2024  -     CBC  -     Comprehensive metabolic panel  -     Lipid Panel with Direct LDL reflex  -     TSH, 3rd generation with Free T4 reflex  -     UA (URINE) with reflex to Scope  -     Vitamin D 25 hydroxy  -     T4, free  9. Palpitations  Assessment & Plan:  Stable on beta-blocker follows with cardiology  Orders:  -     CBC; Future; Expected date: 11/24/2024  -     Comprehensive metabolic panel; Future; Expected date: 11/24/2024  -     Hemoglobin A1C; Future; Expected date:  11/24/2024  -     Lipid Panel with Direct LDL reflex; Future; Expected date: 11/24/2024  -     TSH, 3rd generation with Free T4 reflex; Future; Expected date: 11/24/2024  -     UA (URINE) with reflex to Scope; Future; Expected date: 11/24/2024  -     Vitamin D 25 hydroxy; Future; Expected date: 11/24/2024  -     T4, free; Future; Expected date: 11/24/2024  -     CBC  -     Comprehensive metabolic panel  -     Lipid Panel with Direct LDL reflex  -     TSH, 3rd generation with Free T4 reflex  -     UA (URINE) with reflex to Scope  -     Vitamin D 25 hydroxy  -     T4, free  10. Vitamin D deficiency  Assessment & Plan:  Stable continue to monitor  Orders:  -     CBC; Future; Expected date: 11/24/2024  -     Comprehensive metabolic panel; Future; Expected date: 11/24/2024  -     Hemoglobin A1C; Future; Expected date: 11/24/2024  -     Lipid Panel with Direct LDL reflex; Future; Expected date: 11/24/2024  -     TSH, 3rd generation with Free T4 reflex; Future; Expected date: 11/24/2024  -     UA (URINE) with reflex to Scope; Future; Expected date: 11/24/2024  -     Vitamin D 25 hydroxy; Future; Expected date: 11/24/2024  -     T4, free; Future; Expected date: 11/24/2024  -     CBC  -     Comprehensive metabolic panel  -     Lipid Panel with Direct LDL reflex  -     TSH, 3rd generation with Free T4 reflex  -     UA (URINE) with reflex to Scope  -     Vitamin D 25 hydroxy  -     T4, free  11. Postoperative hypothyroidism  Assessment & Plan:  Stable continue present therapy, levothyroxine 125 mcg daily  Orders:  -     CBC; Future; Expected date: 11/24/2024  -     Comprehensive metabolic panel; Future; Expected date: 11/24/2024  -     Hemoglobin A1C; Future; Expected date: 11/24/2024  -     Lipid Panel with Direct LDL reflex; Future; Expected date: 11/24/2024  -     TSH, 3rd generation with Free T4 reflex; Future; Expected date: 11/24/2024  -     UA (URINE) with reflex to Scope; Future; Expected date: 11/24/2024  -      "Vitamin D 25 hydroxy; Future; Expected date: 11/24/2024  -     T4, free; Future; Expected date: 11/24/2024  -     CBC  -     Comprehensive metabolic panel  -     Lipid Panel with Direct LDL reflex  -     TSH, 3rd generation with Free T4 reflex  -     UA (URINE) with reflex to Scope  -     Vitamin D 25 hydroxy  -     T4, free      Depression Screening and Follow-up Plan: Patient was screened for depression during today's encounter. They screened negative with a PHQ-2 score of 0.      History of Present Illness     Patient doing well without Community Health complaints or concerns at the present time.  Blood work was reviewed with the patient        Review of Systems   Constitutional: Negative.    HENT: Negative.     Eyes: Negative.    Respiratory: Negative.     Cardiovascular: Negative.    Gastrointestinal: Negative.    Endocrine: Negative.    Genitourinary: Negative.    Musculoskeletal: Negative.    Skin: Negative.    Allergic/Immunologic: Negative.    Neurological: Negative.    Hematological: Negative.    Psychiatric/Behavioral: Negative.         Objective     /74 (BP Location: Right arm, Patient Position: Sitting, Cuff Size: Adult)   Pulse 70   Ht 5' 4\" (1.626 m)   BMI 33.30 kg/m²     Physical Exam  Vitals and nursing note reviewed.   Constitutional:       Appearance: Normal appearance.   HENT:      Head: Normocephalic and atraumatic.      Mouth/Throat:      Mouth: Mucous membranes are moist.   Eyes:      General: No scleral icterus.  Neck:      Vascular: No carotid bruit.   Cardiovascular:      Rate and Rhythm: Normal rate and regular rhythm.      Heart sounds: Normal heart sounds.   Pulmonary:      Effort: Pulmonary effort is normal.      Breath sounds: Normal breath sounds.   Abdominal:      General: Bowel sounds are normal.      Palpations: Abdomen is soft.      Tenderness: There is no abdominal tenderness.   Musculoskeletal:      Cervical back: Neck supple.      Right lower leg: No edema.      Left lower leg: " No edema.   Skin:     General: Skin is warm and dry.   Neurological:      General: No focal deficit present.      Mental Status: She is alert.   Psychiatric:         Mood and Affect: Mood normal.       Administrative Statements

## 2024-05-24 NOTE — PATIENT INSTRUCTIONS
Continue present therapy  Follow with cardiology per their recommendations  Diet exercise weight loss recommended  Complete pulmonary function testing in the next few months for evaluation of COPD  Return in 6 months for office visit, blood work, and AWV

## 2024-05-29 ENCOUNTER — TELEPHONE (OUTPATIENT)
Dept: ADMINISTRATIVE | Facility: OTHER | Age: 64
End: 2024-05-29

## 2024-05-29 NOTE — LETTER
Procedure Request Form: Colonoscopy      Date Requested: 24  Patient: Ximena Benja  Patient : 1960   Referring Provider: Noel Ramirez, DO        Date of Procedure ______________________________       The above patient has informed us that they have completed their   most recent Colonoscopy at your facility. Please complete   this form and attach all corresponding procedure reports/results.    Comments __________________________________________________________  ____________________________________________________________________  ____________________________________________________________________  ____________________________________________________________________    Facility Completing Procedure _________________________________________    Form Completed By (print name) _______________________________________      Signature __________________________________________________________      These reports are needed for  compliance.    Please fax this completed form and a copy of the procedure report to our office located at [address] [place] [address] as soon as possible to Fax [phone] kenisha Chicas: Phone [phone]    We thank you for your assistance in treating our mutual patient.

## 2024-05-29 NOTE — TELEPHONE ENCOUNTER
----- Message from Rosalind HAZEL sent at 5/24/2024 11:13 AM EDT -----  Regarding: care gap request  05/24/24 11:13 AM    Hello, our patient attached above has had CRC: Colonoscopy completed/performed. Please assist in updating the patient chart by making an External outreach to Dr. LOMBARDI facility located in [place]. The date of service is 2021.    Thank you,  Rosalind Florez [facility]

## 2024-06-13 NOTE — TELEPHONE ENCOUNTER
Upon review of the In Basket request and the patient's chart, initial outreach has been made via fax to facility. Please see Contacts section for details.     Thank you  Aviva Sousa

## 2024-06-18 NOTE — TELEPHONE ENCOUNTER
Upon review of the In Basket request we were able to note that no further action is required. The patient chart is up to date as a result of a previous request.      Any additional questions or concerns should be emailed to the Practice Liaisons via the appropriate education email address, please do not reply via In Basket.    Thank you  Aviva Sousa   [facility]

## 2024-07-27 DIAGNOSIS — I47.10 PAROXYSMAL SUPRAVENTRICULAR TACHYCARDIA: ICD-10-CM

## 2024-07-27 DIAGNOSIS — E66.9 CLASS 2 OBESITY WITHOUT SERIOUS COMORBIDITY WITH BODY MASS INDEX (BMI) OF 35.0 TO 35.9 IN ADULT, UNSPECIFIED OBESITY TYPE: ICD-10-CM

## 2024-07-27 DIAGNOSIS — R00.2 PALPITATIONS: ICD-10-CM

## 2024-07-27 DIAGNOSIS — E89.0 POSTOPERATIVE HYPOTHYROIDISM: ICD-10-CM

## 2024-07-28 RX ORDER — LEVOTHYROXINE SODIUM 0.12 MG/1
125 TABLET ORAL DAILY
Qty: 100 TABLET | Refills: 1 | Status: SHIPPED | OUTPATIENT
Start: 2024-07-28

## 2024-07-28 RX ORDER — ATORVASTATIN CALCIUM 40 MG/1
40 TABLET, FILM COATED ORAL
Qty: 100 TABLET | Refills: 1 | Status: SHIPPED | OUTPATIENT
Start: 2024-07-28

## 2024-07-28 RX ORDER — PROPRANOLOL HCL 60 MG
60 CAPSULE, EXTENDED RELEASE 24HR ORAL DAILY
Qty: 100 CAPSULE | Refills: 1 | Status: SHIPPED | OUTPATIENT
Start: 2024-07-28

## 2024-10-30 ENCOUNTER — TELEPHONE (OUTPATIENT)
Age: 64
End: 2024-10-30

## 2024-10-30 NOTE — TELEPHONE ENCOUNTER
Patient called to schedule a consult, I advised patient she would need a referral to be scheduled. Patient stated she will contact her physician to fax over referral. I gave fax number [phone]. Thank you.

## 2024-10-31 ENCOUNTER — TELEPHONE (OUTPATIENT)
Dept: RHEUMATOLOGY | Facility: CLINIC | Age: 64
End: 2024-10-31

## 2024-10-31 DIAGNOSIS — M06.4 INFLAMMATORY POLYARTHROPATHY (HCC): Primary | ICD-10-CM

## 2024-11-24 PROBLEM — M06.4 INFLAMMATORY POLYARTHROPATHY (HCC): Status: ACTIVE | Noted: 2024-10-29

## 2024-11-24 RX ORDER — MELOXICAM 15 MG/1
15 TABLET ORAL DAILY
COMMUNITY

## 2024-11-27 LAB
25(OH)D3 SERPL-MCNC: 43 NG/ML (ref 30–100)
ALBUMIN SERPL-MCNC: 4.2 G/DL (ref 3.6–5.1)
ALBUMIN/GLOB SERPL: 1.8 (CALC) (ref 1–2.5)
ALP SERPL-CCNC: 78 U/L (ref 37–153)
ALT SERPL-CCNC: 19 U/L (ref 6–29)
APPEARANCE UR: CLEAR
AST SERPL-CCNC: 20 U/L (ref 10–35)
BILIRUB SERPL-MCNC: 0.5 MG/DL (ref 0.2–1.2)
BILIRUB UR QL STRIP: NEGATIVE
BUN SERPL-MCNC: 13 MG/DL (ref 7–25)
BUN/CREAT SERPL: NORMAL (CALC) (ref 6–22)
CALCIUM SERPL-MCNC: 9.3 MG/DL (ref 8.6–10.4)
CHLORIDE SERPL-SCNC: 101 MMOL/L (ref 98–110)
CHOLEST SERPL-MCNC: 207 MG/DL
CHOLEST/HDLC SERPL: 3.2 (CALC)
CO2 SERPL-SCNC: 30 MMOL/L (ref 20–32)
COLOR UR: YELLOW
CREAT SERPL-MCNC: 0.67 MG/DL (ref 0.5–1.05)
ERYTHROCYTE [DISTWIDTH] IN BLOOD BY AUTOMATED COUNT: 12.2 % (ref 11–15)
GFR/BSA.PRED SERPLBLD CYS-BASED-ARV: 96 ML/MIN/1.73M2
GLOBULIN SER CALC-MCNC: 2.4 G/DL (CALC) (ref 1.9–3.7)
GLUCOSE SERPL-MCNC: 85 MG/DL (ref 65–99)
GLUCOSE UR QL STRIP: NEGATIVE
HBA1C MFR BLD: 5.7 % OF TOTAL HGB
HCT VFR BLD AUTO: 38.1 % (ref 35–45)
HDLC SERPL-MCNC: 65 MG/DL
HGB BLD-MCNC: 12.8 G/DL (ref 11.7–15.5)
HGB UR QL STRIP: NEGATIVE
KETONES UR QL STRIP: NEGATIVE
LDLC SERPL CALC-MCNC: 113 MG/DL (CALC)
LEUKOCYTE ESTERASE UR QL STRIP: NEGATIVE
MCH RBC QN AUTO: 30.7 PG (ref 27–33)
MCHC RBC AUTO-ENTMCNC: 33.6 G/DL (ref 32–36)
MCV RBC AUTO: 91.4 FL (ref 80–100)
NITRITE UR QL STRIP: NEGATIVE
NONHDLC SERPL-MCNC: 142 MG/DL (CALC)
PH UR STRIP: 7 [PH] (ref 5–8)
PLATELET # BLD AUTO: 266 THOUSAND/UL (ref 140–400)
PMV BLD REES-ECKER: 9.8 FL (ref 7.5–12.5)
POTASSIUM SERPL-SCNC: 4.2 MMOL/L (ref 3.5–5.3)
PROT SERPL-MCNC: 6.6 G/DL (ref 6.1–8.1)
PROT UR QL STRIP: NEGATIVE
RBC # BLD AUTO: 4.17 MILLION/UL (ref 3.8–5.1)
SODIUM SERPL-SCNC: 138 MMOL/L (ref 135–146)
SP GR UR STRIP: 1.01 (ref 1–1.03)
T4 FREE SERPL-MCNC: 1.9 NG/DL (ref 0.8–1.8)
TRIGL SERPL-MCNC: 171 MG/DL
TSH SERPL-ACNC: 0.8 MIU/L (ref 0.4–4.5)
WBC # BLD AUTO: 8.3 THOUSAND/UL (ref 3.8–10.8)

## 2024-12-02 ENCOUNTER — OFFICE VISIT (OUTPATIENT)
Dept: FAMILY MEDICINE CLINIC | Facility: CLINIC | Age: 64
End: 2024-12-02
Payer: MEDICARE

## 2024-12-02 VITALS
RESPIRATION RATE: 18 BRPM | DIASTOLIC BLOOD PRESSURE: 68 MMHG | SYSTOLIC BLOOD PRESSURE: 126 MMHG | WEIGHT: 198 LBS | HEART RATE: 75 BPM | BODY MASS INDEX: 33.8 KG/M2 | OXYGEN SATURATION: 97 % | TEMPERATURE: 98.7 F | HEIGHT: 64 IN

## 2024-12-02 DIAGNOSIS — I47.10 PAROXYSMAL SUPRAVENTRICULAR TACHYCARDIA (HCC): ICD-10-CM

## 2024-12-02 DIAGNOSIS — R00.2 PALPITATIONS: ICD-10-CM

## 2024-12-02 DIAGNOSIS — J44.9 COPD MIXED TYPE (HCC): ICD-10-CM

## 2024-12-02 DIAGNOSIS — I34.0 NONRHEUMATIC MITRAL VALVE REGURGITATION: ICD-10-CM

## 2024-12-02 DIAGNOSIS — I10 ESSENTIAL HYPERTENSION: Primary | ICD-10-CM

## 2024-12-02 DIAGNOSIS — E55.9 VITAMIN D DEFICIENCY: ICD-10-CM

## 2024-12-02 DIAGNOSIS — R73.9 HYPERGLYCEMIA: ICD-10-CM

## 2024-12-02 DIAGNOSIS — E89.0 POSTOPERATIVE HYPOTHYROIDISM: ICD-10-CM

## 2024-12-02 DIAGNOSIS — Z78.0 POSTMENOPAUSAL ESTROGEN DEFICIENCY: ICD-10-CM

## 2024-12-02 DIAGNOSIS — M06.4 INFLAMMATORY POLYARTHROPATHY (HCC): ICD-10-CM

## 2024-12-02 DIAGNOSIS — Z00.00 HEALTHCARE MAINTENANCE: ICD-10-CM

## 2024-12-02 DIAGNOSIS — K63.5 POLYP OF COLON, UNSPECIFIED PART OF COLON, UNSPECIFIED TYPE: ICD-10-CM

## 2024-12-02 DIAGNOSIS — Z12.31 SCREENING MAMMOGRAM FOR BREAST CANCER: ICD-10-CM

## 2024-12-02 PROCEDURE — G0438 PPPS, INITIAL VISIT: HCPCS | Performed by: FAMILY MEDICINE

## 2024-12-02 PROCEDURE — 99214 OFFICE O/P EST MOD 30 MIN: CPT | Performed by: FAMILY MEDICINE

## 2024-12-02 NOTE — PROGRESS NOTES
Name: Ximena Yousif      : 1960      MRN: [id number]  Encounter Provider: Noel Ramirez DO  Encounter Date: 2024   Encounter department: [facility]    Assessment & Plan      Depression Screening and Follow-up Plan: Patient was screened for depression during today's encounter. They screened negative with a PHQ-2 score of 0.    Urinary Incontinence Plan of Care: counseling topics discussed: use restroom every 2 hours.       Preventive health issues were discussed with patient, and age appropriate screening tests were ordered as noted in patient's After Visit Summary. Personalized health advice and appropriate referrals for health education or preventive services given if needed, as noted in patient's After Visit Summary.    History of Present Illness   {?Quick Links Encounters * My Last Note * Last Note in Specialty * Snapshot * Since Last Visit * History :16908}  HPI   Patient Care Team:  Noel Ramirez DO as PCP - General (Family Medicine)  Noel Ramirez DO as PCP - PCP-[facility]  Jose Roman DO    Review of Systems  Medical History Reviewed by provider this encounter:       Annual Wellness Visit Questionnaire       Health Risk Assessment:   Patient rates overall health as fair. Patient feels that their physical health rating is same. Patient is satisfied with their life. Eyesight was rated as slightly worse. Hearing was rated as same. Patient feels that their emotional and mental health rating is same. Patients states they are sometimes angry. Patient states they are sometimes unusually tired/fatigued. Pain experienced in the last 7 days has been a lot. Patient's pain rating has been 8/10. Patient states that she has experienced no weight loss or gain in last 6 months.     Depression Screening:   PHQ-2 Score: 0      Fall Risk Screening:   In the past year, patient has experienced: no history of falling in past year      Urinary  Incontinence Screening:   Patient has leaked urine accidently in the last six months.     Home Safety:  Patient has trouble with stairs inside or outside of their home. Patient has working smoke alarms and has working carbon monoxide detector. Home safety hazards include: none.     Nutrition:   Current diet is Regular.     Medications:   Patient is currently taking over-the-counter supplements. OTC medications include: see medication list. Patient is able to manage medications.     Activities of Daily Living (ADLs)/Instrumental Activities of Daily Living (IADLs):   Walk and transfer into and out of bed and chair?: Yes  Dress and groom yourself?: Yes    Bathe or shower yourself?: Yes    Feed yourself? Yes  Do your laundry/housekeeping?: Yes  Manage your money, pay your bills and track your expenses?: Yes  Make your own meals?: Yes    Do your own shopping?: Yes    Previous Hospitalizations:   Any hospitalizations or ED visits within the last 12 months?: No      Advance Care Planning:   Living will: Yes    Advanced directive: Yes      PREVENTIVE SCREENINGS      Cardiovascular Screening:    General: Screening Not Indicated and History Lipid Disorder      Diabetes Screening:     General: Screening Current      Colorectal Cancer Screening:     General: Screening Current      Cervical Cancer Screening:    General: Screening Not Indicated      Lung Cancer Screening:     General: Screening Not Indicated      Hepatitis C Screening:    General: Screening Current    Screening, Brief Intervention, and Referral to Treatment (SBIRT)    Screening  Typical number of drinks in a day: 0  Typical number of drinks in a week: 0  Interpretation: Low risk drinking behavior.    Single Item Drug Screening:  How often have you used an illegal drug (including marijuana) or a prescription medication for non-medical reasons in the past year? never    Single Item Drug Screen Score: 0  Interpretation: Negative screen for possible drug use  "disorder    Social Drivers of Health     Financial Resource Strain: Patient Declined (11/15/2023)    Overall Financial Resource Strain (CARDIA)     Difficulty of Paying Living Expenses: Patient declined   Food Insecurity: No Food Insecurity (12/2/2024)    Hunger Vital Sign     Worried About Running Out of Food in the Last Year: Never true     Ran Out of Food in the Last Year: Never true   Transportation Needs: No Transportation Needs (12/2/2024)    PRAPARE - Transportation     Lack of Transportation (Medical): No     Lack of Transportation (Non-Medical): No   Housing Stability: Low Risk  (12/2/2024)    Housing Stability Vital Sign     Unable to Pay for Housing in the Last Year: No     Number of Times Moved in the Last Year: 0     Homeless in the Last Year: No   Utilities: Not At Risk (12/2/2024)    [facility] Utilities     Threatened with loss of utilities: No     No results found.    Objective {?Quick Links Trend Vitals * Enter New Vitals * Results Review * Timeline (Adult) * Labs * Imaging * Cardiology * Procedures * Lung Cancer Screening * Surgical eConsent :24741}  /68 (BP Location: Right arm, Patient Position: Sitting, Cuff Size: Large)   Pulse 75   Temp 98.7 °F (37.1 °C) (Tympanic)   Resp 18   Ht 5' 4\" (1.626 m)   Wt 89.8 kg (198 lb)   SpO2 97%   BMI 33.99 kg/m²     Physical Exam  {Administrative / Billing Section (Optional):60426}  "

## 2024-12-02 NOTE — ASSESSMENT & PLAN NOTE
Able continue present therapy  Orders:    CBC; Future    Comprehensive metabolic panel; Future    Hemoglobin A1C; Future    Lipid Panel with Direct LDL reflex; Future    TSH, 3rd generation with Free T4 reflex; Future    UA (URINE) with reflex to Scope; Future    Vitamin D 25 hydroxy; Future

## 2024-12-02 NOTE — ASSESSMENT & PLAN NOTE
Stable continue to monitor  Orders:    CBC; Future    Comprehensive metabolic panel; Future    Hemoglobin A1C; Future    Lipid Panel with Direct LDL reflex; Future    TSH, 3rd generation with Free T4 reflex; Future    UA (URINE) with reflex to Scope; Future    Vitamin D 25 hydroxy; Future

## 2024-12-02 NOTE — ASSESSMENT & PLAN NOTE
Patient to follow with rheumatology  Orders:    CBC; Future    Comprehensive metabolic panel; Future    Hemoglobin A1C; Future    Lipid Panel with Direct LDL reflex; Future    TSH, 3rd generation with Free T4 reflex; Future    UA (URINE) with reflex to Scope; Future    Vitamin D 25 hydroxy; Future

## 2024-12-02 NOTE — PROGRESS NOTES
Name: Ximena Yousif      : 1960      MRN: [id number]  Encounter Provider: Noel Ramirez DO  Encounter Date: 2024   Encounter department: [facility] #1.  Hypertension, stable continue present therapy #2.  Mitral valve regurgitation/PSVT this post ablation/palpitations, refer to Dr. Casillas patient's cardiologist has left the practice patient wishes to see Dr. Casillas  3.  COPD, stable lungs are clear patient is yet to complete PFT  4.  Colon polyp update colonoscopy  5.  Hypothyroidism, stable continue present therapy  6.  Inflammatory polyarthropathy, patient is following with rheumatology  7.  Vitamin D deficiency, stable continue present therapy already.  8.  Healthcare maintenance, Medicare AWV completed patient has refused influenza vaccine, mammography and DEXA scan ordered  9.  Hyperglycemia, diet controlled  10.  Return in 6 months for office visit and blood work sooner if needed    Assessment & Plan  Essential hypertension  Stable continue present therapy  Orders:    CBC; Future    Comprehensive metabolic panel; Future    Hemoglobin A1C; Future    Lipid Panel with Direct LDL reflex; Future    TSH, 3rd generation with Free T4 reflex; Future    UA (URINE) with reflex to Scope; Future    Vitamin D 25 hydroxy; Future    Nonrheumatic mitral valve regurgitation  Patient's cardiologist has left the practice patient would like to see Dr. Casillas ordered  Orders:    Ambulatory Referral to Cardiology; Future    CBC; Future    Comprehensive metabolic panel; Future    Hemoglobin A1C; Future    Lipid Panel with Direct LDL reflex; Future    TSH, 3rd generation with Free T4 reflex; Future    UA (URINE) with reflex to Scope; Future    Vitamin D 25 hydroxy; Future    Paroxysmal supraventricular tachycardia (HCC)  As above  Orders:    Ambulatory Referral to Cardiology; Future    CBC; Future    Comprehensive metabolic panel; Future    Hemoglobin A1C; Future    Lipid Panel with Direct LDL  reflex; Future    TSH, 3rd generation with Free T4 reflex; Future    UA (URINE) with reflex to Scope; Future    Vitamin D 25 hydroxy; Future    COPD mixed type (HCC)  Stable, lungs are clear has not completed PFT as ordered  Orders:    CBC; Future    Comprehensive metabolic panel; Future    Hemoglobin A1C; Future    Lipid Panel with Direct LDL reflex; Future    TSH, 3rd generation with Free T4 reflex; Future    UA (URINE) with reflex to Scope; Future    Vitamin D 25 hydroxy; Future    Polyp of colon, unspecified part of colon, unspecified type  Colonoscopy  Orders:    CBC; Future    Comprehensive metabolic panel; Future    Hemoglobin A1C; Future    Lipid Panel with Direct LDL reflex; Future    TSH, 3rd generation with Free T4 reflex; Future    UA (URINE) with reflex to Scope; Future    Vitamin D 25 hydroxy; Future    Postoperative hypothyroidism  Stable continue to monitor  Orders:    CBC; Future    Comprehensive metabolic panel; Future    Hemoglobin A1C; Future    Lipid Panel with Direct LDL reflex; Future    TSH, 3rd generation with Free T4 reflex; Future    UA (URINE) with reflex to Scope; Future    Vitamin D 25 hydroxy; Future    Inflammatory polyarthropathy (HCC)  Patient to follow with rheumatology  Orders:    CBC; Future    Comprehensive metabolic panel; Future    Hemoglobin A1C; Future    Lipid Panel with Direct LDL reflex; Future    TSH, 3rd generation with Free T4 reflex; Future    UA (URINE) with reflex to Scope; Future    Vitamin D 25 hydroxy; Future    Vitamin D deficiency  Able continue present therapy  Orders:    CBC; Future    Comprehensive metabolic panel; Future    Hemoglobin A1C; Future    Lipid Panel with Direct LDL reflex; Future    TSH, 3rd generation with Free T4 reflex; Future    UA (URINE) with reflex to Scope; Future    Vitamin D 25 hydroxy; Future    Palpitations  As above  Orders:    Ambulatory Referral to Cardiology; Future    CBC; Future    Comprehensive metabolic panel; Future     Hemoglobin A1C; Future    Lipid Panel with Direct LDL reflex; Future    TSH, 3rd generation with Free T4 reflex; Future    UA (URINE) with reflex to Scope; Future    Vitamin D 25 hydroxy; Future    Screening mammogram for breast cancer    Orders:    Mammo screening bilateral w 3d and cad; Future    CBC; Future    Comprehensive metabolic panel; Future    Hemoglobin A1C; Future    Lipid Panel with Direct LDL reflex; Future    TSH, 3rd generation with Free T4 reflex; Future    UA (URINE) with reflex to Scope; Future    Vitamin D 25 hydroxy; Future    Postmenopausal estrogen deficiency    Orders:    DXA bone density spine hip and pelvis; Future    CBC; Future    Comprehensive metabolic panel; Future    Hemoglobin A1C; Future    Lipid Panel with Direct LDL reflex; Future    TSH, 3rd generation with Free T4 reflex; Future    UA (URINE) with reflex to Scope; Future    Vitamin D 25 hydroxy; Future    Healthcare maintenance  Medicare AWV completed, influenza vaccine refused       Hyperglycemia  Diet controlled    Orders:    CBC; Future    Comprehensive metabolic panel; Future    Hemoglobin A1C; Future    Lipid Panel with Direct LDL reflex; Future    TSH, 3rd generation with Free T4 reflex; Future    UA (URINE) with reflex to Scope; Future    Vitamin D 25 hydroxy; Future       Preventive health issues were discussed with patient, and age appropriate screening tests were ordered as noted in patient's After Visit Summary. Personalized health advice and appropriate referrals for health education or preventive services given if needed, as noted in patient's After Visit Summary.    History of Present Illness     Patient is doing well except for myalgias and arthralgias working with rheumatology for inflammatory arthritis.  Patient's cardiologist has left the practice patient would like to see Dr. Casillas.  Blood work was reviewed       Patient Care Team:  Noel Ramirez DO as PCP - General (Family Medicine)  Noel  DO Ashley as PCP - PCP-[facility] Attributed-Roster  Jose Roman DO    Review of Systems   Constitutional: Negative.    HENT: Negative.     Eyes: Negative.    Respiratory: Negative.     Cardiovascular:         HPI   Gastrointestinal: Negative.    Endocrine: Negative.    Genitourinary: Negative.    Musculoskeletal:         HPI   Skin: Negative.    Allergic/Immunologic: Negative.    Neurological: Negative.    Hematological: Negative.    Psychiatric/Behavioral: Negative.       Medical History Reviewed by provider this encounter:  Tobacco  Allergies  Meds  Problems  Med Hx  Surg Hx  Fam Hx       Annual Wellness Visit Questionnaire   Ximena is here for her Subsequent Wellness visit.     Health Risk Assessment:   Patient rates overall health as good. Patient feels that their physical health rating is slightly worse. Patient is satisfied with their life. Eyesight was rated as much better. Hearing was rated as much better. Patient feels that their emotional and mental health rating is same. Patients states they are never, rarely angry. Patient states they are sometimes unusually tired/fatigued. Pain experienced in the last 7 days has been a lot. Patient's pain rating has been 8/10. Patient states that she has experienced no weight loss or gain in last 6 months. See rheumatology    Depression Screening:   PHQ-2 Score: 0      Fall Risk Screening:   In the past year, patient has experienced: no history of falling in past year      Urinary Incontinence Screening:   Patient has not leaked urine accidently in the last six months.     Home Safety:  Patient does not have trouble with stairs inside or outside of their home. Patient has working smoke alarms and has working carbon monoxide detector.     Medications:   Patient is not currently taking any over-the-counter supplements. Patient is able to manage medications.     Activities of Daily Living (ADLs)/Instrumental Activities of Daily Living (IADLs):    Walk and transfer into and out of bed and chair?: Yes  Dress and groom yourself?: Yes    Bathe or shower yourself?: Yes    Feed yourself? Yes  Do your laundry/housekeeping?: Yes  Manage your money, pay your bills and track your expenses?: Yes  Make your own meals?: Yes    Do your own shopping?: Yes    Previous Hospitalizations:   Any hospitalizations or ED visits within the last 12 months?: Yes      Advance Care Planning:   Living will: Yes    Durable POA for healthcare: Yes    Advanced directive: Yes    Advanced directive counseling given: Yes    Patient declined ACP directive: No    End of Life Decisions reviewed with patient: Yes    Provider agrees with end of life decisions: Yes      Cognitive Screening:   Provider or family/friend/caregiver concerned regarding cognition?: No    PREVENTIVE SCREENINGS      Cardiovascular Screening:    General: Screening Not Indicated and History Lipid Disorder      Diabetes Screening:     General: Screening Current      Colorectal Cancer Screening:     General: Screening Current      Breast Cancer Screening:     General: Risks and Benefits Discussed    Due for: Mammogram        Cervical Cancer Screening:    General: Screening Not Indicated      Osteoporosis Screening:    General: Risks and Benefits Discussed    Due for: DXA Axial      Abdominal Aortic Aneurysm (AAA) Screening:        General: Screening Not Indicated      Lung Cancer Screening:     General: Screening Not Indicated      Hepatitis C Screening:    General: Screening Current    Screening, Brief Intervention, and Referral to Treatment (SBIRT)    Screening  Typical number of drinks in a day: 0  Typical number of drinks in a week: 1  Interpretation: Low risk drinking behavior.    Social Drivers of Health     Financial Resource Strain: Patient Declined (11/15/2023)    Overall Financial Resource Strain (CARDIA)     Difficulty of Paying Living Expenses: Patient declined   Food Insecurity: No Food Insecurity (12/2/2024)     "Hunger Vital Sign     Worried About Running Out of Food in the Last Year: Never true     Ran Out of Food in the Last Year: Never true   Transportation Needs: No Transportation Needs (12/2/2024)    PRAPARE - Transportation     Lack of Transportation (Medical): No     Lack of Transportation (Non-Medical): No   Housing Stability: Low Risk  (12/2/2024)    Housing Stability Vital Sign     Unable to Pay for Housing in the Last Year: No     Number of Times Moved in the Last Year: 0     Homeless in the Last Year: No   Utilities: Not At Risk (12/2/2024)    [facility] Utilities     Threatened with loss of utilities: No     No results found.    Objective   /68 (BP Location: Right arm, Patient Position: Sitting, Cuff Size: Large)   Pulse 75   Temp 98.7 °F (37.1 °C) (Tympanic)   Resp 18   Ht 5' 4\" (1.626 m)   Wt 89.8 kg (198 lb)   SpO2 97%   BMI 33.99 kg/m²     Physical Exam  Vitals and nursing note reviewed.   Constitutional:       Appearance: Normal appearance. She is obese.   HENT:      Head: Normocephalic and atraumatic.      Mouth/Throat:      Mouth: Mucous membranes are moist.   Eyes:      General: No scleral icterus.  Neck:      Vascular: No carotid bruit.   Cardiovascular:      Rate and Rhythm: Normal rate and regular rhythm.      Heart sounds: Normal heart sounds.   Pulmonary:      Effort: Pulmonary effort is normal.      Breath sounds: Normal breath sounds.   Abdominal:      General: Bowel sounds are normal.      Palpations: Abdomen is soft.      Tenderness: There is no abdominal tenderness.   Musculoskeletal:      Cervical back: Neck supple.      Right lower leg: No edema.      Left lower leg: No edema.   Skin:     General: Skin is warm and dry.   Neurological:      General: No focal deficit present.      Mental Status: She is alert.   Psychiatric:         Mood and Affect: Mood normal.         "

## 2024-12-02 NOTE — PATIENT INSTRUCTIONS
Patient to follow with cardiology, Dr. Casillas  Patient is following with endocrinology  Diet exercise weight loss recommended  Continue present therapy  Complete mammography for breast cancer screening  Complete DEXA scan for osteoporosis screening  Return in 6 months for office visit blood work sooner if needed

## 2024-12-02 NOTE — ASSESSMENT & PLAN NOTE
Colonoscopy  Orders:    CBC; Future    Comprehensive metabolic panel; Future    Hemoglobin A1C; Future    Lipid Panel with Direct LDL reflex; Future    TSH, 3rd generation with Free T4 reflex; Future    UA (URINE) with reflex to Scope; Future    Vitamin D 25 hydroxy; Future

## 2024-12-02 NOTE — ASSESSMENT & PLAN NOTE
Stable continue present therapy  Orders:    CBC; Future    Comprehensive metabolic panel; Future    Hemoglobin A1C; Future    Lipid Panel with Direct LDL reflex; Future    TSH, 3rd generation with Free T4 reflex; Future    UA (URINE) with reflex to Scope; Future    Vitamin D 25 hydroxy; Future

## 2024-12-02 NOTE — ASSESSMENT & PLAN NOTE
Patient's cardiologist has left the practice patient would like to see Dr. Casillas ordered  Orders:    Ambulatory Referral to Cardiology; Future    CBC; Future    Comprehensive metabolic panel; Future    Hemoglobin A1C; Future    Lipid Panel with Direct LDL reflex; Future    TSH, 3rd generation with Free T4 reflex; Future    UA (URINE) with reflex to Scope; Future    Vitamin D 25 hydroxy; Future

## 2024-12-02 NOTE — ASSESSMENT & PLAN NOTE
As above  Orders:    Ambulatory Referral to Cardiology; Future    CBC; Future    Comprehensive metabolic panel; Future    Hemoglobin A1C; Future    Lipid Panel with Direct LDL reflex; Future    TSH, 3rd generation with Free T4 reflex; Future    UA (URINE) with reflex to Scope; Future    Vitamin D 25 hydroxy; Future

## 2024-12-02 NOTE — ASSESSMENT & PLAN NOTE
Stable, lungs are clear has not completed PFT as ordered  Orders:    CBC; Future    Comprehensive metabolic panel; Future    Hemoglobin A1C; Future    Lipid Panel with Direct LDL reflex; Future    TSH, 3rd generation with Free T4 reflex; Future    UA (URINE) with reflex to Scope; Future    Vitamin D 25 hydroxy; Future

## 2024-12-09 ENCOUNTER — CONSULT (OUTPATIENT)
Dept: RHEUMATOLOGY | Facility: CLINIC | Age: 64
End: 2024-12-09
Payer: MEDICARE

## 2024-12-09 VITALS
HEIGHT: 64 IN | WEIGHT: 198 LBS | DIASTOLIC BLOOD PRESSURE: 74 MMHG | HEART RATE: 77 BPM | BODY MASS INDEX: 33.8 KG/M2 | OXYGEN SATURATION: 99 % | SYSTOLIC BLOOD PRESSURE: 128 MMHG

## 2024-12-09 DIAGNOSIS — M06.4 INFLAMMATORY POLYARTHROPATHY (HCC): Primary | ICD-10-CM

## 2024-12-09 PROCEDURE — 99205 OFFICE O/P NEW HI 60 MIN: CPT | Performed by: INTERNAL MEDICINE

## 2024-12-09 RX ORDER — PREDNISONE 5 MG/1
15 TABLET ORAL DAILY
Qty: 90 TABLET | Refills: 3 | Status: SHIPPED | OUTPATIENT
Start: 2024-12-09

## 2024-12-09 RX ORDER — AMPICILLIN TRIHYDRATE 250 MG
50 CAPSULE ORAL
COMMUNITY
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
## 2024-12-18 ENCOUNTER — TELEPHONE (OUTPATIENT)
Age: 64
End: 2024-12-18

## 2024-12-18 NOTE — TELEPHONE ENCOUNTER
Pt has taken the Prednisone since 12/10 and has decided to stop taking it. She states it hasn't helped at all. She has seen only negative results. She has gained already 1 pound and been having mood swings as well. She doesn't want to be on it. She states she is not finding the immediate relief and still having pain. Her ankles hurt every day. She states it's not working at all, she is at a loss and not really sure what to do or what she wants for this. She is willing to try something else.       Please advise.

## 2024-12-19 NOTE — TELEPHONE ENCOUNTER
Called and LM for pt, let her know it was ok to stop prednisone, offered her a spot on Monday 1/23 as Dr. Flaherty wants to see her, offered her 9:15, 1:45 or 3:45

## 2024-12-19 NOTE — TELEPHONE ENCOUNTER
If the prednisone did not help at all, ok to stop it  I would need to see her again , please OB her 12/23 @ 3:45  Thank you

## 2024-12-23 ENCOUNTER — OFFICE VISIT (OUTPATIENT)
Dept: RHEUMATOLOGY | Facility: CLINIC | Age: 64
End: 2024-12-23
Payer: MEDICARE

## 2024-12-23 VITALS
DIASTOLIC BLOOD PRESSURE: 74 MMHG | HEIGHT: 64 IN | OXYGEN SATURATION: 99 % | HEART RATE: 68 BPM | BODY MASS INDEX: 34.15 KG/M2 | WEIGHT: 200 LBS | SYSTOLIC BLOOD PRESSURE: 118 MMHG

## 2024-12-23 DIAGNOSIS — M25.572 BILATERAL ANKLE PAIN, UNSPECIFIED CHRONICITY: Primary | ICD-10-CM

## 2024-12-23 DIAGNOSIS — M25.571 BILATERAL ANKLE PAIN, UNSPECIFIED CHRONICITY: Primary | ICD-10-CM

## 2024-12-23 PROCEDURE — G2211 COMPLEX E/M VISIT ADD ON: HCPCS | Performed by: INTERNAL MEDICINE

## 2024-12-23 PROCEDURE — 99214 OFFICE O/P EST MOD 30 MIN: CPT | Performed by: INTERNAL MEDICINE
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
## 2025-01-28 ENCOUNTER — TELEPHONE (OUTPATIENT)
Age: 65
End: 2025-01-28

## 2025-01-28 NOTE — TELEPHONE ENCOUNTER
Pt cancel her appt w/ Dr. Flaherty/ stating he told her that he cannot help her.    Pt asked that we take her name off any cancellations list.

## 2025-02-15 DIAGNOSIS — E89.0 POSTOPERATIVE HYPOTHYROIDISM: ICD-10-CM

## 2025-02-15 DIAGNOSIS — R00.2 PALPITATIONS: ICD-10-CM

## 2025-02-15 DIAGNOSIS — I47.10 PAROXYSMAL SUPRAVENTRICULAR TACHYCARDIA (HCC): ICD-10-CM

## 2025-02-15 DIAGNOSIS — E66.812 CLASS 2 OBESITY WITHOUT SERIOUS COMORBIDITY WITH BODY MASS INDEX (BMI) OF 35.0 TO 35.9 IN ADULT, UNSPECIFIED OBESITY TYPE: ICD-10-CM

## 2025-02-15 RX ORDER — LEVOTHYROXINE SODIUM 125 UG/1
125 TABLET ORAL DAILY
Qty: 100 TABLET | Refills: 1 | Status: SHIPPED | OUTPATIENT
Start: 2025-02-15

## 2025-02-15 RX ORDER — ATORVASTATIN CALCIUM 40 MG/1
40 TABLET, FILM COATED ORAL
Qty: 100 TABLET | Refills: 1 | Status: SHIPPED | OUTPATIENT
Start: 2025-02-15

## 2025-02-15 RX ORDER — PROPRANOLOL HYDROCHLORIDE 60 MG/1
60 CAPSULE, EXTENDED RELEASE ORAL DAILY
Qty: 100 CAPSULE | Refills: 1 | Status: SHIPPED | OUTPATIENT
Start: 2025-02-15

## 2025-02-18 ENCOUNTER — CONSULT (OUTPATIENT)
Dept: CARDIOLOGY CLINIC | Facility: CLINIC | Age: 65
End: 2025-02-18
Payer: MEDICARE

## 2025-02-18 VITALS
SYSTOLIC BLOOD PRESSURE: 130 MMHG | HEART RATE: 68 BPM | BODY MASS INDEX: 34.31 KG/M2 | DIASTOLIC BLOOD PRESSURE: 82 MMHG | WEIGHT: 201 LBS | HEIGHT: 64 IN

## 2025-02-18 DIAGNOSIS — M79.602 LEFT ARM PAIN: ICD-10-CM

## 2025-02-18 DIAGNOSIS — R06.09 DOE (DYSPNEA ON EXERTION): ICD-10-CM

## 2025-02-18 DIAGNOSIS — M15.0 PRIMARY OSTEOARTHRITIS INVOLVING MULTIPLE JOINTS: ICD-10-CM

## 2025-02-18 DIAGNOSIS — J44.9 COPD MIXED TYPE (HCC): ICD-10-CM

## 2025-02-18 DIAGNOSIS — E66.9 OBESITY (BMI 30-39.9): ICD-10-CM

## 2025-02-18 DIAGNOSIS — I10 ESSENTIAL HYPERTENSION: ICD-10-CM

## 2025-02-18 DIAGNOSIS — I20.89 ANGINAL EQUIVALENT (HCC): Primary | ICD-10-CM

## 2025-02-18 DIAGNOSIS — I47.10 PAROXYSMAL SUPRAVENTRICULAR TACHYCARDIA (HCC): ICD-10-CM

## 2025-02-18 DIAGNOSIS — R00.2 PALPITATIONS: ICD-10-CM

## 2025-02-18 DIAGNOSIS — M06.4 INFLAMMATORY POLYARTHROPATHY (HCC): ICD-10-CM

## 2025-02-18 DIAGNOSIS — I47.10 SVT (SUPRAVENTRICULAR TACHYCARDIA) (HCC): ICD-10-CM

## 2025-02-18 DIAGNOSIS — E78.2 MIXED HYPERLIPIDEMIA: ICD-10-CM

## 2025-02-18 DIAGNOSIS — I34.0 NONRHEUMATIC MITRAL VALVE REGURGITATION: ICD-10-CM

## 2025-02-18 PROCEDURE — 93000 ELECTROCARDIOGRAM COMPLETE: CPT

## 2025-02-18 PROCEDURE — 99214 OFFICE O/P EST MOD 30 MIN: CPT

## 2025-02-18 RX ORDER — METHOCARBAMOL 750 MG/1
750 TABLET, FILM COATED ORAL
COMMUNITY
Start: 2025-02-11

## 2025-02-18 NOTE — PROGRESS NOTES
Cardiology Consultation   MD Wagner Osullivan MD, FACC  Levi De La Cruz DO, ZACH ERAZO FACP, FASNC Ather Mansoor, MD Rujul Patel, DO, [facility] Mason HospitalCORDELIA Casillas DO, GERI ERAZO  -------------------------------------------------------------------  [facility]  [address] [place] [address]  Phone: [phone]  Fax: [phone]  02/18/25  Ximena Yousif  YOB: 1960   MRN: [id number]      Referring Physician: Noel Ramirez DO  [address] [address]     HPI:  I am seeing this patient in cardiology consultation for:  SVT, HTN, Anginal equivalent symptoms    Ximena Yousif is a 64 y.o. female with:   Paroxysmal supraventricular tachycardia status post ablation 2004  Mitral regurgitation  Echo in 2021 with hyperdynamic LV function EF 75%, atrial septal aneurysm with no evidence of shunt by color-flow Doppler, mild systolic anterior motion of the chordal structures with mild prolapse involving the posterior leaflet and mild to moderate mitral regurgitation  Echo 2022 with EF 60%, no regional wall motion abnormality, mildly thickened mitral leaflets with systolic anterior motion of the anterior chordal elements without clear outflow tract gradient seen, mild mitral regurgitation, mild tricuspid regurgitation  Hypertension  COPD-mixed type  Hypothyroidism  Telemetry polyarthropathy-follows with rheumatology  Vitamin D deficiency     Tobacco: Former smoker  Alcohol: Social  Drugs: None    Family History: Stroke diabetes hypertension and MI history in family    She presents today for follow-up/reevaluation with cardiology.  I first met her in 2021 when she was hospitalized having her hip surgery.  She states that since then she has been doing pretty well but she does note an episode she had about 2 weeks ago when she was out to dinner where she felt like she was getting pain starting in her left shoulder and going down her left arm that  lasted for several hours.  She states that she left dinner went home took an aspirin and lay down and after a total of about 4 hours the pain resolved.  She did note some shortness of breath associated with the left arm pain as well.  She has history of palpitations and SVT which has been under pretty good control since she had her ablation which was back in 2004.  She remains on propranolol which can treat SVTs but she is also using it for blood pressure control as well    Review of Systems   Constitutional:  Negative for chills and fever.   HENT:  Negative for facial swelling and sore throat.    Eyes:  Negative for visual disturbance.   Respiratory:  Positive for shortness of breath. Negative for cough, chest tightness and wheezing.    Cardiovascular:  Positive for chest pain. Negative for palpitations and leg swelling.   Gastrointestinal:  Negative for abdominal pain, blood in stool, constipation, diarrhea, nausea and vomiting.   Endocrine: Negative for cold intolerance and heat intolerance.   Genitourinary:  Negative for decreased urine volume, difficulty urinating, dysuria and hematuria.   Musculoskeletal:  Positive for arthralgias, back pain and joint swelling. Negative for myalgias.   Skin:  Negative for rash.   Neurological:  Negative for dizziness, syncope, weakness and numbness.   Psychiatric/Behavioral:  Negative for agitation, behavioral problems and confusion. The patient is not nervous/anxious.         OBJECTIVE  Vitals:    02/18/25 0852   BP: 170/100   Pulse: 68       Physical Exam   Constitutional: awake, alert and oriented, in no acute distress, no obvious deformities, obese female  Head: Normocephalic, without obvious abnormality, atraumatic  Eyes: conjunctivae clear and moist. Sclera anicteric. No xanthelasmas. Pupils equal bilaterally. Extraocular motions are full.  Ear nose mouth and throat: ears are symmetrical bilaterally, hearing appears to be equal bilaterally, no nasal discharge or  epistaxis, oropharynx is clear with moist mucous membranes  Neck: Trachea is midline, neck is supple, no thyromegaly or significant lymphadenopathy, there is full range of motion.  Lungs: clear to auscultation bilaterally, no wheezes, no rales, no rhonchi, no accessory muscle use, breathing is nonlabored  Heart: Regular rhythm with a Normal heart rate, S1, S2 normal, 2 out of 6 to 3 out of 6 systolic ejection murmur at the left midclavicular line, no click, rub or gallop, No lower extremity edema  Abdomen: Obese, soft, non-tender; bowel sounds normal; no masses, no organomegaly  Psychiatric: Patient is oriented to time, place, person, mood/affect is negative for depression, anxiety, agitation, appears to have appropriate insight  Skin: Skin is warm, dry, intact. No obvious rashes or lesions on exposed extremities. Nail beds are pink with no cyanosis or clubbing.      EKG:  No results found for this visit on 02/18/25.     The 10-year ASCVD risk score (Abraham DODGE, et al., 2019) is: 13.6%    Values used to calculate the score:      Age: 64 years      Sex: Female      Is Non- : No      Diabetic: No      Tobacco smoker: No      Systolic Blood Pressure: 170 mmHg      Is BP treated: Yes      HDL Cholesterol: 65 mg/dL      Total Cholesterol: 207 mg/dL    IMPRESSION:  Left arm pain, dyspnea-possible anginal equivalent type symptoms  Paroxysmal supraventricular tachycardia status post ablation 2004  Mitral regurgitation  Echo in 2021 with hyperdynamic LV function EF 75%, atrial septal aneurysm with no evidence of shunt by color-flow Doppler, mild systolic anterior motion of the chordal structures with mild prolapse involving the posterior leaflet and mild to moderate mitral regurgitation  Echo 2022 with EF 60%, no regional wall motion abnormality, mildly thickened mitral leaflets with systolic anterior motion of the anterior chordal elements without clear outflow tract gradient seen, mild mitral  regurgitation, mild tricuspid regurgitation  Hypertension  COPD-mixed type  Hypothyroidism  Telemetry polyarthropathy-follows with rheumatology  Vitamin D deficiency    DISCUSSION/RECOMMENDATIONS:  She presents today for follow-up with cardiology.  She had episode a few weeks ago of left arm pain and dyspnea which seems to be possibly anginal equivalent type symptoms.  This occurred when she was just out to dinner, improved with aspirin.  For this, would investigate with a pharmacologic nuclear stress to assess for inducible ischemia.  She has significant orthopedic issues, has had bilateral hip replacements and is going to need bilateral knee replacements as well, will hold off on placing her on a treadmill for the stress test and do pharmacologic instead  Continue with Lipitor  Will check new echo this echo was in 2022 with findings described above.  She continues to have murmur on exam in the mitral position  Continue with levothyroxine, propranolol  Will plan for follow-up after echo and stress test    Alen Casillas DO, [facility], GERI  --------------------------------------------------------------------------------  TREADMILL STRESS  No results found for this or any previous visit.     ----------------------------------------------------------------------------------------------  NUCLEAR STRESS TEST: No results found for this or any previous visit.    No results found for this or any previous visit.      --------------------------------------------------------------------------------  CATH:  No results found for this or any previous visit.    --------------------------------------------------------------------------------  ECHO:   Results for orders placed during the hospital encounter of 09/28/21    Echo complete with contrast if indicated (order if scheduling before 10/11/21)    [address] [address]  [phone]    Transthoracic  Echocardiogram  2D, M-mode, Doppler, and Color Doppler    Study date:  30-Sep-2021    Patient: DENTON TAPIA  MR number: [id number]  Account number: [id number]  : 1960  Age: 61 years  Gender: Female  Status: Outpatient  Location: Bedside  Height: 64 in  Weight: 181.1 lb  BP: 97/ 72 mmHg    Indications: Hypotension, Murmur    Diagnoses: I95.9 - Hypotension, unspecified, R01.1 - Cardiac murmur, unspecified    Sonographer:  Miguel Perry RDCS  Primary Physician:  Noel Ramirez DO  Referring Physician:  Jack Peraza MD  Group:  [facility]  Interpreting Physician:  Alen Casillas D.O.    SUMMARY    LEFT VENTRICLE:  Systolic function was hyperdynamic by visual assessment. Ejection fraction was estimated to be 75 %.  There were no regional wall motion abnormalities.    ATRIAL SEPTUM:  There was a septal aneurysm without evidence of shunt by color Doppler.    MITRAL VALVE:  There was mild systolic anterior motion of the chordal structures.  There was a mild prolapse involving the posterior leaflet.  There was mild to moderate regurgitation.    AORTIC VALVE:  There was no evidence for stenosis. There was increased turbulence of flow in the LVOT without evidence of obstruction.    TRICUSPID VALVE:  There was mild regurgitation.  Estimated peak PA pressure was 31 mmHg.    SUMMARY MEASUREMENTS  2D measurements:  Unspecified Anatomy:   %FS was 38.6 %.  AA PSSL Full was -21 %.  AAS PSSL Full was -30.7 %.  AI PSSL Full was -31.1 %.  AL PSSL Full was -18.7 %.  AP PSSL Full was -21.6 %.  AS PSSL Full was -32.1 %.  AVC was 349.7 ms.  Ao Diam was 3.4 cm.  Ao asc was 3.3 cm.  BA PSSL Full was -19.6 %.  BAS PSSL Full was -14 %.  BI PSSL Full was -29.3 %.  BL PSSL Full was -22.2 %.  BP PSSL Full was -27.9 %.  BS PSSL Full was -20.6 %.  EDV(Teich) was 60.3 ml.  EF(Teich) was 69.7 %.  ESV(Teich) was 18.3 ml.  G peak SL Full(A2C) was -23.2 %.  G peak SL Full(A4C) was -22.4 %.  G peak SL Full(APLAX)  was -24.4 %.  G peak SL Full(Avg) was -23.3 %.  IVSd was 0.9 cm.  LA Diam was 3 cm.  LAAs A4C was 17.8 cm2.  LAESV A-L A4C  was 53.8 ml.  LAESV MOD A4C was 44.7 ml.  LALs A4C was 5 cm.  LVEDV MOD A4C was 84.2 ml.  LVEF MOD A4C was 78.9 %.  LVESV MOD A4C was 17.8 ml.  LVIDd was 3.8 cm.  LVIDs was 2.3 cm.  LVLd A4C was 8.9 cm.  LVLs A4C was 6.8 cm.  LVPWd was 0.9  cm.  MA PSSL Full was -19.7 %.  MAS PSSL Full was -26.4 %.  MI PSSL Full was -22.9 %.  ML PSSL Full was -21.9 %.  MP PSSL Full was -27.9 %.  MS PSSL Full was -22.6 %.  Peak SL Dispersion Full was 43.7 ms.  RAAs A4C was 18.3 cm2.  RAESV A-L  was 51.5 ml.  RAESV MOD was 49.6 ml.  RALs was 5.5 cm.  RVIDd was 3.4 cm.  SV MOD A4C was 66.4 ml.  SV(Teich) was 42.1 ml.  CW measurements:  Unspecified Anatomy:   AV Env.Ti was 313 ms.  AV VTI was 43.7 cm.  AV Vmax was 1.9 m/s.  AV Vmean was 1.4 m/s.  AV maxPG was 15.2 mmHg.  AV meanPG was 8.8 mmHg.  TR Vmax was 2.7 m/s.  TR maxPG was 28.7 mmHg.  MM measurements:  Unspecified Anatomy:   TAPSE was 2.9 cm.  PW measurements:  Unspecified Anatomy:   DVI was 0.7 .  E' Avg was 0.1 m/s.  E' Lat was 0.2 m/s.  E' Sept was 0.1 m/s.  E/E' Avg was 6.9 .  E/E' Lat was 6.5 .  E/E' Sept was 7.4 .  LVOT Env.Ti was 348.2 ms.  LVOT VTI was 31.3 cm.  LVOT Vmax was 1.6 m/s.  LVOT Vmean was 0.9 m/s.  LVOT maxPG was 10.7 mmHg.  LVOT meanPG was 4.4 mmHg.  MV A Jeremy was 0.7 m/s.  MV Dec [place] was 6.8 m/s2.  MV DecT was 144.6 ms.  MV E Jeremy was 1 m/s.  MV E/A Ratio was 1.5 .  MV PHT was 41.9 ms.  MVA By PHT was 5.2  cm2.    HISTORY: PRIOR HISTORY: Hyperlipidemia, Hypertension, SVT, Hypothyroid, Palpitations, YANCI, Thyroid Cancer    PROCEDURE: The procedure was performed at the bedside. This was a routine study. The transthoracic approach was used. The study included complete 2D imaging, M-mode, complete spectral Doppler, and color Doppler. The heart rate was 79 bpm,  at the start of the study. Images were obtained from the parasternal, apical,  subcostal, and suprasternal notch acoustic windows. Image quality was good.    LEFT VENTRICLE: Size was normal. Systolic function was hyperdynamic by visual assessment. Ejection fraction was estimated to be 75 %. There were no regional wall motion abnormalities. Wall thickness was normal. DOPPLER: Left ventricular  diastolic function parameters were normal. Normal strain -23.3%.    RIGHT VENTRICLE: The size was normal. Systolic function was normal. Wall thickness was normal.    LEFT ATRIUM: Size was at the upper limits of normal.    ATRIAL SEPTUM: There was a septal aneurysm without evidence of shunt by color Doppler.    RIGHT ATRIUM: Size was at the upper limits of normal.    MITRAL VALVE: Valve structure was normal. There was normal leaflet separation. There was mild systolic anterior motion of the chordal structures. There was a mild prolapse involving the posterior leaflet. DOPPLER: The transmitral velocity  was within the normal range. There was no evidence for stenosis. There was mild to moderate regurgitation.    AORTIC VALVE: The valve was trileaflet. Leaflets exhibited normal thickness, mild calcification, and normal cuspal separation. DOPPLER: Transaortic velocity was within the normal range. There was no evidence for stenosis. There was  increased turbulence of flow in the LVOT without evidence of obstruction. There was no regurgitation.    TRICUSPID VALVE: The valve structure was normal. There was normal leaflet separation. DOPPLER: The transtricuspid velocity was within the normal range. There was no evidence for stenosis. There was mild regurgitation. Estimated peak PA  pressure was 31 mmHg.    PULMONIC VALVE: Leaflets exhibited normal thickness, no calcification, and normal cuspal separation. DOPPLER: The transpulmonic velocity was within the normal range. There was no regurgitation.    PERICARDIUM: There was no pericardial effusion. The pericardium was normal in appearance.    AORTA: The root  exhibited normal size. The ascending aorta was normal in size.    SYSTEMIC VEINS: IVC: The inferior vena cava was normal in size and course. Respirophasic changes were normal.    SYSTEM MEASUREMENT TABLES    2D  %FS: 38.6 %  AA PSSL Full: -21 %  AAS PSSL Full: -30.7 %  AI PSSL Full: -31.1 %  AL PSSL Full: -18.7 %  AP PSSL Full: -21.6 %  AS PSSL Full: -32.1 %  AVC: 349.7 ms  Ao Diam: 3.4 cm  Ao asc: 3.3 cm  BA PSSL Full: -19.6 %  BAS PSSL Full: -14 %  BI PSSL Full: -29.3 %  BL PSSL Full: -22.2 %  BP PSSL Full: -27.9 %  BS PSSL Full: -20.6 %  EDV(Teich): 60.3 ml  EF(Teich): 69.7 %  ESV(Teich): 18.3 ml  G peak SL Full(A2C): -23.2 %  G peak SL Full(A4C): -22.4 %  G peak SL Full(APLAX): -24.4 %  G peak SL Full(Avg): -23.3 %  IVSd: 0.9 cm  LA Diam: 3 cm  LAAs A4C: 17.8 cm2  LAESV A-L A4C: 53.8 ml  LAESV MOD A4C: 44.7 ml  LALs A4C: 5 cm  LVEDV MOD A4C: 84.2 ml  LVEF MOD A4C: 78.9 %  LVESV MOD A4C: 17.8 ml  LVIDd: 3.8 cm  LVIDs: 2.3 cm  LVLd A4C: 8.9 cm  LVLs A4C: 6.8 cm  LVPWd: 0.9 cm  MA PSSL Full: -19.7 %  MAS PSSL Full: -26.4 %  MI PSSL Full: -22.9 %  ML PSSL Full: -21.9 %  MP PSSL Full: -27.9 %  MS PSSL Full: -22.6 %  Peak SL Dispersion Full: 43.7 ms  RAAs A4C: 18.3 cm2  RAESV A-L: 51.5 ml  RAESV MOD: 49.6 ml  RALs: 5.5 cm  RVIDd: 3.4 cm  SV MOD A4C: 66.4 ml  SV(Teich): 42.1 ml    CW  AV Env.Ti: 313 ms  AV VTI: 43.7 cm  AV Vmax: 1.9 m/s  AV Vmean: 1.4 m/s  AV maxPG: 15.2 mmHg  AV meanP.8 mmHg  TR Vmax: 2.7 m/s  TR maxP.7 mmHg    MM  TAPSE: 2.9 cm    PW  DVI: 0.7  E' Av.1 m/s  E' Lat: 0.2 m/s  E' Sept: 0.1 m/s  E/E' Av.9  E/E' Lat: 6.5  E/E' Sept: 7.4  LVOT Env.Ti: 348.2 ms  LVOT VTI: 31.3 cm  LVOT Vmax: 1.6 m/s  LVOT Vmean: 0.9 m/s  LVOT maxPG: 10.7 mmHg  LVOT meanP.4 mmHg  MV A Jeremy: 0.7 m/s  MV Dec [place]: 6.8 m/s2  MV DecT: 144.6 ms  MV E [place]: 1 m/s  MV E/A Ratio: 1.5  MV PHT: 41.9 ms  MVA By PHT: 5.2 cm2    [employer] Accredited Echocardiography Laboratory    Prepared and  electronically signed by    Alen Casillas D.O.  Signed 01-Oct-2021 10:49:39    No results found for this or any previous visit.    --------------------------------------------------------------------------------  HOLTER  Results for orders placed during the hospital encounter of 20    Holter monitor - 24 hour    Narrative  PT NAME: Ximena Yousif  : 1960  AGE: 60 y.o.  GENDER: female  MRN: [id number]   PROCEDURE: Holter monitor - 24 hour        INDICATIONS(S): SVT    DESCRIPTION OF FINDINGS:    The patient was monitored for a total of 24 hours.  The patient was predominantly in normal sinus rhythm throughout the study.  The average heart rate was 71 beats per minute.  The heart rate ranged from a low of 49 beats per minute in sinus bradycardia at 6:44 AM to a maximum of 120 beats per minute in sinus tachycardia at 6:14 PM.    Ventricular ectopic activity consisted of 1586 beats (1.6% of total beats), of which, 1393 were single PVCs, 28 were ventricular couplets, and 3 were in trigeminy. There was no sustained or nonsustained ventricular tachycardia.    Supraventricular ectopic activity consisted of 70 beats (<1% of total beats), of which, 25 were single PACs. There were 7 atrial runs, longest of which was 10 beats at 3:55 PM. There was no evidence of atrial fibrillation or atrial flutter.    There were no significant pauses. The longest R-R interval was 1.7 seconds at 4:29 AM.  There was no evidence of advanced degree heart block.    The accompanying patient diary notes symptoms of palpitations, skipped beats, and fatigue. Correlation with the tracings at these times reveals PVCs, atrial tachycardia, and sinus rhythm.    Impression  1. Predominantly normal sinus rhythm with an average heart rate of 71 beats per minute.  2. There were 7 runs of atrial tachycardia with the longest run consisting of 10 beats.  3. There was a low burden (1.6%) of  premature ventricular contractions.There were rare premature  atrial contractions.  4. Reported symptoms during the monitoring period correlated with at PVCs and atrial tachycardia.  5. There were no significant pauses or advanced degree heart block.      Cardiology Fellow: Joana Matamoros MD  Attending Cardiologist: Justin Sanches MD    --------------------------------------------------------------------------------  CAROTIDS  No results found for this or any previous visit.       Diagnoses and all orders for this visit:    Nonrheumatic mitral valve regurgitation  -     Ambulatory Referral to Cardiology  -     POCT ECG    Paroxysmal supraventricular tachycardia (HCC)  -     Ambulatory Referral to Cardiology  -     POCT ECG    Palpitations  -     Ambulatory Referral to Cardiology  -     POCT ECG    Other orders  -     methocarbamol (ROBAXIN) 750 mg tablet; Take 750 mg by mouth daily at bedtime as needed  -     Omega-3 Fatty Acids (FISH OIL PO); Take by mouth OMEGA 3-6-9       ======================================================    Past Medical History:   Diagnosis Date    Arthritis     Bruises easily     Cancer ([facility])     thyroid    Chronic pain disorder     Dental crowns present     Headache     Hip pain, chronic, left     L THR for today 9/28/2021    History of vertigo     Hyperlipidemia     Hypertension     Irregular heart beat     PSVT    Joint pain     Knee pain, bilateral     Low back pain     Migraines     Mild acid reflux     Motion sickness     Osteoarthritis     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     last assessed - 12Dec2017    PONV (postoperative nausea and vomiting)     Risk for falls     SVT (supraventricular tachycardia) ([facility])     history/none recent/ has had heart ablation in 2004    Thyroid cancer ([facility]) 2007    Status post thyroidectomy, no longer sees endocrinology; last assessed - 17Oct2014    Use of cane as ambulatory aid     occas    Varicose veins of leg with edema     last assessed - 22Oct2014    Walking pneumonia     Wears glasses      Past Surgical History:    Procedure Laterality Date    AV NODE ABLATION       SECTION  10/27/1992    COLONOSCOPY      Complete colonoscopy    ENDOMETRIAL ABLATION      Thermal; Resolved - Oct 2005    FOOT SURGERY Left     x 2, 1970 &  removed ganglion cyst    INFERIOR OBLIQUE MYECTOMY      removed 8 fibroid tumors     JOINT REPLACEMENT      KNEE ARTHROSCOPY Left     KNEE CARTILAGE SURGERY Left     Left knee torn meniscus; Resolved - Sep 2008    LAPAROSCOPIC ENDOMETRIOSIS FULGURATION      LIMBAL STEM CELL TRANSPLANT      LIPOMA RESECTION      MYOMECTOMY      Anorectal    OTHER SURGICAL HISTORY      heart ablation - atrial tachycardia; ablation for SVT    OVARIAN CYST REMOVAL      MO ARTHRP ACETBLR/PROX FEM PROSTC AGRFT/ALGRFT Right 2021    Procedure: ARTHROPLASTY HIP TOTAL ANTERIOR;  Surgeon: Jack Peraza MD;  [facility];  Service: Orthopedics    MO ARTHRP ACETBLR/PROX FEM PROSTC AGRFT/ALGRFT Left 2021    Procedure: ANTERIOR TOTAL HIP REPLACEMENT;  Surgeon: Jack Peraza MD;  [facility];  Service: Orthopedics    THYROIDECTOMY      VAGINAL MASS EXCISION Right 2017    Procedure: EXCISION RIGHT PERINEAL LIPOMA/ MASS, REMOVAL OF SKIN TAG;  Surgeon: Libby Amaral MD;  Location: [facility];  Service: General    WISDOM TOOTH EXTRACTION           Medications  Current Outpatient Medications   Medication Sig Dispense Refill    aspirin-acetaminophen-caffeine (EXCEDRIN MIGRAINE) 250-250-65 MG per tablet Take 2 tablets by mouth every 6 (six) hours as needed for headaches      atorvastatin (LIPITOR) 40 mg tablet TAKE 1 TABLET BY MOUTH EVERYDAY AT BEDTIME 100 tablet 1    B Complex Vitamins (B COMPLEX PO) Take 1 tablet by mouth every other day      Bioflavonoid Products (JAY C PO) Take 1 tablet by mouth every other day      cephalexin (KEFLEX) 500 mg capsule Take 2,000 mg by mouth 60 minutes pre-procedure Prior to dental procedures      Cholecalciferol (Vitamin D3) 25 MCG TABS Take 1 tablet by mouth  "every other day      Co Q-10 50 MG Take 50 mg by mouth every other day      cyanocobalamin (VITAMIN B-12) 2000 MCG tablet Take 2,500 mcg by mouth every other day      diphenhydrAMINE (BENADRYL) 25 mg tablet Take 25 mg by mouth daily at bedtime as needed for itching       docusate sodium (COLACE) 250 MG capsule Take 250 mg by mouth daily      GARLIC PO Take 1 tablet by mouth every other day      L-Lysine 500 MG CAPS Take 500 mg by mouth every other day      levothyroxine 125 mcg tablet TAKE 1 TABLET BY MOUTH EVERY  tablet 1    LUTEIN PO Take by mouth every other day      Magnesium 500 MG TABS Take 1 tablet (500 mg total) by mouth daily (Patient taking differently: Take 500 mg by mouth every other day) 30 tablet 11    meloxicam (MOBIC) 15 mg tablet Take 15 mg by mouth if needed for mild pain or moderate pain      methocarbamol (ROBAXIN) 750 mg tablet Take 750 mg by mouth daily at bedtime as needed      Omega-3 Fatty Acids (FISH OIL PO) Take by mouth OMEGA 3-6-9      POTASSIUM GLUCONATE PO Take 90 mg by mouth every other day      propranolol (INDERAL LA) 60 mg 24 hr capsule TAKE 1 CAPSULE BY MOUTH EVERY  capsule 1    Sodium Hyaluronate, oral, (HYALURONIC ACID PO) Take by mouth every other day      Vitamin D-Vitamin K (D3 + K2 PO) Take by mouth every other day      VITAMIN E PO Take by mouth every other day      zinc gluconate 50 mg tablet Take 50 mg by mouth every other day      Collagen-Vitamin C-Biotin (COLLAGEN PO) Take by mouth (Patient not taking: Reported on 2/18/2025)       No current facility-administered medications for this visit.        Allergies   Allergen Reactions    Morphine Other (See Comments) and Headache     Other reaction(s): Other (See Comments)  severe headache  \"severe headache\"    Other Rash     Adhesive tape    Pain Meds,,,, caused N/V  Rash    Codeine Headache     Category: Adverse Reaction;     Prednisone Irritability    Tramadol Headache     Category: Adverse Reaction;     " Medical Tape Rash       Social History     Socioeconomic History    Marital status: /Civil Union     Spouse name: Not on file    Number of children: Not on file    Years of education: Not on file    Highest education level: Not on file   Occupational History    Not on file   Tobacco Use    Smoking status: Former     Current packs/day: 0.00     Average packs/day: 0.3 packs/day for 45.0 years (11.3 ttl pk-yrs)     Types: Cigarettes     Start date: 3/6/1976     Quit date: 3/6/2021     Years since quitting: 3.9    Smokeless tobacco: Never    Tobacco comments:     pt quit 10 years ago but has an occ cigarette   Vaping Use    Vaping status: Never Used   Substance and Sexual Activity    Alcohol use: Yes     Comment: socially    Drug use: No    Sexual activity: Yes   Other Topics Concern    Not on file   Social History Narrative    Not on file     Social Drivers of Health     Financial Resource Strain: Patient Declined (11/15/2023)    Overall Financial Resource Strain (CARDIA)     Difficulty of Paying Living Expenses: Patient declined   Food Insecurity: No Food Insecurity (12/2/2024)    Hunger Vital Sign     Worried About Running Out of Food in the Last Year: Never true     Ran Out of Food in the Last Year: Never true   Transportation Needs: No Transportation Needs (12/2/2024)    PRAPARE - Transportation     Lack of Transportation (Medical): No     Lack of Transportation (Non-Medical): No   Physical Activity: Not on file   Stress: Not on file   Social Connections: Not on file   Intimate Partner Violence: Not on file   Housing Stability: Low Risk  (12/2/2024)    Housing Stability Vital Sign     Unable to Pay for Housing in the Last Year: No     Number of Times Moved in the Last Year: 0     Homeless in the Last Year: No        Family History   Problem Relation Age of Onset    Alzheimer's disease Mother     Stroke Family     Diabetes Family     Hypertension Family     Heart attack Family     Prostate cancer Father      "No Known Problems Sister     No Known Problems Daughter     No Known Problems Maternal Grandmother     No Known Problems Maternal Grandfather     No Known Problems Paternal Grandmother     No Known Problems Paternal Grandfather        Lab Results   Component Value Date    WBC 8.3 11/26/2024    HGB 12.8 11/26/2024    HCT 38.1 11/26/2024    MCV 91.4 11/26/2024     11/26/2024      Lab Results   Component Value Date    SODIUM 138 11/26/2024    K 4.2 11/26/2024     11/26/2024    CO2 30 11/26/2024    BUN 13 11/26/2024    CREATININE 0.67 11/26/2024    GLUC 85 11/26/2024    CALCIUM 9.3 11/26/2024      Lab Results   Component Value Date    HGBA1C 5.7 (H) 11/26/2024      Lab Results   Component Value Date    CHOL 192 08/04/2017    CHOL 195 12/29/2016    CHOL 204 (H) 07/27/2016     Lab Results   Component Value Date    HDL 65 11/26/2024    HDL 63 05/17/2024    HDL 71 11/13/2023     Lab Results   Component Value Date    LDLCALC 113 (H) 11/26/2024    LDLCALC 106 (H) 05/17/2024    LDLCALC 127 (H) 11/13/2023     Lab Results   Component Value Date    TRIG 171 (H) 11/26/2024    TRIG 165 (H) 05/17/2024    TRIG 114 11/13/2023     No results found for: \"CHOLHDL\"   Lab Results   Component Value Date    INR 0.9 09/08/2021          Patient Active Problem List    Diagnosis Date Noted    Osteoarthritis 09/08/2021    Colon polyp 07/26/2021    Palpitations 03/02/2021    Vertigo 01/28/2021    Vitamin D deficiency 08/11/2020    Hyperglycemia 11/01/2019    Benign hematuria 01/20/2016    Menopause 10/02/2014    Allergic rhinitis 04/23/2014    Headache 04/23/2014    Mixed hyperlipidemia 04/23/2014    Essential hypertension 04/23/2014    Paroxysmal supraventricular tachycardia (HCC) 04/23/2014    Postoperative hypothyroidism 08/20/2013    Inflammatory polyarthropathy (HCC) 10/29/2024    COPD mixed type (HCC) 10/17/2022    Nonrheumatic mitral valve regurgitation 06/16/2022    Cyst of left ovary 12/09/2021    Liver cyst 12/02/2021    " "History of total left hip replacement 10/21/2021    Obesity (BMI 30-39.9) 01/28/2021       Portions of the record may have been created with voice recognition software. Occasional wrong word or \"sound a like\" substitutions may have occurred due to the inherent limitations of voice recognition software. Read the chart carefully and recognize, using context, where substitutions have occurred.    Alen Casillas DO, [facility]  2/18/2025 9:12 AM          "

## 2025-03-04 ENCOUNTER — EVALUATION (OUTPATIENT)
Dept: PHYSICAL THERAPY | Facility: REHABILITATION | Age: 65
End: 2025-03-04
Payer: MEDICARE

## 2025-03-04 DIAGNOSIS — M76.61 TENDONITIS, ACHILLES, RIGHT: Primary | ICD-10-CM

## 2025-03-04 DIAGNOSIS — M76.62 TENDONITIS, ACHILLES, LEFT: ICD-10-CM

## 2025-03-04 PROCEDURE — 97110 THERAPEUTIC EXERCISES: CPT | Performed by: PHYSICAL THERAPIST

## 2025-03-04 PROCEDURE — 97161 PT EVAL LOW COMPLEX 20 MIN: CPT | Performed by: PHYSICAL THERAPIST

## 2025-03-04 NOTE — LETTER
2025    Uday Dumont DPM   [facility] [address]  [facility] [address]    Patient: Ximena Yousif   YOB: 1960   Date of Visit: 3/4/2025     Encounter Diagnosis     ICD-10-CM    1. Tendonitis, Achilles, right  M76.61       2. Tendonitis, Achilles, left  M76.62           Dear Dr. Dumont:    Thank you for your recent referral of Ximena Yousif. Please review the attached evaluation summary from Ximena's recent visit.     Please verify that you agree with the plan of care by signing the attached order.     If you have any questions or concerns, please do not hesitate to call.     I sincerely appreciate the opportunity to share in the care of one of your patients and hope to have another opportunity to work with you in the near future.       Sincerely,    Kvng Patterson, PT      Referring Provider:      I certify that I have read the below Plan of Care and certify the need for these services furnished under this plan of treatment while under my care.                    Uday Dumont DPM   [facility]  [address] [place] [id number]  Via Fax: [phone]          PT Evaluation     Today's date: 3/4/2025  Patient name: Ximena Yousif  : 1960  MRN: [id number]  Referring provider: Uday Dumont DPM  Dx:   Encounter Diagnosis     ICD-10-CM    1. Tendonitis, Achilles, right  M76.61       2. Tendonitis, Achilles, left  M76.62           Start Time: 1450  Stop Time: 1535  Total time in clinic (min): 45 minutes    Assessment  Impairments: abnormal gait, abnormal or restricted ROM, activity intolerance, impaired physical strength, lacks appropriate home exercise program and pain with function  Symptom irritability: moderate    Assessment details: Patient is a 64 y.o. female that presents with bilateral Achilles tendon pain.  Patient presents with decreased strength, decreased ROM, and gait abnormalities.  Patient has difficulty with ambulation, negotiation of stairs, and standing (greater than 30  minutes) secondary to impairments.  Patient would benefit from skilled physical therapy services to address impairments to maximize function.      Understanding of Dx/Px/POC: good    Goals  Impairment:  1.  Patient will reports 50% reduction in pain in 4 weeks to maximize function.  2.  Patient will improve strength to 4/5 in all planes to maximize function.  3.  Patient will improve ROM to WFL in 4 weeks to maximize function.    Functional:  1. Patient will improve FOTO by 21 points to 47/100 in 4 weeks to maximize function.  2. Patient will be independent with HEP in 4 weeks to maximize function.  3. Patient will report no difficulty with ambulation in 4 weeks to maximize function.  4. Patient will report no difficulty with negotiation of stairs in 4 weeks to maximize function.  5. Patient will report no difficulty with standing greater than 30 minutes in 4 weeks to maximize function.          Plan  Patient would benefit from: skilled physical therapy  Planned modality interventions: cryotherapy, thermotherapy: hydrocollator packs and low level laser therapy    Planned therapy interventions: manual therapy, IASTM, activity modification, balance, patient/caregiver education, neuromuscular re-education, therapeutic exercise, therapeutic activities, stretching, strengthening, functional ROM exercises and home exercise program    Frequency: 2x week  Duration in weeks: 4  Treatment plan discussed with: patient  Plan details: Patient will be a RE in 4 weeks.          Subjective Evaluation    History of Present Illness  Date of onset: 7/2/2024  Mechanism of injury: Patient presents with bilateral achilles tendon pain, left worse than right that started after a massage on 7/2/2024.  Patient also has bilateral knee pain and will have a right knee replacement in May 2025.  Patient reports difficulty with ambulation, negotiation of stairs, and standing (greater than 30 minutes).  Her pain will start in mid tendon and radiate  up into her calf.    Patient Goals  Patient goals for therapy: increased strength, increased motion and decreased pain    Pain  At best pain ratin  At worst pain rating: 10  Location: L calf/heel  Quality: grabbing.  Aggravating factors: stair climbing, walking and standing  Progression: worsening    Treatments  Current treatment: physical therapy        Objective     Tenderness   Left Ankle/Foot   No tenderness in the Achilles insertion and proximal Achilles.     Right Ankle/Foot   Tenderness in the proximal Achilles. No tenderness in the Achilles insertion.     Passive Range of Motion   Left Ankle/Foot    Dorsiflexion (ke): 12 degrees   Plantar flexion: WFL  Inversion: 50 degrees   Eversion: 30 degrees     Right Ankle/Foot    Dorsiflexion (ke): 20 degrees   Plantar flexion: WFL  Inversion: 30 degrees   Eversion: 25 degrees     Strength/Myotome Testing     Left Hip   Planes of Motion   Flexion: 4  External rotation: 4  Internal rotation: 4+    Right Hip   Planes of Motion   Flexion: 4  External rotation: 4  Internal rotation: 4+    Left Knee   Flexion: 4  Extension: 4    Right Knee   Flexion: 4  Extension: 4    Left Ankle/Foot   Dorsiflexion: 5  Inversion: 4+  Eversion: 5    Right Ankle/Foot   Dorsiflexion: 5  Inversion: 4+  Eversion: 5    Ambulation     Observational Gait   Decreased walking speed and stride length.              Precautions: hx HA, hx LBP, hx cancer, zhane knee pain, zhane shoulder pain, zhane JEREMY      Manuals 3/4            IASTM zhane calf/achilles                                                    Neuro Re-Ed             Tandem/ modified tandem stance                                                                                           Ther Ex             Recumbent bike             Isometric PF  issued            Standing PF             Light gastroc stretch following IASTM                                                                  Ther Activity                                       Gait  Training                                       Modalities             Ice PRN

## 2025-03-04 NOTE — PROGRESS NOTES
PT Evaluation     Today's date: 3/4/2025  Patient name: Ximena Yousif  : 1960  MRN: [id number]  Referring provider: Uday Dumont DPM  Dx:   Encounter Diagnosis     ICD-10-CM    1. Tendonitis, Achilles, right  M76.61       2. Tendonitis, Achilles, left  M76.62           Start Time: 1450  Stop Time: 1535  Total time in clinic (min): 45 minutes    Assessment  Impairments: abnormal gait, abnormal or restricted ROM, activity intolerance, impaired physical strength, lacks appropriate home exercise program and pain with function  Symptom irritability: moderate    Assessment details: Patient is a 64 y.o. female that presents with bilateral Achilles tendon pain.  Patient presents with decreased strength, decreased ROM, and gait abnormalities.  Patient has difficulty with ambulation, negotiation of stairs, and standing (greater than 30 minutes) secondary to impairments.  Patient would benefit from skilled physical therapy services to address impairments to maximize function.      Understanding of Dx/Px/POC: good    Goals  Impairment:  1.  Patient will reports 50% reduction in pain in 4 weeks to maximize function.  2.  Patient will improve strength to 4/5 in all planes to maximize function.  3.  Patient will improve ROM to WFL in 4 weeks to maximize function.    Functional:  1. Patient will improve FOTO by 21 points to 47/100 in 4 weeks to maximize function.  2. Patient will be independent with HEP in 4 weeks to maximize function.  3. Patient will report no difficulty with ambulation in 4 weeks to maximize function.  4. Patient will report no difficulty with negotiation of stairs in 4 weeks to maximize function.  5. Patient will report no difficulty with standing greater than 30 minutes in 4 weeks to maximize function.          Plan  Patient would benefit from: skilled physical therapy  Planned modality interventions: cryotherapy, thermotherapy: hydrocollator packs and low level laser therapy    Planned therapy  interventions: manual therapy, IASTM, activity modification, balance, patient/caregiver education, neuromuscular re-education, therapeutic exercise, therapeutic activities, stretching, strengthening, functional ROM exercises and home exercise program    Frequency: 2x week  Duration in weeks: 4  Treatment plan discussed with: patient  Plan details: Patient will be a RE in 4 weeks.          Subjective Evaluation    History of Present Illness  Date of onset: 2024  Mechanism of injury: Patient presents with bilateral achilles tendon pain, left worse than right that started after a massage on 2024.  Patient also has bilateral knee pain and will have a right knee replacement in May 2025.  Patient reports difficulty with ambulation, negotiation of stairs, and standing (greater than 30 minutes).  Her pain will start in mid tendon and radiate up into her calf.    Patient Goals  Patient goals for therapy: increased strength, increased motion and decreased pain    Pain  At best pain ratin  At worst pain rating: 10  Location: L calf/heel  Quality: grabbing.  Aggravating factors: stair climbing, walking and standing  Progression: worsening    Treatments  Current treatment: physical therapy        Objective     Tenderness   Left Ankle/Foot   No tenderness in the Achilles insertion and proximal Achilles.     Right Ankle/Foot   Tenderness in the proximal Achilles. No tenderness in the Achilles insertion.     Passive Range of Motion   Left Ankle/Foot    Dorsiflexion (ke): 12 degrees   Plantar flexion: WFL  Inversion: 50 degrees   Eversion: 30 degrees     Right Ankle/Foot    Dorsiflexion (ke): 20 degrees   Plantar flexion: WFL  Inversion: 30 degrees   Eversion: 25 degrees     Strength/Myotome Testing     Left Hip   Planes of Motion   Flexion: 4  External rotation: 4  Internal rotation: 4+    Right Hip   Planes of Motion   Flexion: 4  External rotation: 4  Internal rotation: 4+    Left Knee   Flexion: 4  Extension:  4    Right Knee   Flexion: 4  Extension: 4    Left Ankle/Foot   Dorsiflexion: 5  Inversion: 4+  Eversion: 5    Right Ankle/Foot   Dorsiflexion: 5  Inversion: 4+  Eversion: 5    Ambulation     Observational Gait   Decreased walking speed and stride length.              Precautions: hx HA, hx LBP, hx cancer, zhane knee pain, zhane shoulder pain, zhane JEREMY      Manuals 3/4            IASTM zhane calf/achilles                                                    Neuro Re-Ed             Tandem/ modified tandem stance                                                                                           Ther Ex             Recumbent bike             Isometric PF  issued            Standing PF             Light gastroc stretch following IASTM                                                                  Ther Activity                                       Gait Training                                       Modalities             Ice PRN

## 2025-03-05 ENCOUNTER — APPOINTMENT (OUTPATIENT)
Dept: PHYSICAL THERAPY | Facility: REHABILITATION | Age: 65
End: 2025-03-05
Payer: MEDICARE

## 2025-03-06 ENCOUNTER — OFFICE VISIT (OUTPATIENT)
Dept: PHYSICAL THERAPY | Facility: REHABILITATION | Age: 65
End: 2025-03-06
Payer: MEDICARE

## 2025-03-06 DIAGNOSIS — M76.62 TENDONITIS, ACHILLES, LEFT: ICD-10-CM

## 2025-03-06 DIAGNOSIS — M76.61 TENDONITIS, ACHILLES, RIGHT: Primary | ICD-10-CM

## 2025-03-06 PROCEDURE — 97140 MANUAL THERAPY 1/> REGIONS: CPT

## 2025-03-06 PROCEDURE — 97110 THERAPEUTIC EXERCISES: CPT

## 2025-03-06 NOTE — PROGRESS NOTES
"Daily Note     Today's date: 3/6/2025  Patient name: Ximena Yousif  : 1960  MRN: [id number]  Referring provider: Uday Dumont DPM  Dx:   Encounter Diagnosis     ICD-10-CM    1. Tendonitis, Achilles, right  M76.61       2. Tendonitis, Achilles, left  M76.62           Start Time: 0945  Stop Time: 1030  Total time in clinic (min): 45 minutes    Subjective: Pt reports noticing mild soreness following last visit and attributes to addition of isometric PF.  Pt otherwise reports no significant changes in symptoms over the last few days.  Pt notes symptoms tend to be worse on L compared to R.      Objective: See treatment diary below  Precautions: hx HA, hx LBP, hx cancer, zhane knee pain, zhane shoulder pain, zhane JEREMY      Manuals 3/4 3/6           IASTM zhane calf/achilles  10 min v. light                                                  Neuro Re-Ed             Tandem/ modified tandem stance  20\"x2 ea                                                                                         Ther Ex             Recumbent bike  5 min AROM           Isometric PF  issued 5\"x10           Standing PF  nv           Light gastroc stretch following IASTM   30\"x3 ea w/ PT                                                               Ther Activity                                       Gait Training                                       Modalities             Ice PRN                              Assessment: Pt presents to PT for first visit since initial evaluation.  Initiated TE as noted per PT POC. Tolerated treatment well. Significant tenderness to medial gastroc and achilles on L, and achilles on R with trial of IASTM this visit.  Performed with very mild pressure.  Good strength with isometrics bilat.  Pt was educated in possible soreness and was issued HEP.  Assess response to treatment NV.  Patient demonstrated fatigue post treatment and would benefit from continued PT      Plan: Progress treatment as tolerated.          "

## 2025-03-11 ENCOUNTER — OFFICE VISIT (OUTPATIENT)
Dept: PHYSICAL THERAPY | Facility: REHABILITATION | Age: 65
End: 2025-03-11
Payer: MEDICARE

## 2025-03-11 DIAGNOSIS — M76.62 TENDONITIS, ACHILLES, LEFT: ICD-10-CM

## 2025-03-11 DIAGNOSIS — M76.61 TENDONITIS, ACHILLES, RIGHT: Primary | ICD-10-CM

## 2025-03-11 PROCEDURE — 97140 MANUAL THERAPY 1/> REGIONS: CPT

## 2025-03-11 PROCEDURE — 97110 THERAPEUTIC EXERCISES: CPT

## 2025-03-11 NOTE — PROGRESS NOTES
"Daily Note     Today's date: 3/11/2025  Patient name: Ximena Yousif  : 1960  MRN: [id number]  Referring provider: Uday Dumont DPM  Dx:   Encounter Diagnosis     ICD-10-CM    1. Tendonitis, Achilles, right  M76.61       2. Tendonitis, Achilles, left  M76.62           Start Time: 1620  Stop Time: 1705    Subjective: Pt reports \"I was a mess for 3 days\" after last treatment session with soreness described \"all over\".  Upon further questioning patient reports soreness in the legs, the knees, the back.  Pt reports having sound wave therapy at chiropractor/acupuncturist office that she had after last treatment and prior to today's treatment.          Objective: See treatment diary below  Precautions: hx HA, hx LBP, hx cancer, zhane knee pain, zhane shoulder pain, zhane JEREMY      Manuals 3/4 3/6 3/11          IASTM zhane calf/achilles  10 min v. light 10 min v. light                                                 Neuro Re-Ed             Tandem/ modified tandem stance  20\"x2 ea 20\"x2 ea                                                                                        Ther Ex             Recumbent bike  5 min AROM 5 min AROM          Isometric PF  issued 5\"x10 5\"x10          Standing PF  nv x10          Light gastroc stretch following IASTM   30\"x3 ea w/ PT 30\"x3 ea w/ PT                                                              Ther Activity                                       Gait Training                                       Modalities             Ice PRN                              Assessment:  Tolerated treatment well. Limited progressions this visit secondary to soreness following last treatment.  IASTM performed with mild pressure, greater discomfort on L compared to R.  Good tolerance to addition of standing PF with fatigue. Assess response to treatment NV.  Patient demonstrated fatigue post treatment and would benefit from continued PT      Plan: Progress treatment as tolerated.          "

## 2025-03-13 ENCOUNTER — HOSPITAL ENCOUNTER (OUTPATIENT)
Dept: NON INVASIVE DIAGNOSTICS | Facility: HOSPITAL | Age: 65
Discharge: HOME | End: 2025-03-13
Payer: MEDICARE

## 2025-03-13 ENCOUNTER — APPOINTMENT (OUTPATIENT)
Dept: PHYSICAL THERAPY | Facility: REHABILITATION | Age: 65
End: 2025-03-13
Payer: MEDICARE

## 2025-03-13 ENCOUNTER — HOSPITAL ENCOUNTER (OUTPATIENT)
Dept: NUCLEAR MEDICINE | Facility: HOSPITAL | Age: 65
Discharge: HOME | End: 2025-03-13
Payer: MEDICARE

## 2025-03-13 VITALS
HEIGHT: 64 IN | SYSTOLIC BLOOD PRESSURE: 141 MMHG | BODY MASS INDEX: 34.31 KG/M2 | DIASTOLIC BLOOD PRESSURE: 73 MMHG | HEART RATE: 67 BPM | WEIGHT: 201 LBS

## 2025-03-13 VITALS
HEIGHT: 64 IN | WEIGHT: 201 LBS | OXYGEN SATURATION: 98 % | BODY MASS INDEX: 34.31 KG/M2 | HEART RATE: 70 BPM | SYSTOLIC BLOOD PRESSURE: 134 MMHG | DIASTOLIC BLOOD PRESSURE: 90 MMHG

## 2025-03-13 DIAGNOSIS — R06.09 DOE (DYSPNEA ON EXERTION): ICD-10-CM

## 2025-03-13 DIAGNOSIS — I20.89 ANGINAL EQUIVALENT (HCC): ICD-10-CM

## 2025-03-13 DIAGNOSIS — I34.0 NONRHEUMATIC MITRAL VALVE REGURGITATION: ICD-10-CM

## 2025-03-13 DIAGNOSIS — M79.602 LEFT ARM PAIN: ICD-10-CM

## 2025-03-13 LAB
AORTIC ROOT: 3.3 CM
AORTIC VALVE MEAN VELOCITY: 8 M/S
ASCENDING AORTA: 3.8 CM
AV AREA BY CONTINUOUS VTI: 3.6 CM2
AV AREA PEAK VELOCITY: 3.6 CM2
AV LVOT MEAN GRADIENT: 4 MMHG
AV LVOT PEAK GRADIENT: 6 MMHG
AV MEAN PRESS GRAD SYS DOP V1V2: 3 MMHG
AV ORIFICE AREA US: 3.58 CM2
AV PEAK GRADIENT: 6 MMHG
AV VELOCITY RATIO: 1.03
AV VMAX SYS DOP: 1.2 M/S
CHEST PAIN STATEMENT: NORMAL
DOP CALC AO VTI: 28.63 CM
DOP CALC LVOT AREA: 3.46 CM2
DOP CALC LVOT CARDIAC INDEX: 3.46 L/MIN/M2
DOP CALC LVOT CARDIAC OUTPUT: 6.78 L/MIN
DOP CALC LVOT DIAMETER: 2.1 CM
DOP CALC LVOT PEAK VEL VTI: 29.62 CM
DOP CALC LVOT PEAK VEL: 1.23 M/S
DOP CALC LVOT STROKE INDEX: 50 ML/M2
DOP CALC LVOT STROKE VOLUME: 102.54
E WAVE DECELERATION TIME: 190 MS
E/A RATIO: 0.66
FRACTIONAL SHORTENING: 40 (ref 28–44)
INTERVENTRICULAR SEPTUM IN DIASTOLE (PARASTERNAL SHORT AXIS VIEW): 1 CM
INTERVENTRICULAR SEPTUM: 1 CM (ref 0.6–1.1)
LAAS-AP2: 13.5 CM2
LAAS-AP4: 13 CM2
LEFT ATRIUM SIZE: 3.6 CM
LEFT ATRIUM VOLUME (MOD BIPLANE): 40 ML
LEFT ATRIUM VOLUME INDEX (MOD BIPLANE): 20.4 ML/M2
LEFT INTERNAL DIMENSION IN SYSTOLE: 2.6 CM (ref 2.1–4)
LEFT VENTRICULAR INTERNAL DIMENSION IN DIASTOLE: 4.3 CM (ref 3.5–6)
LEFT VENTRICULAR POSTERIOR WALL IN END DIASTOLE: 0.8 CM
LEFT VENTRICULAR STROKE VOLUME: 61 ML
LV EF US.2D.A4C+ESTIMATED: 62 %
LVSV (TEICH): 61 ML
MAX DIASTOLIC BP: 90 MMHG
MAX HR PERCENT: 62 %
MAX HR: 97 BPM
MAX PREDICTED HEART RATE: 154 BPM
MV E'TISSUE VEL-LAT: 7 CM/S
MV E'TISSUE VEL-SEP: 8 CM/S
MV PEAK A VEL: 1.01 M/S
MV PEAK E VEL: 67 CM/S
PROTOCOL NAME: NORMAL
RATE PRESSURE PRODUCT: 9506
REASON FOR TERMINATION: NORMAL
RIGHT ATRIAL 2D VOLUME: 37 ML
RIGHT ATRIUM AREA SYSTOLE A4C: 14.2 CM2
SL CV LEFT ATRIUM LENGTH A2C: 3.5 CM
SL CV LV EF: 61
SL CV PED ECHO LEFT VENTRICLE DIASTOLIC VOLUME (MOD BIPLANE) 2D: 85 ML
SL CV PED ECHO LEFT VENTRICLE SYSTOLIC VOLUME (MOD BIPLANE) 2D: 24 ML
SL CV REST NUCLEAR ISOTOPE DOSE: 11 MCI
SL CV STRESS NUCLEAR ISOTOPE DOSE: 30.5 MCI
SL CV STRESS RECOVERY BP: NORMAL MMHG
SL CV STRESS RECOVERY HR: 77 BPM
SL CV STRESS RECOVERY O2 SAT: 99 %
SPECT HRT GATED+EF W RNC IV: 70 %
STRESS ANGINA INDEX: 0
STRESS BASELINE BP: NORMAL MMHG
STRESS BASELINE HR: 70 BPM
STRESS O2 SAT REST: 98 %
STRESS PEAK HR: 97 BPM
STRESS POST ESTIMATED WORKLOAD: 1 METS
STRESS POST EXERCISE DUR MIN: 5 MIN
STRESS POST EXERCISE DUR SEC: 26 SEC
STRESS POST O2 SAT PEAK: 99 %
STRESS POST PEAK BP: 98 MMHG
STRESS POST PEAK HR: 97 BPM
STRESS POST PEAK SYSTOLIC BP: 134 MMHG
STRESS/REST PERFUSION RATIO: 1.29
TARGET HR FORMULA: NORMAL
TEST INDICATION: NORMAL
TR MAX PG: 22 MMHG
TR PEAK VELOCITY: 2.3 M/S
TRICUSPID ANNULAR PLANE SYSTOLIC EXCURSION: 2.4 CM
TRICUSPID VALVE PEAK REGURGITATION VELOCITY: 2.32 M/S

## 2025-03-13 PROCEDURE — 93306 TTE W/DOPPLER COMPLETE: CPT

## 2025-03-13 PROCEDURE — 93018 CV STRESS TEST I&R ONLY: CPT | Performed by: STUDENT IN AN ORGANIZED HEALTH CARE EDUCATION/TRAINING PROGRAM

## 2025-03-13 PROCEDURE — A9502 TC99M TETROFOSMIN: HCPCS

## 2025-03-13 PROCEDURE — 78452 HT MUSCLE IMAGE SPECT MULT: CPT

## 2025-03-13 PROCEDURE — 78452 HT MUSCLE IMAGE SPECT MULT: CPT | Performed by: STUDENT IN AN ORGANIZED HEALTH CARE EDUCATION/TRAINING PROGRAM

## 2025-03-13 PROCEDURE — 93306 TTE W/DOPPLER COMPLETE: CPT | Performed by: INTERNAL MEDICINE

## 2025-03-13 PROCEDURE — 93016 CV STRESS TEST SUPVJ ONLY: CPT | Performed by: STUDENT IN AN ORGANIZED HEALTH CARE EDUCATION/TRAINING PROGRAM

## 2025-03-13 PROCEDURE — 93017 CV STRESS TEST TRACING ONLY: CPT

## 2025-03-13 RX ORDER — REGADENOSON 0.08 MG/ML
0.4 INJECTION, SOLUTION INTRAVENOUS ONCE
Status: COMPLETED | OUTPATIENT
Start: 2025-03-13 | End: 2025-03-13

## 2025-03-13 RX ADMIN — REGADENOSON 0.4 MG: 0.08 INJECTION, SOLUTION INTRAVENOUS at 10:34

## 2025-03-14 ENCOUNTER — OFFICE VISIT (OUTPATIENT)
Dept: PHYSICAL THERAPY | Facility: REHABILITATION | Age: 65
End: 2025-03-14
Payer: MEDICARE

## 2025-03-14 DIAGNOSIS — M76.61 TENDONITIS, ACHILLES, RIGHT: Primary | ICD-10-CM

## 2025-03-14 DIAGNOSIS — M76.62 TENDONITIS, ACHILLES, LEFT: ICD-10-CM

## 2025-03-14 PROCEDURE — 97110 THERAPEUTIC EXERCISES: CPT

## 2025-03-14 PROCEDURE — 97140 MANUAL THERAPY 1/> REGIONS: CPT

## 2025-03-14 NOTE — PROGRESS NOTES
"Daily Note     Today's date: 3/14/2025  Patient name: Ximena Yousif  : 1960  MRN: [id number]  Referring provider: Uday Dumont DPM  Dx:   Encounter Diagnosis     ICD-10-CM    1. Tendonitis, Achilles, right  M76.61       2. Tendonitis, Achilles, left  M76.62                   Subjective: No improvements. Pain 8/10.           Objective: See treatment diary below  Precautions: hx HA, hx LBP, hx cancer, zhane knee pain, zhane shoulder pain, zhane JEREMY      Manuals 3/4 3/6 3/11 3/14         IASTM zhane calf/achilles  10 min v. light 10 min v. light CM  20 min IASTM + STM         KT achilles inhibition    CM  5 mins                                   Neuro Re-Ed    3/14         Tandem/ modified tandem stance  20\"x2 ea 20\"x2 ea 20\"x2 ea                                                                                       Ther Ex    3/14         Recumbent bike  5 min AROM 5 min AROM 6 min  AROM         Isometric PF  issued 5\"x10 5\"x10 5\"x10         Standing PF  nv x10 x10         Light gastroc stretch following IASTM   30\"x3 ea w/ PT 30\"x3 ea w/ PT 30\"x3 ea w/ PT                                                             Ther Activity                                       Gait Training                                       Modalities             Ice PRN                              Assessment:  Tolerated treatment well. Trial of KT for achilles inhibition today; assess skin integrity NV. Consider laser in future sessions if primary PT feels its appropriate.  Beginning to desensitize. Demonstrated improved tolerance to manual intervention. Decreased pain reported post with a significant improvement/tolerance to gait.  Patient demonstrated fatigue post treatment and would benefit from continued PT      Plan: Progress treatment as tolerated.          "

## 2025-03-18 ENCOUNTER — OFFICE VISIT (OUTPATIENT)
Dept: PHYSICAL THERAPY | Facility: REHABILITATION | Age: 65
End: 2025-03-18
Payer: MEDICARE

## 2025-03-18 DIAGNOSIS — M76.62 TENDONITIS, ACHILLES, LEFT: ICD-10-CM

## 2025-03-18 DIAGNOSIS — M76.61 TENDONITIS, ACHILLES, RIGHT: Primary | ICD-10-CM

## 2025-03-18 PROCEDURE — 97110 THERAPEUTIC EXERCISES: CPT

## 2025-03-18 PROCEDURE — 97140 MANUAL THERAPY 1/> REGIONS: CPT

## 2025-03-18 NOTE — PROGRESS NOTES
"Daily Note     Today's date: 3/18/2025  Patient name: Ximena Yousif  : 1960  MRN: [id number]  Referring provider: Uday Dumont DPM  Dx:   Encounter Diagnosis     ICD-10-CM    1. Tendonitis, Achilles, right  M76.61       2. Tendonitis, Achilles, left  M76.62           Start Time: 1445  Stop Time: 1530    Subjective: Pt reports improvement in symptoms with addition of k-tape.  Pt notes removing prior to session and doesn't feel as good without tape.        Objective: See treatment diary below  Precautions: hx HA, hx LBP, hx cancer, zhane knee pain, zhane shoulder pain, zhane JEREMY      Manuals 3/4 3/6 3/11 3/14 3/18        IASTM zhane calf/achilles  10 min v. light 10 min v. light CM  20 min IASTM + STM 10 min light        KT achilles inhibition    CM  5 mins 5 min                                  Neuro Re-Ed    3/14         Tandem/ modified tandem stance  20\"x2 ea 20\"x2 ea 20\"x2 ea 20\"x2 ea                                                                                      Ther Ex    3/14         Recumbent bike  5 min AROM 5 min AROM 6 min  AROM 5 min AROM        Isometric PF  issued 5\"x10 5\"x10 5\"x10 nv        Standing PF  nv x10 x10 x10        Light gastroc stretch following IASTM   30\"x3 ea w/ PT 30\"x3 ea w/ PT 30\"x3 ea w/ PT 30\"x3 ea w/ PT        Patient Edu     10 min                                               Ther Activity                                       Gait Training                                       Modalities             Ice PRN                              Assessment:  Tolerated treatment well. Performed IASTM to bilat gastroc/achilles region with improved tolerance compared to previous sessions.  Pt is most challenged by tandem stance balance with L LE posterior.  Fatigue noted with standing PF.  K-tape applied.  Assess response nv. Patient demonstrated fatigue post treatment and would benefit from continued PT      Plan: Progress treatment as tolerated.          "

## 2025-03-20 ENCOUNTER — OFFICE VISIT (OUTPATIENT)
Dept: PHYSICAL THERAPY | Facility: REHABILITATION | Age: 65
End: 2025-03-20
Payer: MEDICARE

## 2025-03-20 DIAGNOSIS — M76.62 TENDONITIS, ACHILLES, LEFT: ICD-10-CM

## 2025-03-20 DIAGNOSIS — M76.61 TENDONITIS, ACHILLES, RIGHT: Primary | ICD-10-CM

## 2025-03-20 PROCEDURE — 97140 MANUAL THERAPY 1/> REGIONS: CPT

## 2025-03-20 PROCEDURE — 97110 THERAPEUTIC EXERCISES: CPT

## 2025-03-20 NOTE — PROGRESS NOTES
"Daily Note     Today's date: 3/20/2025  Patient name: Ximena Yousif  : 1960  MRN: [id number]  Referring provider: Uday Dumont DPM  Dx:   Encounter Diagnosis     ICD-10-CM    1. Tendonitis, Achilles, right  M76.61       2. Tendonitis, Achilles, left  M76.62             Subjective: Pt reports the tape did not stick as well last visit and has been in more overall pain.  Pt describes pain/stiffness and swelling as global and in multiple joints.  Pt reports bilat knees \"have been killing me\".              Objective: See treatment diary below  Precautions: hx HA, hx LBP, hx cancer, zhane knee pain, zhane shoulder pain, zhane JEREMY      Manuals 3/4 3/6 3/11 3/14 3/18 3/20       IASTM zhane calf/achilles  10 min v. light 10 min v. light CM  20 min IASTM + STM 10 min light 10 min light       KT achilles inhibition    CM  5 mins 5 min np       Laser to bilat achilles      15 min                    Neuro Re-Ed    3/14         Tandem/ modified tandem stance  20\"x2 ea 20\"x2 ea 20\"x2 ea 20\"x2 ea                                                                                      Ther Ex    3/14         Recumbent bike  5 min AROM 5 min AROM 6 min  AROM 5 min AROM 5 min AROM       Isometric PF  issued 5\"x10 5\"x10 5\"x10 nv        Standing PF  nv x10 x10 x10        Light gastroc stretch following IASTM   30\"x3 ea w/ PT 30\"x3 ea w/ PT 30\"x3 ea w/ PT 30\"x3 ea w/ PT 30\"x3 ea w/ PT       Patient Edu     10 min 5 min                                              Ther Activity                                       Gait Training                                       Modalities             Ice PRN                              Assessment:  Tolerated treatment well. Soft tissue restrictions felt through instruments during IASTM to bilat achilles.  Trial of laser to bilat achilles this visit.  Will assess responses NV. Patient demonstrated fatigue post treatment and would benefit from continued PT      Plan: Progress treatment as tolerated.        "

## 2025-03-25 ENCOUNTER — OFFICE VISIT (OUTPATIENT)
Dept: PHYSICAL THERAPY | Facility: REHABILITATION | Age: 65
End: 2025-03-25
Payer: MEDICARE

## 2025-03-25 DIAGNOSIS — M76.62 TENDONITIS, ACHILLES, LEFT: ICD-10-CM

## 2025-03-25 DIAGNOSIS — M76.61 TENDONITIS, ACHILLES, RIGHT: Primary | ICD-10-CM

## 2025-03-25 PROCEDURE — 97140 MANUAL THERAPY 1/> REGIONS: CPT

## 2025-03-25 PROCEDURE — 97110 THERAPEUTIC EXERCISES: CPT

## 2025-03-25 NOTE — PROGRESS NOTES
"Daily Note     Today's date: 3/25/2025  Patient name: Ximena Yousif  : 1960  MRN: [id number]  Referring provider: Uday Dumont DPM  Dx:   Encounter Diagnosis     ICD-10-CM    1. Tendonitis, Achilles, right  M76.61       2. Tendonitis, Achilles, left  M76.62             Subjective: Pt reports instance two nights ago where she went to step down and the L knee gave out and was in severe pain.  Pt notes improved tolerance to IASTM, but minimal change overall in achilles symptoms with the L being more severe than the R.             Objective: See treatment diary below  Precautions: hx HA, hx LBP, hx cancer, zhane knee pain, zhane shoulder pain, zhane JEREMY      Manuals 3/4 3/6 3/11 3/14 3/18 3/20 3/25      IASTM zhane calf/achilles  10 min v. light 10 min v. light CM  20 min IASTM + STM 10 min light 10 min light 10 min      KT achilles inhibition    CM  5 mins 5 min np       Laser to bilat achilles      15 min 7 min                   Neuro Re-Ed    3/14         Tandem/ modified tandem stance  20\"x2 ea 20\"x2 ea 20\"x2 ea 20\"x2 ea                                                                                      Ther Ex    3/14         Recumbent bike  5 min AROM 5 min AROM 6 min  AROM 5 min AROM 5 min AROM 5 min AROM      Isometric PF  issued 5\"x10 5\"x10 5\"x10 nv  5\"x10 ea      Standing PF  nv x10 x10 x10        Light gastroc stretch following IASTM   30\"x3 ea w/ PT 30\"x3 ea w/ PT 30\"x3 ea w/ PT 30\"x3 ea w/ PT 30\"x3 ea w/ PT 30\"x3 ea w/ PT      Patient Edu     10 min 5 min                                              Ther Activity                                       Gait Training                                       Modalities             Ice PRN                              Assessment:  Tolerated treatment well. Tenderness to palpation with instruments during IASTM to L achilles region, soft tissue restrictions felt bilaterally to achilles and gastroc regions.  Good strength with isometrics.  Performed laser on L only " secondary to time constraints.  Pt leaves therapy reporting less pain and improved ambulation.  Assess NV. Patient demonstrated fatigue post treatment and would benefit from continued PT      Plan: Progress treatment as tolerated.

## 2025-03-28 ENCOUNTER — OFFICE VISIT (OUTPATIENT)
Dept: PHYSICAL THERAPY | Facility: REHABILITATION | Age: 65
End: 2025-03-28
Payer: MEDICARE

## 2025-03-28 DIAGNOSIS — M76.61 TENDONITIS, ACHILLES, RIGHT: Primary | ICD-10-CM

## 2025-03-28 DIAGNOSIS — M76.62 TENDONITIS, ACHILLES, LEFT: ICD-10-CM

## 2025-03-28 PROCEDURE — 97110 THERAPEUTIC EXERCISES: CPT | Performed by: PHYSICAL THERAPIST

## 2025-03-28 PROCEDURE — 97140 MANUAL THERAPY 1/> REGIONS: CPT | Performed by: PHYSICAL THERAPIST

## 2025-03-28 NOTE — PROGRESS NOTES
"Daily Note     Today's date: 3/28/2025  Patient name: Ximena Yousif  : 1960  MRN: [id number]  Referring provider: Uday Dumont DPM  Dx:   Encounter Diagnosis     ICD-10-CM    1. Tendonitis, Achilles, right  M76.61       2. Tendonitis, Achilles, left  M76.62             Subjective: Patient reports increased achilles and knee pain.  Her left knee and achilles are more painful than right.  She has a lot of difficulty with ambulation.        Objective: See treatment diary below  Precautions: hx HA, hx LBP, hx cancer, zhane knee pain, zhane shoulder pain, zhane JEREMY      Manuals 3/4 3/6 3/11 3/14 3/18 3/20 3/25 3/28     IASTM zhane calf/achilles  10 min v. light 10 min v. light CM  20 min IASTM + STM 10 min light 10 min light 10 min 20 min     KT achilles inhibition    CM  5 mins 5 min np  10 min     Laser to bilat achilles      15 min 7 min np                  Neuro Re-Ed    3/14         Tandem/ modified tandem stance  20\"x2 ea 20\"x2 ea 20\"x2 ea 20\"x2 ea                                                                                      Ther Ex    3/14         Recumbent bike  5 min AROM 5 min AROM 6 min  AROM 5 min AROM 5 min AROM 5 min AROM 5 min AROM     Isometric PF  issued 5\"x10 5\"x10 5\"x10 nv  5\"x10 ea 5\" 10x zhane     Standing PF  nv x10 x10 x10        Light gastroc stretch following IASTM   30\"x3 ea w/ PT 30\"x3 ea w/ PT 30\"x3 ea w/ PT 30\"x3 ea w/ PT 30\"x3 ea w/ PT 30\"x3 ea w/ PT 3x30\" zhane with PT     Patient Edu     10 min 5 min                                              Ther Activity                                       Gait Training                                       Modalities             Ice PRN                              Assessment:  Tolerated treatment well. [place] of k-tape to achilles zhane as she has had some relief in the past.  Also trial of 3/8 inch heel lifts zhane.  Educated to try to ambulate at home with shoes vs barefoot.  Assess response NV.  Patient demonstrated fatigue post treatment and would " benefit from continued PT      Plan: Progress treatment as tolerated.

## 2025-03-31 ENCOUNTER — OFFICE VISIT (OUTPATIENT)
Dept: PHYSICAL THERAPY | Facility: REHABILITATION | Age: 65
End: 2025-03-31
Payer: MEDICARE

## 2025-03-31 DIAGNOSIS — M76.62 TENDONITIS, ACHILLES, LEFT: ICD-10-CM

## 2025-03-31 DIAGNOSIS — M76.61 TENDONITIS, ACHILLES, RIGHT: Primary | ICD-10-CM

## 2025-03-31 PROCEDURE — 97110 THERAPEUTIC EXERCISES: CPT

## 2025-03-31 PROCEDURE — 97140 MANUAL THERAPY 1/> REGIONS: CPT

## 2025-03-31 NOTE — PROGRESS NOTES
"Daily Note     Today's date: 3/31/2025  Patient name: Ximena Yousif  : 1960  MRN: [id number]  Referring provider: Uday Dumont DPM  Dx:   Encounter Diagnosis     ICD-10-CM    1. Tendonitis, Achilles, right  M76.61       2. Tendonitis, Achilles, left  M76.62             Subjective: Patient reports no change with trial of heel lift and taping last visit.  Pt reports minimal usage of lift as she has primarily been home and only ambulates in sneakers outside the home.  Pt reports foot and knee pain has been more severe and describes knee pain as severe yesterday.        Objective: See treatment diary below  Precautions: hx HA, hx LBP, hx cancer, zhane knee pain, zhane shoulder pain, zhane JEREMY      Manuals 3/4 3/6 3/11 3/14 3/18 3/20 3/25 3/28 3/31    IASTM zhane calf/achilles  10 min v. light 10 min v. light CM  20 min IASTM + STM 10 min light 10 min light 10 min 20 min 10 min light    KT achilles inhibition    CM  5 mins 5 min np  10 min     Laser to bilat achilles      15 min 7 min np 15 min                 Neuro Re-Ed    3/14         Tandem/ modified tandem stance  20\"x2 ea 20\"x2 ea 20\"x2 ea 20\"x2 ea                                                                                      Ther Ex    3/14         Recumbent bike  5 min AROM 5 min AROM 6 min  AROM 5 min AROM 5 min AROM 5 min AROM 5 min AROM 5 min AROM    Isometric PF  issued 5\"x10 5\"x10 5\"x10 nv  5\"x10 ea 5\" 10x zhane     Standing PF  nv x10 x10 x10        Light gastroc stretch following IASTM   30\"x3 ea w/ PT 30\"x3 ea w/ PT 30\"x3 ea w/ PT 30\"x3 ea w/ PT 30\"x3 ea w/ PT 30\"x3 ea w/ PT 3x30\" zhane with PT 30\"x3 ea w/ PT    Patient Edu     10 min 5 min                                              Ther Activity                                       Gait Training                                       Modalities             Ice PRN                              Assessment:  Tolerated treatment well. More significant tenderness with IASTM to L achilles, erythema visible at " achilles bilat.  Pt requests to hold on k-tape this visit and to perform laser.  Pt leaves therapy demonstrating improved gait and reports less discomfort.  Plan to RE next visit. Patient demonstrated fatigue post treatment and would benefit from continued PT      Plan: Progress treatment as tolerated.

## 2025-04-03 ENCOUNTER — EVALUATION (OUTPATIENT)
Dept: PHYSICAL THERAPY | Facility: REHABILITATION | Age: 65
End: 2025-04-03
Payer: MEDICARE

## 2025-04-03 DIAGNOSIS — M76.62 TENDONITIS, ACHILLES, LEFT: ICD-10-CM

## 2025-04-03 DIAGNOSIS — M76.61 TENDONITIS, ACHILLES, RIGHT: Primary | ICD-10-CM

## 2025-04-03 PROCEDURE — 97140 MANUAL THERAPY 1/> REGIONS: CPT | Performed by: PHYSICAL THERAPIST

## 2025-04-03 PROCEDURE — 97110 THERAPEUTIC EXERCISES: CPT | Performed by: PHYSICAL THERAPIST

## 2025-04-03 NOTE — PROGRESS NOTES
PT Re-Evaluation     Today's date: 4/3/2025  Patient name: Ximena Yousif  : 1960  MRN: [id number]  Referring provider: Uday Dumont DPM  Dx:   Encounter Diagnosis     ICD-10-CM    1. Tendonitis, Achilles, right  M76.61       2. Tendonitis, Achilles, left  M76.62           Start Time: 1450  Stop Time: 1530  Total time in clinic (min): 40 minutes    Assessment  Impairments: abnormal gait, activity intolerance, impaired physical strength and pain with function  Symptom irritability: moderate    Assessment details: Patient is a 64 y.o. female that presents with bilateral Achilles tendon pain.  Patient demonstrates improvement with ROM and strength.  Patient reports continued difficulty with ambulation, negotiation of stairs, and standing (greater than 30 minutes).  Patient has made progress towards goals established for physical therapy.  Patient would benefit from continued skilled physical therapy services for continued strengthening and gait training to maximize function.        Understanding of Dx/Px/POC: good    Goals  Impairment:  1.  Patient will reports 50% reduction in pain in 4 weeks to maximize function.-NOT MET  2.  Patient will improve strength to 4/5 in all planes to maximize function.-PARTIALLY MET  3.  Patient will improve ROM to WFL in 4 weeks to maximize function.-MET    Functional:  1. Patient will improve FOTO by 21 points to 47/100 in 4 weeks to maximize function.-NOT ASSESSED  2. Patient will be independent with HEP in 4 weeks to maximize function.-MET  3. Patient will report no difficulty with ambulation in 4 weeks to maximize function.-NOT MET  4. Patient will report no difficulty with negotiation of stairs in 4 weeks to maximize function.-NOT MET  5. Patient will report no difficulty with standing greater than 30 minutes in 4 weeks to maximize function.-NOT MET          Plan  Patient would benefit from: skilled physical therapy  Planned modality interventions: cryotherapy, thermotherapy:  hydrocollator packs and low level laser therapy    Planned therapy interventions: manual therapy, IASTM, activity modification, balance, patient/caregiver education, neuromuscular re-education, therapeutic exercise, therapeutic activities, stretching, strengthening, functional ROM exercises and home exercise program    Frequency: 2x week  Duration in weeks: 4  Treatment plan discussed with: patient  Plan details: Patient will be a RE in 4 weeks.          Subjective Evaluation    History of Present Illness  Date of onset: 2024  Mechanism of injury: Patient presents with bilateral achilles tendon pain, left worse than right that started after a massage on 2024.  Patient also has bilateral knee pain and will have a right knee replacement in May 2025.  Patient reports difficulty with ambulation, negotiation of stairs, and standing (greater than 30 minutes).  Her pain will start in mid tendon and radiate up into her calf.    Patient Goals  Patient goals for therapy: increased strength, increased motion and decreased pain    Pain  At best pain ratin  At worst pain rating: 10  Location: L calf/heel  Quality: grabbing.  Aggravating factors: stair climbing, walking and standing  Progression: worsening    Treatments  Current treatment: physical therapy        Objective     Tenderness   Left Ankle/Foot   Tenderness in the proximal Achilles. No tenderness in the Achilles insertion.     Right Ankle/Foot   Tenderness in the proximal Achilles. No tenderness in the Achilles insertion.     Passive Range of Motion   Left Ankle/Foot    Dorsiflexion (ke): 15 degrees   Plantar flexion: WFL  Inversion: 50 degrees   Eversion: 25 degrees     Right Ankle/Foot    Dorsiflexion (ke): 25 degrees   Plantar flexion: WFL  Inversion: 55 degrees   Eversion: 25 degrees     Strength/Myotome Testing     Left Hip   Planes of Motion   Flexion: 4+  External rotation: 4+  Internal rotation: 4+    Right Hip   Planes of Motion   Flexion:  "4+  External rotation: 4  Internal rotation: 4+    Left Knee   Flexion: 4  Extension: 4    Right Knee   Flexion: 4  Extension: 4    Left Ankle/Foot   Dorsiflexion: 5  Inversion: 4+  Eversion: 5    Right Ankle/Foot   Dorsiflexion: 5  Inversion: 4+  Eversion: 5    Additional Strength Details  Plantar flexion: unable to perform standing heel raise with zhane LE    Ambulation     Observational Gait   Gait: antalgic   Decreased walking speed and stride length.              Precautions: hx HA, hx LBP, hx cancer, zhane knee pain, zhane shoulder pain, zhane JEREMY        Manuals 4/3   3/14 3/18 3/20 3/25 3/28 3/31     IASTM zhane calf/achilles manual STM NV   CM  20 min IASTM + STM 10 min light 10 min light 10 min 20 min 10 min light     KT achilles inhibition     CM  5 mins 5 min np   10 min       Laser to bilat achilles         15 min 7 min np 15 min     measurements  30 min                   Neuro Re-Ed     3/14               Tandem/ modified tandem stance     20\"x2 ea 20\"x2 ea                                                                                                                                                 Ther Ex     3/14               Recumbent bike     6 min  AROM 5 min AROM 5 min AROM 5 min AROM 5 min AROM 5 min AROM     Isometric PF     5\"x10 nv   5\"x10 ea 5\" 10x zhane       Standing PF     x10 x10             Light gastroc stretch following IASTM      30\"x3 ea w/ PT 30\"x3 ea w/ PT 30\"x3 ea w/ PT 30\"x3 ea w/ PT 3x30\" zhane with PT 30\"x3 ea w/ PT     Patient Edu       10 min 5 min            supine plantar flexion band  black 5\" 20x R                                                               Ther Activity                                                                 Gait Training                                                                 Modalities                     Ice PRN                                              "

## 2025-04-03 NOTE — LETTER
2025    Uday Dumont DPM   [facility]  [address]  [facility] [address]    Patient: Ximena Yousif   YOB: 1960   Date of Visit: 4/3/2025     Encounter Diagnosis     ICD-10-CM    1. Tendonitis, Achilles, right  M76.61       2. Tendonitis, Achilles, left  M76.62           Dear Dr. Uday Dumont DPM:    Thank you for your recent referral of Ximena Yousif. Please review the attached evaluation summary from Ximena's recent visit.     Please verify that you agree with the plan of care by signing the attached order.     If you have any questions or concerns, please do not hesitate to call.     I sincerely appreciate the opportunity to share in the care of one of your patients and hope to have another opportunity to work with you in the near future.       Sincerely,    Kvng Patterson, PT      Referring Provider:      I certify that I have read the below Plan of Care and certify the need for these services furnished under this plan of treatment while under my care.                    Uday Dumont DPM   [facility]  [address]  [facility] [address]  Via Fax: [phone]          PT Re-Evaluation     Today's date: 4/3/2025  Patient name: Ximena Yousif  : 1960  MRN: [id number]  Referring provider: Uday Dumont DPM  Dx:   Encounter Diagnosis     ICD-10-CM    1. Tendonitis, Achilles, right  M76.61       2. Tendonitis, Achilles, left  M76.62           Start Time: 1450  Stop Time: 1530  Total time in clinic (min): 40 minutes    Assessment  Impairments: abnormal gait, activity intolerance, impaired physical strength and pain with function  Symptom irritability: moderate    Assessment details: Patient is a 64 y.o. female that presents with bilateral Achilles tendon pain.  Patient demonstrates improvement with ROM and strength.  Patient reports continued difficulty with ambulation, negotiation of stairs, and standing (greater than 30 minutes).  Patient has made progress towards goals established for  physical therapy.  Patient would benefit from continued skilled physical therapy services for continued strengthening and gait training to maximize function.        Understanding of Dx/Px/POC: good    Goals  Impairment:  1.  Patient will reports 50% reduction in pain in 4 weeks to maximize function.-NOT MET  2.  Patient will improve strength to 4/5 in all planes to maximize function.-PARTIALLY MET  3.  Patient will improve ROM to WFL in 4 weeks to maximize function.-MET    Functional:  1. Patient will improve FOTO by 21 points to 47/100 in 4 weeks to maximize function.-NOT ASSESSED  2. Patient will be independent with HEP in 4 weeks to maximize function.-MET  3. Patient will report no difficulty with ambulation in 4 weeks to maximize function.-NOT MET  4. Patient will report no difficulty with negotiation of stairs in 4 weeks to maximize function.-NOT MET  5. Patient will report no difficulty with standing greater than 30 minutes in 4 weeks to maximize function.-NOT MET          Plan  Patient would benefit from: skilled physical therapy  Planned modality interventions: cryotherapy, thermotherapy: hydrocollator packs and low level laser therapy    Planned therapy interventions: manual therapy, IASTM, activity modification, balance, patient/caregiver education, neuromuscular re-education, therapeutic exercise, therapeutic activities, stretching, strengthening, functional ROM exercises and home exercise program    Frequency: 2x week  Duration in weeks: 4  Treatment plan discussed with: patient  Plan details: Patient will be a RE in 4 weeks.          Subjective Evaluation    History of Present Illness  Date of onset: 7/2/2024  Mechanism of injury: Patient presents with bilateral achilles tendon pain, left worse than right that started after a massage on 7/2/2024.  Patient also has bilateral knee pain and will have a right knee replacement in May 2025.  Patient reports difficulty with ambulation, negotiation of stairs,  and standing (greater than 30 minutes).  Her pain will start in mid tendon and radiate up into her calf.    Patient Goals  Patient goals for therapy: increased strength, increased motion and decreased pain    Pain  At best pain ratin  At worst pain rating: 10  Location: L calf/heel  Quality: grabbing.  Aggravating factors: stair climbing, walking and standing  Progression: worsening    Treatments  Current treatment: physical therapy        Objective     Tenderness   Left Ankle/Foot   Tenderness in the proximal Achilles. No tenderness in the Achilles insertion.     Right Ankle/Foot   Tenderness in the proximal Achilles. No tenderness in the Achilles insertion.     Passive Range of Motion   Left Ankle/Foot    Dorsiflexion (ke): 15 degrees   Plantar flexion: WFL  Inversion: 50 degrees   Eversion: 25 degrees     Right Ankle/Foot    Dorsiflexion (ke): 25 degrees   Plantar flexion: WFL  Inversion: 55 degrees   Eversion: 25 degrees     Strength/Myotome Testing     Left Hip   Planes of Motion   Flexion: 4+  External rotation: 4+  Internal rotation: 4+    Right Hip   Planes of Motion   Flexion: 4+  External rotation: 4  Internal rotation: 4+    Left Knee   Flexion: 4  Extension: 4    Right Knee   Flexion: 4  Extension: 4    Left Ankle/Foot   Dorsiflexion: 5  Inversion: 4+  Eversion: 5    Right Ankle/Foot   Dorsiflexion: 5  Inversion: 4+  Eversion: 5    Additional Strength Details  Plantar flexion: unable to perform standing heel raise with zhane LE    Ambulation     Observational Gait   Gait: antalgic   Decreased walking speed and stride length.              Precautions: hx HA, hx LBP, hx cancer, zhane knee pain, zhane shoulder pain, zhane JEREMY        Manuals 4/3   3/14 3/18 3/20 3/25 3/28 3/31     IASTM zhane calf/achilles manual STM NV   CM  20 min IASTM + STM 10 min light 10 min light 10 min 20 min 10 min light     KT achilles inhibition     CM  5 mins 5 min np   10 min       Laser to bilat achilles         15 min 7 min np 15  "min     measurements  30 min                   Neuro Re-Ed     3/14               Tandem/ modified tandem stance     20\"x2 ea 20\"x2 ea                                                                                                                                                 Ther Ex     3/14               Recumbent bike     6 min  AROM 5 min AROM 5 min AROM 5 min AROM 5 min AROM 5 min AROM     Isometric PF     5\"x10 nv   5\"x10 ea 5\" 10x zhane       Standing PF     x10 x10             Light gastroc stretch following IASTM      30\"x3 ea w/ PT 30\"x3 ea w/ PT 30\"x3 ea w/ PT 30\"x3 ea w/ PT 3x30\" zhane with PT 30\"x3 ea w/ PT     Patient Edu       10 min 5 min            supine plantar flexion band  black 5\" 20x R                                                               Ther Activity                                                                 Gait Training                                                                 Modalities                     Ice PRN                                                              "

## 2025-04-04 ENCOUNTER — TELEPHONE (OUTPATIENT)
Age: 65
End: 2025-04-04

## 2025-04-04 ENCOUNTER — APPOINTMENT (OUTPATIENT)
Dept: LAB | Facility: CLINIC | Age: 65
End: 2025-04-04
Payer: MEDICARE

## 2025-04-04 ENCOUNTER — RESULTS FOLLOW-UP (OUTPATIENT)
Dept: CARDIOLOGY CLINIC | Facility: CLINIC | Age: 65
End: 2025-04-04

## 2025-04-04 DIAGNOSIS — R94.39 ABNORMAL BALLISTOCARDIOGRAM: ICD-10-CM

## 2025-04-04 DIAGNOSIS — R94.39 ABNORMAL STRESS TEST: Primary | ICD-10-CM

## 2025-04-04 DIAGNOSIS — I20.89 ANGINAL EQUIVALENT (HCC): ICD-10-CM

## 2025-04-04 DIAGNOSIS — R94.39 ABNORMAL STRESS TEST: ICD-10-CM

## 2025-04-04 LAB
BUN SERPL-MCNC: 18 MG/DL (ref 5–25)
CREAT SERPL-MCNC: 0.61 MG/DL (ref 0.6–1.3)
GFR SERPL CREATININE-BSD FRML MDRD: 94 ML/MIN/1.73SQ M

## 2025-04-04 PROCEDURE — 36415 COLL VENOUS BLD VENIPUNCTURE: CPT

## 2025-04-04 PROCEDURE — 84520 ASSAY OF UREA NITROGEN: CPT

## 2025-04-04 PROCEDURE — 82565 ASSAY OF CREATININE: CPT

## 2025-04-04 NOTE — TELEPHONE ENCOUNTER
----- Message from Alen Casillas DO sent at 4/4/2025  9:54 AM EDT -----  Please call the patient regarding their Stress Test results.  Stress test with borderline findings.  Possible artifact but cannot rule out ischemia.  Needs more sensitive and specific test with coronary CT.  Orders will be placed for this, please assist with scheduling.  Continue taking propranolol to optimize heart rate, I would recommend this is taken 90 minutes prior to the test.  Will also send for BUN/creatinine to be done prior to test as well. .

## 2025-04-04 NOTE — TELEPHONE ENCOUNTER
Caller: Isidro Yousif    Doctor: Dr. Casillas    Reason for call: pt calling to state that she got the bloodwork ordered done today and she will have the CTA cardiac scan on 4/11/25 in [place]. Please call pt at    Call back#: [phone]

## 2025-04-04 NOTE — TELEPHONE ENCOUNTER
Caller: Patient    Doctor: Dr. Casillas    Call back #: [phone]    Reason for call: Patient rescheduled her appointment with Dr. Casillas for May 1st. This appointment is to review her CTA results and also for a pre op clearance for her upcoming knee surgery on May 12th. Patient did request if Dr. Casillas could see patient the week of April 21st (avoiding April 22nd and 24th). Patient wanted to know if this is possible, or if Dr. Casillas would be okay seeing patient on May 1st. Please advise. Thank you!

## 2025-04-04 NOTE — TELEPHONE ENCOUNTER
Placed call to patient. She verbalized understanding. Provided patient with central scheduling phone number. She will call back to reschedule her appointment after scheduling the CT cardiac.

## 2025-04-07 ENCOUNTER — OFFICE VISIT (OUTPATIENT)
Dept: PHYSICAL THERAPY | Facility: REHABILITATION | Age: 65
End: 2025-04-07
Payer: MEDICARE

## 2025-04-07 DIAGNOSIS — M76.62 TENDONITIS, ACHILLES, LEFT: ICD-10-CM

## 2025-04-07 DIAGNOSIS — M76.61 TENDONITIS, ACHILLES, RIGHT: Primary | ICD-10-CM

## 2025-04-07 PROCEDURE — 97110 THERAPEUTIC EXERCISES: CPT | Performed by: PHYSICAL THERAPIST

## 2025-04-07 PROCEDURE — 97140 MANUAL THERAPY 1/> REGIONS: CPT | Performed by: PHYSICAL THERAPIST

## 2025-04-07 NOTE — PROGRESS NOTES
"Daily Note     Today's date: 2025  Patient name: Ximena Yosuif  : 1960  MRN: [id number]  Referring provider: Uday Dumont DPM  Dx:   Encounter Diagnosis     ICD-10-CM    1. Tendonitis, Achilles, right  M76.61       2. Tendonitis, Achilles, left  M76.62           Start Time: 1400  Stop Time: 1447  Total time in clinic (min): 47 minutes    Subjective: Patient reports starting to do step leading with the left LE as this seems less painful (up and down stairs).  Patient reports compliance with HEP.        Objective: See treatment diary below      Assessment: Tolerated treatment well. Patient exhibited good technique with therapeutic exercises and would benefit from continued PT.  Patient presents with improved step length this visit compared to last visit.  Progressed PF with band to gold band with good tolerance.  Also altered STM to manual from IASTM.  Also added seated PF with fatigue.  Assess response NV.        Plan: Progress treatment as tolerated.       Precautions: hx HA, hx LBP, hx cancer, zhane knee pain, zhane shoulder pain, zhane JEREMY        Manuals 4/3 4/7  3/14 3/18 3/20 3/25 3/28 3/31     IASTM zhane calf/achilles manual STM NV 15 min  CM  20 min IASTM + STM 10 min light 10 min light 10 min 20 min 10 min light     KT achilles inhibition     CM  5 mins 5 min np   10 min       Laser to bilat achilles         15 min 7 min np 15 min     measurements  30 min                   Neuro Re-Ed     3/14               Tandem/ modified tandem stance   np  20\"x2 ea 20\"x2 ea                                                                                                                                                 Ther Ex     3/14               Recumbent bike   5 min AROM  6 min  AROM 5 min AROM 5 min AROM 5 min AROM 5 min AROM 5 min AROM     Isometric PF   5\" 10x zhane with PT  5\"x10 nv   5\"x10 ea 5\" 10x zhane       Standing PF     x10 x10             Light gastroc stretch following IASTM    3x30\"   30\"x3 ea w/ PT 30\"x3 " "ea w/ PT 30\"x3 ea w/ PT 30\"x3 ea w/ PT 3x30\" zhane with PT 30\"x3 ea w/ PT     Patient Edu       10 min 5 min            supine plantar flexion band  black 5\" 20x R Black 5\" 12x L, 8x gold L, 20x R                   seated PF with knees flexed   5\" 20x with 7.5 lb ankle weights on thigh                                        Ther Activity                                                                 Gait Training                                                                 Modalities                     Ice PRN                                              "

## 2025-04-08 NOTE — PRE-PROCEDURE INSTRUCTIONS
Call placed to patient to discuss upcoming CCTA @ [facility].  Reason for exam, allergies, medications and cardiac history reviewed.  Pre procedure instructions reviewed with patient.  Pre-medication, Propranolol, reviewed with patient.  Procedure and post-procedure expectations and instructions reviewed.  Recommended patient to have a  for procedure.  Appointment reminder given:  4/11/25 @ 1300 with 1245 arrival time.  Patient verbalized understanding and denied any questions at this time.

## 2025-04-10 ENCOUNTER — APPOINTMENT (OUTPATIENT)
Dept: PHYSICAL THERAPY | Facility: REHABILITATION | Age: 65
End: 2025-04-10
Payer: MEDICARE

## 2025-04-11 ENCOUNTER — HOSPITAL ENCOUNTER (OUTPATIENT)
Dept: CT IMAGING | Facility: HOSPITAL | Age: 65
End: 2025-04-11
Payer: MEDICARE

## 2025-04-11 VITALS — DIASTOLIC BLOOD PRESSURE: 68 MMHG | HEART RATE: 78 BPM | SYSTOLIC BLOOD PRESSURE: 123 MMHG | RESPIRATION RATE: 22 BRPM

## 2025-04-11 DIAGNOSIS — I20.89 ANGINAL EQUIVALENT (HCC): ICD-10-CM

## 2025-04-11 DIAGNOSIS — R94.39 ABNORMAL STRESS TEST: ICD-10-CM

## 2025-04-11 PROCEDURE — 75574 CT ANGIO HRT W/3D IMAGE: CPT

## 2025-04-11 RX ORDER — NITROGLYCERIN 0.4 MG/1
0.8 TABLET SUBLINGUAL ONCE
Status: COMPLETED | OUTPATIENT
Start: 2025-04-11 | End: 2025-04-11

## 2025-04-11 RX ORDER — METOPROLOL TARTRATE 1 MG/ML
5 INJECTION, SOLUTION INTRAVENOUS
Status: DISCONTINUED | OUTPATIENT
Start: 2025-04-11 | End: 2025-04-15 | Stop reason: HOSPADM

## 2025-04-11 RX ADMIN — IOHEXOL 98 ML: 350 INJECTION, SOLUTION INTRAVENOUS at 13:15

## 2025-04-11 RX ADMIN — NITROGLYCERIN 0.8 MG: 0.4 TABLET SUBLINGUAL at 12:55

## 2025-04-11 NOTE — NURSING NOTE
Patient arrived for coronary CT angiography. Test education reviewed with patient. Medications and allergies verified. Pt denies PDE5 inhibitor use. Patient took Propranolol as instructed by cardiology. SL nitro given per protocol. See vitals flowsheet. Tolerated test well. Instructed to increase fluid intake remainder of day. Vitals stable, patient asymptomatic upon departure with daughter.

## 2025-04-14 ENCOUNTER — OFFICE VISIT (OUTPATIENT)
Dept: PHYSICAL THERAPY | Facility: REHABILITATION | Age: 65
End: 2025-04-14
Attending: PODIATRIST
Payer: MEDICARE

## 2025-04-14 ENCOUNTER — TELEPHONE (OUTPATIENT)
Age: 65
End: 2025-04-14

## 2025-04-14 DIAGNOSIS — M76.62 TENDONITIS, ACHILLES, LEFT: ICD-10-CM

## 2025-04-14 DIAGNOSIS — M76.61 TENDONITIS, ACHILLES, RIGHT: Primary | ICD-10-CM

## 2025-04-14 PROCEDURE — 97140 MANUAL THERAPY 1/> REGIONS: CPT

## 2025-04-14 PROCEDURE — 97110 THERAPEUTIC EXERCISES: CPT

## 2025-04-14 NOTE — TELEPHONE ENCOUNTER
CTA with Minimal coronary artery atherosclerosis with no flow limiting coronary artery stenosis. Good result!

## 2025-04-14 NOTE — PROGRESS NOTES
"Daily Note     Today's date: 2025  Patient name: Ximena Yousif  : 1960  MRN: [id number]  Referring provider: Uday Dumont DPM  Dx:   Encounter Diagnosis     ICD-10-CM    1. Tendonitis, Achilles, right  M76.61       2. Tendonitis, Achilles, left  M76.62           Start Time: 1400  Stop Time: 1445  Total time in clinic (min): 45 minutes    Subjective: Patient reports no significant soreness with addition of TE last visit and reports compliance with HEP most days.  Pt reports the knees are limiting function and is scheduled to get injections tomorrow.      Objective: See treatment diary below    Precautions: hx HA, hx LBP, hx cancer, zhane knee pain, zhane shoulder pain, zhane JEREMY        Manuals 4/3 4/7 4/14 3/14 3/18 3/20 3/25 3/28 3/31     IASTM zhane calf/achilles manual STM NV 15 min 15 min STM CM  20 min IASTM + STM 10 min light 10 min light 10 min 20 min 10 min light     KT achilles inhibition     CM  5 mins 5 min np   10 min       Laser to bilat achilles         15 min 7 min np 15 min     measurements  30 min                   Neuro Re-Ed     3/14               Tandem/ modified tandem stance   np  20\"x2 ea 20\"x2 ea                                                                                                                                                 Ther Ex     3/14               Recumbent bike   5 min AROM 5 min AROM 6 min  AROM 5 min AROM 5 min AROM 5 min AROM 5 min AROM 5 min AROM     Isometric PF   5\" 10x zhane with PT 5\"x10 ea w/ PT 5\"x10 nv   5\"x10 ea 5\" 10x zhane       Standing PF     x10 x10             Light gastroc stretch following IASTM    3x30\"  30\"x3 ea w/ PT 30\"x3 ea w/ PT 30\"x3 ea w/ PT 30\"x3 ea w/ PT 30\"x3 ea w/ PT 3x30\" zhane with PT 30\"x3 ea w/ PT     Patient Edu       10 min 5 min            supine plantar flexion band  black 5\" 20x R Black 5\" 12x L, 8x gold L, 20x R Gold 5\"x20 ea                  seated PF with knees flexed   5\" 20x with 7.5 lb ankle weights on thigh 7.5# weights on thigh " "5\"x20                                       Ther Activity                                                                 Gait Training                                                                 Modalities                     Ice PRN                                         Assessment: Tolerated treatment well.  Tenderness to L gastroc region with STM, no TTP noted on R.  Good strength with isometric PF bilat.  Good ROM demonstrated with seated PF with weight.  Assess response to treatment NV and progress as able. Patient exhibited good technique with therapeutic exercises and would benefit from continued PT.        Plan: Progress treatment as tolerated.            "

## 2025-04-14 NOTE — TELEPHONE ENCOUNTER
Patient requesting a call from provider to discuss NM stress test & CTA cardiac results done March 13th & April 11th.    These were done as pre-op clearance. Her follow up appointment is not until May1st with surgery May 12th. Also requesting a sooner appointment.    She has a lot of appointments prior to surgery in preparation but doesn't want to do them if she is not cleared for knee replacement.    Please contact patient at [phone].

## 2025-04-14 NOTE — TELEPHONE ENCOUNTER
Placed call to patient. She verbalized understanding. She wants to know if she is cleared for surgery. She also wants to know her result for her echo. Does she need to keep the appointment for May 1st? If not, when should she be seen next?    She states if she does not answer the phone, a message can be left.

## 2025-04-15 NOTE — TELEPHONE ENCOUNTER
Pt called back to f/u on her call from yesterday.  Pt would like to know asap if cleared, she does not want to begin PAT process if she is not.  Also wants to know if she needs to keep 5/1 apt.     She also asked if cardiac testing (echo, stress, and cardiac CTA could be faxed to Dr. Peraza at [facility].  Faxed to [phone] per pt request.

## 2025-04-15 NOTE — TELEPHONE ENCOUNTER
Per Dr. Casillas via secure chat message, patient can be seen 6 months from last office visit. (August 2025).     Placed call to patient. No answer. Left vm for patient to return call.     Ok to discuss with CTS.

## 2025-04-15 NOTE — TELEPHONE ENCOUNTER
"Per Dr. Casillas via secure chat message.     \"yes, Minimal coronary artery atherosclerosis with no flow limiting coronary artery stenosis on CT. ok to proceed with surgery\"    Sent another message to Dr. Casillas asking if patient needs to be seen on 5/1 or at a later date.   "

## 2025-04-15 NOTE — TELEPHONE ENCOUNTER
Pt returned call.  Relayed messages, she verbalized understanding.  Scheduled pt appointment, no appt in August but 9/4 appt scheduled.    Pt said a clearance letter will need to be sent to Dr. Peraza.

## 2025-04-15 NOTE — TELEPHONE ENCOUNTER
Printed test results and faxed to Dr. Peraza per patient request.     Sent secure chat message to Dr. Casillas regarding patient's questions.

## 2025-04-16 ENCOUNTER — OFFICE VISIT (OUTPATIENT)
Dept: PHYSICAL THERAPY | Facility: REHABILITATION | Age: 65
End: 2025-04-16
Attending: PODIATRIST
Payer: MEDICARE

## 2025-04-16 DIAGNOSIS — M76.61 TENDONITIS, ACHILLES, RIGHT: Primary | ICD-10-CM

## 2025-04-16 DIAGNOSIS — M76.62 TENDONITIS, ACHILLES, LEFT: ICD-10-CM

## 2025-04-16 PROCEDURE — 97110 THERAPEUTIC EXERCISES: CPT

## 2025-04-16 PROCEDURE — 97140 MANUAL THERAPY 1/> REGIONS: CPT

## 2025-04-16 NOTE — PROGRESS NOTES
"Daily Note     Today's date: 2025  Patient name: Ximena Yousif  : 1960  MRN: [id number]  Referring provider: Uday Dumont DPM  Dx:   Encounter Diagnosis     ICD-10-CM    1. Tendonitis, Achilles, right  M76.61       2. Tendonitis, Achilles, left  M76.62           Start Time: 1030  Stop Time: 1113  Total time in clinic (min): 43 minutes    Subjective: Patient reports she is doing better today.  Pt notes getting a R knee injection yesterday with improvement in symptoms.  Pt notes the knee feels better, but the bilat feet also have been better.  Pt notes going up the stairs yesterday without thinking.          Objective: See treatment diary below    Precautions: hx HA, hx LBP, hx cancer, zhane knee pain, zhane shoulder pain, zhane JEREMY        Manuals 4/3 4/7 4/14 4/16 3/18 3/20 3/25 3/28 3/31     IASTM zhane calf/achilles manual STM NV 15 min 15 min STM 15 min STM 10 min light 10 min light 10 min 20 min 10 min light     KT achilles inhibition      5 min np   10 min       Laser to bilat achilles         15 min 7 min np 15 min     measurements  30 min                   Neuro Re-Ed                    Tandem/ modified tandem stance   np   20\"x2 ea                                                                                                                                                 Ther Ex                    Recumbent bike   5 min AROM 5 min AROM 5 min AROM 5 min AROM 5 min AROM 5 min AROM 5 min AROM 5 min AROM     Isometric PF   5\" 10x zhane with PT 5\"x10 ea w/ PT 5\"x10 ea w/ PT nv   5\"x10 ea 5\" 10x zhane       Standing PF      x10             Light gastroc stretch following IASTM    3x30\"  30\"x3 ea w/ PT 30\"x3 ea w/ PT 30\"x3 ea w/ PT 30\"x3 ea w/ PT 30\"x3 ea w/ PT 3x30\" zhane with PT 30\"x3 ea w/ PT     Patient Edu       10 min 5 min            supine plantar flexion band  black 5\" 20x R Black 5\" 12x L, 8x gold L, 20x R Gold 5\"x20 ea Gold 5\"x20 ea                seated PF with knees flexed   5\" 20x with 7.5 lb ankle " "weights on thigh 7.5# weights on thigh 5\"x20 7.5# weights on thigh 5\"x20                                      Ther Activity                                                                 Gait Training                                                                 Modalities                     Ice PRN                                         Assessment: Tolerated treatment well.  No tenderness to L gastroc this visit, however tenderness on R present today.  Cueing for resisted ankle PF provided to maintain talar neutral and performing through available ROM.  Assess response to treatment NV and progress as able. Patient exhibited good technique with therapeutic exercises and would benefit from continued PT.        Plan: Progress treatment as tolerated.            "

## 2025-04-22 ENCOUNTER — RA CDI HCC (OUTPATIENT)
Dept: OTHER | Facility: HOSPITAL | Age: 65
End: 2025-04-22

## 2025-04-22 ENCOUNTER — OFFICE VISIT (OUTPATIENT)
Dept: PHYSICAL THERAPY | Facility: REHABILITATION | Age: 65
End: 2025-04-22
Payer: MEDICARE

## 2025-04-22 DIAGNOSIS — M76.62 TENDONITIS, ACHILLES, LEFT: ICD-10-CM

## 2025-04-22 DIAGNOSIS — M76.61 TENDONITIS, ACHILLES, RIGHT: Primary | ICD-10-CM

## 2025-04-22 PROCEDURE — 97140 MANUAL THERAPY 1/> REGIONS: CPT | Performed by: PHYSICAL THERAPIST

## 2025-04-22 PROCEDURE — 97110 THERAPEUTIC EXERCISES: CPT | Performed by: PHYSICAL THERAPIST

## 2025-04-22 NOTE — PROGRESS NOTES
"Daily Note     Today's date: 2025  Patient name: Ximena Yousif  : 1960  MRN: [id number]  Referring provider: Uday Dumont DPM  Dx:   Encounter Diagnosis     ICD-10-CM    1. Tendonitis, Achilles, right  M76.61       2. Tendonitis, Achilles, left  M76.62           Start Time: 1402  Stop Time: 1445  Total time in clinic (min): 43 minutes    Subjective: Patient reports increased zhane achilles pain she attributes to seeing her physician on Friday.  She feels she has bee making more progress as of lately.          Objective: See treatment diary below    Precautions: hx HA, hx LBP, hx cancer, zhane knee pain, zhane shoulder pain, zhane JEREMY        Manuals 4/3 4/7 4/14 4/16 4/22   3/28 3/31    IASTM zhane calf/achilles manual STM NV 15 min 15 min STM 15 min STM 10 min STM   20 min 10 min light     KT achilles inhibition         10 min       Laser to bilat achilles           np 15 min     measurements  30 min                 Neuro Re-Ed                  Tandem/ modified tandem stance   np                                                                                                                                      Ther Ex                  Recumbent bike   5 min AROM 5 min AROM 5 min AROM 5 min AROM   5 min AROM 5 min AROM     Isometric PF   5\" 10x zhane with PT 5\"x10 ea w/ PT 5\"x10 ea w/ PT 5\" 10x with PT zhane   5\" 10x zhane       Standing PF                 Light gastroc stretch following IASTM    3x30\"  30\"x3 ea w/ PT 30\"x3 ea w/ PT 3x30\" zhane with PT   3x30\" zhane with PT 30\"x3 ea w/ PT     Patient Edu                   supine plantar flexion band  black 5\" 20x R Black 5\" 12x L, 8x gold L, 20x R Gold 5\"x20 ea Gold 5\"x20 ea  gold 5\" 20x zhane            seated PF with knees flexed   5\" 20x with 7.5 lb ankle weights on thigh 7.5# weights on thigh 5\"x20 7.5# weights on thigh 5\"x20   10 lbs on thighs 5\" 20x                               Ther Activity                                                           Gait Training           "                                                 Modalities                   Ice PRN                                       Assessment: Tolerated treatment well.  Patient continues to progress well with current treatment plan.  Patient demonstrates improvement with step length with gait compared to several weeks prior.  Continue to progress strengthening as able.  Discussed use of walker for post op TKA.  Patient exhibited good technique with therapeutic exercises and would benefit from continued PT.        Plan: Progress treatment as tolerated.

## 2025-04-24 ENCOUNTER — OFFICE VISIT (OUTPATIENT)
Dept: PHYSICAL THERAPY | Facility: REHABILITATION | Age: 65
End: 2025-04-24
Attending: PODIATRIST
Payer: MEDICARE

## 2025-04-24 DIAGNOSIS — M76.61 TENDONITIS, ACHILLES, RIGHT: Primary | ICD-10-CM

## 2025-04-24 DIAGNOSIS — M76.62 TENDONITIS, ACHILLES, LEFT: ICD-10-CM

## 2025-04-24 PROCEDURE — 97110 THERAPEUTIC EXERCISES: CPT

## 2025-04-24 PROCEDURE — 97140 MANUAL THERAPY 1/> REGIONS: CPT

## 2025-04-24 NOTE — PROGRESS NOTES
"Daily Note     Today's date: 2025  Patient name: Ximena Yousif  : 1960  MRN: [id number]  Referring provider: Uday Dumont DPM  Dx:   Encounter Diagnosis     ICD-10-CM    1. Tendonitis, Achilles, right  M76.61       2. Tendonitis, Achilles, left  M76.62           Start Time: 1400  Stop Time: 1445  Total time in clinic (min): 45 minutes    Subjective: Patient reports increased L calf soreness following last visit.  Pt notes increased soreness has been present since visit with physician on Friday, but feels she had made progress prior to that.           Objective: See treatment diary below    Precautions: hx HA, hx LBP, hx cancer, zhane knee pain, zhane shoulder pain, zhane JEREMY        Manuals 4/3 4/7 4/14 4/16 4/22 4/24  3/28 3/31    IASTM zhane calf/achilles manual STM NV 15 min 15 min STM 15 min STM 10 min STM 15 min STM  20 min 10 min light     KT achilles inhibition         10 min       Laser to bilat achilles           np 15 min     measurements  30 min                 Neuro Re-Ed                  Tandem/ modified tandem stance   np                                                                                                                                      Ther Ex                  Recumbent bike   5 min AROM 5 min AROM 5 min AROM 5 min AROM 5 min AROM  5 min AROM 5 min AROM     Isometric PF   5\" 10x zhane with PT 5\"x10 ea w/ PT 5\"x10 ea w/ PT 5\" 10x with PT zhane 5\"x10 ea w/ PT  5\" 10x zhane       Standing PF                 Light gastroc stretch following IASTM    3x30\"  30\"x3 ea w/ PT 30\"x3 ea w/ PT 3x30\" zhane with PT 30\"x3 ea w/ PT  3x30\" zhane with PT 30\"x3 ea w/ PT     Patient Edu                   supine plantar flexion band  black 5\" 20x R Black 5\" 12x L, 8x gold L, 20x R Gold 5\"x20 ea Gold 5\"x20 ea  gold 5\" 20x zhane Gold 5\"x20 ea           seated PF with knees flexed   5\" 20x with 7.5 lb ankle weights on thigh 7.5# weights on thigh 5\"x20 7.5# weights on thigh 5\"x20   10 lbs on thighs 5\" 20x 10# on thighs " "5\"x20                              Ther Activity                                                           Gait Training                                                           Modalities                   Ice PRN                                       Assessment: Tolerated treatment well.  Pt is able to complete TE as noted without increase in symptoms.  Plan to RE next visit. Patient exhibited good technique with therapeutic exercises and would benefit from continued PT.        Plan: Progress treatment as tolerated.            "

## 2025-04-25 ENCOUNTER — TELEPHONE (OUTPATIENT)
Dept: ADMINISTRATIVE | Facility: OTHER | Age: 65
End: 2025-04-25

## 2025-04-25 NOTE — TELEPHONE ENCOUNTER
04/25/25 10:59 AM    Patient contacted to bring Advance Directive, POLST, or Living Will document to next scheduled pcp visit.[facility] Department left message.    Thank you.  Amy Diamond MA  [facility]

## 2025-04-28 ENCOUNTER — CONSULT (OUTPATIENT)
Dept: FAMILY MEDICINE CLINIC | Facility: CLINIC | Age: 65
End: 2025-04-28
Payer: MEDICARE

## 2025-04-28 ENCOUNTER — TELEPHONE (OUTPATIENT)
Dept: FAMILY MEDICINE CLINIC | Facility: CLINIC | Age: 65
End: 2025-04-28

## 2025-04-28 ENCOUNTER — EVALUATION (OUTPATIENT)
Dept: PHYSICAL THERAPY | Facility: REHABILITATION | Age: 65
End: 2025-04-28
Attending: ORTHOPAEDIC SURGERY
Payer: MEDICARE

## 2025-04-28 VITALS
TEMPERATURE: 98.5 F | DIASTOLIC BLOOD PRESSURE: 68 MMHG | SYSTOLIC BLOOD PRESSURE: 122 MMHG | HEIGHT: 64 IN | HEART RATE: 69 BPM | OXYGEN SATURATION: 97 % | BODY MASS INDEX: 34.5 KG/M2

## 2025-04-28 DIAGNOSIS — M76.61 TENDONITIS, ACHILLES, RIGHT: Primary | ICD-10-CM

## 2025-04-28 DIAGNOSIS — M76.62 TENDONITIS, ACHILLES, LEFT: ICD-10-CM

## 2025-04-28 DIAGNOSIS — M17.12 PRIMARY OSTEOARTHRITIS OF LEFT KNEE: ICD-10-CM

## 2025-04-28 DIAGNOSIS — Z01.818 PREOPERATIVE CLEARANCE: Primary | ICD-10-CM

## 2025-04-28 DIAGNOSIS — I10 ESSENTIAL HYPERTENSION: ICD-10-CM

## 2025-04-28 DIAGNOSIS — I34.0 NONRHEUMATIC MITRAL VALVE REGURGITATION: ICD-10-CM

## 2025-04-28 DIAGNOSIS — I47.10 PAROXYSMAL SUPRAVENTRICULAR TACHYCARDIA (HCC): ICD-10-CM

## 2025-04-28 DIAGNOSIS — I25.10 CORONARY ARTERY DISEASE INVOLVING NATIVE CORONARY ARTERY OF NATIVE HEART WITHOUT ANGINA PECTORIS: ICD-10-CM

## 2025-04-28 DIAGNOSIS — J44.9 COPD MIXED TYPE (HCC): ICD-10-CM

## 2025-04-28 PROCEDURE — 99214 OFFICE O/P EST MOD 30 MIN: CPT | Performed by: FAMILY MEDICINE

## 2025-04-28 PROCEDURE — 97140 MANUAL THERAPY 1/> REGIONS: CPT | Performed by: PHYSICAL THERAPIST

## 2025-04-28 PROCEDURE — 97110 THERAPEUTIC EXERCISES: CPT | Performed by: PHYSICAL THERAPIST

## 2025-04-28 PROCEDURE — G2211 COMPLEX E/M VISIT ADD ON: HCPCS | Performed by: FAMILY MEDICINE

## 2025-04-28 NOTE — PROGRESS NOTES
"Name: Xiemna Yousif      : 1960      MRN: [id number]  Encounter Provider: Noel Ramirez DO  Encounter Date: 2025   Encounter department: [facility]  Patient return at scheduled appointment  :  Assessment & Plan  Preoperative clearance  Patient seen examined chart reviewed  Preop testing was reviewed  Cardiac clearance was reviewed  Patient is cleared for upcoming left total knee replacement       Primary osteoarthritis of left knee  Left total knee replacement scheduled for 2025 with Dr. Peraza at [facility]       Coronary artery disease involving native coronary artery of native heart without angina pectoris  Status post CT angiogram which shows \"minimal nonobstructive coronary disease\".  Patient has cardiac clearance for upcoming surgery       Essential hypertension  Stable continue present therapy       Nonrheumatic mitral valve regurgitation  Stable, patient has cardiac clearance as above       Paroxysmal supraventricular tachycardia (HCC)  Stable, patient has cardiac clearance as above       COPD mixed type (HCC)  Stable, lungs are clear, chest x-ray is clear                History of Present Illness   Patient is here for preoperative clearance for left total knee replacement.  Initially scheduled for the right but this was changed to the left.  Patient did see her cardiologist, Dr. Casillas.  She has a cardiac clearance dated 2025.  Patient had an indeterminate nuclear stress test and had a CT coronary angiogram which showed very minimal nonobstructive coronary disease.  And was given cardiac clearance by her cardiologist.  Preoperative blood work and chest x-ray have been reviewed      Review of Systems   Constitutional: Negative.    HENT: Negative.     Eyes: Negative.    Respiratory: Negative.     Cardiovascular:         HPI   Gastrointestinal: Negative.    Endocrine: Negative.    Genitourinary: Negative.    Musculoskeletal: Negative.    Skin: Negative.  " "  Allergic/Immunologic: Negative.    Neurological: Negative.    Hematological: Negative.    Psychiatric/Behavioral: Negative.         Objective   /68 (BP Location: Right arm, Patient Position: Sitting, Cuff Size: Standard)   Pulse 69   Temp 98.5 °F (36.9 °C) (Skin)   Ht 5' 4\" (1.626 m)   SpO2 97%   BMI 34.50 kg/m²      Physical Exam  Vitals and nursing note reviewed.   Constitutional:       Appearance: Normal appearance.   HENT:      Head: Normocephalic and atraumatic.      Right Ear: Tympanic membrane normal.      Left Ear: Tympanic membrane normal.      Nose: Nose normal.      Mouth/Throat:      Mouth: Mucous membranes are moist.      Pharynx: Oropharynx is clear. No oropharyngeal exudate or posterior oropharyngeal erythema.   Eyes:      General: No scleral icterus.        Right eye: No discharge.         Left eye: No discharge.      Extraocular Movements: Extraocular movements intact.      Pupils: Pupils are equal, round, and reactive to light.   Neck:      Vascular: No carotid bruit.   Cardiovascular:      Rate and Rhythm: Normal rate and regular rhythm.      Heart sounds: Normal heart sounds.   Pulmonary:      Effort: Pulmonary effort is normal.      Breath sounds: Normal breath sounds.   Abdominal:      General: Bowel sounds are normal.      Palpations: Abdomen is soft.      Tenderness: There is no abdominal tenderness.   Musculoskeletal:      Cervical back: Neck supple.      Right lower leg: No edema.      Left lower leg: No edema.   Skin:     General: Skin is warm and dry.   Neurological:      General: No focal deficit present.      Mental Status: She is alert and oriented to person, place, and time.      Cranial Nerves: No cranial nerve deficit.   Psychiatric:         Mood and Affect: Mood normal.         Behavior: Behavior normal.         Thought Content: Thought content normal.         Judgment: Judgment normal.         "

## 2025-04-28 NOTE — PROGRESS NOTES
Pre-operative Clearance  Name: Ximena Yousif      : 1960      MRN: [id number]  Encounter Provider: Noel Ramirez DO  Encounter Date: 2025   Encounter department: [facility]    :  Assessment & Plan  Preoperative clearance         Primary osteoarthritis of right knee         Coronary artery disease involving native coronary artery of native heart without angina pectoris  Patient has cardiac clearance from her cardiologist, Dr. Casillas, on 25       Essential hypertension  Stable continue present therapy       Nonrheumatic mitral valve regurgitation  Stable follows with cardiology has cardiac clearance       Paroxysmal supraventricular tachycardia (HCC)  Stable, patient has cardiac clearance from cardiology           Pre-operative Clearance:     Medication Instructions:   - Beta blockers:  Continue to take this medication on your normal schedule.  - Hyperlipidemia meds: Continue to take this medication on your normal schedule.  - Thyroid meds: Continue to take this medication on your normal schedule.       History of Present Illness   {?Quick Links Encounters * My Last Note * Last Note in Specialty * Snapshot * Since Last Visit * History :85084}  Pre-Op Examination       Pre-operative Risk Factors:    - History of cerebrovascular disease: No    - History of ischemic heart disease: Yes    - History of congestive heart failure: No    - Pre-operative treatment with insulin: No    - Pre-operative creatinine >2 mg/dL: No      Review of Systems  Past Medical History   Past Medical History:   Diagnosis Date   • Arthritis    • Bruises easily    • Cancer (HCC)     thyroid   • Chronic pain disorder    • Dental crowns present    • Headache    • Hip pain, chronic, left     L THR for today 2021   • History of vertigo    • Hyperlipidemia    • Hypertension    • Irregular heart beat     PSVT   • Joint pain    • Knee pain, bilateral    • Low back pain    • Migraines    • Mild acid reflux     • Motion sickness    • Osteoarthritis    • 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     last assessed - 2017   • PONV (postoperative nausea and vomiting)    • Risk for falls    • SVT (supraventricular tachycardia) (HCC)     history/none recent/ has had heart ablation in    • Thyroid cancer (HCC) 2007    Status post thyroidectomy, no longer sees endocrinology; last assessed - 2014   • Use of cane as ambulatory aid     occas   • Varicose veins of leg with edema     last assessed - 2014   • Walking pneumonia    • Wears glasses      Past Surgical History:   Procedure Laterality Date   • AV NODE ABLATION     •  SECTION  10/27/1992   • COLONOSCOPY      Complete colonoscopy   • ENDOMETRIAL ABLATION      Thermal; Resolved - Oct 2005   • FOOT SURGERY Left     x ,  &  removed ganglion cyst   • INFERIOR OBLIQUE MYECTOMY      removed 8 fibroid tumors    • JOINT REPLACEMENT     • KNEE ARTHROSCOPY Left    • KNEE CARTILAGE SURGERY Left     Left knee torn meniscus; Resolved - Sep 2008   • LAPAROSCOPIC ENDOMETRIOSIS FULGURATION     • LIMBAL STEM CELL TRANSPLANT     • LIPOMA RESECTION     • MYOMECTOMY      Anorectal   • OTHER SURGICAL HISTORY      heart ablation - atrial tachycardia; ablation for SVT   • OVARIAN CYST REMOVAL     • CT ARTHRP ACETBLR/PROX FEM PROSTC AGRFT/ALGRFT Right 2021    Procedure: ARTHROPLASTY HIP TOTAL ANTERIOR;  Surgeon: Jack Peraza MD;  Location: [facility];  Service: Orthopedics   • CT ARTHRP ACETBLR/PROX FEM PROSTC AGRFT/ALGRFT Left 2021    Procedure: ANTERIOR TOTAL HIP REPLACEMENT;  Surgeon: Jack Peraza MD;  Location: [facility];  Service: Orthopedics   • THYROIDECTOMY     • VAGINAL MASS EXCISION Right 2017    Procedure: EXCISION RIGHT PERINEAL LIPOMA/ MASS, REMOVAL OF SKIN TAG;  Surgeon: Libby Amaral MD;  Location: [facility];  Service: General   • WISDOM TOOTH EXTRACTION       Family History   Problem Relation Age of Onset   • Alzheimer's disease Mother    •  Stroke Family    • Diabetes Family    • Hypertension Family    • Heart attack Family    • Prostate cancer Father    • No Known Problems Sister    • No Known Problems Daughter    • No Known Problems Maternal Grandmother    • No Known Problems Maternal Grandfather    • No Known Problems Paternal Grandmother    • No Known Problems Paternal Grandfather      Social History     Tobacco Use   • Smoking status: Former     Current packs/day: 0.00     Average packs/day: 0.3 packs/day for 45.0 years (11.3 ttl pk-yrs)     Types: Cigarettes     Start date: 3/6/1976     Quit date: 3/6/2021     Years since quittin.1   • Smokeless tobacco: Never   • Tobacco comments:     pt quit 10 years ago but has an occ cigarette   Vaping Use   • Vaping status: Never Used   Substance and Sexual Activity   • Alcohol use: Yes     Comment: socially   • Drug use: No   • Sexual activity: Yes     Current Outpatient Medications on File Prior to Visit   Medication Sig   • aspirin-acetaminophen-caffeine (EXCEDRIN MIGRAINE) 250-250-65 MG per tablet Take 2 tablets by mouth every 6 (six) hours as needed for headaches   • atorvastatin (LIPITOR) 40 mg tablet TAKE 1 TABLET BY MOUTH EVERYDAY AT BEDTIME   • B Complex Vitamins (B COMPLEX PO) Take 1 tablet by mouth every other day   • Bioflavonoid Products (JAY C PO) Take 1 tablet by mouth every other day   • cephalexin (KEFLEX) 500 mg capsule Take 2,000 mg by mouth 60 minutes pre-procedure Prior to dental procedures   • Cholecalciferol (Vitamin D3) 25 MCG TABS Take 1 tablet by mouth every other day   • Co Q-10 50 MG Take 50 mg by mouth every other day   • cyanocobalamin (VITAMIN B-12) 2000 MCG tablet Take 2,500 mcg by mouth every other day   • diphenhydrAMINE (BENADRYL) 25 mg tablet Take 25 mg by mouth daily at bedtime as needed for itching    • docusate sodium (COLACE) 250 MG capsule Take 250 mg by mouth daily   • GARLIC PO Take 1 tablet by mouth every other day   • L-Lysine 500 MG CAPS Take 500 mg by  "mouth every other day   • levothyroxine 125 mcg tablet TAKE 1 TABLET BY MOUTH EVERY DAY   • LUTEIN PO Take by mouth every other day   • Magnesium 500 MG TABS Take 1 tablet (500 mg total) by mouth daily (Patient taking differently: Take 500 mg by mouth every other day)   • meloxicam (MOBIC) 15 mg tablet Take 15 mg by mouth if needed for mild pain or moderate pain   • methocarbamol (ROBAXIN) 750 mg tablet Take 750 mg by mouth daily at bedtime as needed   • Omega-3 Fatty Acids (FISH OIL PO) Take by mouth OMEGA 3-6-9   • POTASSIUM GLUCONATE PO Take 90 mg by mouth every other day   • propranolol (INDERAL LA) 60 mg 24 hr capsule TAKE 1 CAPSULE BY MOUTH EVERY DAY   • Sodium Hyaluronate, oral, (HYALURONIC ACID PO) Take by mouth every other day   • Vitamin D-Vitamin K (D3 + K2 PO) Take by mouth every other day   • VITAMIN E PO Take by mouth every other day   • zinc gluconate 50 mg tablet Take 50 mg by mouth every other day   • [DISCONTINUED] Collagen-Vitamin C-Biotin (COLLAGEN PO) Take by mouth (Patient not taking: Reported on 2/18/2025)     Allergies   Allergen Reactions   • Morphine Other (See Comments) and Headache     Other reaction(s): Other (See Comments)  severe headache  \"severe headache\"   • Other Rash     Adhesive tape    Pain Meds,,,, caused N/V  Rash   • Codeine Headache     Category: Adverse Reaction;    • Prednisone Irritability   • Tramadol Headache     Category: Adverse Reaction;    • Medical Tape Rash     Objective {?Quick Links Trend Vitals * Enter New Vitals * Results Review * Timeline (Adult) * Labs * Imaging * Cardiology * Procedures * Lung Cancer Screening * Surgical eConsent :75905}  /68 (BP Location: Right arm, Patient Position: Sitting, Cuff Size: Standard)   Pulse 69   Temp 98.5 °F (36.9 °C) (Skin)   Ht 5' 4\" (1.626 m)   SpO2 97%   BMI 34.50 kg/m²     Physical Exam  {Administrative / Billing Section (Optional):76517}    Noel Ramirez, DO  "

## 2025-04-28 NOTE — ASSESSMENT & PLAN NOTE
"Status post CT angiogram which shows \"minimal nonobstructive coronary disease\".  Patient has cardiac clearance for upcoming surgery     "

## 2025-04-28 NOTE — PATIENT INSTRUCTIONS
Continue present therapy  Patient to follow with all specialist further instructions  Return at scheduled appointment sooner if needed

## 2025-04-28 NOTE — ASSESSMENT & PLAN NOTE
Left total knee replacement scheduled for 5/12/2025 with Dr. Peraza at [facility]

## 2025-04-28 NOTE — PROGRESS NOTES
PT Discharge    Today's date: 2025  Patient name: Ximena Yousif  : 1960  MRN: [id number]  Referring provider: Jack Peraza MD  Dx:   Encounter Diagnosis     ICD-10-CM    1. Tendonitis, Achilles, right  M76.61       2. Tendonitis, Achilles, left  M76.62           Start Time: 1615  Stop Time: 1700  Total time in clinic (min): 45 minutes    Assessment  Impairments: abnormal gait, activity intolerance, impaired physical strength and pain with function  Symptom irritability: moderate    Assessment details: Patient is a 64 y.o. female that presents with bilateral Achilles tendon pain.  Patient reports significant improvement with right achilles pain and mild improvement with left achilles pain.  Patient demonstrates improvement with ROM and strength.  Patient reports continued difficulty with ambulation, negotiation of stairs, and standing (greater than 30 minutes).  Pre-op visit for L TKA on 2025 completed this visit.  Patient will be seen on 5/15/2025 post op.  Discharged at this time.       Understanding of Dx/Px/POC: good    Goals  Impairment:  1.  Patient will reports 50% reduction in pain in 4 weeks to maximize function.-PARTIALLY MET  2.  Patient will improve strength to 4/5 in all planes to maximize function.-PARTIALLY MET  3.  Patient will improve ROM to WFL in 4 weeks to maximize function.-MET    Functional:  1. Patient will improve FOTO by 21 points to 47/100 in 4 weeks to maximize function.-NOT ASSESSED  2. Patient will be independent with HEP in 4 weeks to maximize function.-MET  3. Patient will report no difficulty with ambulation in 4 weeks to maximize function.-PARTIALLY MET  4. Patient will report no difficulty with negotiation of stairs in 4 weeks to maximize function.-PARTIALLY MET  5. Patient will report no difficulty with standing greater than 30 minutes in 4 weeks to maximize function.-PARTIALLY MET          Plan  Patient would benefit from: skilled physical therapy  Planned modality  interventions: cryotherapy, thermotherapy: hydrocollator packs and low level laser therapy    Planned therapy interventions: manual therapy, IASTM, activity modification, balance, patient/caregiver education, neuromuscular re-education, therapeutic exercise, therapeutic activities, stretching, strengthening, functional ROM exercises and home exercise program    Treatment plan discussed with: patient  Plan details: Patient will be discharged at this time form treatment for zhane achilles tendon pain.  Will RE post op L TKA on 5/15/2025.          Subjective Evaluation    History of Present Illness  Date of onset: 2024  Mechanism of injury: Patient presents with bilateral achilles tendon pain, left worse than right that started after a massage on 2024.  Patient also has bilateral knee pain and will have a right knee replacement in May 2025.  Patient reports difficulty with ambulation, negotiation of stairs, and standing (greater than 30 minutes).  Her pain will start in mid tendon and radiate up into her calf.      2025:    Patient presents pre-op L TKA on 2025.  She reports a history of zhane knee injections for several years.  She was originally going to have her right knee replaced first, but will have her left knee done first as this is most bothersome.  Patient reports difficulty with ambulation, negotiation of stairs (up and down), and any functional activities on her feet.  She feels her knee will buckle or give out on her.  Patient Goals  Patient goals for therapy: increased strength, increased motion and decreased pain    Pain  At best pain ratin  At worst pain ratin (L knee: 8/10)  Location: L calf/heel  Quality: L chilles: grabbing, L knee: ache with sharp at times.  Aggravating factors: stair climbing, walking and standing  Progression: worsening    Treatments  Current treatment: physical therapy        Objective     Tenderness   Left Knee   No tenderness in the lateral joint line and  "medial joint line.   Left Ankle/Foot   No tenderness in the Achilles insertion and proximal Achilles.     Right Ankle/Foot   No tenderness in the Achilles insertion and proximal Achilles.     Passive Range of Motion   Left Knee   Flexion: 115 degrees with pain    Right Knee   Flexion: 127 degrees   Left Ankle/Foot    Dorsiflexion (ke): 15 degrees   Plantar flexion: WFL  Inversion: 50 degrees   Eversion: 25 degrees     Right Ankle/Foot    Dorsiflexion (ke): 25 degrees   Plantar flexion: WFL  Inversion: 55 degrees   Eversion: 25 degrees     Strength/Myotome Testing     Left Hip   Planes of Motion   Flexion: 4+  External rotation: 4+  Internal rotation: 4+    Right Hip   Planes of Motion   Flexion: 4+  External rotation: 4  Internal rotation: 4+    Left Knee   Flexion: 4  Extension: 4    Right Knee   Flexion: 4  Extension: 4    Left Ankle/Foot   Dorsiflexion: 5  Inversion: 4+  Eversion: 5    Right Ankle/Foot   Dorsiflexion: 5  Inversion: 4+  Eversion: 5    Additional Strength Details  Plantar flexion: unable to perform standing heel raise with zhane LE    Ambulation     Observational Gait   Gait: antalgic   Decreased walking speed and stride length.              Precautions: hx HA, hx LBP, hx cancer, zhane knee pain, zhane shoulder pain, zhane JEREMY        Manuals 4/3 4/7 4/14 4/16 4/22 4/24 4/28       IASTM zhane calf/achilles manual STM NV 15 min 15 min STM 15 min STM 10 min STM 15 min STM        KT achilles inhibition                    Laser to bilat achilles                    measurements  30 min           30 min       Neuro Re-Ed                    Tandem/ modified tandem stance   np                                                                                                                                              Ther Ex                    Recumbent bike   5 min AROM 5 min AROM 5 min AROM 5 min AROM 5 min AROM 10 min AROM       Isometric PF    5\" 10x zhane with PT 5\"x10 ea w/ PT 5\"x10 ea w/ PT 5\" 10x with PT zhane " "5\"x10 ea w/ PT        Standing PF                    Light gastroc stretch following IASTM    3x30\"  30\"x3 ea w/ PT 30\"x3 ea w/ PT 3x30\" zhane with PT 30\"x3 ea w/ PT        Patient Edu             5 min        supine plantar flexion band  black 5\" 20x R Black 5\" 12x L, 8x gold L, 20x R Gold 5\"x20 ea Gold 5\"x20 ea  gold 5\" 20x zhane Gold 5\"x20 ea         seated PF with knees flexed   5\" 20x with 7.5 lb ankle weights on thigh 7.5# weights on thigh 5\"x20 7.5# weights on thigh 5\"x20   10 lbs on thighs 5\" 20x 10# on thighs 5\"x20                                Ther Activity                                                                       Gait Training                                                                       Modalities                       Ice PRN                                                         "

## 2025-05-01 ENCOUNTER — APPOINTMENT (OUTPATIENT)
Dept: PHYSICAL THERAPY | Facility: REHABILITATION | Age: 65
End: 2025-05-01
Payer: MEDICARE

## 2025-05-01 ENCOUNTER — APPOINTMENT (OUTPATIENT)
Dept: PHYSICAL THERAPY | Facility: REHABILITATION | Age: 65
End: 2025-05-01
Attending: ORTHOPAEDIC SURGERY
Payer: MEDICARE

## 2025-05-06 ENCOUNTER — TELEPHONE (OUTPATIENT)
Age: 65
End: 2025-05-06

## 2025-05-06 NOTE — TELEPHONE ENCOUNTER
Kacie from [employer] calls to ask if the pre-op forms can be faxed to them at [phone]. Patient was in on 4/28/25. The forms are scanned under that visit. Patient is scheduled for surgery on 5/12/25. Please send as soon as possible.

## 2025-05-08 ENCOUNTER — APPOINTMENT (OUTPATIENT)
Dept: PHYSICAL THERAPY | Facility: REHABILITATION | Age: 65
End: 2025-05-08
Payer: MEDICARE

## 2025-05-15 ENCOUNTER — OFFICE VISIT (OUTPATIENT)
Dept: PHYSICAL THERAPY | Facility: REHABILITATION | Age: 65
End: 2025-05-15
Attending: ORTHOPAEDIC SURGERY
Payer: MEDICARE

## 2025-05-15 DIAGNOSIS — M25.562 LEFT KNEE PAIN, UNSPECIFIED CHRONICITY: Primary | ICD-10-CM

## 2025-05-15 DIAGNOSIS — Z96.652 S/P TOTAL KNEE ARTHROPLASTY, LEFT: ICD-10-CM

## 2025-05-15 PROCEDURE — 97161 PT EVAL LOW COMPLEX 20 MIN: CPT | Performed by: PHYSICAL THERAPIST

## 2025-05-15 PROCEDURE — 97110 THERAPEUTIC EXERCISES: CPT | Performed by: PHYSICAL THERAPIST

## 2025-05-15 RX ORDER — FERROUS SULFATE 325(65) MG
325 TABLET, DELAYED RELEASE (ENTERIC COATED) ORAL
COMMUNITY

## 2025-05-15 RX ORDER — ACETAMINOPHEN 500 MG
500 TABLET ORAL EVERY 6 HOURS PRN
COMMUNITY

## 2025-05-15 RX ORDER — OXYCODONE HYDROCHLORIDE 5 MG/1
5 CAPSULE ORAL EVERY 4 HOURS PRN
COMMUNITY

## 2025-05-15 RX ORDER — ASPIRIN 81 MG/1
81 TABLET, CHEWABLE ORAL DAILY
COMMUNITY

## 2025-05-15 RX ORDER — ONDANSETRON 4 MG/1
4 TABLET, FILM COATED ORAL EVERY 8 HOURS PRN
COMMUNITY

## 2025-05-15 NOTE — PROGRESS NOTES
PT Evaluation     Today's date: 5/15/2025  Patient name: Ximena Yousif  : 1960  MRN: [id number]  Referring provider: Jack Peraza MD  Dx:   Encounter Diagnosis     ICD-10-CM    1. Left knee pain, unspecified chronicity  M25.562       2. S/P total knee arthroplasty, left  Z96.652             Start Time: 1750  Stop Time: 1830  Total time in clinic (min): 40 minutes    Assessment  Impairments: abnormal gait, abnormal or restricted ROM, activity intolerance, impaired physical strength, lacks appropriate home exercise program and pain with function    Assessment details: Patient is a 64 y.o. female that presents s/p L TKA on 2025.  Patient presents with decreased strength, decreased ROM, and gait abnormalities.  Patient has difficulty with ambulation, negotiation of stairs, transfers, and ADLS secondary to impairments.  Patient would benefit from skilled physical therapy services to address impairments to maximize function.        Understanding of Dx/Px/POC: good    Goals  Impairment:  1.  Patient will reports 50% reduction in pain in 4 weeks to maximize function.  2.  Patient will improve strength to 4/5 in all planes to maximize function.  3.  Patient will improve ROM to 0-120 degrees in 4 weeks to maximize function.    Functional:  1. Patient will improve FOTO by 41 points to 57/100 in 4 weeks to maximize function.  2. Patient will be independent with HEP in 4 weeks to maximize function.  3. Patient will report no difficulty with ambulation in 4 weeks to maximize function.  4. Patient will report no difficulty with negotiation of stairs in 4 weeks to maximize function.  5. Patient will report no difficulty with transfers in 4 weeks to maximize function.          Plan  Patient would benefit from: skilled physical therapy  Planned modality interventions: cryotherapy    Planned therapy interventions: manual therapy, IASTM, activity modification, balance, patient/caregiver education, neuromuscular re-education,  therapeutic exercise, therapeutic activities, stretching, strengthening, functional ROM exercises, home exercise program and gait training    Frequency: 2x week  Duration in weeks: 4  Treatment plan discussed with: patient  Plan details: Patient will be a RE in 4 weeks.          Subjective Evaluation    History of Present Illness  Date of surgery: 2025  Mechanism of injury: surgery  Mechanism of injury: Patient presents post-op L TKA on 2025.  She reports a history of zhane knee injections for several years.  She was originally going to have her right knee replaced first, but had her left knee replaced first as this is most bothersome.  Patient reports difficulty with ambulation, negotiation of stairs (up and down), transfers, ADLS, and any functional activities on her feet.  She feels her knee will buckle or give out on her pre-op.  Patient Goals  Patient goals for therapy: increased strength, increased motion and decreased pain    Pain  At best pain ratin  At worst pain ratin  Location: L knee  Aggravating factors: stair climbing, walking and standing  Progression: worsening    Treatments  Current treatment: physical therapy        Objective     Observations   Left Knee   Positive for drainage, edema, effusion and trophic changes.     Additional Observation Details  Steri-strips intact  Pt edu on signs of DVT and infection    Passive Range of Motion   Left Knee   Flexion: 83 degrees with pain  Extension: -5 degrees with pain    Right Knee   Flexion: 127 degrees     Strength/Myotome Testing     Left Hip   Planes of Motion   Flexion: 3-    Right Hip   Planes of Motion   Flexion: 4+  External rotation: 4  Internal rotation: 4+    Left Knee   Flexion: 4-  Extension: 3-  Quadriceps contraction: poor    Right Knee   Flexion: 4  Extension: 4    Ambulation   Weight-Bearing Status   Weight-Bearing Status (Left): weight-bearing as tolerated   Assistive device used: front-wheeled walker    Observational Gait    Decreased walking speed and stride length.              Precautions: hx HA, hx LBP, hx cancer, zhane knee pain, zhane shoulder pain, zhnae JEREMY        Manuals 5/15             Patellar mobility              Oscillation into extension                   Knee PROM                                      Neuro Re-Ed                    Quad set  issued                  weight shift  issued                                                                                                                           Ther Ex                    Recumbent bike              Heel slide flexion issued             Ankle pump issued             LLLD knee extension stretch issued             SAQ                                                                   Ther Activity                                                                       Gait Training                                                                       Modalities                       Ice PRN

## 2025-05-15 NOTE — LETTER
May 16, 2025    Jack Peraza MD  [address] [facility] [address]    Patient: Ximena Yousif   YOB: 1960   Date of Visit: 5/15/2025     Encounter Diagnosis     ICD-10-CM    1. Left knee pain, unspecified chronicity  M25.562       2. S/P total knee arthroplasty, left  Z96.652           Dear Dr. Jack Peraza MD:    Thank you for your recent referral of Ximena Yousif. Please review the attached evaluation summary from Ximena's recent visit.     Please verify that you agree with the plan of care by signing the attached order.     If you have any questions or concerns, please do not hesitate to call.     I sincerely appreciate the opportunity to share in the care of one of your patients and hope to have another opportunity to work with you in the near future.       Sincerely,    Kvng Patterson, PT      Referring Provider:      I certify that I have read the below Plan of Care and certify the need for these services furnished under this plan of treatment while under my care.                    Jack Peraza MD  [address] [facility] [address]  Via Fax: [phone]          PT Evaluation     Today's date: 5/15/2025  Patient name: Ximena Yousif  : 1960  MRN: [id number]  Referring provider: Jack Peraza MD  Dx:   Encounter Diagnosis     ICD-10-CM    1. Left knee pain, unspecified chronicity  M25.562       2. S/P total knee arthroplasty, left  Z96.652             Start Time: 1750  Stop Time: 1830  Total time in clinic (min): 40 minutes    Assessment  Impairments: abnormal gait, abnormal or restricted ROM, activity intolerance, impaired physical strength, lacks appropriate home exercise program and pain with function    Assessment details: Patient is a 64 y.o. female that presents s/p L TKA on 2025.  Patient presents with decreased strength, decreased ROM, and gait abnormalities.  Patient has difficulty with ambulation, negotiation of stairs, transfers, and ADLS secondary  to impairments.  Patient would benefit from skilled physical therapy services to address impairments to maximize function.        Understanding of Dx/Px/POC: good    Goals  Impairment:  1.  Patient will reports 50% reduction in pain in 4 weeks to maximize function.  2.  Patient will improve strength to 4/5 in all planes to maximize function.  3.  Patient will improve ROM to 0-120 degrees in 4 weeks to maximize function.    Functional:  1. Patient will improve FOTO by 41 points to 57/100 in 4 weeks to maximize function.  2. Patient will be independent with HEP in 4 weeks to maximize function.  3. Patient will report no difficulty with ambulation in 4 weeks to maximize function.  4. Patient will report no difficulty with negotiation of stairs in 4 weeks to maximize function.  5. Patient will report no difficulty with transfers in 4 weeks to maximize function.          Plan  Patient would benefit from: skilled physical therapy  Planned modality interventions: cryotherapy    Planned therapy interventions: manual therapy, IASTM, activity modification, balance, patient/caregiver education, neuromuscular re-education, therapeutic exercise, therapeutic activities, stretching, strengthening, functional ROM exercises, home exercise program and gait training    Frequency: 2x week  Duration in weeks: 4  Treatment plan discussed with: patient  Plan details: Patient will be a RE in 4 weeks.          Subjective Evaluation    History of Present Illness  Date of surgery: 5/12/2025  Mechanism of injury: surgery  Mechanism of injury: Patient presents post-op L TKA on 5/12/2025.  She reports a history of zhane knee injections for several years.  She was originally going to have her right knee replaced first, but had her left knee replaced first as this is most bothersome.  Patient reports difficulty with ambulation, negotiation of stairs (up and down), transfers, ADLS, and any functional activities on her feet.  She feels her knee will  buckle or give out on her pre-op.  Patient Goals  Patient goals for therapy: increased strength, increased motion and decreased pain    Pain  At best pain ratin  At worst pain ratin  Location: L knee  Aggravating factors: stair climbing, walking and standing  Progression: worsening    Treatments  Current treatment: physical therapy        Objective     Observations   Left Knee   Positive for drainage, edema, effusion and trophic changes.     Additional Observation Details  Steri-strips intact  Pt edu on signs of DVT and infection    Passive Range of Motion   Left Knee   Flexion: 83 degrees with pain  Extension: -5 degrees with pain    Right Knee   Flexion: 127 degrees     Strength/Myotome Testing     Left Hip   Planes of Motion   Flexion: 3-    Right Hip   Planes of Motion   Flexion: 4+  External rotation: 4  Internal rotation: 4+    Left Knee   Flexion: 4-  Extension: 3-  Quadriceps contraction: poor    Right Knee   Flexion: 4  Extension: 4    Ambulation   Weight-Bearing Status   Weight-Bearing Status (Left): weight-bearing as tolerated   Assistive device used: front-wheeled walker    Observational Gait   Decreased walking speed and stride length.              Precautions: hx HA, hx LBP, hx cancer, zhane knee pain, zhane shoulder pain, zhane JEREMY        Manuals 5/15             Patellar mobility              Oscillation into extension                   Knee PROM                                      Neuro Re-Ed                    Quad set  issued                  weight shift  issued                                                                                                                           Ther Ex                    Recumbent bike              Heel slide flexion issued             Ankle pump issued             LLLD knee extension stretch issued             SAQ                                                                   Ther Activity                                                                        Gait Training                                                                       Modalities                       Ice PRN

## 2025-05-19 ENCOUNTER — OFFICE VISIT (OUTPATIENT)
Dept: PHYSICAL THERAPY | Facility: REHABILITATION | Age: 65
End: 2025-05-19
Attending: ORTHOPAEDIC SURGERY
Payer: MEDICARE

## 2025-05-19 DIAGNOSIS — M76.62 TENDONITIS, ACHILLES, LEFT: ICD-10-CM

## 2025-05-19 DIAGNOSIS — Z96.652 S/P TOTAL KNEE ARTHROPLASTY, LEFT: Primary | ICD-10-CM

## 2025-05-19 DIAGNOSIS — M25.562 LEFT KNEE PAIN, UNSPECIFIED CHRONICITY: ICD-10-CM

## 2025-05-19 DIAGNOSIS — M76.61 TENDONITIS, ACHILLES, RIGHT: ICD-10-CM

## 2025-05-19 PROCEDURE — 97140 MANUAL THERAPY 1/> REGIONS: CPT

## 2025-05-19 PROCEDURE — 97110 THERAPEUTIC EXERCISES: CPT

## 2025-05-19 NOTE — PROGRESS NOTES
"Daily Note     Today's date: 2025  Patient name: Ximena Yousif  : 1960  MRN: [id number]  Referring provider: Jack Peraza MD  Dx:   Encounter Diagnosis     ICD-10-CM    1. S/P total knee arthroplasty, left  Z96.652       2. Left knee pain, unspecified chronicity  M25.562       3. Tendonitis, Achilles, right  M76.61       4. Tendonitis, Achilles, left  M76.62           Start Time: 1400  Stop Time: 1455  Total time in clinic (min): 55 minutes    Subjective: Pt reports feeling she is getting worse.  Pt notes having more pain and swelling and feels more limited with ambulation compared to last week.  Pt is sleeping a few hours at a time and taking pain medication as prescribed.      Objective: See treatment diary below    Precautions: hx HA, hx LBP, hx cancer, zhane knee pain, zhane shoulder pain, zhane JEREMY        Manuals 5/15 5/19            Patellar mobility  5 min            Oscillation into extension  5 min                Knee PROM  5 min                                    Neuro Re-Ed                    Quad set  issued 5\"x10                 weight shift  issued                                                                                                                           Ther Ex                    Recumbent bike  5 min AROM            Heel slide flexion issued 5\"x15            Ankle pump issued             LLLD knee extension stretch issued 3 min            SAQ  nv                                                                 Ther Activity                                                                       Gait Training                                                                       Modalities                       Ice PRN    10 min                                             Assessment: Pt presents to PT for first visit since initial evaluation.  Initiated TE as noted per PT POC.  Tolerated treatment well. Good quad set achieved.  Knee flex ROM to about 90 deg on bike and with heel slides, " good tolerance overall to knee ext oscillations and LLLD stretch.  Concluded with CP at end of session.  Cont to progress as able.  Patient demonstrated fatigue post treatment and would benefit from continued PT.      Plan: Progress treatment as tolerated.

## 2025-05-20 ENCOUNTER — APPOINTMENT (OUTPATIENT)
Dept: PHYSICAL THERAPY | Facility: REHABILITATION | Age: 65
End: 2025-05-20
Attending: ORTHOPAEDIC SURGERY
Payer: MEDICARE

## 2025-05-22 ENCOUNTER — OFFICE VISIT (OUTPATIENT)
Dept: PHYSICAL THERAPY | Facility: REHABILITATION | Age: 65
End: 2025-05-22
Attending: ORTHOPAEDIC SURGERY
Payer: MEDICARE

## 2025-05-22 DIAGNOSIS — Z96.652 S/P TOTAL KNEE ARTHROPLASTY, LEFT: Primary | ICD-10-CM

## 2025-05-22 DIAGNOSIS — M76.62 TENDONITIS, ACHILLES, LEFT: ICD-10-CM

## 2025-05-22 DIAGNOSIS — M76.61 TENDONITIS, ACHILLES, RIGHT: ICD-10-CM

## 2025-05-22 DIAGNOSIS — M25.562 LEFT KNEE PAIN, UNSPECIFIED CHRONICITY: ICD-10-CM

## 2025-05-22 PROCEDURE — 97140 MANUAL THERAPY 1/> REGIONS: CPT

## 2025-05-22 PROCEDURE — 97112 NEUROMUSCULAR REEDUCATION: CPT

## 2025-05-22 PROCEDURE — 97110 THERAPEUTIC EXERCISES: CPT

## 2025-05-22 NOTE — PROGRESS NOTES
"Daily Note     Today's date: 2025  Patient name: Ximena Yousif  : 1960  MRN: [id number]  Referring provider: Jack Peraza MD  Dx:   Encounter Diagnosis     ICD-10-CM    1. S/P total knee arthroplasty, left  Z96.652       2. Left knee pain, unspecified chronicity  M25.562       3. Tendonitis, Achilles, right  M76.61       4. Tendonitis, Achilles, left  M76.62                      Subjective: Pt reports doing well following last visit stating \"it felt looser\".  Pt notes stiffness started returning soon after.  Pt reports compliance with HEP and taking pain medication as prescribed by physician.          Objective: See treatment diary below    Precautions: hx HA, hx LBP, hx cancer, zhane knee pain, zhane shoulder pain, zhane JEREMY        Manuals 5/15 5/19 5/22           Patellar mobility  5 min 5 min           Oscillation into extension  5 min 5 min              Knee PROM  5 min  5 min                                 Neuro Re-Ed                    Quad set  issued 5\"x10 5\"x15               weight shift  issued                                                                                                                           Ther Ex                    Recumbent bike  5 min AROM 6 min AROM           Heel slide flexion issued 5\"x15 5\"x15           Ankle pump issued             LLLD knee extension stretch issued 3 min 3 min           SAQ  nv 5\"x10 L                                                                Ther Activity                                                                       Gait Training                                                                       Modalities                       Ice PRN    10 min  10 min                                             Assessment:  Tolerated treatment well. Posterior knee discomfort with LLLD ext stretching, but is progressing well with knee ext ROM approaching wfl's.  Pt was progressed with addition of SAQ, is challenged achieving TKE initially, but after " repetition is able to perform through available ROM without assistance.  Cont to progress strength and ROM as able. Patient demonstrated fatigue post treatment and would benefit from continued PT to improve strength, ROM and maximize overall level of function.      Plan: Progress treatment as tolerated.

## 2025-05-28 ENCOUNTER — OFFICE VISIT (OUTPATIENT)
Dept: PHYSICAL THERAPY | Facility: REHABILITATION | Age: 65
End: 2025-05-28
Attending: ORTHOPAEDIC SURGERY
Payer: MEDICARE

## 2025-05-28 DIAGNOSIS — M25.562 LEFT KNEE PAIN, UNSPECIFIED CHRONICITY: ICD-10-CM

## 2025-05-28 DIAGNOSIS — Z96.652 S/P TOTAL KNEE ARTHROPLASTY, LEFT: Primary | ICD-10-CM

## 2025-05-28 PROCEDURE — 97110 THERAPEUTIC EXERCISES: CPT | Performed by: PHYSICAL THERAPIST

## 2025-05-28 PROCEDURE — 97140 MANUAL THERAPY 1/> REGIONS: CPT | Performed by: PHYSICAL THERAPIST

## 2025-05-28 NOTE — PROGRESS NOTES
"Daily Note     Today's date: 2025  Patient name: Ximena Yousif  : 1960  MRN: [id number]  Referring provider: Jack Peraza MD  Dx:   Encounter Diagnosis     ICD-10-CM    1. S/P total knee arthroplasty, left  Z96.652       2. Left knee pain, unspecified chronicity  M25.562           Start Time: 1218  Stop Time: 1305  Total time in clinic (min): 47 minutes    Subjective: Patient reports that she continues to do well.  She has been waking short distances in her home without AD.  Pain is well controlled.  No Achilles pain recently.          Objective: See treatment diary below    Precautions: hx HA, hx LBP, hx cancer, zhane knee pain, zhane shoulder pain, zhane JEREMY        Manuals 5/15 5/19 5/22 5/28          Patellar mobility  5 min 5 min 5 min          Oscillation into extension  5 min 5 min 5 min            Knee PROM  5 min  5 min 5 min                               Neuro Re-Ed                    Quad set  issued 5\"x10 5\"x15               weight shift  issued                                                                                                                           Ther Ex                    Recumbent bike  5 min AROM 6 min AROM 7 min AROM          Heel slide flexion issued 5\"x15 5\"x15 5\" 15x          Ankle pump issued             LLLD knee extension stretch issued 3 min 3 min 3 min          SAQ  nv 5\"x10 L 5\" 10x          SLR    5\" 5x                                                 Ther Activity                                                                       Gait Training                        gait/stair training SPC        8 min                                       Modalities                       Ice PRN    10 min  10 min  np                                           Assessment:  Tolerated treatment well. Trimmed dissolvable suture ends this visit.  Knee extension lacking about 5 degrees.  Knee flexion to 105 degrees.  Patient demonstrates good quad control with SLR.  Practiced use of cane " for ambulation and on stairs.  Patient demonstrated fatigue post treatment and would benefit from continued PT to improve strength, ROM and maximize overall level of function.      Plan: Progress treatment as tolerated.

## 2025-05-29 ENCOUNTER — OFFICE VISIT (OUTPATIENT)
Dept: PHYSICAL THERAPY | Facility: REHABILITATION | Age: 65
End: 2025-05-29
Attending: ORTHOPAEDIC SURGERY
Payer: MEDICARE

## 2025-05-29 DIAGNOSIS — M25.562 LEFT KNEE PAIN, UNSPECIFIED CHRONICITY: ICD-10-CM

## 2025-05-29 DIAGNOSIS — Z96.652 S/P TOTAL KNEE ARTHROPLASTY, LEFT: Primary | ICD-10-CM

## 2025-05-29 PROCEDURE — 97140 MANUAL THERAPY 1/> REGIONS: CPT

## 2025-05-29 PROCEDURE — 97110 THERAPEUTIC EXERCISES: CPT

## 2025-05-29 NOTE — PROGRESS NOTES
"Daily Note     Today's date: 2025  Patient name: Ximena Yousif  : 1960  MRN: [id number]  Referring provider: Jack Peraza MD  Dx:   Encounter Diagnosis     ICD-10-CM    1. S/P total knee arthroplasty, left  Z96.652       2. Left knee pain, unspecified chronicity  M25.562                      Subjective: Patient reports doing well today, no new complaints since LV.         Objective: See treatment diary below    Precautions: hx HA, hx LBP, hx cancer, zhane knee pain, zhane shoulder pain, zhane JEREMY        Manuals 5/15 5/19 5/22 5/28 5/29         Patellar mobility  5 min 5 min 5 min HA         Oscillation into extension  5 min 5 min 5 min            Knee PROM  5 min  5 min 5 min   HA                            Neuro Re-Ed                    Quad set  issued 5\"x10 5\"x15               weight shift  issued                                                                                                                           Ther Ex                    Recumbent bike  5 min AROM 6 min AROM 7 min AROM 7 min         Heel slide flexion issued 5\"x15 5\"x15 5\" 15x 5\"x15         Ankle pump issued             LLLD knee extension stretch issued 3 min 3 min 3 min          SAQ  nv 5\"x10 L 5\" 10x 5\" 2x10         SLR    5\" 5x 5\" 2x5          LAQ     5\" 2x10                                 Ther Activity                                                                       Gait Training                        gait/stair training SPC        8 min                                       Modalities                       Ice PRN    10 min  10 min  np                                           Assessment:  Pt did well with all TE as listed, will continue to benefit from skilled PT    Plan: Progress treatment as tolerated.                   "

## 2025-06-02 ENCOUNTER — OFFICE VISIT (OUTPATIENT)
Dept: PHYSICAL THERAPY | Facility: REHABILITATION | Age: 65
End: 2025-06-02
Attending: ORTHOPAEDIC SURGERY
Payer: MEDICARE

## 2025-06-02 DIAGNOSIS — M25.562 LEFT KNEE PAIN, UNSPECIFIED CHRONICITY: ICD-10-CM

## 2025-06-02 DIAGNOSIS — Z96.652 S/P TOTAL KNEE ARTHROPLASTY, LEFT: Primary | ICD-10-CM

## 2025-06-02 PROCEDURE — 97110 THERAPEUTIC EXERCISES: CPT

## 2025-06-02 PROCEDURE — 97140 MANUAL THERAPY 1/> REGIONS: CPT

## 2025-06-02 NOTE — PROGRESS NOTES
"Daily Note     Today's date: 2025  Patient name: Ximena Yousif  : 1960  MRN: [id number]  Referring provider: Jack Peraza MD  Dx:   Encounter Diagnosis     ICD-10-CM    1. S/P total knee arthroplasty, left  Z96.652       2. Left knee pain, unspecified chronicity  M25.562                      Subjective: Ximena is still recovering from last PT session.         Objective: See treatment diary below      Assessment: Tolerated treatment well. Gait training to work on knee flexion at toe off, she has a tendincy to lead with the hip with straight leg. Reduced sets for some tasks this session to mitigate knee pain. Incremental gains into flexion with heel slides and PROM, she very close to achieving full extension. Continued PT would be beneficial to improve function.          Plan: Continue per plan of care.       Precautions: hx HA, hx LBP, hx cancer, zhane knee pain, zhane shoulder pain, zhane JEREMY        Manuals 5/15 5/19 5/22 5/28 5/29 6/2        Patellar mobility  5 min 5 min 5 min HA 5 min        Oscillation into extension  5 min 5 min 5 min    5 min        Knee PROM  5 min  5 min 5 min   HA  5 min                          Neuro Re-Ed                    Quad set  issued 5\"x10 5\"x15      5\"x15         weight shift  issued                                                                                                                           Ther Ex                    Recumbent bike  5 min AROM 6 min AROM 7 min AROM 7 min 7 min rocking        Heel slide flexion issued 5\"x15 5\"x15 5\" 15x 5\"x15 5\" 10x        Ankle pump issued             LLLD knee extension stretch issued 3 min 3 min 3 min  3 min        SAQ  nv 5\"x10 L 5\" 10x 5\" 2x10 5\" 10x        SLR    5\" 5x 5\" 2x5 5\" 5x2         LAQ     5\" 2x10                                 Ther Activity                                                                       Gait Training                        gait/stair training SPC        8 min    5 min                             "       Modalities                       Ice PRN    10 min  10 min  np    10 min

## 2025-06-05 ENCOUNTER — OFFICE VISIT (OUTPATIENT)
Dept: PHYSICAL THERAPY | Facility: REHABILITATION | Age: 65
End: 2025-06-05
Attending: ORTHOPAEDIC SURGERY
Payer: MEDICARE

## 2025-06-05 DIAGNOSIS — M25.562 LEFT KNEE PAIN, UNSPECIFIED CHRONICITY: ICD-10-CM

## 2025-06-05 DIAGNOSIS — Z96.652 S/P TOTAL KNEE ARTHROPLASTY, LEFT: Primary | ICD-10-CM

## 2025-06-05 PROCEDURE — 97140 MANUAL THERAPY 1/> REGIONS: CPT | Performed by: PHYSICAL THERAPIST

## 2025-06-05 PROCEDURE — 97110 THERAPEUTIC EXERCISES: CPT | Performed by: PHYSICAL THERAPIST

## 2025-06-05 NOTE — PROGRESS NOTES
"Daily Note     Today's date: 2025  Patient name: Ximena Yousif  : 1960  MRN: [id number]  Referring provider: Jack Peraza MD  Dx:   Encounter Diagnosis     ICD-10-CM    1. S/P total knee arthroplasty, left  Z96.652       2. Left knee pain, unspecified chronicity  M25.562           Start Time: 1615  Stop Time: 1710  Total time in clinic (min): 55 minutes    Subjective: Patient reports that she is doing well.  Patient reports continued difficulty with sleeping.  Patient reports mild soreness following last treatment session.  She followed up with surgeon today and is pleased with her current progress.          Objective: See treatment diary below      Assessment: Tolerated treatment well. Patient lacking about 5 degrees of knee extension.  Knee flexion to 110 degrees.  Minimal no no lag with SLR within available ROM.  Continue to progress as able.         Plan: Continue per plan of care.       Precautions: hx HA, hx LBP, hx cancer, zhane knee pain, zhane shoulder pain, zhane JEREMY        Manuals 5/15 5/19 5/22 5/28 5/29 6/2 6/5       Patellar mobility  5 min 5 min 5 min HA 5 min 5 min       Oscillation into extension  5 min 5 min 5 min   5 min 5 min       Knee PROM  5 min  5 min 5 min   HA 5 min 5 min                         Neuro Re-Ed                    Quad set  issued 5\"x10 5\"x15      5\"x15         weight shift  issued                                                                                                                           Ther Ex                    Recumbent bike  5 min AROM 6 min AROM 7 min AROM 7 min 7 min rocking 7 min AROM       Heel slide flexion issued 5\"x15 5\"x15 5\" 15x 5\"x15 5\" 10x 5\" 15x with PT       Ankle pump issued             LLLD knee extension stretch issued 3 min 3 min 3 min  3 min 3 min       SAQ  nv 5\"x10 L 5\" 10x 5\" 2x10 5\" 10x 5\" 10x       SLR    5\" 5x 5\" 2x5 5\" 5x2 5\" 10x        LAQ     5\" 2x10                                 Ther Activity                                      "                                  Gait Training                        gait/stair training SPC        8 min    5 min                                   Modalities                       Ice PRN    10 min  10 min  np    10 min  10 min

## 2025-06-09 ENCOUNTER — APPOINTMENT (OUTPATIENT)
Dept: PHYSICAL THERAPY | Facility: REHABILITATION | Age: 65
End: 2025-06-09
Attending: ORTHOPAEDIC SURGERY
Payer: MEDICARE

## 2025-06-12 ENCOUNTER — EVALUATION (OUTPATIENT)
Dept: PHYSICAL THERAPY | Facility: REHABILITATION | Age: 65
End: 2025-06-12
Attending: ORTHOPAEDIC SURGERY
Payer: MEDICARE

## 2025-06-12 DIAGNOSIS — M25.562 LEFT KNEE PAIN, UNSPECIFIED CHRONICITY: ICD-10-CM

## 2025-06-12 DIAGNOSIS — Z96.652 S/P TOTAL KNEE ARTHROPLASTY, LEFT: Primary | ICD-10-CM

## 2025-06-12 LAB
25(OH)D3 SERPL-MCNC: 29 NG/ML (ref 30–100)
ALBUMIN SERPL-MCNC: 4 G/DL (ref 3.6–5.1)
ALBUMIN/GLOB SERPL: 1.7 (CALC) (ref 1–2.5)
ALP SERPL-CCNC: 82 U/L (ref 37–153)
ALT SERPL-CCNC: 23 U/L (ref 6–29)
APPEARANCE UR: CLEAR
AST SERPL-CCNC: 21 U/L (ref 10–35)
BILIRUB SERPL-MCNC: 0.5 MG/DL (ref 0.2–1.2)
BILIRUB UR QL STRIP: NEGATIVE
BUN SERPL-MCNC: 8 MG/DL (ref 7–25)
BUN/CREAT SERPL: NORMAL (CALC) (ref 6–22)
CALCIUM SERPL-MCNC: 8.8 MG/DL (ref 8.6–10.4)
CHLORIDE SERPL-SCNC: 102 MMOL/L (ref 98–110)
CHOLEST SERPL-MCNC: 188 MG/DL
CHOLEST/HDLC SERPL: 3 (CALC)
CO2 SERPL-SCNC: 28 MMOL/L (ref 20–32)
COLOR UR: YELLOW
CREAT SERPL-MCNC: 0.5 MG/DL (ref 0.5–1.05)
ERYTHROCYTE [DISTWIDTH] IN BLOOD BY AUTOMATED COUNT: 13.9 % (ref 11–15)
GFR/BSA.PRED SERPLBLD CYS-BASED-ARV: 103 ML/MIN/1.73M2
GLOBULIN SER CALC-MCNC: 2.4 G/DL (CALC) (ref 1.9–3.7)
GLUCOSE SERPL-MCNC: 85 MG/DL (ref 65–99)
GLUCOSE UR QL STRIP: NEGATIVE
HCT VFR BLD AUTO: 36.1 % (ref 35–45)
HDLC SERPL-MCNC: 62 MG/DL
HGB BLD-MCNC: 11.6 G/DL (ref 11.7–15.5)
HGB UR QL STRIP: NEGATIVE
KETONES UR QL STRIP: NEGATIVE
LDLC SERPL CALC-MCNC: 103 MG/DL (CALC)
LEUKOCYTE ESTERASE UR QL STRIP: NEGATIVE
MCH RBC QN AUTO: 30.4 PG (ref 27–33)
MCHC RBC AUTO-ENTMCNC: 32.1 G/DL (ref 32–36)
MCV RBC AUTO: 94.5 FL (ref 80–100)
NITRITE UR QL STRIP: NEGATIVE
NONHDLC SERPL-MCNC: 126 MG/DL (CALC)
PH UR STRIP: 6 [PH] (ref 5–8)
PLATELET # BLD AUTO: 261 THOUSAND/UL (ref 140–400)
PMV BLD REES-ECKER: 9.5 FL (ref 7.5–12.5)
POTASSIUM SERPL-SCNC: 3.7 MMOL/L (ref 3.5–5.3)
PROT SERPL-MCNC: 6.4 G/DL (ref 6.1–8.1)
PROT UR QL STRIP: NEGATIVE
RBC # BLD AUTO: 3.82 MILLION/UL (ref 3.8–5.1)
SODIUM SERPL-SCNC: 139 MMOL/L (ref 135–146)
SP GR UR STRIP: 1.01 (ref 1–1.03)
T4 FREE SERPL-MCNC: 1.7 NG/DL (ref 0.8–1.8)
TRIGL SERPL-MCNC: 131 MG/DL
TSH SERPL-ACNC: 4.88 MIU/L (ref 0.4–4.5)
WBC # BLD AUTO: 6.3 THOUSAND/UL (ref 3.8–10.8)

## 2025-06-12 PROCEDURE — 97140 MANUAL THERAPY 1/> REGIONS: CPT | Performed by: PHYSICAL THERAPIST

## 2025-06-12 PROCEDURE — 97110 THERAPEUTIC EXERCISES: CPT | Performed by: PHYSICAL THERAPIST

## 2025-06-12 NOTE — PROGRESS NOTES
PT Re-Evaluation     Today's date: 2025  Patient name: Ximena Yousif  : 1960  MRN: [id number]  Referring provider: Jack Peraza MD  Dx:   Encounter Diagnosis     ICD-10-CM    1. S/P total knee arthroplasty, left  Z96.652       2. Left knee pain, unspecified chronicity  M25.562             Start Time: 1617  Stop Time: 1710  Total time in clinic (min): 53 minutes    Assessment  Impairments: abnormal gait, abnormal or restricted ROM, activity intolerance, impaired physical strength, lacks appropriate home exercise program and pain with function    Assessment details: Patient is a 65 y.o. female that presents s/p L TKA on 2025.  Patient reports improvement with skilled physical therapy services.  FOTO improved by 22 points to 38/100.  Patient reports improvement with ambulation, negotiation of stairs,  transfers, ADLS.  Patient reports continued difficulty with ambulation, and negotiation of stairs.  Patient has made good progress towards goals established for physical therapy.  Patient would benefit from continued skilled physical therapy services for continued strengthening and ROM to maximize function.      .        Understanding of Dx/Px/POC: good    Goals  Impairment:  1.  Patient will reports 50% reduction in pain in 4 weeks to maximize function.-PARTIALLY MET  2.  Patient will improve strength to 4/5 in all planes to maximize function.-PARTIALLY MET  3.  Patient will improve ROM to 0-120 degrees in 4 weeks to maximize function.-PARTIALLY MET    Functional:  1. Patient will improve FOTO by 41 points to 57/100 in 4 weeks to maximize function.-PARTIALLY MET  2. Patient will be independent with HEP in 4 weeks to maximize function.-MET  3. Patient will report no difficulty with ambulation in 4 weeks to maximize function.-PARTIALLY MET  4. Patient will report no difficulty with negotiation of stairs in 4 weeks to maximize function.-PARTIALLY MET  5. Patient will report no difficulty with transfers in 4  weeks to maximize function.-MET          Plan  Patient would benefit from: skilled physical therapy  Planned modality interventions: cryotherapy    Planned therapy interventions: manual therapy, IASTM, activity modification, balance, patient/caregiver education, neuromuscular re-education, therapeutic exercise, therapeutic activities, stretching, strengthening, functional ROM exercises, home exercise program and gait training    Frequency: 2x week  Duration in weeks: 4  Treatment plan discussed with: patient  Plan details: Patient will be a RE in 4 weeks.          Subjective Evaluation    History of Present Illness  Date of surgery: 2025  Mechanism of injury: surgery  Mechanism of injury: Patient presents post-op L TKA on 2025.  She reports a history of zhane knee injections for several years.  She was originally going to have her right knee replaced first, but had her left knee replaced first as this is most bothersome.  Patient reports difficulty with ambulation, negotiation of stairs (up and down), transfers, ADLS, and any functional activities on her feet.  She feels her knee will buckle or give out on her pre-op.  Patient Goals  Patient goals for therapy: increased strength, increased motion and decreased pain    Pain  At best pain ratin  At worst pain ratin  Location: L knee  Quality: stiff.  Aggravating factors: stair climbing, walking and standing  Progression: worsening    Treatments  Current treatment: physical therapy        Objective     Observations   Left Knee   Positive for edema and effusion. Negative for drainage and trophic changes.     Passive Range of Motion   Left Knee   Flexion: 118 degrees   Extension: -5 degrees with pain    Right Knee   Flexion: 127 degrees     Strength/Myotome Testing     Left Hip   Planes of Motion   Flexion: 4    Right Hip   Planes of Motion   Flexion: 4+  External rotation: 4  Internal rotation: 4+    Left Knee   Flexion: 4  Extension: 4  Quadriceps  "contraction: good    Right Knee   Flexion: 4+  Extension: 4    Additional Strength Details  No lag with SLR within available ROM    Ambulation   Weight-Bearing Status   Weight-Bearing Status (Left): weight-bearing as tolerated   Assistive device used: single point cane    Observational Gait   Decreased walking speed and stride length.       Flowsheet Rows      Flowsheet Row Most Recent Value   PT/OT G-Codes    Current Score 16   Projected Score 57               Precautions: hx HA, hx LBP, hx cancer, zhane knee pain, zhane shoulder pain, zhane JEREMY        Manuals 5/15 5/19 5/22 5/28 5/29 6/2 6/5 6/12    Patellar mobility   5 min 5 min 5 min HA 5 min 5 min  5 min   Oscillation into extension   5 min 5 min 5 min   5 min 5 min  5 min   Knee PROM   5 min  5 min 5 min   HA 5 min 5 min  5 min   measurements               15 min   Neuro Re-Ed                   Quad set  issued 5\"x10 5\"x15      5\"x15        weight shift  issued                                                                                                                     Ther Ex                   Recumbent bike   5 min AROM 6 min AROM 7 min AROM 7 min 7 min rocking 7 min AROM 7 min AROM   Heel slide flexion issued 5\"x15 5\"x15 5\" 15x 5\"x15 5\" 10x 5\" 15x with PT 5\" 15x with PT   Ankle pump issued                LLLD knee extension stretch issued 3 min 3 min 3 min   3 min 3 min 3 min    SAQ   nv 5\"x10 L 5\" 10x 5\" 2x10 5\" 10x 5\" 10x     SLR       5\" 5x 5\" 2x5 5\" 5x2 5\" 10x 5\" 10x L    LAQ         5\" 2x10                             Ther Activity                                                           Gait Training                    gait/stair training SPC        8 min    5 min                           Modalities                   Ice PRN    10 min  10 min  np    10 min  10 min 10 min                                    "

## 2025-06-12 NOTE — LETTER
2025    Jack Peraza MD  [address] [place] [id number]    Patient: Ximena Yousif   YOB: 1960   Date of Visit: 2025     Encounter Diagnosis     ICD-10-CM    1. S/P total knee arthroplasty, left  Z96.652       2. Left knee pain, unspecified chronicity  M25.562           Dear Dr. Jack Peraza MD:    Thank you for your recent referral of Ximena Yousif. Please review the attached evaluation summary from Ximena's recent visit.     Please verify that you agree with the plan of care by signing the attached order.     If you have any questions or concerns, please do not hesitate to call.     I sincerely appreciate the opportunity to share in the care of one of your patients and hope to have another opportunity to work with you in the near future.       Sincerely,    Kvng Patterson, PT      Referring Provider:      I certify that I have read the below Plan of Care and certify the need for these services furnished under this plan of treatment while under my care.                    Jack Peraza MD  [address] [facility] [address]  Via Fax: [phone]          PT Re-Evaluation     Today's date: 2025  Patient name: Ximena Yousif  : 1960  MRN: [id number]  Referring provider: Jack Peraza MD  Dx:   Encounter Diagnosis     ICD-10-CM    1. S/P total knee arthroplasty, left  Z96.652       2. Left knee pain, unspecified chronicity  M25.562             Start Time: 1617  Stop Time: 1710  Total time in clinic (min): 53 minutes    Assessment  Impairments: abnormal gait, abnormal or restricted ROM, activity intolerance, impaired physical strength, lacks appropriate home exercise program and pain with function    Assessment details: Patient is a 65 y.o. female that presents s/p L TKA on 2025.  Patient reports improvement with skilled physical therapy services.  FOTO improved by 22 points to 38/100.  Patient reports improvement with ambulation, negotiation of  stairs,  transfers, ADLS.  Patient reports continued difficulty with ambulation, and negotiation of stairs.  Patient has made good progress towards goals established for physical therapy.  Patient would benefit from continued skilled physical therapy services for continued strengthening and ROM to maximize function.      .        Understanding of Dx/Px/POC: good    Goals  Impairment:  1.  Patient will reports 50% reduction in pain in 4 weeks to maximize function.-PARTIALLY MET  2.  Patient will improve strength to 4/5 in all planes to maximize function.-PARTIALLY MET  3.  Patient will improve ROM to 0-120 degrees in 4 weeks to maximize function.-PARTIALLY MET    Functional:  1. Patient will improve FOTO by 41 points to 57/100 in 4 weeks to maximize function.-PARTIALLY MET  2. Patient will be independent with HEP in 4 weeks to maximize function.-MET  3. Patient will report no difficulty with ambulation in 4 weeks to maximize function.-PARTIALLY MET  4. Patient will report no difficulty with negotiation of stairs in 4 weeks to maximize function.-PARTIALLY MET  5. Patient will report no difficulty with transfers in 4 weeks to maximize function.-MET          Plan  Patient would benefit from: skilled physical therapy  Planned modality interventions: cryotherapy    Planned therapy interventions: manual therapy, IASTM, activity modification, balance, patient/caregiver education, neuromuscular re-education, therapeutic exercise, therapeutic activities, stretching, strengthening, functional ROM exercises, home exercise program and gait training    Frequency: 2x week  Duration in weeks: 4  Treatment plan discussed with: patient  Plan details: Patient will be a RE in 4 weeks.          Subjective Evaluation    History of Present Illness  Date of surgery: 5/12/2025  Mechanism of injury: surgery  Mechanism of injury: Patient presents post-op L TKA on 5/12/2025.  She reports a history of zhane knee injections for several years.  She  was originally going to have her right knee replaced first, but had her left knee replaced first as this is most bothersome.  Patient reports difficulty with ambulation, negotiation of stairs (up and down), transfers, ADLS, and any functional activities on her feet.  She feels her knee will buckle or give out on her pre-op.  Patient Goals  Patient goals for therapy: increased strength, increased motion and decreased pain    Pain  At best pain ratin  At worst pain ratin  Location: L knee  Quality: stiff.  Aggravating factors: stair climbing, walking and standing  Progression: worsening    Treatments  Current treatment: physical therapy        Objective     Observations   Left Knee   Positive for edema and effusion. Negative for drainage and trophic changes.     Passive Range of Motion   Left Knee   Flexion: 118 degrees   Extension: -5 degrees with pain    Right Knee   Flexion: 127 degrees     Strength/Myotome Testing     Left Hip   Planes of Motion   Flexion: 4    Right Hip   Planes of Motion   Flexion: 4+  External rotation: 4  Internal rotation: 4+    Left Knee   Flexion: 4  Extension: 4  Quadriceps contraction: good    Right Knee   Flexion: 4+  Extension: 4    Additional Strength Details  No lag with SLR within available ROM    Ambulation   Weight-Bearing Status   Weight-Bearing Status (Left): weight-bearing as tolerated   Assistive device used: single point cane    Observational Gait   Decreased walking speed and stride length.       Flowsheet Rows      Flowsheet Row Most Recent Value   PT/OT G-Codes    Current Score 16   Projected Score 57               Precautions: hx HA, hx LBP, hx cancer, zhane knee pain, zhane shoulder pain, zhane JEREMY        Manuals 5/15 5/19 5/22 5/28 5/29 6/2 6/5 6/12    Patellar mobility   5 min 5 min 5 min HA 5 min 5 min  5 min   Oscillation into extension   5 min 5 min 5 min   5 min 5 min  5 min   Knee PROM   5 min  5 min 5 min   HA 5 min 5 min  5 min   measurements               15  "min   Neuro Re-Ed                   Quad set  issued 5\"x10 5\"x15      5\"x15        weight shift  issued                                                                                                                     Ther Ex                   Recumbent bike   5 min AROM 6 min AROM 7 min AROM 7 min 7 min rocking 7 min AROM 7 min AROM   Heel slide flexion issued 5\"x15 5\"x15 5\" 15x 5\"x15 5\" 10x 5\" 15x with PT 5\" 15x with PT   Ankle pump issued                LLLD knee extension stretch issued 3 min 3 min 3 min   3 min 3 min 3 min    SAQ   nv 5\"x10 L 5\" 10x 5\" 2x10 5\" 10x 5\" 10x     SLR       5\" 5x 5\" 2x5 5\" 5x2 5\" 10x 5\" 10x L    LAQ         5\" 2x10                             Ther Activity                                                           Gait Training                    gait/stair training SPC        8 min    5 min                           Modalities                   Ice PRN    10 min  10 min  np    10 min  10 min 10 min                                                    "

## 2025-06-16 ENCOUNTER — OFFICE VISIT (OUTPATIENT)
Dept: PHYSICAL THERAPY | Facility: REHABILITATION | Age: 65
End: 2025-06-16
Attending: ORTHOPAEDIC SURGERY
Payer: MEDICARE

## 2025-06-16 DIAGNOSIS — M76.62 TENDONITIS, ACHILLES, LEFT: ICD-10-CM

## 2025-06-16 DIAGNOSIS — M76.61 TENDONITIS, ACHILLES, RIGHT: ICD-10-CM

## 2025-06-16 DIAGNOSIS — Z96.652 S/P TOTAL KNEE ARTHROPLASTY, LEFT: Primary | ICD-10-CM

## 2025-06-16 DIAGNOSIS — M25.562 LEFT KNEE PAIN, UNSPECIFIED CHRONICITY: ICD-10-CM

## 2025-06-16 PROCEDURE — 97140 MANUAL THERAPY 1/> REGIONS: CPT

## 2025-06-16 PROCEDURE — 97110 THERAPEUTIC EXERCISES: CPT

## 2025-06-16 NOTE — PROGRESS NOTES
"Daily Note     Today's date: 2025  Patient name: Ximena Yousif  : 1960  MRN: [id number]  Referring provider: Jack Peraza MD  Dx:   Encounter Diagnosis     ICD-10-CM    1. S/P total knee arthroplasty, left  Z96.652       2. Left knee pain, unspecified chronicity  M25.562       3. Tendonitis, Achilles, right  M76.61       4. Tendonitis, Achilles, left  M76.62           Start Time: 1700  Stop Time: 1755  Total time in clinic (min): 55 minutes    Subjective: Pt reports improving more each day, but has been having more pain the last few weeks and is unsure why.  Pt questions ability to lay prone and get on/off a table from a prone position.      Objective: See treatment diary below  Precautions: hx HA, hx LBP, hx cancer, zhane knee pain, zhane shoulder pain, zhane JEREMY        Manuals     Patellar mobility  5 min 5 min 5 min 5 min HA 5 min 5 min  5 min   Oscillation into extension  5 min 5 min 5 min 5 min   5 min 5 min  5 min   Knee PROM  5 min 5 min  5 min 5 min   HA 5 min 5 min  5 min   measurements               15 min   Neuro Re-Ed                   Quad set  5\"x10 5\"x15      5\"x15        weight shift                                                                                                                      Ther Ex                   Recumbent bike  7 min AROM 5 min AROM 6 min AROM 7 min AROM 7 min 7 min rocking 7 min AROM 7 min AROM   Heel slide flexion 5\"x15 w/ PT 5\"x15 5\"x15 5\" 15x 5\"x15 5\" 10x 5\" 15x with PT 5\" 15x with PT   Ankle pump                 LLLD knee extension stretch 3 min 3 min 3 min 3 min   3 min 3 min 3 min    SAQ   nv 5\"x10 L 5\" 10x 5\" 2x10 5\" 10x 5\" 10x     SLR  5\"x15 L     5\" 5x 5\" 2x5 5\" 5x2 5\" 10x 5\" 10x L    LAQ         5\" 2x10                             Ther Activity                    Mat table mobility  5 min                                     Gait Training                    gait/stair training SPC        8 min    5 min                      "      Modalities                   Ice PRN  10 min  10 min  10 min  np    10 min  10 min 10 min                           Assessment: Tolerated treatment well. Pt is challenged with SLR but can maintain quad control throughout.  Patellar hypomobility with glides.  Discussed mat table mobility and educated patient how to get on/off table from a prone position.  Concluded with CP at end of session.  Assess Nv and progress as able. Patient demonstrated fatigue post treatment and would benefit from continued PT      Plan: Progress treatment as tolerated.

## 2025-06-18 ENCOUNTER — OFFICE VISIT (OUTPATIENT)
Dept: FAMILY MEDICINE CLINIC | Facility: CLINIC | Age: 65
End: 2025-06-18
Payer: MEDICARE

## 2025-06-18 VITALS
WEIGHT: 194.8 LBS | HEIGHT: 64 IN | OXYGEN SATURATION: 98 % | HEART RATE: 77 BPM | SYSTOLIC BLOOD PRESSURE: 122 MMHG | DIASTOLIC BLOOD PRESSURE: 70 MMHG | BODY MASS INDEX: 33.26 KG/M2

## 2025-06-18 DIAGNOSIS — E89.0 POSTOPERATIVE HYPOTHYROIDISM: ICD-10-CM

## 2025-06-18 DIAGNOSIS — M06.4 INFLAMMATORY POLYARTHROPATHY (HCC): ICD-10-CM

## 2025-06-18 DIAGNOSIS — J44.9 COPD MIXED TYPE (HCC): ICD-10-CM

## 2025-06-18 DIAGNOSIS — I25.10 CORONARY ARTERY DISEASE INVOLVING NATIVE CORONARY ARTERY OF NATIVE HEART WITHOUT ANGINA PECTORIS: ICD-10-CM

## 2025-06-18 DIAGNOSIS — Z99.89 DEPENDENCE ON CANE: ICD-10-CM

## 2025-06-18 DIAGNOSIS — E78.2 MIXED HYPERLIPIDEMIA: ICD-10-CM

## 2025-06-18 DIAGNOSIS — Z12.31 ENCOUNTER FOR SCREENING MAMMOGRAM FOR BREAST CANCER: ICD-10-CM

## 2025-06-18 DIAGNOSIS — E55.9 VITAMIN D DEFICIENCY: ICD-10-CM

## 2025-06-18 DIAGNOSIS — I47.10 PAROXYSMAL SUPRAVENTRICULAR TACHYCARDIA (HCC): ICD-10-CM

## 2025-06-18 DIAGNOSIS — K63.5 POLYP OF COLON, UNSPECIFIED PART OF COLON, UNSPECIFIED TYPE: Primary | ICD-10-CM

## 2025-06-18 DIAGNOSIS — I10 ESSENTIAL HYPERTENSION: ICD-10-CM

## 2025-06-18 DIAGNOSIS — R73.9 HYPERGLYCEMIA: ICD-10-CM

## 2025-06-18 PROCEDURE — G2211 COMPLEX E/M VISIT ADD ON: HCPCS | Performed by: FAMILY MEDICINE

## 2025-06-18 PROCEDURE — 99214 OFFICE O/P EST MOD 30 MIN: CPT | Performed by: FAMILY MEDICINE

## 2025-06-18 RX ORDER — OMEPRAZOLE 20 MG/1
20 CAPSULE, DELAYED RELEASE ORAL DAILY
COMMUNITY

## 2025-06-18 NOTE — ASSESSMENT & PLAN NOTE
Stable continue atorvastatin 40 mg daily  Orders:  •  CBC; Future  •  Comprehensive metabolic panel; Future  •  Hemoglobin A1C; Future  •  Lipid Panel with Direct LDL reflex; Future  •  TSH, 3rd generation with Free T4 reflex; Future  •  UA (URINE) with reflex to Scope; Future  •  Vitamin D 25 hydroxy; Future

## 2025-06-18 NOTE — ASSESSMENT & PLAN NOTE
Status post ablation, in NSR by auscultation follows with cardiology  Orders:  •  CBC; Future  •  Comprehensive metabolic panel; Future  •  Hemoglobin A1C; Future  •  Lipid Panel with Direct LDL reflex; Future  •  TSH, 3rd generation with Free T4 reflex; Future  •  UA (URINE) with reflex to Scope; Future  •  Vitamin D 25 hydroxy; Future

## 2025-06-18 NOTE — PROGRESS NOTES
Name: Ximena Yousif      : 1960      MRN: [id number]  Encounter Provider: Noel Ramirez DO  Encounter Date: 2025   Encounter department: [facility] CARE 2025, has scheduled appointment for follow-up AWV  :  Assessment & Plan  Encounter for screening mammogram for breast cancer    Orders:  •  Mammo screening bilateral w 3d and cad; Future  •  CBC; Future  •  Comprehensive metabolic panel; Future  •  Hemoglobin A1C; Future  •  Lipid Panel with Direct LDL reflex; Future  •  TSH, 3rd generation with Free T4 reflex; Future  •  UA (URINE) with reflex to Scope; Future  •  Vitamin D 25 hydroxy; Future    Polyp of colon, unspecified part of colon, unspecified type  Up-to-date with colonoscopy  Orders:  •  CBC; Future  •  Comprehensive metabolic panel; Future  •  Hemoglobin A1C; Future  •  Lipid Panel with Direct LDL reflex; Future  •  TSH, 3rd generation with Free T4 reflex; Future  •  UA (URINE) with reflex to Scope; Future  •  Vitamin D 25 hydroxy; Future    Coronary artery disease involving native coronary artery of native heart without angina pectoris  Stable continue present therapy follow-up with cardiology  Orders:  •  CBC; Future  •  Comprehensive metabolic panel; Future  •  Hemoglobin A1C; Future  •  Lipid Panel with Direct LDL reflex; Future  •  TSH, 3rd generation with Free T4 reflex; Future  •  UA (URINE) with reflex to Scope; Future  •  Vitamin D 25 hydroxy; Future    Essential hypertension  Stable continue present therapy  Orders:  •  CBC; Future  •  Comprehensive metabolic panel; Future  •  Hemoglobin A1C; Future  •  Lipid Panel with Direct LDL reflex; Future  •  TSH, 3rd generation with Free T4 reflex; Future  •  UA (URINE) with reflex to Scope; Future  •  Vitamin D 25 hydroxy; Future    Paroxysmal supraventricular tachycardia (HCC)  Status post ablation, in NSR by auscultation follows with cardiology  Orders:  •  CBC; Future  •  Comprehensive metabolic panel; Future  •   Hemoglobin A1C; Future  •  Lipid Panel with Direct LDL reflex; Future  •  TSH, 3rd generation with Free T4 reflex; Future  •  UA (URINE) with reflex to Scope; Future  •  Vitamin D 25 hydroxy; Future    COPD mixed type (HCC)  Stable, lungs are clear  Orders:  •  CBC; Future  •  Comprehensive metabolic panel; Future  •  Hemoglobin A1C; Future  •  Lipid Panel with Direct LDL reflex; Future  •  TSH, 3rd generation with Free T4 reflex; Future  •  UA (URINE) with reflex to Scope; Future  •  Vitamin D 25 hydroxy; Future    Postoperative hypothyroidism  Stable continue present therapy  Orders:  •  CBC; Future  •  Comprehensive metabolic panel; Future  •  Hemoglobin A1C; Future  •  Lipid Panel with Direct LDL reflex; Future  •  TSH, 3rd generation with Free T4 reflex; Future  •  UA (URINE) with reflex to Scope; Future  •  Vitamin D 25 hydroxy; Future    Inflammatory polyarthropathy (HCC)  Stable follows with specialist  Orders:  •  CBC; Future  •  Comprehensive metabolic panel; Future  •  Hemoglobin A1C; Future  •  Lipid Panel with Direct LDL reflex; Future  •  TSH, 3rd generation with Free T4 reflex; Future  •  UA (URINE) with reflex to Scope; Future  •  Vitamin D 25 hydroxy; Future    Hyperglycemia  Controlled  Orders:  •  CBC; Future  •  Comprehensive metabolic panel; Future  •  Hemoglobin A1C; Future  •  Lipid Panel with Direct LDL reflex; Future  •  TSH, 3rd generation with Free T4 reflex; Future  •  UA (URINE) with reflex to Scope; Future  •  Vitamin D 25 hydroxy; Future    Mixed hyperlipidemia  Stable continue atorvastatin 40 mg daily  Orders:  •  CBC; Future  •  Comprehensive metabolic panel; Future  •  Hemoglobin A1C; Future  •  Lipid Panel with Direct LDL reflex; Future  •  TSH, 3rd generation with Free T4 reflex; Future  •  UA (URINE) with reflex to Scope; Future  •  Vitamin D 25 hydroxy; Future    Vitamin D deficiency  Stable continue to monitor  Orders:  •  CBC; Future  •  Comprehensive metabolic panel;  "Future  •  Hemoglobin A1C; Future  •  Lipid Panel with Direct LDL reflex; Future  •  TSH, 3rd generation with Free T4 reflex; Future  •  UA (URINE) with reflex to Scope; Future  •  Vitamin D 25 hydroxy; Future    Dependence on cane                History of Present Illness   Status post left total knee replacement 5/12/2025.  Ambulating with cane following orthopedist, blood work was discussed      Review of Systems   Constitutional: Negative.    HENT: Negative.     Eyes: Negative.    Respiratory: Negative.     Cardiovascular: Negative.    Gastrointestinal: Negative.    Endocrine: Negative.    Genitourinary: Negative.    Musculoskeletal:         HPI   Skin: Negative.    Allergic/Immunologic: Negative.    Neurological: Negative.    Hematological: Negative.    Psychiatric/Behavioral: Negative.         Objective   /70 (BP Location: Right arm, Patient Position: Sitting, Cuff Size: Standard)   Pulse 77   Ht 5' 4\" (1.626 m)   Wt 88.4 kg (194 lb 12.8 oz)   SpO2 98%   BMI 33.44 kg/m²      Physical Exam  Vitals and nursing note reviewed.   Constitutional:       Appearance: Normal appearance.   HENT:      Head: Normocephalic and atraumatic.      Mouth/Throat:      Mouth: Mucous membranes are moist.     Eyes:      General: No scleral icterus.    Neck:      Vascular: No carotid bruit.     Cardiovascular:      Rate and Rhythm: Normal rate and regular rhythm.      Heart sounds: Normal heart sounds.   Pulmonary:      Effort: Pulmonary effort is normal.      Breath sounds: Normal breath sounds.   Abdominal:      Palpations: Abdomen is soft.      Tenderness: There is no abdominal tenderness.     Musculoskeletal:      Cervical back: Neck supple.      Right lower leg: No edema.      Left lower leg: No edema.     Skin:     General: Skin is warm and dry.     Neurological:      General: No focal deficit present.      Mental Status: She is alert.     Psychiatric:         Mood and Affect: Mood normal.         "

## 2025-06-18 NOTE — ASSESSMENT & PLAN NOTE
Stable, lungs are clear  Orders:  •  CBC; Future  •  Comprehensive metabolic panel; Future  •  Hemoglobin A1C; Future  •  Lipid Panel with Direct LDL reflex; Future  •  TSH, 3rd generation with Free T4 reflex; Future  •  UA (URINE) with reflex to Scope; Future  •  Vitamin D 25 hydroxy; Future

## 2025-06-18 NOTE — ASSESSMENT & PLAN NOTE
Stable follows with specialist  Orders:  •  CBC; Future  •  Comprehensive metabolic panel; Future  •  Hemoglobin A1C; Future  •  Lipid Panel with Direct LDL reflex; Future  •  TSH, 3rd generation with Free T4 reflex; Future  •  UA (URINE) with reflex to Scope; Future  •  Vitamin D 25 hydroxy; Future

## 2025-06-18 NOTE — ASSESSMENT & PLAN NOTE
Controlled  Orders:  •  CBC; Future  •  Comprehensive metabolic panel; Future  •  Hemoglobin A1C; Future  •  Lipid Panel with Direct LDL reflex; Future  •  TSH, 3rd generation with Free T4 reflex; Future  •  UA (URINE) with reflex to Scope; Future  •  Vitamin D 25 hydroxy; Future

## 2025-06-18 NOTE — ASSESSMENT & PLAN NOTE
Stable continue present therapy follow-up with cardiology  Orders:  •  CBC; Future  •  Comprehensive metabolic panel; Future  •  Hemoglobin A1C; Future  •  Lipid Panel with Direct LDL reflex; Future  •  TSH, 3rd generation with Free T4 reflex; Future  •  UA (URINE) with reflex to Scope; Future  •  Vitamin D 25 hydroxy; Future

## 2025-06-18 NOTE — ASSESSMENT & PLAN NOTE
Stable continue to monitor  Orders:  •  CBC; Future  •  Comprehensive metabolic panel; Future  •  Hemoglobin A1C; Future  •  Lipid Panel with Direct LDL reflex; Future  •  TSH, 3rd generation with Free T4 reflex; Future  •  UA (URINE) with reflex to Scope; Future  •  Vitamin D 25 hydroxy; Future

## 2025-06-18 NOTE — ASSESSMENT & PLAN NOTE
Stable continue present therapy  Orders:  •  CBC; Future  •  Comprehensive metabolic panel; Future  •  Hemoglobin A1C; Future  •  Lipid Panel with Direct LDL reflex; Future  •  TSH, 3rd generation with Free T4 reflex; Future  •  UA (URINE) with reflex to Scope; Future  •  Vitamin D 25 hydroxy; Future

## 2025-06-18 NOTE — PATIENT INSTRUCTIONS
Follow with all specialist further instructions  Complete mammography for breast cancer screening  Return at scheduled appointment 12/8/2025 for follow-up and [facility] complete blood work 1 week before

## 2025-06-18 NOTE — ASSESSMENT & PLAN NOTE
Up-to-date with colonoscopy  Orders:  •  CBC; Future  •  Comprehensive metabolic panel; Future  •  Hemoglobin A1C; Future  •  Lipid Panel with Direct LDL reflex; Future  •  TSH, 3rd generation with Free T4 reflex; Future  •  UA (URINE) with reflex to Scope; Future  •  Vitamin D 25 hydroxy; Future

## 2025-06-19 ENCOUNTER — OFFICE VISIT (OUTPATIENT)
Dept: PHYSICAL THERAPY | Facility: REHABILITATION | Age: 65
End: 2025-06-19
Attending: ORTHOPAEDIC SURGERY
Payer: MEDICARE

## 2025-06-19 DIAGNOSIS — M76.61 TENDONITIS, ACHILLES, RIGHT: ICD-10-CM

## 2025-06-19 DIAGNOSIS — M76.62 TENDONITIS, ACHILLES, LEFT: ICD-10-CM

## 2025-06-19 DIAGNOSIS — M25.562 LEFT KNEE PAIN, UNSPECIFIED CHRONICITY: ICD-10-CM

## 2025-06-19 DIAGNOSIS — Z96.652 S/P TOTAL KNEE ARTHROPLASTY, LEFT: Primary | ICD-10-CM

## 2025-06-19 PROCEDURE — 97110 THERAPEUTIC EXERCISES: CPT

## 2025-06-19 PROCEDURE — 97140 MANUAL THERAPY 1/> REGIONS: CPT

## 2025-06-19 NOTE — PROGRESS NOTES
"Daily Note     Today's date: 2025  Patient name: Ximena Yousif  : 1960  MRN: [id number]  Referring provider: Jack Peraza MD  Dx:   Encounter Diagnosis     ICD-10-CM    1. S/P total knee arthroplasty, left  Z96.652       2. Left knee pain, unspecified chronicity  M25.562       3. Tendonitis, Achilles, right  M76.61       4. Tendonitis, Achilles, left  M76.62                      Subjective: Pt reports taking pain medication about an hour before and a few hours after therapy visits.  Pt reports compliance with HEP, but notes getting muscle cramping like feeling into the anterior hip.  Discussed reducing repetitions of SLR during HEP.        Objective: See treatment diary below  Precautions: hx HA, hx LBP, hx cancer, zhane knee pain, zhane shoulder pain, zhane JEREMY        Manuals     Patellar mobility  5 min 5 min 5 min 5 min HA 5 min 5 min  5 min   Oscillation into extension  5 min 5 min 5 min 5 min   5 min 5 min  5 min   Knee PROM  5 min 5 min 5 min 5 min   HA 5 min 5 min  5 min   measurements               15 min   Neuro Re-Ed                   Quad set   5\"x15      5\"x15        weight shift                                                                                                                      Ther Ex                   Recumbent bike  7 min AROM 7 min AROM 6 min AROM 7 min AROM 7 min 7 min rocking 7 min AROM 7 min AROM   Heel slide flexion 5\"x15 w/ PT 5\"x15 w/ PT 5\"x15 5\" 15x 5\"x15 5\" 10x 5\" 15x with PT 5\" 15x with PT   Ankle pump                 LLLD knee extension stretch 3 min 3 min 3 min 3 min   3 min 3 min 3 min    SAQ    5\"x10 L 5\" 10x 5\" 2x10 5\" 10x 5\" 10x     SLR  5\"x15 L 5\"x10 L   5\" 5x 5\" 2x5 5\" 5x2 5\" 10x 5\" 10x L    LAQ         5\" 2x10                             Ther Activity                    Mat table mobility  5 min                                     Gait Training                    gait/stair training SPC        8 min    5 min                    "        Modalities                   Ice PRN  10 min  10 min  10 min  np    10 min  10 min 10 min                           Assessment: Tolerated treatment well.  Patellar hypomobility in all directions.  Incision in tact and healing well, discussed scar massage and instructed how to perform at home.  Pt intends to reduce repetitions of SLR and will assess tolerance.  Assess Nv and progress as able. Patient demonstrated fatigue post treatment and would benefit from continued PT      Plan: Progress treatment as tolerated.

## 2025-06-23 ENCOUNTER — OFFICE VISIT (OUTPATIENT)
Dept: PHYSICAL THERAPY | Facility: REHABILITATION | Age: 65
End: 2025-06-23
Attending: ORTHOPAEDIC SURGERY
Payer: MEDICARE

## 2025-06-23 DIAGNOSIS — M76.62 TENDONITIS, ACHILLES, LEFT: ICD-10-CM

## 2025-06-23 DIAGNOSIS — Z96.652 S/P TOTAL KNEE ARTHROPLASTY, LEFT: Primary | ICD-10-CM

## 2025-06-23 DIAGNOSIS — M76.61 TENDONITIS, ACHILLES, RIGHT: ICD-10-CM

## 2025-06-23 DIAGNOSIS — M25.562 LEFT KNEE PAIN, UNSPECIFIED CHRONICITY: ICD-10-CM

## 2025-06-23 PROCEDURE — 97110 THERAPEUTIC EXERCISES: CPT

## 2025-06-23 PROCEDURE — 97140 MANUAL THERAPY 1/> REGIONS: CPT

## 2025-06-23 NOTE — PROGRESS NOTES
"Daily Note     Today's date: 2025  Patient name: Ximena Yousif  : 1960  MRN: [id number]  Referring provider: Jack Peraza MD  Dx:   Encounter Diagnosis     ICD-10-CM    1. S/P total knee arthroplasty, left  Z96.652       2. Left knee pain, unspecified chronicity  M25.562       3. Tendonitis, Achilles, right  M76.61       4. Tendonitis, Achilles, left  M76.62           Start Time: 1615  Stop Time: 1705  Total time in clinic (min): 50 minutes    Subjective: Pt reports experiencing an allergic reaction with hives that started Thursday night and lasted through the weekend.  Pt notes reducing repetitions of SLR as discussed and feels this has been helpful.        Objective: See treatment diary below  Precautions: hx HA, hx LBP, hx cancer, zhane knee pain, zhane shoulder pain, zhane JEREMY        Manuals     Patellar mobility  5 min 5 min 5 min 5 min HA 5 min 5 min  5 min   Oscillation into extension  5 min 5 min 5 min 5 min   5 min 5 min  5 min   Knee PROM  5 min 5 min 5 min 5 min   HA 5 min 5 min  5 min   measurements               15 min   Neuro Re-Ed                   Quad set         5\"x15        weight shift                  Elevated sit to stand      x10                                                                                             Ther Ex                   Recumbent bike  7 min AROM 7 min AROM 7 min AROM 7 min AROM 7 min 7 min rocking 7 min AROM 7 min AROM   Heel slide flexion 5\"x15 w/ PT 5\"x15 w/ PT 5\"x15 w/ PT 5\" 15x 5\"x15 5\" 10x 5\" 15x with PT 5\" 15x with PT   Ankle pump                 LLLD knee extension stretch 3 min 3 min 3 min 3 min   3 min 3 min 3 min    SAQ     5\" 10x 5\" 2x10 5\" 10x 5\" 10x     SLR  5\"x15 L 5\"x10 L 5\"x10 L 5\" 5x 5\" 2x5 5\" 5x2 5\" 10x 5\" 10x L    LAQ         5\" 2x10                             Ther Activity                    Mat table mobility  5 min                                     Gait Training                    gait/stair training " SPC        8 min    5 min                           Modalities                   Ice PRN  10 min  10 min  10 min  np    10 min  10 min 10 min                           Assessment: Tolerated treatment well.  Pt lacks a few degrees of knee ext, but demonstrates improved ROM following manual therapy. Good quad control demonstrated with SLR.  Pt was progressed with addition of sit to stands with good tolerance and was issued for home.  Assess NV and progress as able. Patient demonstrated fatigue post treatment and would benefit from continued PT      Plan: Progress treatment as tolerated.

## 2025-06-25 ENCOUNTER — OFFICE VISIT (OUTPATIENT)
Dept: PHYSICAL THERAPY | Facility: REHABILITATION | Age: 65
End: 2025-06-25
Attending: ORTHOPAEDIC SURGERY
Payer: MEDICARE

## 2025-06-25 DIAGNOSIS — M25.562 LEFT KNEE PAIN, UNSPECIFIED CHRONICITY: ICD-10-CM

## 2025-06-25 DIAGNOSIS — Z96.652 S/P TOTAL KNEE ARTHROPLASTY, LEFT: Primary | ICD-10-CM

## 2025-06-25 PROCEDURE — 97140 MANUAL THERAPY 1/> REGIONS: CPT

## 2025-06-25 PROCEDURE — 97110 THERAPEUTIC EXERCISES: CPT

## 2025-06-25 NOTE — PROGRESS NOTES
"Daily Note     Today's date: 2025  Patient name: Ximena Yousif  : 1960  MRN: [id number]  Referring provider: Jack Peraza MD  Dx:   Encounter Diagnosis     ICD-10-CM    1. S/P total knee arthroplasty, left  Z96.652       2. Left knee pain, unspecified chronicity  M25.562           Start Time: 1200  Stop Time: 1252  Total time in clinic (min): 52 minutes    Subjective: Pt reports soreness for the rest of the day following therapy visits before diminishing.  Pt notes the feet have been hurting more, will try utilizing ice and performing exercises as issued previously to get ahead.            Objective: See treatment diary below  Precautions: hx HA, hx LBP, hx cancer, zhane knee pain, zhane shoulder pain, zhane JEREMY        Manuals     Patellar mobility  5 min 5 min 5 min 5 min HA 5 min 5 min  5 min   Oscillation into extension  5 min 5 min 5 min 5 min   5 min 5 min  5 min   Knee PROM  5 min 5 min 5 min 5 min   HA 5 min 5 min  5 min   measurements               15 min   Neuro Re-Ed                   Quad set         5\"x15        weight shift                  Elevated sit to stand      x10  x10                                                                                           Ther Ex                   Recumbent bike  7 min AROM 7 min AROM 7 min AROM 7 min AROM 7 min 7 min rocking 7 min AROM 7 min AROM   Heel slide flexion 5\"x15 w/ PT 5\"x15 w/ PT 5\"x15 w/ PT 5\" 15x 5\"x15 5\" 10x 5\" 15x with PT 5\" 15x with PT   Ankle pump                 LLLD knee extension stretch 3 min 3 min 3 min 3 min   3 min 3 min 3 min    SAQ      5\" 2x10 5\" 10x 5\" 10x     SLR  5\"x15 L 5\"x10 L 5\"x10 L 5\"x10 L 5\" 2x5 5\" 5x2 5\" 10x 5\" 10x L    LAQ         5\" 2x10                             Ther Activity                    Mat table mobility  5 min                                     Gait Training                    gait/stair training SPC           5 min                           Modalities                 "   Ice PRN  10 min  10 min  10 min 10 min    10 min  10 min 10 min                           Assessment: Tolerated treatment well.  Good quad control with SLR and also demonstrates good control with sit to stand exercise.  L LE fatigue noted at end of session.  Cont to progress as able.   Patient demonstrated fatigue post treatment and would benefit from continued PT      Plan: Progress treatment as tolerated.

## 2025-06-30 ENCOUNTER — OFFICE VISIT (OUTPATIENT)
Dept: PHYSICAL THERAPY | Facility: REHABILITATION | Age: 65
End: 2025-06-30
Attending: ORTHOPAEDIC SURGERY
Payer: MEDICARE

## 2025-06-30 DIAGNOSIS — M25.562 LEFT KNEE PAIN, UNSPECIFIED CHRONICITY: ICD-10-CM

## 2025-06-30 DIAGNOSIS — Z96.652 S/P TOTAL KNEE ARTHROPLASTY, LEFT: Primary | ICD-10-CM

## 2025-06-30 PROCEDURE — 97110 THERAPEUTIC EXERCISES: CPT

## 2025-06-30 PROCEDURE — 97140 MANUAL THERAPY 1/> REGIONS: CPT

## 2025-06-30 NOTE — PROGRESS NOTES
"Daily Note     Today's date: 2025  Patient name: Ximena Yousif  : 1960  MRN: [id number]  Referring provider: Jack Peraza MD  Dx:   Encounter Diagnosis     ICD-10-CM    1. S/P total knee arthroplasty, left  Z96.652       2. Left knee pain, unspecified chronicity  M25.562                      Subjective: Pt reports feeling stiff upon arrival to PT this visit.  Pt reports compliance with HEP about 1x daily.      Objective: See treatment diary below  Precautions: hx HA, hx LBP, hx cancer, zhane knee pain, zhane shoulder pain, zhane JEREMY        Manuals     Patellar mobility  5 min 5 min 5 min 5 min 5 min 5 min 5 min  5 min   Oscillation into extension  5 min 5 min 5 min 5 min 5 min 5 min 5 min  5 min   Knee PROM  5 min 5 min 5 min 5 min  5 min 5 min 5 min  5 min   measurements               15 min   Neuro Re-Ed                   Quad set         5\"x15        weight shift                  Elevated sit to stand      x10  x10  x15                                                                                         Ther Ex                   Recumbent bike  7 min AROM 7 min AROM 7 min AROM 7 min AROM 7 min AROM 7 min rocking 7 min AROM 7 min AROM   Heel slide flexion 5\"x15 w/ PT 5\"x15 w/ PT 5\"x15 w/ PT 5\" 15x 5\"x15 w/ PT 5\" 10x 5\" 15x with PT 5\" 15x with PT   Ankle pump                 LLLD knee extension stretch 3 min 3 min 3 min 3 min  3 min 3 min 3 min 3 min    SAQ       5\" 10x 5\" 10x     SLR  5\"x15 L 5\"x10 L 5\"x10 L 5\"x10 L 5\"x12 L 5\" 5x2 5\" 10x 5\" 10x L    LAQ                                      Ther Activity                    Mat table mobility  5 min                                     Gait Training                    gait/stair training SPC           5 min                           Modalities                   Ice PRN  10 min  10 min  10 min 10 min  10 min  10 min  10 min 10 min                           Assessment: Tolerated treatment well.  Pt demonstrates good control " with sit to stands and demonstrates improved strength with additoinal repetitions.  Discussed HEP and educated paitent to perform ROM throughout the day to assist with carryover between sessions and reducing stiffness.  Cont to progress as able. Patient demonstrated fatigue post treatment and would benefit from continued PT      Plan: Progress treatment as tolerated.

## 2025-07-03 ENCOUNTER — OFFICE VISIT (OUTPATIENT)
Dept: PHYSICAL THERAPY | Facility: REHABILITATION | Age: 65
End: 2025-07-03
Attending: ORTHOPAEDIC SURGERY
Payer: MEDICARE

## 2025-07-03 DIAGNOSIS — Z96.652 S/P TOTAL KNEE ARTHROPLASTY, LEFT: Primary | ICD-10-CM

## 2025-07-03 DIAGNOSIS — M25.562 LEFT KNEE PAIN, UNSPECIFIED CHRONICITY: ICD-10-CM

## 2025-07-03 PROCEDURE — 97110 THERAPEUTIC EXERCISES: CPT | Performed by: PHYSICAL THERAPIST

## 2025-07-03 PROCEDURE — 97140 MANUAL THERAPY 1/> REGIONS: CPT | Performed by: PHYSICAL THERAPIST

## 2025-07-03 NOTE — PROGRESS NOTES
"Daily Note     Today's date: 7/3/2025  Patient name: Ximena Yousif  : 1960  MRN: [id number]  Referring provider: Jack Peraza MD  Dx:   Encounter Diagnosis     ICD-10-CM    1. S/P total knee arthroplasty, left  Z96.652       2. Left knee pain, unspecified chronicity  M25.562           Start Time: 1745  Stop Time: 0  Total time in clinic (min): 55 minutes    Subjective: Patient reports that she is doing well overall.        Objective: See treatment diary below  Precautions: hx HA, hx LBP, hx cancer, zhane knee pain, zhane shoulder pain, zhane JEREMY        Manuals 6/16 6/19 6/23 6/25 6/30 7/3     Patellar mobility  5 min 5 min 5 min 5 min 5 min 5 min     Oscillation into extension  5 min 5 min 5 min 5 min 5 min 5 min     Knee PROM  5 min 5 min 5 min 5 min  5 min 5 min     Knee ext mobs      5 min     measurements                 Neuro Re-Ed                 Quad set              weight shift                Elevated sit to stand      x10  x10  x15  10x chair                                                                             Ther Ex                 Recumbent bike  7 min AROM 7 min AROM 7 min AROM 7 min AROM 7 min AROM 7 min AROM     Heel slide flexion 5\"x15 w/ PT 5\"x15 w/ PT 5\"x15 w/ PT 5\" 15x 5\"x15 w/ PT 5\" 10x     Ankle pump                LLLD knee extension stretch 3 min 3 min 3 min 3 min  3 min 3 min     SAQ            SLR  5\"x15 L 5\"x10 L 5\"x10 L 5\"x10 L 5\"x12 L 5\" 15x L                                         Ther Activity                    Mat table mobility  5 min                                     Gait Training                    gait/stair training SPC                                    Modalities                 Ice PRN  10 min  10 min  10 min 10 min  10 min  10 min                             Assessment: Tolerated treatment well.  Patient's knee extension approaching WFL. Knee flexion measured to 118 degrees.  Patient demonstrates good quad control with SLR.  Patient is able to progress sit to " stand to chair from cushion.  Patient demonstrated fatigue post treatment and would benefit from continued PT.      Plan: Progress treatment as tolerated.

## 2025-07-07 ENCOUNTER — OFFICE VISIT (OUTPATIENT)
Dept: PHYSICAL THERAPY | Facility: REHABILITATION | Age: 65
End: 2025-07-07
Attending: ORTHOPAEDIC SURGERY
Payer: MEDICARE

## 2025-07-07 DIAGNOSIS — M25.562 LEFT KNEE PAIN, UNSPECIFIED CHRONICITY: ICD-10-CM

## 2025-07-07 DIAGNOSIS — Z96.652 S/P TOTAL KNEE ARTHROPLASTY, LEFT: Primary | ICD-10-CM

## 2025-07-07 PROCEDURE — 97140 MANUAL THERAPY 1/> REGIONS: CPT

## 2025-07-07 PROCEDURE — 97110 THERAPEUTIC EXERCISES: CPT

## 2025-07-07 NOTE — PROGRESS NOTES
"Daily Note     Today's date: 2025  Patient name: Ximena Yousif  : 1960  MRN: [id number]  Referring provider: Jack Peraza MD  Dx:   Encounter Diagnosis     ICD-10-CM    1. S/P total knee arthroplasty, left  Z96.652       2. Left knee pain, unspecified chronicity  M25.562           Start Time: 1615  Stop Time: 1710  Total time in clinic (min): 55 minutes    Subjective: Patient reports having a bad day today.  Pt reports global pain and reports this can happen when she is between injections.  Pt notes having pain to the proximal lateral part of lower L leg.      Objective: See treatment diary below  Precautions: hx HA, hx LBP, hx cancer, zhane knee pain, zhane shoulder pain, zhane JEREMY        Manuals 6/16 6/19 6/23 6/25 6/30 7/3 7/7    Patellar mobility  5 min 5 min 5 min 5 min 5 min 5 min 5 min    Oscillation into extension  5 min 5 min 5 min 5 min 5 min 5 min 5 min    Knee PROM  5 min 5 min 5 min 5 min  5 min 5 min 5 min    Knee ext mobs      5 min 5 min glides    measurements                 Neuro Re-Ed                 Quad set              weight shift                Elevated sit to stand      x10  x10  x15  10x chair x10 chair                                                                            Ther Ex                 Recumbent bike  7 min AROM 7 min AROM 7 min AROM 7 min AROM 7 min AROM 7 min AROM 7 min AROM    Heel slide flexion 5\"x15 w/ PT 5\"x15 w/ PT 5\"x15 w/ PT 5\" 15x 5\"x15 w/ PT 5\" 10x 5\"x15    Ankle pump                LLLD knee extension stretch 3 min 3 min 3 min 3 min  3 min 3 min 3 min    SAQ            SLR  5\"x15 L 5\"x10 L 5\"x10 L 5\"x10 L 5\"x12 L 5\" 15x L 5\"x15 L                                        Ther Activity                    Mat table mobility  5 min                                     Gait Training                    gait/stair training SPC                                    Modalities                 Ice PRN  10 min  10 min  10 min 10 min  10 min  10 min 10 min                      "       Assessment: Tolerated treatment well.  Sit to stands performed in chair with good tolerance and appropriate fatigue with exercise.  Knee ext ROM improves with manual therapy.  Cont to progress as able. Patient demonstrated fatigue post treatment and would benefit from continued PT.      Plan: Progress treatment as tolerated.               Pt was treated via unsupervised time from 4:15 - 4:20 pm and was 1:1 with treating clinician for rest of session.

## 2025-07-10 ENCOUNTER — EVALUATION (OUTPATIENT)
Dept: PHYSICAL THERAPY | Facility: REHABILITATION | Age: 65
End: 2025-07-10
Attending: ORTHOPAEDIC SURGERY
Payer: MEDICARE

## 2025-07-10 DIAGNOSIS — M25.562 LEFT KNEE PAIN, UNSPECIFIED CHRONICITY: ICD-10-CM

## 2025-07-10 DIAGNOSIS — Z96.652 S/P TOTAL KNEE ARTHROPLASTY, LEFT: Primary | ICD-10-CM

## 2025-07-10 PROCEDURE — 97110 THERAPEUTIC EXERCISES: CPT | Performed by: PHYSICAL THERAPIST

## 2025-07-10 PROCEDURE — 97140 MANUAL THERAPY 1/> REGIONS: CPT | Performed by: PHYSICAL THERAPIST

## 2025-07-10 NOTE — PROGRESS NOTES
PT Re-Evaluation     Today's date: 7/10/2025  Patient name: Ximena Yousif  : 1960  MRN: [id number]  Referring provider: Jack Peraza MD  Dx:   Encounter Diagnosis     ICD-10-CM    1. S/P total knee arthroplasty, left  Z96.652       2. Left knee pain, unspecified chronicity  M25.562             Start Time: 1615  Stop Time: 1710  Total time in clinic (min): 55 minutes    Assessment  Impairments: abnormal gait, abnormal or restricted ROM, activity intolerance and impaired physical strength    Assessment details: Patient is a 65 y.o. female that presents s/p L TKA on 2025.  Patient reports improvement with skilled physical therapy services.  FOTO improved by 22 points to 38/100.  Patient reports improvement with ambulation, negotiation of stairs,  transfers, and ADLS.  Patient reports continued difficulty with ambulation and negotiation of stairs.  Patient has made good progress towards goals established for physical therapy.  Patient would benefit from continued skilled physical therapy services for continued strengthening and ROM to maximize function.      .        Understanding of Dx/Px/POC: good    Goals  Impairment:  1.  Patient will reports 50% reduction in pain in 4 weeks to maximize function.-PARTIALLY MET  2.  Patient will improve strength to 4/5 in all planes to maximize function.-PARTIALLY MET  3.  Patient will improve ROM to 0-120 degrees in 4 weeks to maximize function.-PARTIALLY MET    Functional:  1. Patient will improve FOTO by 41 points to 57/100 in 4 weeks to maximize function.-PARTIALLY MET  2. Patient will be independent with HEP in 4 weeks to maximize function.-MET  3. Patient will report no difficulty with ambulation in 4 weeks to maximize function.-PARTIALLY MET  4. Patient will report no difficulty with negotiation of stairs in 4 weeks to maximize function.-PARTIALLY MET  5. Patient will report no difficulty with transfers in 4 weeks to maximize function.-MET          Plan  Patient would  benefit from: skilled physical therapy  Planned modality interventions: cryotherapy    Planned therapy interventions: manual therapy, IASTM, activity modification, balance, patient/caregiver education, neuromuscular re-education, therapeutic exercise, therapeutic activities, stretching, strengthening, functional ROM exercises, home exercise program and gait training    Frequency: 2x week  Duration in weeks: 4  Treatment plan discussed with: patient  Plan details: Patient will be a RE in 4 weeks.          Subjective Evaluation    History of Present Illness  Date of surgery: 2025  Mechanism of injury: surgery  Mechanism of injury: Patient presents post-op L TKA on 2025.  She reports a history of zhane knee injections for several years.  She was originally going to have her right knee replaced first, but had her left knee replaced first as this is most bothersome.  Patient reports difficulty with ambulation, negotiation of stairs (up and down), transfers, ADLS, and any functional activities on her feet.  She feels her knee will buckle or give out on her pre-op.  Patient Goals  Patient goals for therapy: increased strength, increased motion and decreased pain    Pain  At best pain ratin  At worst pain ratin  Location: L knee  Quality: stiff.  Aggravating factors: stair climbing, walking and standing  Progression: worsening    Treatments  Current treatment: physical therapy        Objective     Observations   Left Knee   Positive for edema and effusion. Negative for drainage and trophic changes.     Passive Range of Motion   Left Knee   Flexion: 121 degrees   Extension: 0 degrees     Right Knee   Flexion: 127 degrees     Strength/Myotome Testing     Left Hip   Planes of Motion   Flexion: 4+    Right Hip   Planes of Motion   Flexion: 4+    Left Knee   Flexion: 4  Extension: 4+  Quadriceps contraction: good    Right Knee   Flexion: 4+  Extension: 4    Additional Strength Details  No lag with SLR within  "available ROM    Ambulation   Weight-Bearing Status   Weight-Bearing Status (Left): weight-bearing as tolerated   Assistive device used: single point cane    Observational Gait   Decreased walking speed and stride length.                Precautions: hx HA, hx LBP, hx cancer, zhane knee pain, zhane shoulder pain, zhane JEREMY        Manuals 6/16 6/19 6/23 6/25 6/30 7/3 7/7  7/10   Patellar mobility  5 min 5 min 5 min 5 min 5 min 5 min 5 min  5 min   Oscillation into extension  5 min 5 min 5 min 5 min 5 min 5 min 5 min  5 min   Knee PROM  5 min 5 min 5 min 5 min  5 min 5 min 5 min     Knee ext mobs           5 min 5 min glides  5 min   Patellar distraction        NV   measurements               10 min   Neuro Re-Ed                   Quad set                    weight shift                   Elevated sit to stand      x10  x10  x15  10x chair x10 chair     Lateral step down (SL squat)                NV                                                               Ther Ex                   Recumbent bike  7 min AROM 7 min AROM 7 min AROM 7 min AROM 7 min AROM 7 min AROM 7 min AROM 7 min AROM, 3 min bike on   Heel slide flexion 5\"x15 w/ PT 5\"x15 w/ PT 5\"x15 w/ PT 5\" 15x 5\"x15 w/ PT 5\" 10x 5\"x15 5\" 15x   Ankle pump                   LLLD knee extension stretch 3 min 3 min 3 min 3 min  3 min 3 min 3 min 3 min   SAQ                   SLR  5\"x15 L 5\"x10 L 5\"x10 L 5\"x10 L 5\"x12 L 5\" 15x L 5\"x15 L  5\" 15x                                           Ther Activity                    Mat table mobility  5 min                                     Gait Training                    gait/stair training SPC                                       Modalities                   Ice PRN  10 min  10 min  10 min 10 min  10 min  10 min 10 min  10 min                                       "

## 2025-07-10 NOTE — LETTER
2025    Jack Peraza MD  [address] [facility] [address]    Patient: Ximena Yousif   YOB: 1960   Date of Visit: 7/10/2025     Encounter Diagnosis     ICD-10-CM    1. S/P total knee arthroplasty, left  Z96.652       2. Left knee pain, unspecified chronicity  M25.562           Dear Dr. Jack Peraza MD:    Thank you for your recent referral of Ximena Yousif. Please review the attached evaluation summary from Ximena's recent visit.     Please verify that you agree with the plan of care by signing the attached order.     If you have any questions or concerns, please do not hesitate to call.     I sincerely appreciate the opportunity to share in the care of one of your patients and hope to have another opportunity to work with you in the near future.       Sincerely,    Kvng Patterson, PT      Referring Provider:      I certify that I have read the below Plan of Care and certify the need for these services furnished under this plan of treatment while under my care.                    Jack Peraza MD  [address] [facility] [address]  Via Fax: [phone]          PT Re-Evaluation     Today's date: 7/10/2025  Patient name: Ximena Yousif  : 1960  MRN: [id number]  Referring provider: Jack Peraza MD  Dx:   Encounter Diagnosis     ICD-10-CM    1. S/P total knee arthroplasty, left  Z96.652       2. Left knee pain, unspecified chronicity  M25.562             Start Time: 1615  Stop Time: 1710  Total time in clinic (min): 55 minutes    Assessment  Impairments: abnormal gait, abnormal or restricted ROM, activity intolerance and impaired physical strength    Assessment details: Patient is a 65 y.o. female that presents s/p L TKA on 2025.  Patient reports improvement with skilled physical therapy services.  FOTO improved by 22 points to 38/100.  Patient reports improvement with ambulation, negotiation of stairs,  transfers, and ADLS.  Patient reports continued  difficulty with ambulation and negotiation of stairs.  Patient has made good progress towards goals established for physical therapy.  Patient would benefit from continued skilled physical therapy services for continued strengthening and ROM to maximize function.      .        Understanding of Dx/Px/POC: good    Goals  Impairment:  1.  Patient will reports 50% reduction in pain in 4 weeks to maximize function.-PARTIALLY MET  2.  Patient will improve strength to 4/5 in all planes to maximize function.-PARTIALLY MET  3.  Patient will improve ROM to 0-120 degrees in 4 weeks to maximize function.-PARTIALLY MET    Functional:  1. Patient will improve FOTO by 41 points to 57/100 in 4 weeks to maximize function.-PARTIALLY MET  2. Patient will be independent with HEP in 4 weeks to maximize function.-MET  3. Patient will report no difficulty with ambulation in 4 weeks to maximize function.-PARTIALLY MET  4. Patient will report no difficulty with negotiation of stairs in 4 weeks to maximize function.-PARTIALLY MET  5. Patient will report no difficulty with transfers in 4 weeks to maximize function.-MET          Plan  Patient would benefit from: skilled physical therapy  Planned modality interventions: cryotherapy    Planned therapy interventions: manual therapy, IASTM, activity modification, balance, patient/caregiver education, neuromuscular re-education, therapeutic exercise, therapeutic activities, stretching, strengthening, functional ROM exercises, home exercise program and gait training    Frequency: 2x week  Duration in weeks: 4  Treatment plan discussed with: patient  Plan details: Patient will be a RE in 4 weeks.          Subjective Evaluation    History of Present Illness  Date of surgery: 5/12/2025  Mechanism of injury: surgery  Mechanism of injury: Patient presents post-op L TKA on 5/12/2025.  She reports a history of zhane knee injections for several years.  She was originally going to have her right knee replaced  first, but had her left knee replaced first as this is most bothersome.  Patient reports difficulty with ambulation, negotiation of stairs (up and down), transfers, ADLS, and any functional activities on her feet.  She feels her knee will buckle or give out on her pre-op.  Patient Goals  Patient goals for therapy: increased strength, increased motion and decreased pain    Pain  At best pain ratin  At worst pain ratin  Location: L knee  Quality: stiff.  Aggravating factors: stair climbing, walking and standing  Progression: worsening    Treatments  Current treatment: physical therapy        Objective     Observations   Left Knee   Positive for edema and effusion. Negative for drainage and trophic changes.     Passive Range of Motion   Left Knee   Flexion: 121 degrees   Extension: 0 degrees     Right Knee   Flexion: 127 degrees     Strength/Myotome Testing     Left Hip   Planes of Motion   Flexion: 4+    Right Hip   Planes of Motion   Flexion: 4+    Left Knee   Flexion: 4  Extension: 4+  Quadriceps contraction: good    Right Knee   Flexion: 4+  Extension: 4    Additional Strength Details  No lag with SLR within available ROM    Ambulation   Weight-Bearing Status   Weight-Bearing Status (Left): weight-bearing as tolerated   Assistive device used: single point cane    Observational Gait   Decreased walking speed and stride length.                Precautions: hx HA, hx LBP, hx cancer, zhane knee pain, zhane shoulder pain, zhane JEREMY        Manuals  7/3 7  7/10   Patellar mobility  5 min 5 min 5 min 5 min 5 min 5 min 5 min  5 min   Oscillation into extension  5 min 5 min 5 min 5 min 5 min 5 min 5 min  5 min   Knee PROM  5 min 5 min 5 min 5 min  5 min 5 min 5 min     Knee ext mobs           5 min 5 min glides  5 min   Patellar distraction        NV   measurements               10 min   Neuro Re-Ed                   Quad set                    weight shift                   Elevated sit to stand    "   x10  x10  x15  10x chair x10 chair     Lateral step down (SL squat)                NV                                                               Ther Ex                   Recumbent bike  7 min AROM 7 min AROM 7 min AROM 7 min AROM 7 min AROM 7 min AROM 7 min AROM 7 min AROM, 3 min bike on   Heel slide flexion 5\"x15 w/ PT 5\"x15 w/ PT 5\"x15 w/ PT 5\" 15x 5\"x15 w/ PT 5\" 10x 5\"x15 5\" 15x   Ankle pump                   LLLD knee extension stretch 3 min 3 min 3 min 3 min  3 min 3 min 3 min 3 min   SAQ                   SLR  5\"x15 L 5\"x10 L 5\"x10 L 5\"x10 L 5\"x12 L 5\" 15x L 5\"x15 L  5\" 15x                                           Ther Activity                    Mat table mobility  5 min                                     Gait Training                    gait/stair training SPC                                       Modalities                   Ice PRN  10 min  10 min  10 min 10 min  10 min  10 min 10 min  10 min                                                       "

## 2025-07-14 ENCOUNTER — OFFICE VISIT (OUTPATIENT)
Dept: PHYSICAL THERAPY | Facility: REHABILITATION | Age: 65
End: 2025-07-14
Attending: ORTHOPAEDIC SURGERY
Payer: MEDICARE

## 2025-07-14 DIAGNOSIS — Z96.652 S/P TOTAL KNEE ARTHROPLASTY, LEFT: Primary | ICD-10-CM

## 2025-07-14 DIAGNOSIS — M25.562 LEFT KNEE PAIN, UNSPECIFIED CHRONICITY: ICD-10-CM

## 2025-07-14 PROCEDURE — 97110 THERAPEUTIC EXERCISES: CPT

## 2025-07-14 PROCEDURE — 97140 MANUAL THERAPY 1/> REGIONS: CPT

## 2025-07-14 NOTE — PROGRESS NOTES
"Daily Note     Today's date: 2025  Patient name: Ximena Yousif  : 1960  MRN: [id number]  Referring provider: Jack Peraza MD  Dx:   Encounter Diagnosis     ICD-10-CM    1. S/P total knee arthroplasty, left  Z96.652       2. Left knee pain, unspecified chronicity  M25.562           Total time in clinic (min): 55 minutes    Subjective: Pt reports she was in the ED today.  Pt notes starting with chest pain and tightness/heaviness in the chest with difficulty breathing starting around 5 pm last night.  Pt notes the symptoms subsided within an hour, but had episodes again at 9 pm and 11:15 pm.  Pt notes during the 11:15 pm episode she laid down and experienced a pain that shot up her chest into the neck and jaw region.  Pt notes another episode this AM, reached out to physician whom recommended she go to the ED for further testing.  Pt notes going to the ED this afternoon and was cleared after a cardiac work up.  Pt reports negative EKG and CT findings to r/o DVT.      Objective: See treatment diary below    Precautions: hx HA, hx LBP, hx cancer, zhane knee pain, zhane shoulder pain, zhane JEREMY        Manuals 7/14 6/19 6/23 6/25 6/30 7/3 7/7  7/10   Patellar mobility 5 min 5 min 5 min 5 min 5 min 5 min 5 min  5 min   Oscillation into extension 5 min 5 min 5 min 5 min 5 min 5 min 5 min  5 min   Knee PROM  5 min 5 min 5 min  5 min 5 min 5 min     Knee ext mobs 5 min         5 min 5 min glides  5 min   Patellar distraction NV       NV   measurements               10 min   Neuro Re-Ed                   Quad set                    weight shift                   Elevated sit to stand      x10  x10  x15  10x chair x10 chair     Lateral step down (SL squat)  0R x10 ea              NV                                                               Ther Ex                   Recumbent bike 5 min bike on 7 min AROM 7 min AROM 7 min AROM 7 min AROM 7 min AROM 7 min AROM 7 min AROM, 3 min bike on   Heel slide flexion 5\"x15 5\"x15 w/ PT " "5\"x15 w/ PT 5\" 15x 5\"x15 w/ PT 5\" 10x 5\"x15 5\" 15x   Ankle pump                   LLLD knee extension stretch 3 min 3 min 3 min 3 min  3 min 3 min 3 min 3 min   SAQ                   SLR 5\"x15 5\"x10 L 5\"x10 L 5\"x10 L 5\"x12 L 5\" 15x L 5\"x15 L  5\" 15x                                           Ther Activity                    Mat table mobility                                      Gait Training                    gait/stair training SPC                                       Modalities                   Ice PRN  10 min  10 min  10 min 10 min  10 min  10 min 10 min  10 min                                Assessment: Tolerated treatment well. Patellar hypomobility in all directions.  Held on addition of patellar distraction secondary to incision healing.  Will add NV if appropriate. Pt is challenged by addition of lateral step down this visit.  Educated to perform as part of HEP. Patient demonstrated fatigue post treatment and would benefit from continued PT      Plan: Progress treatment as tolerated.              "

## 2025-07-17 ENCOUNTER — OFFICE VISIT (OUTPATIENT)
Dept: PHYSICAL THERAPY | Facility: REHABILITATION | Age: 65
End: 2025-07-17
Attending: ORTHOPAEDIC SURGERY
Payer: MEDICARE

## 2025-07-17 DIAGNOSIS — M76.62 TENDONITIS, ACHILLES, LEFT: ICD-10-CM

## 2025-07-17 DIAGNOSIS — M25.562 LEFT KNEE PAIN, UNSPECIFIED CHRONICITY: ICD-10-CM

## 2025-07-17 DIAGNOSIS — Z96.652 S/P TOTAL KNEE ARTHROPLASTY, LEFT: Primary | ICD-10-CM

## 2025-07-17 DIAGNOSIS — M76.61 TENDONITIS, ACHILLES, RIGHT: ICD-10-CM

## 2025-07-17 PROCEDURE — 97110 THERAPEUTIC EXERCISES: CPT

## 2025-07-17 PROCEDURE — 97140 MANUAL THERAPY 1/> REGIONS: CPT

## 2025-07-17 NOTE — PROGRESS NOTES
"Daily Note     Today's date: 2025  Patient name: Ximena Yousif  : 1960  MRN: [id number]  Referring provider: Jack Peraza MD  Dx:   Encounter Diagnosis     ICD-10-CM    1. S/P total knee arthroplasty, left  Z96.652       2. Left knee pain, unspecified chronicity  M25.562       3. Tendonitis, Achilles, right  M76.61       4. Tendonitis, Achilles, left  M76.62           Total time in clinic (min): 55 minutes    Subjective: Pt reports the L knee feels tight.          Objective: See treatment diary below    Precautions: hx HA, hx LBP, hx cancer, zhane knee pain, zhane shoulder pain, zhane JEREMY        Manuals 7/14 7/17 6/23 6/25 6/30 7/3 7/7  7/10   Patellar mobility 5 min 5 min 5 min 5 min 5 min 5 min 5 min  5 min   Oscillation into extension 5 min 3 min 5 min 5 min 5 min 5 min 5 min  5 min   Knee PROM   5 min 5 min  5 min 5 min 5 min     Knee ext mobs 5 min 5 min       5 min 5 min glides  5 min   Patellar distraction NV 5 min      NV   measurements               10 min   Neuro Re-Ed                   Quad set                    weight shift                   Elevated sit to stand    x15 chair  x10  x10  x15  10x chair x10 chair     Lateral step down (SL squat)  0R x10 ea  0R x15 L            NV                                                               Ther Ex                   Recumbent bike 5 min bike on 6 min bike on 7 min AROM 7 min AROM 7 min AROM 7 min AROM 7 min AROM 7 min AROM, 3 min bike on   Heel slide flexion 5\"x15 5\"x15 5\"x15 w/ PT 5\" 15x 5\"x15 w/ PT 5\" 10x 5\"x15 5\" 15x   Ankle pump                   LLLD knee extension stretch 3 min 3 min 3 min 3 min  3 min 3 min 3 min 3 min   SAQ                  SLR 5\"x15 5\"x15 5\"x10 L 5\"x10 L 5\"x12 L 5\" 15x L 5\"x15 L  5\" 15x                                           Ther Activity                    Mat table mobility                                      Gait Training                    gait/stair training SPC                                       Modalities         "           Ice PRN  10 min  10 min  10 min 10 min  10 min  10 min 10 min  10 min                                Assessment: Tolerated treatment well. Added patellar distraction with plunger this visit with good tolerance.  Improved gait noted following manuals.  Good control demonstrated with lateral step down.  Patient demonstrated fatigue post treatment and would benefit from continued PT      Plan: Progress treatment as tolerated.

## 2025-07-21 ENCOUNTER — OFFICE VISIT (OUTPATIENT)
Dept: PHYSICAL THERAPY | Facility: REHABILITATION | Age: 65
End: 2025-07-21
Attending: ORTHOPAEDIC SURGERY
Payer: MEDICARE

## 2025-07-21 DIAGNOSIS — Z96.652 S/P TOTAL KNEE ARTHROPLASTY, LEFT: Primary | ICD-10-CM

## 2025-07-21 DIAGNOSIS — M25.562 LEFT KNEE PAIN, UNSPECIFIED CHRONICITY: ICD-10-CM

## 2025-07-21 PROCEDURE — 97110 THERAPEUTIC EXERCISES: CPT

## 2025-07-21 PROCEDURE — 97140 MANUAL THERAPY 1/> REGIONS: CPT

## 2025-07-21 NOTE — PROGRESS NOTES
"Daily Note     Today's date: 2025  Patient name: Ximena Yousif  : 1960  MRN: [id number]  Referring provider: Jack Peraza MD  Dx:   Encounter Diagnosis     ICD-10-CM    1. S/P total knee arthroplasty, left  Z96.652       2. Left knee pain, unspecified chronicity  M25.562           Total time in clinic (min): 55 minutes    Subjective: Pt reports overdoing it the last few days.  Pt notes going to a [employer] and [employer] market which required a lot of ambulation and walking up/down ramps.  Pt also notes doing her laundry for the first time since surgery yesterday.        Objective: See treatment diary below    Precautions: hx HA, hx LBP, hx cancer, zhane knee pain, zhane shoulder pain, zhane JEREMY        Manuals 7/14 7/17 7/21 6/25 6/30 7/3 7/7  7/10   Patellar mobility 5 min 5 min 5 min 5 min 5 min 5 min 5 min  5 min   Oscillation into extension 5 min 3 min 3 min 5 min 5 min 5 min 5 min  5 min   Knee PROM    5 min  5 min 5 min 5 min     Knee ext mobs 5 min 5 min 5 min      5 min 5 min glides  5 min   Patellar distraction NV 5 min 5 min     NV   measurements               10 min   Neuro Re-Ed                   Quad set                    weight shift                   Elevated sit to stand    x15 chair 15 chair  x10  x15  10x chair x10 chair     Lateral step down (SL squat)  0R x10 ea  0R x15 L  0R x10 ea          NV                                                               Ther Ex                   Recumbent bike 5 min bike on 6 min bike on 7 min bike on 7 min AROM 7 min AROM 7 min AROM 7 min AROM 7 min AROM, 3 min bike on   Heel slide flexion 5\"x15 5\"x15 5\"x15 5\" 15x 5\"x15 w/ PT 5\" 10x 5\"x15 5\" 15x   Ankle pump                   LLLD knee extension stretch 3 min 3 min 3 min 3 min  3 min 3 min 3 min 3 min   SAQ                  SLR 5\"x15 5\"x15 5\"x15 5\"x10 L 5\"x12 L 5\" 15x L 5\"x15 L  5\" 15x                                           Ther Activity                    Mat table mobility                              "         Gait Training                    gait/stair training SPC                                       Modalities                   Ice PRN  10 min  10 min  10 min 10 min  10 min  10 min 10 min  10 min                                Assessment: Tolerated treatment well. Good tolerance to addition of patellar distraction with improved patellar ROM demonstrated upon conclusion.  Good control demonstrated with SLR.  L knee discomfort with sit to stands attributed to fatigue and activity levels. Assess NV and cont to progress as able. Patient demonstrated fatigue post treatment and would benefit from continued PT      Plan: Progress treatment as tolerated.

## 2025-07-23 ENCOUNTER — APPOINTMENT (OUTPATIENT)
Dept: PHYSICAL THERAPY | Facility: REHABILITATION | Age: 65
End: 2025-07-23
Attending: ORTHOPAEDIC SURGERY
Payer: MEDICARE

## 2025-07-24 ENCOUNTER — OFFICE VISIT (OUTPATIENT)
Dept: PHYSICAL THERAPY | Facility: REHABILITATION | Age: 65
End: 2025-07-24
Attending: ORTHOPAEDIC SURGERY
Payer: MEDICARE

## 2025-07-24 DIAGNOSIS — M25.562 LEFT KNEE PAIN, UNSPECIFIED CHRONICITY: ICD-10-CM

## 2025-07-24 DIAGNOSIS — Z96.652 S/P TOTAL KNEE ARTHROPLASTY, LEFT: Primary | ICD-10-CM

## 2025-07-24 PROCEDURE — 97110 THERAPEUTIC EXERCISES: CPT | Performed by: PHYSICAL THERAPIST

## 2025-07-24 PROCEDURE — 97140 MANUAL THERAPY 1/> REGIONS: CPT | Performed by: PHYSICAL THERAPIST

## 2025-07-24 NOTE — PROGRESS NOTES
"Daily Note     Today's date: 2025  Patient name: Ximena Yousif  : 1960  MRN: [id number]  Referring provider: Jack Peraza MD  Dx:   Encounter Diagnosis     ICD-10-CM    1. S/P total knee arthroplasty, left  Z96.652       2. Left knee pain, unspecified chronicity  M25.562           Total time in clinic (min): 55 minutes    Subjective: Patient reports bilateral achilles/heel pain this afternoon.  Patient reports that her knee is doing okay overall.  She has not bene able to find a good spot to do her lateral step up/down at home.          Objective: See treatment diary below    Precautions: hx HA, hx LBP, hx cancer, zhane knee pain, zhane shoulder pain, zhane JEREMY        Manuals 7/14 7/17 7/21 7/24   7/7  7/10   Patellar mobility 5 min 5 min 5 min 5 min   5 min  5 min   Oscillation into extension 5 min 3 min 3 min 5 min   5 min  5 min   Knee PROM    5 min    5 min     Knee ext mobs 5 min 5 min 5 min  5 min   5 min glides  5 min   Patellar distraction NV 5 min 5 min 5 min    NV   measurements             10 min   Neuro Re-Ed                 Quad set                  weight shift                 Elevated sit to stand    x15 chair 15 chair    x10 chair     Lateral step down (SL squat)  0R x10 ea  0R x15 L  0R x10 ea  0R 20x L        NV                                                               Ther Ex                   Recumbent bike 5 min bike on 6 min bike on 7 min bike on 8 min bike on   7 min AROM 7 min AROM, 3 min bike on   Heel slide flexion 5\"x15 5\"x15 5\"x15 5\" 15x   5\"x15 5\" 15x   Ankle pump                 LLLD knee extension stretch 3 min 3 min 3 min 3 min   3 min 3 min   SAQ                SLR 5\"x15 5\"x15 5\"x15 5\"x15 L   5\"x15 L  5\" 15x                                           Ther Activity                    Mat table mobility                                      Gait Training                    gait/stair training SPC                                       Modalities                   Ice PRN  10 min  " 10 min  10 min 10 min   10 min  10 min                                Assessment: Tolerated treatment well.  Patient presents with knee extension ROM WFL.  Knee flexion to 125 degrees this visit.  Continued improvement with SLR.  Patient has improved patellar mobility compared to last RE. Patient demonstrated fatigue post treatment and would benefit from continued PT      Plan: Progress treatment as tolerated.

## 2025-07-28 ENCOUNTER — OFFICE VISIT (OUTPATIENT)
Dept: PHYSICAL THERAPY | Facility: REHABILITATION | Age: 65
End: 2025-07-28
Attending: ORTHOPAEDIC SURGERY
Payer: MEDICARE

## 2025-07-28 DIAGNOSIS — Z96.652 S/P TOTAL KNEE ARTHROPLASTY, LEFT: Primary | ICD-10-CM

## 2025-07-28 DIAGNOSIS — M25.562 LEFT KNEE PAIN, UNSPECIFIED CHRONICITY: ICD-10-CM

## 2025-07-28 PROCEDURE — 97140 MANUAL THERAPY 1/> REGIONS: CPT

## 2025-07-28 PROCEDURE — 97110 THERAPEUTIC EXERCISES: CPT

## 2025-07-31 ENCOUNTER — OFFICE VISIT (OUTPATIENT)
Dept: PHYSICAL THERAPY | Facility: REHABILITATION | Age: 65
End: 2025-07-31
Attending: ORTHOPAEDIC SURGERY
Payer: MEDICARE

## 2025-07-31 DIAGNOSIS — M25.562 LEFT KNEE PAIN, UNSPECIFIED CHRONICITY: ICD-10-CM

## 2025-07-31 DIAGNOSIS — Z96.652 S/P TOTAL KNEE ARTHROPLASTY, LEFT: Primary | ICD-10-CM

## 2025-07-31 PROCEDURE — 97110 THERAPEUTIC EXERCISES: CPT

## 2025-07-31 PROCEDURE — 97140 MANUAL THERAPY 1/> REGIONS: CPT

## 2025-08-04 ENCOUNTER — OFFICE VISIT (OUTPATIENT)
Dept: PHYSICAL THERAPY | Facility: REHABILITATION | Age: 65
End: 2025-08-04
Attending: ORTHOPAEDIC SURGERY
Payer: MEDICARE

## 2025-08-04 DIAGNOSIS — Z96.652 S/P TOTAL KNEE ARTHROPLASTY, LEFT: Primary | ICD-10-CM

## 2025-08-04 DIAGNOSIS — M25.562 LEFT KNEE PAIN, UNSPECIFIED CHRONICITY: ICD-10-CM

## 2025-08-04 PROCEDURE — 97140 MANUAL THERAPY 1/> REGIONS: CPT

## 2025-08-04 PROCEDURE — 97110 THERAPEUTIC EXERCISES: CPT

## 2025-08-07 ENCOUNTER — OFFICE VISIT (OUTPATIENT)
Dept: PHYSICAL THERAPY | Facility: REHABILITATION | Age: 65
End: 2025-08-07
Attending: ORTHOPAEDIC SURGERY
Payer: MEDICARE

## 2025-08-07 DIAGNOSIS — Z96.652 S/P TOTAL KNEE ARTHROPLASTY, LEFT: Primary | ICD-10-CM

## 2025-08-07 DIAGNOSIS — M25.562 LEFT KNEE PAIN, UNSPECIFIED CHRONICITY: ICD-10-CM

## 2025-08-07 PROCEDURE — 97140 MANUAL THERAPY 1/> REGIONS: CPT

## 2025-08-07 PROCEDURE — 97110 THERAPEUTIC EXERCISES: CPT

## 2025-08-19 ENCOUNTER — EVALUATION (OUTPATIENT)
Dept: PHYSICAL THERAPY | Facility: REHABILITATION | Age: 65
End: 2025-08-19
Attending: ORTHOPAEDIC SURGERY
Payer: MEDICARE

## 2025-08-19 DIAGNOSIS — M25.562 LEFT KNEE PAIN, UNSPECIFIED CHRONICITY: ICD-10-CM

## 2025-08-19 DIAGNOSIS — Z96.652 S/P TOTAL KNEE ARTHROPLASTY, LEFT: Primary | ICD-10-CM

## 2025-08-19 PROCEDURE — 97140 MANUAL THERAPY 1/> REGIONS: CPT | Performed by: PHYSICAL THERAPIST

## 2025-08-19 PROCEDURE — 97110 THERAPEUTIC EXERCISES: CPT | Performed by: PHYSICAL THERAPIST

## 2025-08-21 ENCOUNTER — OFFICE VISIT (OUTPATIENT)
Dept: PHYSICAL THERAPY | Facility: REHABILITATION | Age: 65
End: 2025-08-21
Attending: ORTHOPAEDIC SURGERY
Payer: MEDICARE

## 2025-08-21 DIAGNOSIS — Z96.652 S/P TOTAL KNEE ARTHROPLASTY, LEFT: Primary | ICD-10-CM

## 2025-08-21 DIAGNOSIS — M25.562 LEFT KNEE PAIN, UNSPECIFIED CHRONICITY: ICD-10-CM

## 2025-08-21 PROCEDURE — 97112 NEUROMUSCULAR REEDUCATION: CPT

## 2025-08-21 PROCEDURE — 97110 THERAPEUTIC EXERCISES: CPT

## 2025-08-21 PROCEDURE — 97140 MANUAL THERAPY 1/> REGIONS: CPT

## (undated) DEVICE — SYRINGE 10ML LL

## (undated) DEVICE — PENCIL ELECTROSURG E-Z CLEAN -0035H

## (undated) DEVICE — GLOVE SRG BIOGEL 6.5

## (undated) DEVICE — COBAN 4 IN STERILE

## (undated) DEVICE — GLOVE INDICATOR PI UNDERGLOVE SZ 7 BLUE

## (undated) DEVICE — TRAY FOLEY 16FR URIMETER SURESTEP

## (undated) DEVICE — SURGICAL GOWN, XL SMARTSLEEVE: Brand: CONVERTORS

## (undated) DEVICE — ADHESIVE SKN CLSR HISTOACRYL FLEX 0.5ML LF

## (undated) DEVICE — SAW BLADE OSCILLATING BRAZOL 167

## (undated) DEVICE — BIPOLAR SEALER 23-113-1 AQM 2.3: Brand: AQUAMANTYS™

## (undated) DEVICE — CAPIT HIP COP -CERAMIC ON POLY

## (undated) DEVICE — COBAN 6 IN STERILE

## (undated) DEVICE — 2963 MEDIPORE SOFT CLOTH TAPE 3 IN X 10 YD 12 RLS/CS: Brand: 3M™ MEDIPORE™

## (undated) DEVICE — 3000CC GUARDIAN II: Brand: GUARDIAN

## (undated) DEVICE — SUT MONOCRYL 3-0 PS-2 27 IN Y427H

## (undated) DEVICE — 3M™ STERI-STRIP™ REINFORCED ADHESIVE SKIN CLOSURES, R1547, 1/2 IN X 4 IN (12 MM X 100 MM), 6 STRIPS/ENVELOPE: Brand: 3M™ STERI-STRIP™

## (undated) DEVICE — 3M™ MICROFOAM™ TAPE 1528-4: Brand: 3M™ MICROFOAM™

## (undated) DEVICE — GLOVE INDICATOR PI UNDERGLOVE SZ 6.5 BLUE

## (undated) DEVICE — 6617 IOBAN II PATIENT ISOLATION DRAPE 5/BX,4BX/CS: Brand: STERI-DRAPE™ IOBAN™ 2

## (undated) DEVICE — SCD SEQUENTIAL COMPRESSION COMFORT SLEEVE MEDIUM KNEE LENGTH: Brand: KENDALL SCD

## (undated) DEVICE — THE SIMPULSE SOLO SYSTEM WITH ULTREX RETRACTABLE SPLASH SHIELD TIP: Brand: SIMPULSE SOLO

## (undated) DEVICE — GLOVE SRG BIOGEL 8

## (undated) DEVICE — SUT STRATAFIX SPIRAL PDS PLUS 1 CTX 18 IN SXPP1A400

## (undated) DEVICE — INTENDED FOR TISSUE SEPARATION, AND OTHER PROCEDURES THAT REQUIRE A SHARP SURGICAL BLADE TO PUNCTURE OR CUT.: Brand: BARD-PARKER ® CARBON RIB-BACK BLADES

## (undated) DEVICE — PLUMEPEN PRO 10FT

## (undated) DEVICE — SUT VICRYL 3-0 SH 27 IN J416H

## (undated) DEVICE — GAUZE SPONGES,16 PLY: Brand: CURITY

## (undated) DEVICE — OCCLUSIVE GAUZE STRIP,3% BISMUTH TRIBROMOPHENATE IN PETROLATUM BLEND: Brand: XEROFORM

## (undated) DEVICE — GLOVE INDICATOR PI UNDERGLOVE SZ 8 BLUE

## (undated) DEVICE — BETHLEHEM TOTAL HIP, KIT: Brand: CARDINAL HEALTH

## (undated) DEVICE — DRAPE C-ARM X-RAY

## (undated) DEVICE — REM POLYHESIVE ADULT PATIENT RETURN ELECTRODE: Brand: VALLEYLAB

## (undated) DEVICE — WEBRIL 6 IN UNSTERILE

## (undated) DEVICE — SUT VICRYL 2-0 SH 27 IN UNDYED J417H

## (undated) DEVICE — SUT MONOCRYL 4-0 PS-2 27 IN Y426H

## (undated) DEVICE — NEEDLE COUNTER LG W/RULER

## (undated) DEVICE — STRL UNIVERSAL MINOR VAGINAL: Brand: CARDINAL HEALTH

## (undated) DEVICE — HOOD: Brand: FLYTE, SURGICOOL

## (undated) DEVICE — FRAZIER SUCTION INSTRUMENT 18 FR W/OBTURATOR, NO CONTROL VENT: Brand: FRAZIER

## (undated) DEVICE — PREP SURGICAL PURPREP 26ML

## (undated) DEVICE — BUTTON SWITCH PENCIL HOLSTER: Brand: VALLEYLAB

## (undated) DEVICE — NEEDLE 25G X 1 1/2

## (undated) DEVICE — SUT VICRYL 2-0 CT-1 36 IN J945H

## (undated) DEVICE — POSITIONER HANA TABLE PACK

## (undated) DEVICE — GLOVE SRG BIOGEL 7

## (undated) DEVICE — INTENDED FOR TISSUE SEPARATION, AND OTHER PROCEDURES THAT REQUIRE A SHARP SURGICAL BLADE TO PUNCTURE OR CUT.: Brand: BARD-PARKER SAFETY BLADES SIZE 15, STERILE